# Patient Record
Sex: MALE | Race: BLACK OR AFRICAN AMERICAN | NOT HISPANIC OR LATINO | URBAN - METROPOLITAN AREA
[De-identification: names, ages, dates, MRNs, and addresses within clinical notes are randomized per-mention and may not be internally consistent; named-entity substitution may affect disease eponyms.]

---

## 2017-01-10 NOTE — HP
CIWA Score





- CIWA Score


Nausea/Vomiting: 3


Muscle Tremors: 3


Anxiety: 2


Agitation: 3


Paroxysmal Sweats: 3


Orientation: 0-Oriented


Tacttile Disturbances: 1-Very Mild Itch/Numbness


Auditory Disturbances: 0-None


Visual Disturbances: 0-None


Headache: 2-Mild


CIWA-Ar Total Score: 17





Admission ROS [facility]





- HPI


Chief Complaint: 


I need help to stop using alcohol and cocaine .


Allergies/Adverse Reactions: 


 Allergies











Allergy/AdvReac Type Severity Reaction Status Date / Time


 


tylemperatriz with codeine Allergy Intermediate Swelling Uncoded 01/10/17 17:52











History of Present Illness: 


89y/o m pt with h/o htn aox3 in  nad ambulating  and cooperative with exam. 


Exam Limitations: No Limitations





- Ebola screening


Have you traveled outside of the country in the last 21 days: No


Have you had contact with anyone from an Ebola affected area: No


Have you been sick,other than usual withdrawal symptoms: No


Do you have a fever: No





- Review of Systems


Constitutional: Malaise, Night Sweats, Changes in sleep


EENT: reports: Ear Pain


Respiratory: reports: Shortness of Breath


Cardiac: reports: No Symptoms Reported, Chest Tightness


GI: reports: Indigestion


: reports: Frequency


Musculoskeletal: reports: Muscle Weakness (left sided weakness occas. ambulates 

with a cane)


Integumentary: reports: No Symptoms Reported


Neuro: reports: Headache, Weakness


Endocrine: reports: No Symptoms Reported


Hematology: reports: No Symptoms Reported


Psychiatric: reports: No Sypmtoms Reported


Other Systems: Reviewed and Negative





Patient History





- Patient Medical History


Hx Anemia: No


Hx Asthma: No


Hx Cancer: No


Hx Cardiac Disorders: No


Hx Congestive Heart Failure: No


Hx Hypertension: Yes


Hx Hypercholesterolemia: Yes


Hx Pacemaker: No


HX Cerebrovascular Accident: Yes (x 3 )


Hx Seizures: No


Hx Dementia: No (h/o blackouts last 5 yrs ago )


Hx Diabetes: No


Hx Gastrointestinal Disorders: Yes (gerd )


Hx Liver Disease: No


Hx Genitourinary Disorders: Yes (bph )


Hx Sexually Transmitted Disorders: No


Hx Renal Disease (ESRD): No


Hx Thyroid Disease: No


Hx Human Immunodeficiency Virus (HIV): No (neg, )


Hx Hepatitis C: No


Hx Depression: Yes


Hx Suicide Attempt: No


Hx Bipolar Disorder: No


Hx Schizophrenia: No


Other Medical History: h/o dvt-pe on warfarin 7.0mg/d 





- Patient Surgical History


Hx Appendectomy: Yes





- PPD History


Documented Results: Negative w/o proof


PPD to be Administered?: Yes





- Reproductive History


Patient is a Female of Child Bearing Age (11 -55 yrs old): No





- Smoking Cessation


Smoking history: Current some day smoker


Have you smoked in the past 12 months: Yes


Aproximately how many cigarettes per day: 2


Cigars Per Day: 0


Hx Chewing Tobacco Use: No


Initiated information on smoking cessation: Yes


'Breaking Loose' booklet given: 01/10/17





- Substance & Tx. History


Hx Alcohol Use: Yes


Hx Substance Use: Yes


Substance Use Type: Alcohol, Cocaine


Hx Substance Use Treatment: Yes





- Substances Abused


  ** Alcohol


Route: Oral


Frequency: Daily


Amount used: vodka 2 pts/d 


Age of first use: 18


Date of Last Use: 01/10/17





  ** Crack


Route: Smoking


Frequency: 1-3 times last 30 days


Amount used: $100-300/ use 


Age of first use: 29


Date of Last Use: 18





Family Disease History





- Family Disease History


Family Disease History: Other: Father (cva  ), Mother (cva  ), 

Sister (cva )





Admission Physical Exam [facility]





- Vital Signs


Vital Signs: 


 Vital Signs - 24 hr











  01/10/17





  16:24


 


Temperature 96.7 F L


 


Pulse Rate 110 H


 


Respiratory 20





Rate 


 


Blood Pressure 159/100








55 y/o m pt 





- Physical


General Appearance: Yes: Disheveled, Obese, Sweating, Anxious


HEENTM: Yes: EOMI, Hearing grossly Normal, Normocephalic, Normal Voice, ANDI


Respiratory: Yes: Chest Non-Tender, Lungs Clear, Normal Breath Sounds, No 

Respiratory Distress


Neck: Yes: Supple, Trachea in good position


Breast: Yes: Within Normal Limits


Cardiology: Yes: Regular Rhythm, S1, S2, Tachycardia


Abdominal: Yes: Non Tender, Flat, Soft, Increased Bowel Sounds, Protuberent


Genitourinary: Yes: Frequency, Uregency, Dribblimg


Back: Yes: Decreased Range of Motion


Musculoskeletal: Yes: Back pain


Extremities: Yes: Tremors


Neurological: Yes: CNs II-XII NML intact, Fully Oriented, Alert, Normal Response


Integumentary: Yes: Moist


Lymphatic: Yes: Within Normal Limits





- Diagnostic


(1) Alcohol dependence with uncomplicated withdrawal


Current Visit: Yes   Status: Chronic





(2) Crack cocaine use


Current Visit: Yes   Status: Chronic





(3) HTN (hypertension)


Current Visit: Yes   Status: Chronic   Qualifiers: 


     Hypertension type: essential hypertension        Qualified Code(s): I10 - 

Essential (primary) hypertension  





(4) Hyperlipidemia


Current Visit: Yes   Status: Chronic   Qualifiers: 


     Hyperlipidemia type: unspecified        Qualified Code(s): E78.5 - 

Hyperlipidemia, unspecified  





(5) History of pulmonary embolism


Current Visit: Yes   Status: Chronic





(6) Nicotine abuse


Current Visit: Yes   Status: Chronic





(7) H/O: CVA (cerebrovascular accident)


Current Visit: Yes   Status: Chronic








Cleared for Admission S





- Detox or Rehab


[facility] Level of Care: Medically Managed


Detox Regimen/Protocol: Librium





BHS Breath Alcohol Content


Breath Alcohol Content: 0.083





Urine Drug Screen





- Results


Drug Screen Negative: No


Urine Drug Screen Results: SANDRINE-Cocaine

## 2017-01-11 NOTE — PN
S CIWA





- CIWA Score


Nausea/Vomitin-No Nausea/No Vomiting


Muscle Tremors: 4-Moderate,w/Arms Extend


Anxiety: 3


Agitation: 4-Moderately Restless


Paroxysmal Sweats: 3


Orientation: 0-Oriented


Tacttile Disturbances: 0-None


Auditory Disturbances: 0-None


Visual Disturbances: 0-None


Headache: 1-Very Mild


CIWA-Ar Total Score: 15





BHS Progress Note (SOAP)


Subjective: 


sweats


shakes


headache


interrupted sleep


Objective: 





17 11:22


 Vital Signs











Temperature  98.2 F   17 10:03


 


Pulse Rate  81   17 10:03


 


Respiratory Rate  16   17 10:03


 


Blood Pressure  135/98   17 10:03


 


O2 Sat by Pulse Oximetry (%)      








 Laboratory Tests











  01/10/17 01/11/17 01/11/17





  23:00 07:30 07:30


 


WBC   6.3 


 


RBC   4.09 


 


Hgb   11.7 


 


Hct   36.7 


 


MCV   89.6 


 


MCHC   32.0 


 


RDW   19.3 H 


 


Plt Count   412 


 


MPV   8.8 


 


Sodium    142


 


Potassium    4.0


 


Chloride    110 H


 


Carbon Dioxide    24


 


Anion Gap    8


 


BUN    9


 


Creatinine    0.9


 


Creat Clearance w eGFR    > 60


 


Random Glucose    86


 


Calcium    8.7


 


Total Bilirubin    0.7


 


AST    13 L


 


ALT    20


 


Alkaline Phosphatase    85


 


Total Protein    7.2


 


Albumin    3.7


 


Urine Color  Lt. yellow  


 


Urine Appearance  Clear  


 


Urine pH  7.0  


 


Ur Specific Gravity  1.010  


 


Urine Protein  Negative  


 


Urine Glucose (UA)  Negative  


 


Urine Ketones  Negative  


 


Urine Blood  Negative  


 


Urine Nitrite  Negative  


 


Urine Bilirubin  Negative  


 


Urine Urobilinogen  1.0 e.u/dl  


 


Ur Leukocyte Esterase  Negative  


 


RPR Titer   


 


HIV 1&2 Antibody Screen   


 


HIV P24 Antigen   














  17





  07:30 07:30


 


WBC  


 


RBC  


 


Hgb  


 


Hct  


 


MCV  


 


MCHC  


 


RDW  


 


Plt Count  


 


MPV  


 


Sodium  


 


Potassium  


 


Chloride  


 


Carbon Dioxide  


 


Anion Gap  


 


BUN  


 


Creatinine  


 


Creat Clearance w eGFR  


 


Random Glucose  


 


Calcium  


 


Total Bilirubin  


 


AST  


 


ALT  


 


Alkaline Phosphatase  


 


Total Protein  


 


Albumin  


 


Urine Color  


 


Urine Appearance  


 


Urine pH  


 


Ur Specific Gravity  


 


Urine Protein  


 


Urine Glucose (UA)  


 


Urine Ketones  


 


Urine Blood  


 


Urine Nitrite  


 


Urine Bilirubin  


 


Urine Urobilinogen  


 


Ur Leukocyte Esterase  


 


RPR Titer  Nonreactive 


 


HIV 1&2 Antibody Screen   Negative


 


HIV P24 Antigen   Negative








awake/alert


ambulating


no acute distress


Assessment: 





17 11:23


withdrawal sx


Plan: 


continue detox


increase fluids

## 2017-01-11 NOTE — CONSULT
[facility] Psychiatric Consult





- Data


Date of interview: 01/11/17


Admission source: [facility]


Identifying data: This is 56 years olod male with  psychiatric hospitalization 

history intoxicated with Alcohol, Crack and Nicotine


Substance Abuse History: - Smoking Cessation.  Smoking history: Current some 

day smoker.  Have you smoked in the past 12 months: Yes.  Aproximately how many 

cigarettes per day: 2.  Cigars Per Day: 0.  Hx Chewing Tobacco Use: No.  

Initiated information on smoking cessation: Yes.  'Breaking Loose' booklet given

: 01/10/17.  - Substance & Tx. History.  Hx Alcohol Use: Yes.  Hx Substance Use

: Yes.  Substance Use Type: Alcohol, Cocaine.  Hx Substance Use Treatment: Yes.

  - Substances Abused.  ** Alcohol.  Route: Oral.  Frequency: Daily.  Amount 

used: vodka 2 pts/d.  Age of first use: 18.  Date of Last Use: 01/10/17.  ** 

Crack.  Route: Smoking.  Frequency: 1-3 times last 30 days.  Amount used: $100-

300/ use.  Age of first use: 29.  Date of Last Use: 01/03/17


Medical History: HTN, Hyperlipidemia


Psychiatric History: Patient reportsm history of depression, repoprts most 

recent psychiatric admission on 2106 at [facility], reports no 

suicidal history, reports no medications taking prior to admission, willing to 

sart andideprssants on rehabilitation program


Physical/Sexual Abuse/Trauma History: Denies


Additional Comment: Detox Unit Care Protocol





Mental Status Exam





- Mental Status Exam


Alert and Oriented to: Person


Cognitive Function: Fair


Patient Appearance: Well Groomed


Mood: Apprehensive


Patient Behavior: Cooperative


Speech Pattern: Appropriate


Voice Loudness: Normal


Thought Process: Goal Oriented


Hallucinations: Denies


Suicidal Ideation: Denies


Homicidal Ideation: Denies


Insight/Judgement: Fair


Sleep: Difficulty falling asleep


Appetite: Fair


Muscle strength/Tone: Normal


Gait/Station: Normal


Additional Comments: Detox Unit Care Protocol





Psychiatric Findings





- Problem List (Axis 1, 2,3)


(1) Alcohol dependence with uncomplicated withdrawal


Current Visit: Yes   Status: Chronic





(2) Crack cocaine use


Current Visit: Yes   Status: Chronic





(3) Nicotine abuse


Current Visit: Yes   Status: Chronic





(4) Drug-induced mood disorder


Current Visit: Yes   Status: Acute








- Initial Treatment Plan


Initial Treatment Plan: Detox Unit Care Protocol

## 2017-01-11 NOTE — EKG
Test Reason : 

Blood Pressure : ***/*** mmHG

Vent. Rate : 099 BPM     Atrial Rate : 099 BPM

   P-R Int : 178 ms          QRS Dur : 080 ms

    QT Int : 352 ms       P-R-T Axes : 078 025 061 degrees

   QTc Int : 451 ms

 

NORMAL SINUS RHYTHM

NORMAL ECG

NO PREVIOUS ECGS AVAILABLE

Confirmed by EVER GONSALVES, CORONA ([id number]) on 1/11/2017 10:25:45 AM

 

Referred By:             Confirmed By:CORONA CURTIS MD

## 2017-01-12 NOTE — PN
S CIWA





- CIWA Score


Nausea/Vomitin


Muscle Tremors: 2


Anxiety: 2


Agitation: 2


Paroxysmal Sweats: 2


Orientation: 0-Oriented


Tacttile Disturbances: 1-Very Mild Itch/Numbness


Auditory Disturbances: 0-None


Visual Disturbances: 0-None


Headache: 0-None Present


CIWA-Ar Total Score: 11





BHS Progress Note (SOAP)


Subjective: 


feeling better , some swewats , headache and nasal congestion 


Objective: 





17 09:50


 Vital Signs











Temperature  97.1 F L  17 05:52


 


Pulse Rate  71   17 05:52


 


Respiratory Rate  18   17 05:52


 


Blood Pressure  107/61   17 05:52


 


O2 Sat by Pulse Oximetry (%)      








 Laboratory Tests











  01/10/17 01/11/17 01/11/17





  23:00 07:30 07:30


 


WBC   6.3 


 


RBC   4.09 


 


Hgb   11.7 


 


Hct   36.7 


 


MCV   89.6 


 


MCHC   32.0 


 


RDW   19.3 H 


 


Plt Count   412 


 


MPV   8.8 


 


Sodium    142


 


Potassium    4.0


 


Chloride    110 H


 


Carbon Dioxide    24


 


Anion Gap    8


 


BUN    9


 


Creatinine    0.9


 


Creat Clearance w eGFR    > 60


 


Random Glucose    86


 


Calcium    8.7


 


Total Bilirubin    0.7


 


AST    13 L


 


ALT    20


 


Alkaline Phosphatase    85


 


Total Protein    7.2


 


Albumin    3.7


 


Urine Color  Lt. yellow  


 


Urine Appearance  Clear  


 


Urine pH  7.0  


 


Ur Specific Gravity  1.010  


 


Urine Protein  Negative  


 


Urine Glucose (UA)  Negative  


 


Urine Ketones  Negative  


 


Urine Blood  Negative  


 


Urine Nitrite  Negative  


 


Urine Bilirubin  Negative  


 


Urine Urobilinogen  1.0 e.u/dl  


 


Ur Leukocyte Esterase  Negative  


 


RPR Titer   


 


HIV 1&2 Antibody Screen   


 


HIV P24 Antigen   














  17





  07:30 07:30


 


WBC  


 


RBC  


 


Hgb  


 


Hct  


 


MCV  


 


MCHC  


 


RDW  


 


Plt Count  


 


MPV  


 


Sodium  


 


Potassium  


 


Chloride  


 


Carbon Dioxide  


 


Anion Gap  


 


BUN  


 


Creatinine  


 


Creat Clearance w eGFR  


 


Random Glucose  


 


Calcium  


 


Total Bilirubin  


 


AST  


 


ALT  


 


Alkaline Phosphatase  


 


Total Protein  


 


Albumin  


 


Urine Color  


 


Urine Appearance  


 


Urine pH  


 


Ur Specific Gravity  


 


Urine Protein  


 


Urine Glucose (UA)  


 


Urine Ketones  


 


Urine Blood  


 


Urine Nitrite  


 


Urine Bilirubin  


 


Urine Urobilinogen  


 


Ur Leukocyte Esterase  


 


RPR Titer  Nonreactive 


 


HIV 1&2 Antibody Screen   Negative


 


HIV P24 Antigen   Negative











Assessment: 





17 09:50


withdrawl sx's 


Plan: 


cont.detox 


increase fluids 


inr pending 


flonase 


tylenolprn

## 2017-01-13 NOTE — DS
BHS Detox Discharge Summary


Admission Date: 


01/10/17





Discharge Date: 01/13/17





- History


Present History: Alcohol Dependence, Cocaine Dependence


Additional Comments: 


patient is stable to be discharged to [facility] today


Pertinent Past History: 


hypertension


hyperlipidemia


old cva


history of pulmonary embolism





- Physical Exam Results


Vital Signs: 


 Vital Signs











Temperature  96.4 F L  01/13/17 10:05


 


Pulse Rate  106 H  01/13/17 10:05


 


Respiratory Rate  20   01/13/17 10:05


 


Blood Pressure  140/89   01/13/17 10:05


 


O2 Sat by Pulse Oximetry (%)      











Pertinent Admission Physical Exam Findings: 


withdrawal symptom





- Treatment


Hospital Course: Detox Protocol Followed, Detoxed Safely, Responded well, 

Discharged Condition Good, Rehab Referral Accepted


[facility]





- Medication


Discharge Medications: 


Ambulatory Orders





Clopidogrel Bisulfate [Plavix -] 75 mg PO DAILY 01/10/17 


Hydrochlorothiazide [Hctz -] 12.5 mg PO DAILY 01/10/17 


Lisinopril [Prinivil -] 40 mg PO DAILY 01/10/17 


Omeprazole 40 mg PO DAILY 01/10/17 


Tamsulosin HCl [Flomax] 0.4 mg PO DAILY 01/10/17 


Warfarin Sodium [Coumadin] 7.5 mg PO DAILY 01/10/17 











- Diagnosis


(1) Alcohol dependence with uncomplicated withdrawal


Current Visit: Yes   Status: Chronic





(2) H/O: CVA (cerebrovascular accident)


Current Visit: Yes   Status: Chronic





(3) HTN (hypertension)


Current Visit: Yes   Status: Chronic   Qualifiers: 


     Hypertension type: essential hypertension        Qualified Code(s): I10 - 

Essential (primary) hypertension  





(4) History of pulmonary embolism


Current Visit: Yes   Status: Chronic





(5) Hyperlipidemia


Current Visit: Yes   Status: Chronic   Qualifiers: 


     Hyperlipidemia type: unspecified        Qualified Code(s): E78.5 - 

Hyperlipidemia, unspecified  





(6) Nicotine abuse


Current Visit: Yes   Status: Chronic





(7) Cocaine dependence


Current Visit: Yes   Status: Acute   








- AMA


Did Patient Leave Against Medical Advice: No

## 2017-01-13 NOTE — PN
BHS Progress Note (SOAP)


Subjective: 


ALERT,INTERRUPTED SLEEP,ANXIOUS


Objective: 





01/13/17 10:59


 Vital Signs











Temperature  96.4 F L  01/13/17 10:05


 


Pulse Rate  106 H  01/13/17 10:05


 


Respiratory Rate  20   01/13/17 10:05


 


Blood Pressure  140/89   01/13/17 10:05


 


O2 Sat by Pulse Oximetry (%)      











Assessment: 





01/13/17 10:59


WITHDRAWAL SYMPTOM


Plan: 


CONTINUE DETOX,DISCHARGE IN AM

## 2017-01-14 NOTE — PN
BHS Progress Note


Note: 


INR IS 2.15 THERAPEUTIC RANGE,TO CONTINUE COUMADIN 7.5 MGS PO DAILY,REPEAT INR 

ON MON 01/16/17

## 2017-01-23 NOTE — PN
BHS Progress Note (SOAP)


Subjective: 


nose bleed after blowing nose , no other bleeding sites 


Objective: 





01/23/17 11:55


 Vital Signs











Temperature  98.1 F   01/23/17 06:43


 


Pulse Rate  95 H  01/23/17 10:17


 


Respiratory Rate  18   01/23/17 06:43


 


Blood Pressure  135/84   01/23/17 10:17


 


O2 Sat by Pulse Oximetry (%)      








 Laboratory Last Values











INR  3.67  (0.82-1.09)  H D 01/23/17  06:30    








pt aox3 in nad ambulating ,


no acute bleeding 


Assessment: 





01/23/17 11:56


epistaxis 


elevated inr 


Plan: 


plan - decrease warfarin to 5mg po daily


         repeat inr 


         report any bleeding episodes to staff

## 2017-02-02 NOTE — PN
Psychiatric Progress Note


Vital Signs: 


 Vital Signs











 Period  Temp  Pulse  Resp  BP Sys/Pickering  Pulse Ox


 


 Last 24 Hr  97.5 F  76-93  18-20  136-144/87-93  











Date of Session: 02/02/17


Chief Complaint:: discharge visit


HPI: Patient has addressed alcohol, cocaine,nicotine dependence.


ROS: HTN, CVA, h/o PE, hypercholesterolemia medically managed.


Current Medications: 


Active Medications











Generic Name Dose Route Start Last Admin





  Trade Name Freq  PRN Reason Stop Dose Admin


 


Acetaminophen  650 mg 01/13/17 15:48 02/01/17 21:54





  Tylenol -  PO   650 mg





  Q4H PRN   Administration





  FEVER OR PAIN   


 


Al Hydroxide/Mg Hydroxide  30 ml 01/13/17 15:48 01/26/17 12:28





  Mylanta Oral Suspension -  PO   30 ml





  Q6H PRN   Administration





  DYSPEPSIA   


 


Albuterol Sulfate  2 puff 01/16/17 12:54 02/01/17 21:18





  Ventolin Hfa Inhaler -  IH   2 puff





  Q4H PRN   Administration





  SHORT OF BREATH/WHEEZING   


 


Clopidogrel Bisulfate  75 mg 01/14/17 10:00 02/02/17 09:54





  Plavix -  PO   75 mg





  DAILY ASHLEY   Administration


 


Diphenhydramine HCl  50 mg 01/13/17 15:48 02/01/17 21:17





  Benadryl -  PO   50 mg





  HSMR1 PRN   Administration





  FOR ITCHING   


 


Eucalyptus/Menthol/Phenol/Sorbitol  1 each 01/13/17 15:48  





  Cepastat Lozenge -  MM   





  Q4H PRN   





  SORE THROAT   


 


Fluticasone Propionate  1 spray 01/13/17 22:00 02/02/17 09:55





  Flonase -  NS   1 spray





  BID ASHLEY   Administration


 


Guaifenesin  10 ml 01/13/17 15:48 01/24/17 21:20





  Robitussin Dm -  PO   10 ml





  Q6H PRN   Administration





  COUGH   


 


Hydrochlorothiazide  12.5 mg 01/14/17 10:00 02/02/17 09:54





  Hctz -  PO   12.5 mg





  DAILY ASHLEY   Administration


 


Hydroxyzine Pamoate  25 mg 01/13/17 15:48  





  Vistaril -  PO   





  Q4H PRN   





  AGITATION   


 


Lisinopril  40 mg 01/14/17 10:00 02/02/17 09:54





  Prinivil  PO   40 mg





  DAILY ASHLEY   Administration


 


Loperamide HCl  4 mg 01/13/17 15:48  





  Imodium -  PO   





  Q6H PRN   





  DIARRHEA   


 


Loratadine  10 mg 01/26/17 10:00 02/02/17 09:54





  Claritin -  PO   10 mg





  DAILY ASHLEY   Administration


 


Magnesium Hydroxide  30 ml 01/13/17 15:48  





  Milk Of Magnesia -  PO   





  DAILY PRN   





  CONSTIPATION   


 


Metoprolol Tartrate  25 mg 01/20/17 13:45 02/02/17 09:54





  Lopressor -  PO   25 mg





  BID ASHLEY   Administration


 


Pantoprazole Sodium  40 mg 01/20/17 10:00 02/02/17 09:54





  Protonix -  PO   40 mg





  DAILY ASHLEY   Administration


 


Prenatal Multivit/Folic Acid/Iron  1 tab 01/14/17 10:00 02/02/17 09:54





  Prenatal Vitamins (Sjr) -  PO   1 tab





  DAILY ASHLEY   Administration


 


Pseudoephedrine/Triprolidine  1 combo 01/13/17 15:48 01/14/17 21:20





  Actifed -  PO   1 combo





  TID PRN   Administration





  NASAL CONGESTION   


 


Tamsulosin HCl  0.4 mg 01/14/17 10:00 02/02/17 09:54





  Flomax -  PO   0.4 mg





  DAILY ASHLEY   Administration


 


Thiamine HCl  100 mg 01/13/17 22:00 02/01/17 21:17





  Vitamin B1 -  PO   100 mg





  HS ASHLEY   Administration


 


Warfarin Sodium  4 mg 01/24/17 18:00 02/01/17 17:00





  Coumadin -  PO   4 mg





  DAILY@1800 ASHLEY   Administration











Current Side Effect: No


Lab tests ordered: No


Lab tests reviewed: Yes


Provider note:: Patient has acompleted today this treatment and met his goals, 

will continue to address his issues at [facility] opd. He verbalized 

understanding of the negative consequenses of alcohol cocaine on his major life 

mert and motivated to continue maintain abstinence and adherent to every 

aspects of his aftercare plans, patient is stable for discharge sven.


Total face to face time:: 20





Mental Status Exam





- Mental Status Exam


Alert and Oriented to: Time, Place, Person


Cognitive Function: Good


Patient Appearance: Well Groomed


Mood: Hopeful


Affect: Appropriate, Mood Congruent


Patient Behavior: Appropriate, Cooperative


Speech Pattern: Clear, Appropriate


Voice Loudness: Normal


Thought Process: Intact, Goal Oriented


Thought Disorder: Not Present


Hallucinations: Denies


Suicidal Ideation: Denies


Homicidal Ideation: Denies


Insight/Judgement: Fair


Sleep: Fair


Appetite: Fair


Muscle strength/Tone: Normal


Gait/Station: Normal





Psychiatric Treatment Plan





- Problem List


(1) Crack cocaine use


Current Visit: No





(2) H/O: CVA (cerebrovascular accident)


Current Visit: No





(3) HTN (hypertension)


Current Visit: No   Qualifiers: 


     Hypertension type: essential hypertension        Qualified Code(s): I10 - 

Essential (primary) hypertension  





(4) History of pulmonary embolism


Current Visit: No





(5) Hyperlipidemia


Current Visit: No   Qualifiers: 


     Hyperlipidemia type: unspecified        Qualified Code(s): E78.5 - 

Hyperlipidemia, unspecified  





(6) Alcohol dependence


Current Visit: Yes   





(7) Nicotine dependence


Current Visit: Yes

## 2017-04-02 ENCOUNTER — EMERGENCY (EMERGENCY)
Facility: HOSPITAL | Age: 57
LOS: 1 days | Discharge: HOME | End: 2017-04-02
Attending: EMERGENCY MEDICINE | Admitting: EMERGENCY MEDICINE
Payer: MEDICARE

## 2017-04-02 VITALS
TEMPERATURE: 98 F | RESPIRATION RATE: 16 BRPM | OXYGEN SATURATION: 98 % | HEART RATE: 106 BPM | SYSTOLIC BLOOD PRESSURE: 140 MMHG | DIASTOLIC BLOOD PRESSURE: 90 MMHG

## 2017-04-02 DIAGNOSIS — Z88.8 ALLERGY STATUS TO OTHER DRUGS, MEDICAMENTS AND BIOLOGICAL SUBSTANCES STATUS: ICD-10-CM

## 2017-04-02 DIAGNOSIS — R06.02 SHORTNESS OF BREATH: ICD-10-CM

## 2017-04-02 DIAGNOSIS — I10 ESSENTIAL (PRIMARY) HYPERTENSION: ICD-10-CM

## 2017-04-02 DIAGNOSIS — Z79.899 OTHER LONG TERM (CURRENT) DRUG THERAPY: ICD-10-CM

## 2017-04-02 DIAGNOSIS — R07.9 CHEST PAIN, UNSPECIFIED: ICD-10-CM

## 2017-04-02 DIAGNOSIS — R42 DIZZINESS AND GIDDINESS: ICD-10-CM

## 2017-04-02 LAB
ALBUMIN SERPL ELPH-MCNC: 3.5 G/DL — SIGNIFICANT CHANGE UP (ref 3.4–5)
ALP SERPL-CCNC: 81 U/L — SIGNIFICANT CHANGE UP (ref 40–120)
ALT FLD-CCNC: 19 U/L — SIGNIFICANT CHANGE UP (ref 12–42)
ANION GAP SERPL CALC-SCNC: 12 MMOL/L — SIGNIFICANT CHANGE UP (ref 9–16)
AST SERPL-CCNC: 21 U/L — SIGNIFICANT CHANGE UP (ref 15–37)
BASOPHILS NFR BLD AUTO: 0.4 % — SIGNIFICANT CHANGE UP (ref 0–2)
BILIRUB SERPL-MCNC: 1 MG/DL — SIGNIFICANT CHANGE UP (ref 0.2–1.2)
BUN SERPL-MCNC: 13 MG/DL — SIGNIFICANT CHANGE UP (ref 7–23)
CALCIUM SERPL-MCNC: 8.5 MG/DL — SIGNIFICANT CHANGE UP (ref 8.5–10.5)
CHLORIDE SERPL-SCNC: 107 MMOL/L — SIGNIFICANT CHANGE UP (ref 96–108)
CO2 SERPL-SCNC: 25 MMOL/L — SIGNIFICANT CHANGE UP (ref 22–31)
CREAT SERPL-MCNC: 0.92 MG/DL — SIGNIFICANT CHANGE UP (ref 0.5–1.3)
EOSINOPHIL NFR BLD AUTO: 0.7 % — SIGNIFICANT CHANGE UP (ref 0–6)
GLUCOSE SERPL-MCNC: 84 MG/DL — SIGNIFICANT CHANGE UP (ref 70–99)
HCT VFR BLD CALC: 36.4 % — LOW (ref 39–50)
HGB BLD-MCNC: 11.8 G/DL — LOW (ref 13–17)
IMM GRANULOCYTES NFR BLD AUTO: 0.4 % — SIGNIFICANT CHANGE UP (ref 0–1.5)
INR BLD: 1.6 — HIGH (ref 0.88–1.16)
LYMPHOCYTES # BLD AUTO: 20.1 % — SIGNIFICANT CHANGE UP (ref 13–44)
MCHC RBC-ENTMCNC: 30.7 PG — SIGNIFICANT CHANGE UP (ref 27–34)
MCHC RBC-ENTMCNC: 32.4 G/DL — SIGNIFICANT CHANGE UP (ref 32–36)
MCV RBC AUTO: 94.8 FL — SIGNIFICANT CHANGE UP (ref 80–100)
MONOCYTES NFR BLD AUTO: 11 % — SIGNIFICANT CHANGE UP (ref 2–14)
NEUTROPHILS NFR BLD AUTO: 67.4 % — SIGNIFICANT CHANGE UP (ref 43–77)
PLATELET # BLD AUTO: 390 K/UL — SIGNIFICANT CHANGE UP (ref 150–400)
POTASSIUM SERPL-MCNC: 3.3 MMOL/L — LOW (ref 3.5–5.3)
POTASSIUM SERPL-SCNC: 3.3 MMOL/L — LOW (ref 3.5–5.3)
PROT SERPL-MCNC: 7.8 G/DL — SIGNIFICANT CHANGE UP (ref 6.4–8.2)
PROTHROM AB SERPL-ACNC: 17.8 SEC — HIGH (ref 9.8–12.7)
RBC # BLD: 3.84 M/UL — LOW (ref 4.2–5.8)
RBC # FLD: 16.8 % — SIGNIFICANT CHANGE UP (ref 10.3–16.9)
SODIUM SERPL-SCNC: 144 MMOL/L — SIGNIFICANT CHANGE UP (ref 132–145)
TROPONIN I SERPL-MCNC: <0.017 NG/ML — LOW (ref 0.02–0.06)
WBC # BLD: 8.3 K/UL — SIGNIFICANT CHANGE UP (ref 3.8–10.5)
WBC # FLD AUTO: 8.3 K/UL — SIGNIFICANT CHANGE UP (ref 3.8–10.5)

## 2017-04-02 PROCEDURE — 99220: CPT | Mod: 25

## 2017-04-02 PROCEDURE — 93010 ELECTROCARDIOGRAM REPORT: CPT | Mod: 77

## 2017-04-02 PROCEDURE — 71010: CPT | Mod: 26

## 2017-04-02 PROCEDURE — 99284 EMERGENCY DEPT VISIT MOD MDM: CPT

## 2017-04-02 RX ORDER — ENOXAPARIN SODIUM 100 MG/ML
100 INJECTION SUBCUTANEOUS ONCE
Qty: 0 | Refills: 0 | Status: COMPLETED | OUTPATIENT
Start: 2017-04-02 | End: 2017-04-02

## 2017-04-02 RX ORDER — WARFARIN SODIUM 2.5 MG/1
8 TABLET ORAL ONCE
Qty: 0 | Refills: 0 | Status: DISCONTINUED | OUTPATIENT
Start: 2017-04-02 | End: 2017-04-02

## 2017-04-02 RX ORDER — WARFARIN SODIUM 2.5 MG/1
5 TABLET ORAL ONCE
Qty: 0 | Refills: 0 | Status: COMPLETED | OUTPATIENT
Start: 2017-04-02 | End: 2017-04-02

## 2017-04-02 RX ORDER — ENOXAPARIN SODIUM 100 MG/ML
80 INJECTION SUBCUTANEOUS ONCE
Qty: 0 | Refills: 0 | Status: DISCONTINUED | OUTPATIENT
Start: 2017-04-02 | End: 2017-04-02

## 2017-04-02 RX ORDER — AMLODIPINE BESYLATE 2.5 MG/1
5 TABLET ORAL ONCE
Qty: 0 | Refills: 0 | Status: COMPLETED | OUTPATIENT
Start: 2017-04-02 | End: 2017-04-02

## 2017-04-02 RX ORDER — SODIUM CHLORIDE 9 MG/ML
1000 INJECTION INTRAMUSCULAR; INTRAVENOUS; SUBCUTANEOUS
Qty: 0 | Refills: 0 | Status: DISCONTINUED | OUTPATIENT
Start: 2017-04-02 | End: 2017-04-06

## 2017-04-02 RX ADMIN — WARFARIN SODIUM 5 MILLIGRAM(S): 2.5 TABLET ORAL at 21:20

## 2017-04-02 RX ADMIN — AMLODIPINE BESYLATE 5 MILLIGRAM(S): 2.5 TABLET ORAL at 16:34

## 2017-04-02 RX ADMIN — SODIUM CHLORIDE 60 MILLILITER(S): 9 INJECTION INTRAMUSCULAR; INTRAVENOUS; SUBCUTANEOUS at 21:16

## 2017-04-02 RX ADMIN — ENOXAPARIN SODIUM 100 MILLIGRAM(S): 100 INJECTION SUBCUTANEOUS at 21:15

## 2017-04-02 NOTE — ED CDU PROVIDER NOTE - OBJECTIVE STATEMENT
57 y/o M with pmhx pancreatitis, CVA, HTN, MI, PE (on Coumadin), c/o feeling dizzy and SOB after getting off a train at [place] today. Pt admits to using cocaine and alcohol last night. Denies chest pain, n/v, calf pain or swelling. Received 162mg ASA and ntg with EMS prior to arrival which made him feel better.  Takes coumadin for PE.  states when INR is low, takes lovenox as bridging at home.

## 2017-04-02 NOTE — ED PROVIDER NOTE - NS ED MD SCRIBE ATTENDING SCRIBE SECTIONS
HIV/PHYSICAL EXAM/REVIEW OF SYSTEMS/VITAL SIGNS( Pullset)/RESULTS/PAST MEDICAL/SURGICAL/SOCIAL HISTORY/HISTORY OF PRESENT ILLNESS

## 2017-04-02 NOTE — ED PROVIDER NOTE - PMH
Acute pancreatitis  Pancreatitis  Cerebral artery occlusion with cerebral infarction  Stroke  Diverticulosis of large intestine  Diverticulosis  History of pulmonary embolism    HTN (hypertension)    MI (myocardial infarction)

## 2017-04-02 NOTE — ED PROVIDER NOTE - INTERPRETATION
normal sinus rhythm, Normal axis, Normal [place] interval and QRS complex. There are no acute ischemic ST or T-wave changes.

## 2017-04-02 NOTE — ED ADULT TRIAGE NOTE - CHIEF COMPLAINT QUOTE
Patient complaining of chest pain, dizziness and shortness of breath x 1 hr Patient complaining of chest pain, dizziness and shortness of breath x 1 hr. Given , and 0.8 spray of Nitro Patient complaining of chest pain, dizziness and shortness of breath x 1 hr.  Admits cocaine and ETOH yesterday Given , and 0.8 spray of Nitro

## 2017-04-02 NOTE — ED ADULT NURSE NOTE - CHIEF COMPLAINT QUOTE
Patient complaining of chest pain, dizziness and shortness of breath x 1 hr.  Admits cocaine and ETOH yesterday Given , and 0.8 spray of Nitro

## 2017-04-02 NOTE — ED PROVIDER NOTE - MEDICAL DECISION MAKING DETAILS
sob, LH, baseline has cp, hx of MI, had cocaine last night w/ EtOH, on coumadin for PE, will check labs, screening EKG, will likely keep on obs for cardiology eval in the AM, continue to trend enzyme

## 2017-04-02 NOTE — ED PROVIDER NOTE - OBJECTIVE STATEMENT
55 y/o M with pmhx pancreatitis, CVA, HTN, MI, PE (on Coumadin), c/o feeling dizzy and SOB after getting off a train at [place] today. Pt admits to using cocaine and alcohol last night. Denies chest pain, n/v, calf pain or swelling. Received 162mg ASA and ntg with EMS prior to arrival. 55 y/o M with pmhx pancreatitis, CVA, HTN, MI, PE (on Coumadin), c/o feeling dizzy and SOB after getting off a train at [place] today. Pt admits to using cocaine and alcohol last night. Denies chest pain, n/v, calf pain or swelling. Received 162mg ASA and ntg with EMS prior to arrival which made him feel better.  Takes coumadin for PE.  states when INR is low, takes lovenox as bridging at home.

## 2017-04-03 VITALS
HEART RATE: 83 BPM | TEMPERATURE: 98 F | RESPIRATION RATE: 16 BRPM | DIASTOLIC BLOOD PRESSURE: 88 MMHG | SYSTOLIC BLOOD PRESSURE: 146 MMHG | OXYGEN SATURATION: 97 %

## 2017-04-03 LAB
TROPONIN I SERPL-MCNC: <0.017 NG/ML — LOW (ref 0.02–0.06)
TROPONIN I SERPL-MCNC: <0.017 NG/ML — LOW (ref 0.02–0.06)

## 2017-04-03 PROCEDURE — 99217: CPT

## 2017-04-03 RX ORDER — ENOXAPARIN SODIUM 100 MG/ML
100 INJECTION SUBCUTANEOUS ONCE
Qty: 0 | Refills: 0 | Status: COMPLETED | OUTPATIENT
Start: 2017-04-03 | End: 2017-04-03

## 2017-04-03 RX ADMIN — ENOXAPARIN SODIUM 100 MILLIGRAM(S): 100 INJECTION SUBCUTANEOUS at 09:48

## 2017-04-03 NOTE — SBIRT NOTE. - NSSBIRTSERVICES_GEN_A_ED_FT
Provided SBIRT services. Full Screen Positive. Brief Intervention Performed. Screening results were reviewed with the patient and patient was provided information about healthy guidelines and potential negative consequences associated with level of risk.   Motivation and readiness to reduce or stop use was discussed and goals and activities to make changes were suggested/offered.    Audit Score :8    Dast Score :2    Duration : 15 minutes

## 2017-04-03 NOTE — CONSULT NOTE ADULT - SUBJECTIVE AND OBJECTIVE BOX
[facility] Cardiology Consultation    CHIEF COMPLAINT: dizziness    HISTORY OF PRESENT ILLNESS: The patient is a poor historian. Patient is seen this morning after completing 3 sets of cardiac markers and observational monitoring. He states that, as he was getting off the train, he noted a sensation of feeling dizzy and lightheaded. He did not pass out at the times. He notes possible retrosternal chest discomfort of unclear quality or duration as an associated complaint. He follows with a doctor in [place] and sees a cardiology on occasion. He admits to alcohol and cocaine use on occasion.     PAST MEDICAL & SURGICAL HISTORY:  HTN (hypertension)  MI (myocardial infarction)  History of pulmonary embolism  Cerebral artery occlusion with cerebral infarction: Stroke  Acute pancreatitis: Pancreatitis  Diverticulosis of large intestine: Diverticulosis    Allergies: codeine (Unknown)    MEDICATIONS:  patient was somewhat uncertain  ASA and coumadin  Atenolol possible  Lisinopril  Lipitor      FAMILY HISTORY:  No pertinent family history in first degree relatives    SOCIAL HISTORY:  occasional EtOH, occasional tobacco or occasional cocaine     REVIEW OF SYSTEMS:  CONSTITUTIONAL: No fever, weight loss, or fatigue  EYES: No eye pain, visual disturbances, or discharge  ENMT:  No difficulty hearing, tinnitus, vertigo; No sinus or throat pain  NECK: No pain or stiffness  BREASTS: No pain, masses, or nipple discharge  RESPIRATORY: No cough, wheezing, chills or hemoptysis; No Shortness of Breath  CARDIOVASCULAR: No chest pain, palpitations, dizziness, or leg swelling  GASTROINTESTINAL: No abdominal or epigastric pain. No nausea, vomiting, or hematemesis; No diarrhea or constipation. No melena or hematochezia.  GENITOURINARY: No dysuria, frequency, hematuria, or incontinence  NEUROLOGICAL: No headaches, memory loss, loss of strength, numbness, or tremors  SKIN: No itching, burning, rashes, or lesions   LYMPH Nodes: No enlarged glands  ENDOCRINE: No heat or cold intolerance; No hair loss  MUSCULOSKELETAL: No joint pain or swelling; No muscle, back, or extremity pain  PSYCHIATRIC: No depression, anxiety, mood swings, or difficulty sleeping  HEME/LYMPH: No easy bruising, or bleeding gums  ALLERY AND IMMUNOLOGIC: No hives or eczema	      PHYSICAL EXAM:  T(C): 36.9, Max: 37 (04-03 @ 01:23)  HR: 83 (57 - 106)  BP: 146/88 (126/83 - 172/99)  RR: 16 (16 - 20)  SpO2: 97% (94% - 98%)      Appearance: middle aged male NAD	  HEENT:   Normal oral mucosa, PERRL, EOMI	  Lymphatic: No lymphadenopathy  Cardiovascular: Normal S1 S2, No JVD, No murmurs, No edema  Respiratory: Lungs clear to auscultation	  Psychiatry: A & O x 3, Mood & affect appropriate  Gastrointestinal:  Soft, Non-tender, + BS	  Skin: No rashes, No ecchymoses, No cyanosis	  Neurologic: Non-focal  Extremities: Normal range of motion, No clubbing, cyanosis or edema  Vascular: Peripheral pulses palpable 2+ bilaterally  	    ECG:  	SR no ST-T changes    LABS:	 	  CARDIAC MARKERS:  Troponin I, Serum: <0.017 ng/mL (04-03 @ 06:32)  Troponin I, Serum: <0.017 ng/mL (04-03 @ 00:02)  Troponin I, Serum: <0.017 ng/mL (04-02 @ 17:52)                          11.8   8.3   )-----------( 390      ( 02 Apr 2017 16:27 )             36.4     02 Apr 2017 17:52    144    |  107    |  13     ----------------------------<  84     3.3     |  25     |  0.92     Ca    8.5        02 Apr 2017 17:52    TPro  7.8    /  Alb  3.5    /  TBili  1.0    /  DBili  x      /  AST  21     /  ALT  19     /  AlkPhos  81     02 Apr 2017 17:52        ASSESSMENT/PLAN: 	57 y/o male, poor historian, cocaine use who presents with dizziness  1. dizziness seems to be improving, unlikely cardiac, given the data obtained so far, encourage out-patient follow up and lifestyle modification  2. CAD on ASA, discussed beta blockers and recreational drug interactions  3. HTN responded to norvasc, but would cont home meds  4. HLD cont statin  5. Dispo stable for discharge, has follow up    I spent 45 min in care of this patient

## 2017-04-03 NOTE — ED ADULT NURSE REASSESSMENT NOTE - NS ED NURSE REASSESS COMMENT FT1
patient sleeping thru the night with no subjective complaints. VSS.
Rec'd pt in bed 7. Pt A+Ox3 and in NAD. Denies c/p, denies sob.

## 2017-07-30 ENCOUNTER — EMERGENCY (EMERGENCY)
Facility: HOSPITAL | Age: 57
LOS: 1 days | Discharge: HOME | End: 2017-07-30
Attending: EMERGENCY MEDICINE | Admitting: EMERGENCY MEDICINE
Payer: MEDICARE

## 2017-07-30 VITALS
HEART RATE: 79 BPM | DIASTOLIC BLOOD PRESSURE: 100 MMHG | OXYGEN SATURATION: 98 % | TEMPERATURE: 98 F | SYSTOLIC BLOOD PRESSURE: 142 MMHG | WEIGHT: 216.93 LBS

## 2017-07-30 VITALS
TEMPERATURE: 98 F | HEART RATE: 62 BPM | SYSTOLIC BLOOD PRESSURE: 135 MMHG | DIASTOLIC BLOOD PRESSURE: 87 MMHG | OXYGEN SATURATION: 98 % | RESPIRATION RATE: 18 BRPM

## 2017-07-30 DIAGNOSIS — R07.89 OTHER CHEST PAIN: ICD-10-CM

## 2017-07-30 DIAGNOSIS — R11.0 NAUSEA: ICD-10-CM
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
## 2017-07-30 DIAGNOSIS — I10 ESSENTIAL (PRIMARY) HYPERTENSION: ICD-10-CM

## 2017-07-30 DIAGNOSIS — Z79.899 OTHER LONG TERM (CURRENT) DRUG THERAPY: ICD-10-CM

## 2017-07-30 LAB
ALBUMIN SERPL ELPH-MCNC: 3.4 G/DL — SIGNIFICANT CHANGE UP (ref 3.4–5)
ALP SERPL-CCNC: 65 U/L — SIGNIFICANT CHANGE UP (ref 40–120)
ALT FLD-CCNC: 18 U/L — SIGNIFICANT CHANGE UP (ref 12–42)
ANION GAP SERPL CALC-SCNC: 9 MMOL/L — SIGNIFICANT CHANGE UP (ref 9–16)
APPEARANCE UR: CLEAR — SIGNIFICANT CHANGE UP
APTT BLD: 31.3 SEC — SIGNIFICANT CHANGE UP (ref 27.5–36.5)
AST SERPL-CCNC: 47 U/L — HIGH (ref 15–37)
BASOPHILS NFR BLD AUTO: 0.9 % — SIGNIFICANT CHANGE UP (ref 0–2)
BILIRUB SERPL-MCNC: 0.9 MG/DL — SIGNIFICANT CHANGE UP (ref 0.2–1.2)
BILIRUB UR-MCNC: NEGATIVE — SIGNIFICANT CHANGE UP
BUN SERPL-MCNC: 8 MG/DL — SIGNIFICANT CHANGE UP (ref 7–23)
CALCIUM SERPL-MCNC: 9.3 MG/DL — SIGNIFICANT CHANGE UP (ref 8.5–10.5)
CHLORIDE SERPL-SCNC: 107 MMOL/L — SIGNIFICANT CHANGE UP (ref 96–108)
CK MB BLD-MCNC: 0.65 % — SIGNIFICANT CHANGE UP
CK MB BLD-MCNC: 0.66 % — SIGNIFICANT CHANGE UP
CK MB CFR SERPL CALC: 1.3 NG/ML — SIGNIFICANT CHANGE UP (ref 0.5–3.6)
CK MB CFR SERPL CALC: 2.1 NG/ML — SIGNIFICANT CHANGE UP (ref 0.5–3.6)
CO2 SERPL-SCNC: 22 MMOL/L — SIGNIFICANT CHANGE UP (ref 22–31)
COLOR SPEC: YELLOW — SIGNIFICANT CHANGE UP
CREAT SERPL-MCNC: 0.81 MG/DL — SIGNIFICANT CHANGE UP (ref 0.5–1.3)
DIFF PNL FLD: NEGATIVE — SIGNIFICANT CHANGE UP
EOSINOPHIL NFR BLD AUTO: 1.3 % — SIGNIFICANT CHANGE UP (ref 0–6)
ETHANOL SERPL-MCNC: 7 MG/DL — HIGH
GLUCOSE SERPL-MCNC: 94 MG/DL — SIGNIFICANT CHANGE UP (ref 70–99)
GLUCOSE UR QL: NEGATIVE — SIGNIFICANT CHANGE UP
HCT VFR BLD CALC: 38.4 % — LOW (ref 39–50)
HGB BLD-MCNC: 12.5 G/DL — LOW (ref 13–17)
IMM GRANULOCYTES NFR BLD AUTO: 0.3 % — SIGNIFICANT CHANGE UP (ref 0–1.5)
INR BLD: 1.03 — SIGNIFICANT CHANGE UP (ref 0.88–1.16)
KETONES UR-MCNC: NEGATIVE — SIGNIFICANT CHANGE UP
LEUKOCYTE ESTERASE UR-ACNC: NEGATIVE — SIGNIFICANT CHANGE UP
LYMPHOCYTES # BLD AUTO: 20.5 % — SIGNIFICANT CHANGE UP (ref 13–44)
MCHC RBC-ENTMCNC: 32.1 PG — SIGNIFICANT CHANGE UP (ref 27–34)
MCHC RBC-ENTMCNC: 32.6 G/DL — SIGNIFICANT CHANGE UP (ref 32–36)
MCV RBC AUTO: 98.5 FL — SIGNIFICANT CHANGE UP (ref 80–100)
MONOCYTES NFR BLD AUTO: 13.7 % — SIGNIFICANT CHANGE UP (ref 2–14)
NEUTROPHILS NFR BLD AUTO: 63.3 % — SIGNIFICANT CHANGE UP (ref 43–77)
NITRITE UR-MCNC: NEGATIVE — SIGNIFICANT CHANGE UP
PCP SPEC-MCNC: SIGNIFICANT CHANGE UP
PH UR: 5.5 — SIGNIFICANT CHANGE UP (ref 5–8)
PLATELET # BLD AUTO: 389 K/UL — SIGNIFICANT CHANGE UP (ref 150–400)
POTASSIUM SERPL-MCNC: 5.1 MMOL/L — SIGNIFICANT CHANGE UP (ref 3.5–5.3)
POTASSIUM SERPL-SCNC: 5.1 MMOL/L — SIGNIFICANT CHANGE UP (ref 3.5–5.3)
PROT SERPL-MCNC: 7.6 G/DL — SIGNIFICANT CHANGE UP (ref 6.4–8.2)
PROT UR-MCNC: NEGATIVE MG/DL — SIGNIFICANT CHANGE UP
PROTHROM AB SERPL-ACNC: 11.3 SEC — SIGNIFICANT CHANGE UP (ref 9.8–12.7)
RBC # BLD: 3.9 M/UL — LOW (ref 4.2–5.8)
RBC # FLD: 15.1 % — SIGNIFICANT CHANGE UP (ref 10.3–16.9)
SODIUM SERPL-SCNC: 138 MMOL/L — SIGNIFICANT CHANGE UP (ref 132–145)
SP GR SPEC: >=1.03 — SIGNIFICANT CHANGE UP (ref 1–1.03)
TROPONIN I SERPL-MCNC: <0.017 NG/ML — LOW (ref 0.02–0.06)
TROPONIN I SERPL-MCNC: <0.017 NG/ML — LOW (ref 0.02–0.06)
UROBILINOGEN FLD QL: 0.2 E.U./DL — SIGNIFICANT CHANGE UP
WBC # BLD: 6.9 K/UL — SIGNIFICANT CHANGE UP (ref 3.8–10.5)
WBC # FLD AUTO: 6.9 K/UL — SIGNIFICANT CHANGE UP (ref 3.8–10.5)

## 2017-07-30 PROCEDURE — 99284 EMERGENCY DEPT VISIT MOD MDM: CPT

## 2017-07-30 PROCEDURE — 71010: CPT | Mod: 26

## 2017-07-30 PROCEDURE — 99220: CPT | Mod: 25

## 2017-07-30 PROCEDURE — 93010 ELECTROCARDIOGRAM REPORT: CPT

## 2017-07-30 RX ORDER — NITROGLYCERIN 6.5 MG
0.4 CAPSULE, EXTENDED RELEASE ORAL ONCE
Qty: 0 | Refills: 0 | Status: COMPLETED | OUTPATIENT
Start: 2017-07-30 | End: 2017-07-30

## 2017-07-30 RX ORDER — ONDANSETRON 8 MG/1
4 TABLET, FILM COATED ORAL ONCE
Qty: 0 | Refills: 0 | Status: COMPLETED | OUTPATIENT
Start: 2017-07-30 | End: 2017-07-30

## 2017-07-30 RX ORDER — ASPIRIN/CALCIUM CARB/MAGNESIUM 324 MG
162 TABLET ORAL ONCE
Qty: 0 | Refills: 0 | Status: COMPLETED | OUTPATIENT
Start: 2017-07-30 | End: 2017-07-30

## 2017-07-30 RX ORDER — AMLODIPINE BESYLATE 2.5 MG/1
5 TABLET ORAL ONCE
Qty: 0 | Refills: 0 | Status: COMPLETED | OUTPATIENT
Start: 2017-07-30 | End: 2017-07-30

## 2017-07-30 RX ADMIN — Medication 162 MILLIGRAM(S): at 08:04

## 2017-07-30 RX ADMIN — Medication 0.4 MILLIGRAM(S): at 08:05

## 2017-07-30 RX ADMIN — Medication 162 MILLIGRAM(S): at 01:55

## 2017-07-30 RX ADMIN — ONDANSETRON 4 MILLIGRAM(S): 8 TABLET, FILM COATED ORAL at 08:04

## 2017-07-30 RX ADMIN — AMLODIPINE BESYLATE 5 MILLIGRAM(S): 2.5 TABLET ORAL at 08:04

## 2017-07-30 NOTE — ED PROVIDER NOTE - NEURO NEGATIVE STATEMENT, MLM
+ lightheadedness. no loss of consciousness, no gait abnormality, no headache, no sensory deficits, and no weakness.

## 2017-07-30 NOTE — CONSULT NOTE ADULT - SUBJECTIVE AND OBJECTIVE BOX
[facility] Cardiology Consultation    CHIEF COMPLAINT: chest pain    HISTORY OF PRESENT ILLNESS: 56 y/o male with history of cocaine and alcohol use who presents secondary to chest discomfort. The pain was midsternal and pressure like in nature. Symptoms noted at rest and not different from prior complains. Patient comfortable and now complains of feeling nauseous.     PAST MEDICAL & SURGICAL HISTORY:  HTN (hypertension)  History of pulmonary embolism  Cerebral artery occlusion with cerebral infarction: Stroke  Acute pancreatitis: Pancreatitis  Diverticulosis of large intestine: Diverticulosis    Allergies codeine (Unknown)      MEDICATIONS:  xarelto  flomax  lisinopril  metoprolol  lipitor      FAMILY HISTORY:  No pertinent family history in first degree relatives    SOCIAL HISTORY:  no EtOH, tobacco or drug use    REVIEW OF SYSTEMS:  CONSTITUTIONAL: No fever, weight loss, or fatigue  EYES: No eye pain, visual disturbances, or discharge  ENMT:  No difficulty hearing, tinnitus, vertigo; No sinus or throat pain  NECK: No pain or stiffness  BREASTS: No pain, masses, or nipple discharge  RESPIRATORY: No cough, wheezing, chills or hemoptysis; No Shortness of Breath  CARDIOVASCULAR: No chest pain, palpitations, dizziness, or leg swelling  GASTROINTESTINAL: No abdominal or epigastric pain. No nausea, vomiting, or hematemesis; No diarrhea or constipation. No melena or hematochezia.  GENITOURINARY: No dysuria, frequency, hematuria, or incontinence  NEUROLOGICAL: No headaches, memory loss, loss of strength, numbness, or tremors  SKIN: No itching, burning, rashes, or lesions   LYMPH Nodes: No enlarged glands  ENDOCRINE: No heat or cold intolerance; No hair loss  MUSCULOSKELETAL: No joint pain or swelling; No muscle, back, or extremity pain  PSYCHIATRIC: No depression, anxiety, mood swings, or difficulty sleeping  HEME/LYMPH: No easy bruising, or bleeding gums  ALLERY AND IMMUNOLOGIC: No hives or eczema	      PHYSICAL EXAM:  T(C): 37.1 (07-30-17 @ 06:35), Max: 37.1 (07-30-17 @ 06:35)  HR: 59 (07-30-17 @ 06:35) (59 - 79)  BP: 142/97 (07-30-17 @ 06:35) (142/97 - 142/100)  RR: 18 (07-30-17 @ 06:35) (18 - 18)  SpO2: 96% (07-30-17 @ 06:35) (96% - 98%)  Appearance: elderly man NAD  HEENT:   Normal oral mucosa, PERRL, EOMI	  Lymphatic: No lymphadenopathy  Cardiovascular: Normal S1 S2, No JVD, No murmurs, No edema  Respiratory: Lungs clear to auscultation	  Psychiatry: A & O x 3, Mood & affect appropriate  Gastrointestinal:  Soft, Non-tender, + BS	  Skin: No rashes, No ecchymoses, No cyanosis	  Neurologic: Non-focal  Extremities: Normal range of motion, No clubbing, cyanosis or edema  Vascular: Peripheral pulses palpable 2+ bilaterally  	    ECG:  	SR no ST-T changes    LABS:	 	  CARDIAC MARKERS:  Troponin I, Serum: <0.017 ng/mL (07-30 @ 06:18)  Troponin I, Serum: <0.017 ng/mL (07-30 @ 01:45)                          12.5   6.9   )-----------( 389      ( 30 Jul 2017 01:45 )             38.4     07-30    138  |  107  |  8   ----------------------------<  94  5.1   |  22  |  0.81    Ca    9.3      30 Jul 2017 01:45    TPro  7.6  /  Alb  3.4  /  TBili  0.9  /  DBili  x   /  AST  47<H>  /  ALT  18  /  AlkPhos  65  07-30    ASSESSMENT/PLAN: 	56 y/o male with cocaine induced chest discomfort, HTN, HLD  1. patient seen and examined chart reviewed, his presentation appears to be similar to his prior one; ekg unchanged  2. norvasc 5 mg qdaily  3. stable for discharge, encourage out-patient follow up and rehab  4. no changes in out patient meds advised    I spent 45 min in care of this patient

## 2017-07-30 NOTE — ED PROVIDER NOTE - MEDICAL DECISION MAKING DETAILS
Two troponins negative however pt still endorses chest discomfort. Nitro 0.4mg SL ordered. Will place on Obs and have Dr. Alvarado (Cardiologist) evaluate pt in ED.

## 2017-07-30 NOTE — ED CDU PROVIDER NOTE - OBJECTIVE STATEMENT
56 y/o M with PMH of HTN and PE, on xarelto, presents c/o waxing/waning, localized, left-sided, pounding CP x 1 hour that began while pt was walking down the street. Pain is moderate in nature with no aggravating/alleviating factors. He reports drinking 2 pints of vodka and using cocaine this evening prior to onset of CP. He also reports having SOB, lightheadedness and nausea. No meds taken prior to arrival.    Denies fever, chills, headache, confusion, LOC, vision changes, neck or back pain, palpitations, cough, hemoptysis, abdo pain, vomiting, diarrhea

## 2017-07-30 NOTE — ED PROVIDER NOTE - OBJECTIVE STATEMENT
58 y/o M with PMH of HTN and PE, on xarelto, presents c/o waxing/waning, localized, left-sided, pounding CP x 1 hour that began while pt was walking down the street. Pain is moderate in nature with no aggravating/alleviating factors. He reports drinking 2 pints of vodka and using cocaine this evening prior to onset of CP. He also reports having SOB, lightheadedness and nausea. No meds taken prior to arrival.    Denies fever, chills, headache, confusion, LOC, vision changes, neck or back pain, palpitations, cough, hemoptysis, abdo pain, vomiting, diarrhea

## 2017-07-30 NOTE — ED CDU PROVIDER NOTE - PROGRESS NOTE DETAILS
Seen by Dr. Winter and cleared for discharge.  Patient is known to him from prior visits.  Patient is to continue Xeralto.  Patient is now feeling better.  No pain, dyspnea, vomiting, or diarrhea.  Tolerating po without difficulty.  Well appearing.  Importance of close followup and precautions for immediate return discussed.

## 2017-07-30 NOTE — ED PROVIDER NOTE - ATTENDING CONTRIBUTION TO CARE
56 y/o M with PMH of HTN and PE, on xarelto, presents c/o waxing/waning, localized, left-sided, pounding CP x 1 hour that began while pt was walking down the street. Pain is moderate in nature with no aggravating/alleviating factors. He reports drinking 2 pints of vodka and using cocaine this evening prior to onset of CP.    /100    Exam: awake, alert  lungs: cta bilat  cor: rrr without m/c/r.    EKG: no elevation    Imp: cocaine cp, will place on observation for serial ekg's, continuous cardiac monitoring, serial ce"s, cardiology consult.

## 2017-07-30 NOTE — ED CDU PROVIDER NOTE - ATTENDING CONTRIBUTION TO CARE
58 y/o M with PMH of HTN and PE, on xarelto, presents c/o waxing/waning, localized, left-sided, pounding CP x 1 hour that began while pt was walking down the street. Pain is moderate in nature with no aggravating/alleviating factors. He reports drinking 2 pints of vodka and using cocaine this evening prior to onset of CP.    /100    Exam: awake, alert  lungs: cta bilat  cor: rrr without m/c/r.    EKG: no elevation    troponins x 2 wnl  on reassessment this am, pt c/o sscp, no ekg change. Will place on observation, await cardiology consult (Dr. Alvarado).    Imp: cocaine cp, will place on observation for serial ekg's, continuous cardiac monitoring, serial ce"s, cardiology consult.

## 2017-09-10 ENCOUNTER — EMERGENCY (EMERGENCY)
Facility: HOSPITAL | Age: 57
LOS: 1 days | Discharge: HOME | End: 2017-09-10
Attending: EMERGENCY MEDICINE | Admitting: EMERGENCY MEDICINE
Payer: MEDICARE

## 2017-09-10 VITALS
SYSTOLIC BLOOD PRESSURE: 135 MMHG | HEART RATE: 119 BPM | RESPIRATION RATE: 19 BRPM | TEMPERATURE: 98 F | DIASTOLIC BLOOD PRESSURE: 95 MMHG | OXYGEN SATURATION: 99 % | WEIGHT: 229.94 LBS

## 2017-09-10 VITALS
RESPIRATION RATE: 17 BRPM | OXYGEN SATURATION: 98 % | DIASTOLIC BLOOD PRESSURE: 85 MMHG | SYSTOLIC BLOOD PRESSURE: 148 MMHG | HEART RATE: 85 BPM

## 2017-09-10 DIAGNOSIS — J45.909 UNSPECIFIED ASTHMA, UNCOMPLICATED: ICD-10-CM

## 2017-09-10 DIAGNOSIS — Z88.5 ALLERGY STATUS TO NARCOTIC AGENT: ICD-10-CM

## 2017-09-10 DIAGNOSIS — J40 BRONCHITIS, NOT SPECIFIED AS ACUTE OR CHRONIC: ICD-10-CM

## 2017-09-10 DIAGNOSIS — F17.210 NICOTINE DEPENDENCE, CIGARETTES, UNCOMPLICATED: ICD-10-CM

## 2017-09-10 DIAGNOSIS — Z79.01 LONG TERM (CURRENT) USE OF ANTICOAGULANTS: ICD-10-CM

## 2017-09-10 DIAGNOSIS — Z79.52 LONG TERM (CURRENT) USE OF SYSTEMIC STEROIDS: ICD-10-CM

## 2017-09-10 DIAGNOSIS — I10 ESSENTIAL (PRIMARY) HYPERTENSION: ICD-10-CM

## 2017-09-10 DIAGNOSIS — Z79.899 OTHER LONG TERM (CURRENT) DRUG THERAPY: ICD-10-CM

## 2017-09-10 DIAGNOSIS — R05 COUGH: ICD-10-CM

## 2017-09-10 LAB
ALBUMIN SERPL ELPH-MCNC: 3.6 G/DL — SIGNIFICANT CHANGE UP (ref 3.4–5)
ALP SERPL-CCNC: 78 U/L — SIGNIFICANT CHANGE UP (ref 40–120)
ALT FLD-CCNC: 23 U/L — SIGNIFICANT CHANGE UP (ref 12–42)
ANION GAP SERPL CALC-SCNC: 15 MMOL/L — SIGNIFICANT CHANGE UP (ref 9–16)
AST SERPL-CCNC: 51 U/L — HIGH (ref 15–37)
BILIRUB SERPL-MCNC: 0.7 MG/DL — SIGNIFICANT CHANGE UP (ref 0.2–1.2)
BUN SERPL-MCNC: 23 MG/DL — SIGNIFICANT CHANGE UP (ref 7–23)
CALCIUM SERPL-MCNC: 9.4 MG/DL — SIGNIFICANT CHANGE UP (ref 8.5–10.5)
CHLORIDE SERPL-SCNC: 111 MMOL/L — HIGH (ref 96–108)
CO2 SERPL-SCNC: 22 MMOL/L — SIGNIFICANT CHANGE UP (ref 22–31)
CREAT SERPL-MCNC: 0.95 MG/DL — SIGNIFICANT CHANGE UP (ref 0.5–1.3)
ETHANOL SERPL-MCNC: 48 MG/DL — HIGH
GLUCOSE SERPL-MCNC: 114 MG/DL — HIGH (ref 70–99)
HCT VFR BLD CALC: 41.1 % — SIGNIFICANT CHANGE UP (ref 39–50)
HGB BLD-MCNC: 13.4 G/DL — SIGNIFICANT CHANGE UP (ref 13–17)
MAGNESIUM SERPL-MCNC: 1.5 MG/DL — LOW (ref 1.6–2.6)
MCHC RBC-ENTMCNC: 32 PG — SIGNIFICANT CHANGE UP (ref 27–34)
MCHC RBC-ENTMCNC: 32.6 G/DL — SIGNIFICANT CHANGE UP (ref 32–36)
MCV RBC AUTO: 98.1 FL — SIGNIFICANT CHANGE UP (ref 80–100)
NT-PROBNP SERPL-SCNC: 20 PG/ML — SIGNIFICANT CHANGE UP
PLATELET # BLD AUTO: 336 K/UL — SIGNIFICANT CHANGE UP (ref 150–400)
POTASSIUM SERPL-MCNC: 4.3 MMOL/L — SIGNIFICANT CHANGE UP (ref 3.5–5.3)
POTASSIUM SERPL-SCNC: 4.3 MMOL/L — SIGNIFICANT CHANGE UP (ref 3.5–5.3)
PROT SERPL-MCNC: 7.7 G/DL — SIGNIFICANT CHANGE UP (ref 6.4–8.2)
RBC # BLD: 4.19 M/UL — LOW (ref 4.2–5.8)
RBC # FLD: 14.4 % — SIGNIFICANT CHANGE UP (ref 10.3–16.9)
SODIUM SERPL-SCNC: 148 MMOL/L — HIGH (ref 132–145)
TROPONIN I SERPL-MCNC: <0.017 NG/ML — LOW (ref 0.02–0.06)
WBC # BLD: 7.5 K/UL — SIGNIFICANT CHANGE UP (ref 3.8–10.5)
WBC # FLD AUTO: 7.5 K/UL — SIGNIFICANT CHANGE UP (ref 3.8–10.5)

## 2017-09-10 PROCEDURE — 99285 EMERGENCY DEPT VISIT HI MDM: CPT | Mod: 25

## 2017-09-10 PROCEDURE — 71020: CPT | Mod: 26

## 2017-09-10 PROCEDURE — 93010 ELECTROCARDIOGRAM REPORT: CPT

## 2017-09-10 RX ORDER — ALBUTEROL 90 UG/1
2.5 AEROSOL, METERED ORAL ONCE
Qty: 0 | Refills: 0 | Status: COMPLETED | OUTPATIENT
Start: 2017-09-10 | End: 2017-09-10

## 2017-09-10 RX ORDER — ALBUTEROL 90 UG/1
2 AEROSOL, METERED ORAL
Qty: 1 | Refills: 0
Start: 2017-09-10 | End: 2017-09-15

## 2017-09-10 RX ORDER — IPRATROPIUM/ALBUTEROL SULFATE 18-103MCG
3 AEROSOL WITH ADAPTER (GRAM) INHALATION ONCE
Qty: 0 | Refills: 0 | Status: COMPLETED | OUTPATIENT
Start: 2017-09-10 | End: 2017-09-10

## 2017-09-10 RX ORDER — SODIUM CHLORIDE 9 MG/ML
1000 INJECTION INTRAMUSCULAR; INTRAVENOUS; SUBCUTANEOUS ONCE
Qty: 0 | Refills: 0 | Status: COMPLETED | OUTPATIENT
Start: 2017-09-10 | End: 2017-09-10

## 2017-09-10 RX ADMIN — Medication 100 MILLIGRAM(S): at 09:06

## 2017-09-10 RX ADMIN — ALBUTEROL 2.5 MILLIGRAM(S): 90 AEROSOL, METERED ORAL at 12:07

## 2017-09-10 RX ADMIN — SODIUM CHLORIDE 1000 MILLILITER(S): 9 INJECTION INTRAMUSCULAR; INTRAVENOUS; SUBCUTANEOUS at 10:32

## 2017-09-10 RX ADMIN — Medication 3 MILLILITER(S): at 09:06

## 2017-09-10 RX ADMIN — Medication 60 MILLIGRAM(S): at 09:06

## 2017-09-10 NOTE — ED PROVIDER NOTE - DIAGNOSTIC INTERPRETATION
Interpreted by MD  _chest  x-ray, 2_ views  Lungs clear, heart shadow normal, bony structures normal, no free air under diaphragm, no PTX

## 2017-09-10 NOTE — ED ADULT TRIAGE NOTE - CHIEF COMPLAINT QUOTE
here for productive cough, left sided cp radiating to left arm- Pt is a+Ox3 denies sick contacts and fever, pt with alcohol on breath

## 2017-11-13 ENCOUNTER — EMERGENCY (EMERGENCY)
Facility: HOSPITAL | Age: 57
LOS: 1 days | Discharge: HOME | End: 2017-11-13
Attending: EMERGENCY MEDICINE | Admitting: EMERGENCY MEDICINE
Payer: MEDICARE

## 2017-11-13 VITALS
RESPIRATION RATE: 20 BRPM | OXYGEN SATURATION: 96 % | SYSTOLIC BLOOD PRESSURE: 155 MMHG | DIASTOLIC BLOOD PRESSURE: 113 MMHG | HEART RATE: 95 BPM | TEMPERATURE: 98 F

## 2017-11-13 VITALS
DIASTOLIC BLOOD PRESSURE: 87 MMHG | TEMPERATURE: 99 F | SYSTOLIC BLOOD PRESSURE: 134 MMHG | OXYGEN SATURATION: 97 % | RESPIRATION RATE: 18 BRPM | HEART RATE: 82 BPM

## 2017-11-13 DIAGNOSIS — R11.2 NAUSEA WITH VOMITING, UNSPECIFIED: ICD-10-CM

## 2017-11-13 DIAGNOSIS — B34.9 VIRAL INFECTION, UNSPECIFIED: ICD-10-CM

## 2017-11-13 DIAGNOSIS — R42 DIZZINESS AND GIDDINESS: ICD-10-CM

## 2017-11-13 DIAGNOSIS — I10 ESSENTIAL (PRIMARY) HYPERTENSION: ICD-10-CM

## 2017-11-13 DIAGNOSIS — Z79.01 LONG TERM (CURRENT) USE OF ANTICOAGULANTS: ICD-10-CM

## 2017-11-13 DIAGNOSIS — Z79.52 LONG TERM (CURRENT) USE OF SYSTEMIC STEROIDS: ICD-10-CM

## 2017-11-13 DIAGNOSIS — R07.1 CHEST PAIN ON BREATHING: ICD-10-CM

## 2017-11-13 DIAGNOSIS — F17.200 NICOTINE DEPENDENCE, UNSPECIFIED, UNCOMPLICATED: ICD-10-CM

## 2017-11-13 DIAGNOSIS — E78.5 HYPERLIPIDEMIA, UNSPECIFIED: ICD-10-CM

## 2017-11-13 DIAGNOSIS — Z79.2 LONG TERM (CURRENT) USE OF ANTIBIOTICS: ICD-10-CM

## 2017-11-13 DIAGNOSIS — Z88.5 ALLERGY STATUS TO NARCOTIC AGENT: ICD-10-CM

## 2017-11-13 DIAGNOSIS — Z79.899 OTHER LONG TERM (CURRENT) DRUG THERAPY: ICD-10-CM

## 2017-11-13 LAB
ALBUMIN SERPL ELPH-MCNC: 3.2 G/DL — LOW (ref 3.4–5)
ALP SERPL-CCNC: 96 U/L — SIGNIFICANT CHANGE UP (ref 40–120)
ALT FLD-CCNC: 26 U/L — SIGNIFICANT CHANGE UP (ref 12–42)
ANION GAP SERPL CALC-SCNC: 10 MMOL/L — SIGNIFICANT CHANGE UP (ref 9–16)
APPEARANCE UR: CLEAR — SIGNIFICANT CHANGE UP
AST SERPL-CCNC: 33 U/L — SIGNIFICANT CHANGE UP (ref 15–37)
BASOPHILS NFR BLD AUTO: 0.6 % — SIGNIFICANT CHANGE UP (ref 0–2)
BILIRUB SERPL-MCNC: 0.6 MG/DL — SIGNIFICANT CHANGE UP (ref 0.2–1.2)
BILIRUB UR-MCNC: (no result)
BUN SERPL-MCNC: 10 MG/DL — SIGNIFICANT CHANGE UP (ref 7–23)
CALCIUM SERPL-MCNC: 9.4 MG/DL — SIGNIFICANT CHANGE UP (ref 8.5–10.5)
CHLORIDE SERPL-SCNC: 104 MMOL/L — SIGNIFICANT CHANGE UP (ref 96–108)
CO2 SERPL-SCNC: 25 MMOL/L — SIGNIFICANT CHANGE UP (ref 22–31)
COLOR SPEC: YELLOW — SIGNIFICANT CHANGE UP
CREAT SERPL-MCNC: 0.84 MG/DL — SIGNIFICANT CHANGE UP (ref 0.5–1.3)
DIFF PNL FLD: NEGATIVE — SIGNIFICANT CHANGE UP
EOSINOPHIL NFR BLD AUTO: 1.2 % — SIGNIFICANT CHANGE UP (ref 0–6)
FLUAV SPEC QL CULT: NEGATIVE — SIGNIFICANT CHANGE UP
FLUBV AG SPEC QL IA: NEGATIVE — SIGNIFICANT CHANGE UP
GLUCOSE SERPL-MCNC: 111 MG/DL — HIGH (ref 70–99)
GLUCOSE UR QL: NEGATIVE — SIGNIFICANT CHANGE UP
HCT VFR BLD CALC: 37.4 % — LOW (ref 39–50)
HGB BLD-MCNC: 12.8 G/DL — LOW (ref 13–17)
IMM GRANULOCYTES NFR BLD AUTO: 0.5 % — SIGNIFICANT CHANGE UP (ref 0–1.5)
KETONES UR-MCNC: NEGATIVE — SIGNIFICANT CHANGE UP
LACTATE SERPL-SCNC: 1.5 MMOL/L — SIGNIFICANT CHANGE UP (ref 0.4–2)
LEUKOCYTE ESTERASE UR-ACNC: NEGATIVE — SIGNIFICANT CHANGE UP
LIDOCAIN IGE QN: 424 U/L — HIGH (ref 73–393)
LYMPHOCYTES # BLD AUTO: 13.3 % — SIGNIFICANT CHANGE UP (ref 13–44)
MCHC RBC-ENTMCNC: 34.2 G/DL — SIGNIFICANT CHANGE UP (ref 32–36)
MCHC RBC-ENTMCNC: 34.8 PG — HIGH (ref 27–34)
MCV RBC AUTO: 101.6 FL — HIGH (ref 80–100)
MONOCYTES NFR BLD AUTO: 11.5 % — SIGNIFICANT CHANGE UP (ref 2–14)
NEUTROPHILS NFR BLD AUTO: 72.9 % — SIGNIFICANT CHANGE UP (ref 43–77)
NITRITE UR-MCNC: NEGATIVE — SIGNIFICANT CHANGE UP
PH UR: 6.5 — SIGNIFICANT CHANGE UP (ref 5–8)
PLATELET # BLD AUTO: 228 K/UL — SIGNIFICANT CHANGE UP (ref 150–400)
POTASSIUM SERPL-MCNC: 3.3 MMOL/L — LOW (ref 3.5–5.3)
POTASSIUM SERPL-SCNC: 3.3 MMOL/L — LOW (ref 3.5–5.3)
PROT SERPL-MCNC: 7.1 G/DL — SIGNIFICANT CHANGE UP (ref 6.4–8.2)
PROT UR-MCNC: (no result) MG/DL
RBC # BLD: 3.68 M/UL — LOW (ref 4.2–5.8)
RBC # FLD: 13.6 % — SIGNIFICANT CHANGE UP (ref 10.3–16.9)
SODIUM SERPL-SCNC: 139 MMOL/L — SIGNIFICANT CHANGE UP (ref 132–145)
SP GR SPEC: 1.02 — SIGNIFICANT CHANGE UP (ref 1–1.03)
UROBILINOGEN FLD QL: 1 E.U./DL — SIGNIFICANT CHANGE UP
WBC # BLD: 8.4 K/UL — SIGNIFICANT CHANGE UP (ref 3.8–10.5)
WBC # FLD AUTO: 8.4 K/UL — SIGNIFICANT CHANGE UP (ref 3.8–10.5)

## 2017-11-13 PROCEDURE — 99285 EMERGENCY DEPT VISIT HI MDM: CPT | Mod: 25

## 2017-11-13 PROCEDURE — 74177 CT ABD & PELVIS W/CONTRAST: CPT | Mod: 26

## 2017-11-13 PROCEDURE — 93010 ELECTROCARDIOGRAM REPORT: CPT

## 2017-11-13 PROCEDURE — 71020: CPT | Mod: 26

## 2017-11-13 RX ORDER — SODIUM CHLORIDE 9 MG/ML
2000 INJECTION INTRAMUSCULAR; INTRAVENOUS; SUBCUTANEOUS ONCE
Qty: 0 | Refills: 0 | Status: COMPLETED | OUTPATIENT
Start: 2017-11-13 | End: 2017-11-13

## 2017-11-13 RX ORDER — SODIUM CHLORIDE 9 MG/ML
3 INJECTION INTRAMUSCULAR; INTRAVENOUS; SUBCUTANEOUS ONCE
Qty: 0 | Refills: 0 | Status: COMPLETED | OUTPATIENT
Start: 2017-11-13 | End: 2017-11-13

## 2017-11-13 RX ORDER — ONDANSETRON 8 MG/1
4 TABLET, FILM COATED ORAL ONCE
Qty: 0 | Refills: 0 | Status: COMPLETED | OUTPATIENT
Start: 2017-11-13 | End: 2017-11-13

## 2017-11-13 RX ORDER — ACETAMINOPHEN 500 MG
650 TABLET ORAL ONCE
Qty: 0 | Refills: 0 | Status: COMPLETED | OUTPATIENT
Start: 2017-11-13 | End: 2017-11-13

## 2017-11-13 RX ORDER — IOHEXOL 300 MG/ML
50 INJECTION, SOLUTION INTRAVENOUS ONCE
Qty: 0 | Refills: 0 | Status: COMPLETED | OUTPATIENT
Start: 2017-11-13 | End: 2017-11-13

## 2017-11-13 RX ADMIN — SODIUM CHLORIDE 2000 MILLILITER(S): 9 INJECTION INTRAMUSCULAR; INTRAVENOUS; SUBCUTANEOUS at 09:00

## 2017-11-13 RX ADMIN — Medication 650 MILLIGRAM(S): at 14:07

## 2017-11-13 RX ADMIN — ONDANSETRON 4 MILLIGRAM(S): 8 TABLET, FILM COATED ORAL at 12:15

## 2017-11-13 RX ADMIN — Medication 50 MILLIGRAM(S): at 13:23

## 2017-11-13 RX ADMIN — SODIUM CHLORIDE 3 MILLILITER(S): 9 INJECTION INTRAMUSCULAR; INTRAVENOUS; SUBCUTANEOUS at 09:10

## 2017-11-13 RX ADMIN — IOHEXOL 50 MILLILITER(S): 300 INJECTION, SOLUTION INTRAVENOUS at 11:00

## 2017-11-13 NOTE — ED PROVIDER NOTE - MEDICAL DECISION MAKING DETAILS
56 y/o M w/ fever, cough, runny nose, nausea, vomiting, and diarrhea. Possible viral syndrome. Will send labs, get chest XR to rule out pneumonia. CTAP to rule out intraabdominal infection, UA to rule out UTI. Not currently concern for ACS or PE. Librium given to avoid withdrawal symptoms.

## 2017-11-13 NOTE — ED ADULT NURSE REASSESSMENT NOTE - NS ED NURSE REASSESS COMMENT FT1
Received patient from night shift. Patient is A,A&Ox3, resting comfortably, comfort care provided and will continue to monitor.

## 2017-11-13 NOTE — ED PROVIDER NOTE - OBJECTIVE STATEMENT
HLD, PE was on coumadin x 2 years stopped this summer after docs recs, + alcohol abuse, n/v/d x 3 days, +chills, +coughing x 2 days, runny nose, able to tolerate some food, +dizzy, no abdominal pain, +pleuritic pain both sides with deep breath, +h/o withdrawal, no sz/DT HLD, chronic pancreatis, PE was on coumadin x 2 years stopped this summer after docs recs, + alcohol abuse, n/v/d x 3 days, +chills, +coughing x 2 days, runny nose, able to tolerate some food, +dizzy, no abdominal pain, +pleuritic pain both sides with deep breath, +h/o withdrawal, no sz/DT

## 2017-11-13 NOTE — SBIRT NOTE. - NSSBIRTSERVICES_GEN_A_ED_FT
Provided SBIRT services: Full screen positive. Referral to Treatment attempted. Screening results were reviewed with the patient and patient was provided information about healthy guidelines and potential negative consequences associated with level of risk. Motivation and readiness to reduce or stop use was discussed and goals and activities to make changes were suggested/offered.  Options discussed for further evaluation and treatment, but referral to treatment was not completed because Patient currently has a treatment plan setup or currently in treatment  Audit Score: 28  DAST Score:4  Duration = 22 Minutes

## 2017-11-13 NOTE — ED ADULT TRIAGE NOTE - CHIEF COMPLAINT QUOTE
walkin patient with complaints of abdominal pain, nausea, vomiting and diarrhea since last night. Patient reports he drinks alcohol daily. last drink yesterday.

## 2017-11-13 NOTE — ED ADULT NURSE NOTE - OBJECTIVE STATEMENT
Pt walked in c/o N/V/D, subjective fever and chills, productive cough and rib pain on inspiration x3days. Pt also reports nasal congestion. Pt denies any CP/SOB, no recent travel or recent sick contacts. Pt airway is patent and breathing is spontaneous and unlabored.

## 2017-11-13 NOTE — ED PROVIDER NOTE - PROGRESS NOTE DETAILS
Mildly elevated lipase consistent with chronic pancreatitis. Not concern for acute panceratitis. Chest XR without signs of pneumonia. CTAP without intraabdominal infection. No evidence of UTI on UA. Recommend Tylenol PRN for likely viral syndrome, PO hydration, rest, and follow up with PMD. Strict return precautions were given.

## 2017-11-26 NOTE — ED CDU PROVIDER NOTE - CROS ED EYES ALL NEG
- General


Chief complaint: Skin/Abscess/Foreign Body


Stated complaint: CYST BEHIND LEFT EAR


Time Seen by Provider: 17 20:31


Source: patient


Mode of arrival: Ambulatory


Limitations: No Limitations





- History of Present Illness


Initial comments: 


38M PMh Schizophrenia p/w c/o lump behind left ear and left-sided earache.  

Also complaining of stye on his left lower eyelid.  States he has had ongoing 

symptoms for approximately 3 days.  Denies any drainage from left ear.  Denies 

any fever or chills.  Denies nausea or vomiting.  Denies tinnitus.  Is awake 

alert and oriented 3 does not appear to be in acute distress.


MD complaint: other (post auricular lymphdenopathy)


Onset/Timin


-: days(s)


Severity: moderate


Quality: aching


Consistency: constant


Improves with: none


Worsens with: none


Context: none


Associated symptoms: denies other symptoms


Treatments Prior to Arrival: none





- Related Data


 Previous Rx's











 Medication  Instructions  Recorded  Last Taken  Type


 


Famotidine [Pepcid] 20 mg PO BID #60 tablet 10/03/17 Unknown Rx


 


Ibuprofen [Motrin 400 MG tab] 400 mg PO Q8H PRN #20 tablet 10/03/17 Unknown Rx


 


Butalb/Acetamin/Caff -40 1 tab PO Q6HR PRN #15 tab 10/08/17 Unknown Rx





[Fioricet]    


 


Amoxicillin/K Clav Tab [Augmentin 1 tab PO Q12HR #20 tab 17 Unknown Rx





875 mg]    


 


Erythromycin [Erythromycin Ophth 1 applic OP BID #1 tube 17 Unknown Rx





Oint]    


 


Ibuprofen [Motrin] 800 mg PO Q8HR PRN #30 tablet 17 Unknown Rx











 Allergies











Allergy/AdvReac Type Severity Reaction Status Date / Time


 


No Known Allergies Allergy   Verified 10/01/17 22:30














Abscess Boil HPI





- HPI


Chief Complaint: Skin/Abscess/Foreign Body


Stated Complaint: CYST BEHIND LEFT EAR


Time Seen by Provider: 17 20:31


Home Medications: 


 Previous Rx's











 Medication  Instructions  Recorded  Last Taken  Type


 


Famotidine [Pepcid] 20 mg PO BID #60 tablet 10/03/17 Unknown Rx


 


Ibuprofen [Motrin 400 MG tab] 400 mg PO Q8H PRN #20 tablet 10/03/17 Unknown Rx


 


Butalb/Acetamin/Caff -40 1 tab PO Q6HR PRN #15 tab 10/08/17 Unknown Rx





[Fioricet]    


 


Amoxicillin/K Clav Tab [Augmentin 1 tab PO Q12HR #20 tab 17 Unknown Rx





875 mg]    


 


Erythromycin [Erythromycin Ophth 1 applic OP BID #1 tube 17 Unknown Rx





Oint]    


 


Ibuprofen [Motrin] 800 mg PO Q8HR PRN #30 tablet 17 Unknown Rx











Allergies/Adverse Reactions: 


 Allergies











Allergy/AdvReac Type Severity Reaction Status Date / Time


 


No Known Allergies Allergy   Verified 10/01/17 22:30














ED Review of Systems


ROS: 


Stated complaint: CYST BEHIND LEFT EAR


Other details as noted in HPI





Constitutional: denies: chills, fever


Eyes: denies: eye pain, eye discharge, vision change


ENT: ear pain (left sided earache)


Respiratory: denies: cough, shortness of breath, wheezing


Cardiovascular: denies: chest pain, palpitations


Endocrine: no symptoms reported


Gastrointestinal: as per HPI.  denies: abdominal pain, nausea, diarrhea


Genitourinary: denies: urgency, dysuria


Musculoskeletal: denies: back pain, joint swelling, arthralgia


Skin: denies: rash, lesions


Neurological: denies: headache, weakness, paresthesias


Psychiatric: denies: anxiety, depression


Hematological/Lymphatic: denies: easy bleeding, easy bruising





ED Past Medical Hx





- Past Medical History


Previous Medical History?: No


Hx Psychiatric Treatment:  (bipolar/schizo not taking meds)





- Surgical History


Past Surgical History?: No





- Social History


Smoking Status: Never Smoker


Substance Use Type: None





- Medications


Home Medications: 


 Home Medications











 Medication  Instructions  Recorded  Confirmed  Last Taken  Type


 


Famotidine [Pepcid] 20 mg PO BID #60 tablet 10/03/17  Unknown Rx


 


Ibuprofen [Motrin 400 MG tab] 400 mg PO Q8H PRN #20 tablet 10/03/17  Unknown Rx


 


Butalb/Acetamin/Caff -40 1 tab PO Q6HR PRN #15 tab 10/08/17  Unknown Rx





[Fioricet]     


 


Amoxicillin/K Clav Tab [Augmentin 1 tab PO Q12HR #20 tab 17  Unknown Rx





875 mg]     


 


Erythromycin [Erythromycin Ophth 1 applic OP BID #1 tube 17  Unknown Rx





Oint]     


 


Ibuprofen [Motrin] 800 mg PO Q8HR PRN #30 tablet 17  Unknown Rx














ED Physical Exam





- General


Limitations: No Limitations


General appearance: alert, in no apparent distress





- Head


Head exam: Present: atraumatic, normocephalic





- Eye


Eye exam: Present: normal appearance, PERRL, EOMI





- Expanded Eye Exam


  ** Expanded


Eyelids: Stye: Left (stye left lower eyelid)





- ENT


ENT exam: Present: mucous membranes moist





- Expanded ENT Exam


  ** Expanded


TM/Canal exam: Erythema: Left TM (left tympanic membrane injected and inflamed, 

no clinical mastoiditis on exam), Bulging: Left TM


Throat exam: Positive: tonsillar erythema





- Neck


Neck exam: Present: normal inspection





- Respiratory


Respiratory exam: Present: normal lung sounds bilaterally.  Absent: respiratory 

distress





- Cardiovascular


Cardiovascular Exam: Present: regular rate, normal rhythm.  Absent: systolic 

murmur, diastolic murmur, rubs, gallop





- GI/Abdominal


GI/Abdominal exam: Present: soft, normal bowel sounds





- Rectal


Rectal exam: Present: deferred





- Extremities Exam


Extremities exam: Present: normal inspection





- Back Exam


Back exam: Present: normal inspection





- Neurological Exam


Neurological exam: Present: alert, oriented X3





- Psychiatric


Psychiatric exam: Present: normal affect, normal mood





- Skin


Skin exam: Present: warm, dry, intact, normal color.  Absent: rash





ED Course


 Vital Signs











  17





  18:19


 


Temperature 99.7 F H


 


Pulse Rate 85


 


Respiratory 16





Rate 


 


Blood Pressure 111/80


 


O2 Sat by Pulse 98





Oximetry 














ED Medical Decision Making





- Medical Decision Making


A/P: Left Sided Otitis media, stye left eye


1-Motrin when necessary


2-course of of Augmentin.  No clinical signs or symptoms of mastoiditis.  Some 

postauricular lymphadenopathy left ear region.


3-erythromycin ointment and warm compresses left eye


4-follow-up with primary care and ophthalmology 


Critical care attestation.: 


If time is entered above; I have spent that time in minutes in the direct care 

of this critically ill patient, excluding procedure time.








ED Disposition


Clinical Impression: 


Hordeolum external


Qualifiers:


 Laterality: left Eyelid: lower Qualified Code(s): H00.015 - Hordeolum externum 

left lower eyelid





Otitis media


Qualifiers:


 Otitis media type: suppurative Chronicity: acute Laterality: left Recurrence: 

not specified as recurrent Spontaneous tympanic membrane rupture: without 

spontaneous rupture Qualified Code(s): H66.002 - Acute suppurative otitis media 

without spontaneous rupture of ear drum, left ear





Disposition: DC- TO HOME OR SELFCARE


Is pt being admited?: No


Does the pt Need Aspirin: No


Condition: Stable


Instructions:  Stye (ED), Otitis Media (ED)


Prescriptions: 


Amoxicillin/K Clav Tab [Augmentin 875 mg] 1 tab PO Q12HR #20 tab


Erythromycin [Erythromycin Ophth Oint] 1 applic OP BID #1 tube


Ibuprofen [Motrin] 800 mg PO Q8HR PRN #30 tablet


 PRN Reason: Pain


Referrals: 


[facility] [Outside] - 3-5 Days


MELITA GALLEGOS MD [Staff Physician] - 3-5 Days


DUDLEY RITCHIE MD [Staff Physician] - 3-5 Days


[facility] [Outside] - 3-5 Days


Time of Disposition: 20:58 negative...

## 2018-01-11 ENCOUNTER — EMERGENCY (EMERGENCY)
Facility: HOSPITAL | Age: 58
LOS: 1 days | Discharge: HOME | End: 2018-01-11
Attending: EMERGENCY MEDICINE | Admitting: EMERGENCY MEDICINE
Payer: MEDICARE

## 2018-01-11 VITALS
DIASTOLIC BLOOD PRESSURE: 82 MMHG | RESPIRATION RATE: 18 BRPM | HEART RATE: 67 BPM | TEMPERATURE: 99 F | SYSTOLIC BLOOD PRESSURE: 138 MMHG | OXYGEN SATURATION: 98 %

## 2018-01-11 VITALS
RESPIRATION RATE: 20 BRPM | OXYGEN SATURATION: 96 % | HEART RATE: 82 BPM | TEMPERATURE: 98 F | DIASTOLIC BLOOD PRESSURE: 96 MMHG | SYSTOLIC BLOOD PRESSURE: 141 MMHG

## 2018-01-11 DIAGNOSIS — Z79.51 LONG TERM (CURRENT) USE OF INHALED STEROIDS: ICD-10-CM

## 2018-01-11 DIAGNOSIS — I10 ESSENTIAL (PRIMARY) HYPERTENSION: ICD-10-CM

## 2018-01-11 DIAGNOSIS — Z88.5 ALLERGY STATUS TO NARCOTIC AGENT: ICD-10-CM

## 2018-01-11 DIAGNOSIS — T62.91XA TOXIC EFFECT OF UNSPECIFIED NOXIOUS SUBSTANCE EATEN AS FOOD, ACCIDENTAL (UNINTENTIONAL), INITIAL ENCOUNTER: ICD-10-CM
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
## 2018-01-11 DIAGNOSIS — Z79.899 OTHER LONG TERM (CURRENT) DRUG THERAPY: ICD-10-CM

## 2018-01-11 DIAGNOSIS — Z79.01 LONG TERM (CURRENT) USE OF ANTICOAGULANTS: ICD-10-CM

## 2018-01-11 DIAGNOSIS — R11.2 NAUSEA WITH VOMITING, UNSPECIFIED: ICD-10-CM

## 2018-01-11 LAB
ALBUMIN SERPL ELPH-MCNC: 3.5 G/DL — SIGNIFICANT CHANGE UP (ref 3.4–5)
ALP SERPL-CCNC: 77 U/L — SIGNIFICANT CHANGE UP (ref 40–120)
ALT FLD-CCNC: 43 U/L — HIGH (ref 12–42)
AMPHET UR-MCNC: NEGATIVE — SIGNIFICANT CHANGE UP
ANION GAP SERPL CALC-SCNC: 13 MMOL/L — SIGNIFICANT CHANGE UP (ref 9–16)
APPEARANCE UR: CLEAR — SIGNIFICANT CHANGE UP
AST SERPL-CCNC: 53 U/L — HIGH (ref 15–37)
BARBITURATES UR SCN-MCNC: NEGATIVE — SIGNIFICANT CHANGE UP
BENZODIAZ UR-MCNC: POSITIVE
BILIRUB SERPL-MCNC: 0.9 MG/DL — SIGNIFICANT CHANGE UP (ref 0.2–1.2)
BILIRUB UR-MCNC: NEGATIVE — SIGNIFICANT CHANGE UP
BUN SERPL-MCNC: 9 MG/DL — SIGNIFICANT CHANGE UP (ref 7–23)
CALCIUM SERPL-MCNC: 8.2 MG/DL — LOW (ref 8.5–10.5)
CHLORIDE SERPL-SCNC: 104 MMOL/L — SIGNIFICANT CHANGE UP (ref 96–108)
CO2 SERPL-SCNC: 24 MMOL/L — SIGNIFICANT CHANGE UP (ref 22–31)
COCAINE METAB.OTHER UR-MCNC: NEGATIVE — SIGNIFICANT CHANGE UP
COLOR SPEC: YELLOW — SIGNIFICANT CHANGE UP
CREAT SERPL-MCNC: 0.81 MG/DL — SIGNIFICANT CHANGE UP (ref 0.5–1.3)
DIFF PNL FLD: NEGATIVE — SIGNIFICANT CHANGE UP
ETHANOL SERPL-MCNC: 69 MG/DL — HIGH
GLUCOSE SERPL-MCNC: 125 MG/DL — HIGH (ref 70–99)
GLUCOSE UR QL: NEGATIVE — SIGNIFICANT CHANGE UP
HCT VFR BLD CALC: 39.3 % — SIGNIFICANT CHANGE UP (ref 39–50)
HGB BLD-MCNC: 13.5 G/DL — SIGNIFICANT CHANGE UP (ref 13–17)
KETONES UR-MCNC: NEGATIVE — SIGNIFICANT CHANGE UP
LEUKOCYTE ESTERASE UR-ACNC: NEGATIVE — SIGNIFICANT CHANGE UP
LIDOCAIN IGE QN: 151 U/L — SIGNIFICANT CHANGE UP (ref 73–393)
MAGNESIUM SERPL-MCNC: 1.7 MG/DL — SIGNIFICANT CHANGE UP (ref 1.6–2.6)
MCHC RBC-ENTMCNC: 34.4 G/DL — SIGNIFICANT CHANGE UP (ref 32–36)
MCHC RBC-ENTMCNC: 34.4 PG — HIGH (ref 27–34)
MCV RBC AUTO: 100.3 FL — HIGH (ref 80–100)
METHADONE UR-MCNC: NEGATIVE — SIGNIFICANT CHANGE UP
NITRITE UR-MCNC: NEGATIVE — SIGNIFICANT CHANGE UP
NT-PROBNP SERPL-SCNC: 15 PG/ML — SIGNIFICANT CHANGE UP
OPIATES UR-MCNC: NEGATIVE — SIGNIFICANT CHANGE UP
PCP SPEC-MCNC: SIGNIFICANT CHANGE UP
PCP UR-MCNC: NEGATIVE — SIGNIFICANT CHANGE UP
PH UR: 7 — SIGNIFICANT CHANGE UP (ref 5–8)
PLATELET # BLD AUTO: 296 K/UL — SIGNIFICANT CHANGE UP (ref 150–400)
POTASSIUM SERPL-MCNC: 3.5 MMOL/L — SIGNIFICANT CHANGE UP (ref 3.5–5.3)
POTASSIUM SERPL-SCNC: 3.5 MMOL/L — SIGNIFICANT CHANGE UP (ref 3.5–5.3)
PROT SERPL-MCNC: 7.3 G/DL — SIGNIFICANT CHANGE UP (ref 6.4–8.2)
PROT UR-MCNC: NEGATIVE MG/DL — SIGNIFICANT CHANGE UP
RBC # BLD: 3.92 M/UL — LOW (ref 4.2–5.8)
RBC # FLD: 12.6 % — SIGNIFICANT CHANGE UP (ref 10.3–16.9)
SODIUM SERPL-SCNC: 141 MMOL/L — SIGNIFICANT CHANGE UP (ref 132–145)
SP GR SPEC: 1.02 — SIGNIFICANT CHANGE UP (ref 1–1.03)
THC UR QL: NEGATIVE — SIGNIFICANT CHANGE UP
TROPONIN I SERPL-MCNC: <0.017 NG/ML — LOW (ref 0.02–0.06)
TROPONIN I SERPL-MCNC: <0.017 NG/ML — LOW (ref 0.02–0.06)
UROBILINOGEN FLD QL: 0.2 E.U./DL — SIGNIFICANT CHANGE UP
WBC # BLD: 4.9 K/UL — SIGNIFICANT CHANGE UP (ref 3.8–10.5)
WBC # FLD AUTO: 4.9 K/UL — SIGNIFICANT CHANGE UP (ref 3.8–10.5)

## 2018-01-11 PROCEDURE — 99285 EMERGENCY DEPT VISIT HI MDM: CPT | Mod: 25

## 2018-01-11 PROCEDURE — 93010 ELECTROCARDIOGRAM REPORT: CPT

## 2018-01-11 PROCEDURE — 71045 X-RAY EXAM CHEST 1 VIEW: CPT | Mod: 26

## 2018-01-11 PROCEDURE — 74019 RADEX ABDOMEN 2 VIEWS: CPT | Mod: 26

## 2018-01-11 RX ORDER — DIPHENOXYLATE HCL/ATROPINE 2.5-.025MG
1 TABLET ORAL ONCE
Qty: 0 | Refills: 0 | Status: DISCONTINUED | OUTPATIENT
Start: 2018-01-11 | End: 2018-01-11

## 2018-01-11 RX ORDER — FAMOTIDINE 10 MG/ML
20 INJECTION INTRAVENOUS ONCE
Qty: 0 | Refills: 0 | Status: COMPLETED | OUTPATIENT
Start: 2018-01-11 | End: 2018-01-11

## 2018-01-11 RX ORDER — SODIUM CHLORIDE 9 MG/ML
3 INJECTION INTRAMUSCULAR; INTRAVENOUS; SUBCUTANEOUS ONCE
Qty: 0 | Refills: 0 | Status: COMPLETED | OUTPATIENT
Start: 2018-01-11 | End: 2018-01-11

## 2018-01-11 RX ORDER — ASPIRIN/CALCIUM CARB/MAGNESIUM 324 MG
325 TABLET ORAL ONCE
Qty: 0 | Refills: 0 | Status: DISCONTINUED | OUTPATIENT
Start: 2018-01-11 | End: 2018-01-11

## 2018-01-11 RX ORDER — ONDANSETRON 8 MG/1
4 TABLET, FILM COATED ORAL ONCE
Qty: 0 | Refills: 0 | Status: COMPLETED | OUTPATIENT
Start: 2018-01-11 | End: 2018-01-11

## 2018-01-11 RX ORDER — METOCLOPRAMIDE HCL 10 MG
10 TABLET ORAL ONCE
Qty: 0 | Refills: 0 | Status: COMPLETED | OUTPATIENT
Start: 2018-01-11 | End: 2018-01-11

## 2018-01-11 RX ADMIN — Medication 10 MILLIGRAM(S): at 19:30

## 2018-01-11 RX ADMIN — ONDANSETRON 4 MILLIGRAM(S): 8 TABLET, FILM COATED ORAL at 16:37

## 2018-01-11 RX ADMIN — FAMOTIDINE 20 MILLIGRAM(S): 10 INJECTION INTRAVENOUS at 16:37

## 2018-01-11 RX ADMIN — Medication 1 TABLET(S): at 16:37

## 2018-01-11 RX ADMIN — SODIUM CHLORIDE 3 MILLILITER(S): 9 INJECTION INTRAMUSCULAR; INTRAVENOUS; SUBCUTANEOUS at 15:50

## 2018-01-11 NOTE — SBIRT NOTE. - NSSBIRTSERVICES_GEN_A_ED_FT
Provided SBIRT services: Full screen positive. Referral to Treatment attempted. Screening results were reviewed with the patient and patient was provided information about healthy guidelines and potential negative consequences associated with level of risk. Motivation and readiness to reduce or stop use was discussed and goals and activities to make changes were suggested/offered.  Options discussed for further evaluation and treatment, but referral to treatment was not completed because Pt declined refused and is in need of medical treatment.  AUDIT: 22  DAST Score:0  Duration = 20 Minutes

## 2018-01-11 NOTE — ED ADULT NURSE NOTE - OBJECTIVE STATEMENT
Patient presents to the ED complaining of chills, nausea/vomiting/diarrhea with one episode of BRB in emesis; patient denies any chest pain, shortness of breath, abdominal pain; patient is complaining of lower back pain; patient states he usually drink 3 to 4 pints of vodka daily but has not been able to tolerate anything PO since this morning; abdomen soft, nontender, nondistended; patient with mild headache and tremors noted- SLOAN Quesada RN

## 2018-01-26 ENCOUNTER — EMERGENCY (EMERGENCY)
Facility: HOSPITAL | Age: 58
LOS: 1 days | Discharge: HOME | End: 2018-01-26
Admitting: EMERGENCY MEDICINE
Payer: MEDICARE

## 2018-01-26 VITALS
DIASTOLIC BLOOD PRESSURE: 131 MMHG | RESPIRATION RATE: 18 BRPM | OXYGEN SATURATION: 100 % | SYSTOLIC BLOOD PRESSURE: 182 MMHG | HEART RATE: 97 BPM | WEIGHT: 220.02 LBS | TEMPERATURE: 98 F

## 2018-01-26 VITALS
OXYGEN SATURATION: 98 % | HEART RATE: 90 BPM | SYSTOLIC BLOOD PRESSURE: 148 MMHG | DIASTOLIC BLOOD PRESSURE: 93 MMHG | RESPIRATION RATE: 18 BRPM

## 2018-01-26 DIAGNOSIS — Z79.52 LONG TERM (CURRENT) USE OF SYSTEMIC STEROIDS: ICD-10-CM

## 2018-01-26 DIAGNOSIS — Z88.5 ALLERGY STATUS TO NARCOTIC AGENT: ICD-10-CM

## 2018-01-26 DIAGNOSIS — R06.02 SHORTNESS OF BREATH: ICD-10-CM

## 2018-01-26 DIAGNOSIS — Z79.2 LONG TERM (CURRENT) USE OF ANTIBIOTICS: ICD-10-CM

## 2018-01-26 DIAGNOSIS — R07.89 OTHER CHEST PAIN: ICD-10-CM

## 2018-01-26 DIAGNOSIS — Z79.01 LONG TERM (CURRENT) USE OF ANTICOAGULANTS: ICD-10-CM

## 2018-01-26 DIAGNOSIS — I25.10 ATHEROSCLEROTIC HEART DISEASE OF NATIVE CORONARY ARTERY WITHOUT ANGINA PECTORIS: ICD-10-CM

## 2018-01-26 DIAGNOSIS — R05 COUGH: ICD-10-CM

## 2018-01-26 DIAGNOSIS — I10 ESSENTIAL (PRIMARY) HYPERTENSION: ICD-10-CM

## 2018-01-26 DIAGNOSIS — Z79.02 LONG TERM (CURRENT) USE OF ANTITHROMBOTICS/ANTIPLATELETS: ICD-10-CM

## 2018-01-26 DIAGNOSIS — Z79.899 OTHER LONG TERM (CURRENT) DRUG THERAPY: ICD-10-CM

## 2018-01-26 DIAGNOSIS — R07.9 CHEST PAIN, UNSPECIFIED: ICD-10-CM

## 2018-01-26 LAB
ANION GAP SERPL CALC-SCNC: 8 MMOL/L — LOW (ref 9–16)
BUN SERPL-MCNC: 7 MG/DL — SIGNIFICANT CHANGE UP (ref 7–23)
CALCIUM SERPL-MCNC: 8.5 MG/DL — SIGNIFICANT CHANGE UP (ref 8.5–10.5)
CHLORIDE SERPL-SCNC: 101 MMOL/L — SIGNIFICANT CHANGE UP (ref 96–108)
CK MB BLD-MCNC: 0.8 % — SIGNIFICANT CHANGE UP
CK MB CFR SERPL CALC: 3.6 NG/ML — SIGNIFICANT CHANGE UP (ref 0.5–3.6)
CO2 SERPL-SCNC: 27 MMOL/L — SIGNIFICANT CHANGE UP (ref 22–31)
CREAT SERPL-MCNC: 0.81 MG/DL — SIGNIFICANT CHANGE UP (ref 0.5–1.3)
D DIMER BLD IA.RAPID-MCNC: 196 NG/ML DDU — SIGNIFICANT CHANGE UP
ETHANOL SERPL-MCNC: <3 MG/DL — SIGNIFICANT CHANGE UP
GLUCOSE SERPL-MCNC: 104 MG/DL — HIGH (ref 70–99)
HCT VFR BLD CALC: 38 % — LOW (ref 39–50)
HGB BLD-MCNC: 13.1 G/DL — SIGNIFICANT CHANGE UP (ref 13–17)
MAGNESIUM SERPL-MCNC: 1.4 MG/DL — LOW (ref 1.6–2.6)
MCHC RBC-ENTMCNC: 34.5 G/DL — SIGNIFICANT CHANGE UP (ref 32–36)
MCHC RBC-ENTMCNC: 34.9 PG — HIGH (ref 27–34)
MCV RBC AUTO: 101.3 FL — HIGH (ref 80–100)
PHOSPHATE SERPL-MCNC: 2.6 MG/DL — SIGNIFICANT CHANGE UP (ref 2.5–4.5)
PLATELET # BLD AUTO: 282 K/UL — SIGNIFICANT CHANGE UP (ref 150–400)
POTASSIUM SERPL-MCNC: 3.5 MMOL/L — SIGNIFICANT CHANGE UP (ref 3.5–5.3)
POTASSIUM SERPL-SCNC: 3.5 MMOL/L — SIGNIFICANT CHANGE UP (ref 3.5–5.3)
RBC # BLD: 3.75 M/UL — LOW (ref 4.2–5.8)
RBC # FLD: 13.2 % — SIGNIFICANT CHANGE UP (ref 10.3–16.9)
SODIUM SERPL-SCNC: 136 MMOL/L — SIGNIFICANT CHANGE UP (ref 132–145)
TROPONIN I SERPL-MCNC: <0.017 NG/ML — LOW (ref 0.02–0.06)
TROPONIN I SERPL-MCNC: <0.017 NG/ML — LOW (ref 0.02–0.06)
TSH SERPL-MCNC: 0.32 UIU/ML — LOW (ref 0.36–3.74)
WBC # BLD: 4.6 K/UL — SIGNIFICANT CHANGE UP (ref 3.8–10.5)
WBC # FLD AUTO: 4.6 K/UL — SIGNIFICANT CHANGE UP (ref 3.8–10.5)

## 2018-01-26 PROCEDURE — 93010 ELECTROCARDIOGRAM REPORT: CPT

## 2018-01-26 PROCEDURE — 99284 EMERGENCY DEPT VISIT MOD MDM: CPT | Mod: 25

## 2018-01-26 PROCEDURE — 71046 X-RAY EXAM CHEST 2 VIEWS: CPT | Mod: 26

## 2018-01-26 RX ORDER — ONDANSETRON 8 MG/1
4 TABLET, FILM COATED ORAL ONCE
Qty: 0 | Refills: 0 | Status: COMPLETED | OUTPATIENT
Start: 2018-01-26 | End: 2018-01-26

## 2018-01-26 RX ORDER — ASPIRIN/CALCIUM CARB/MAGNESIUM 324 MG
325 TABLET ORAL DAILY
Qty: 0 | Refills: 0 | Status: DISCONTINUED | OUTPATIENT
Start: 2018-01-26 | End: 2018-01-30

## 2018-01-26 RX ADMIN — ONDANSETRON 4 MILLIGRAM(S): 8 TABLET, FILM COATED ORAL at 16:00

## 2018-01-26 RX ADMIN — Medication 325 MILLIGRAM(S): at 10:41

## 2018-01-26 NOTE — ED PROVIDER NOTE - MEDICAL DECISION MAKING DETAILS
Pt with CP and SOB with cardiac hx and hx of PE. Will get ACS workup, including CXR, and will also add d-dimer.

## 2018-01-26 NOTE — ED PROVIDER NOTE - OBJECTIVE STATEMENT
57y M with PMHx of HTN, stroke, diverticulosis, pancreatitis, PE, numerous ED visits in the past    Pt presents to ED for chest pain. Pt [place] the train [place] 8[place] when he was woken up by a dull pressure-like pain on the left side of his chest, radiating to his left arm and causing SOB. Pt states pain has been constant since then. Pt states he has been worked up for similar symptoms in the past and has had numerous visits to the ED for similar symptoms. Last visit 1 month ago. Pt also endorses cough. Admits to EtOH use, and last binge drank yesterday afternoon. Denies cocaine use or use of other illicit drugs. No LOC, no leg swelling, no calf tenderness.

## 2018-01-26 NOTE — ED ADULT NURSE NOTE - OBJECTIVE STATEMENT
speaking in full sentences, in NAD, and will continue to monitor. Patient is a 56 y/o male presenting to the ED with localized left sided pectoral CP starting 2hours PTA. Patient is speaking in full sentences, in NAD, and will continue to monitor. Patient is from [facility]. Patient drinks 2-3 pints a day, last drink last night.

## 2018-01-26 NOTE — SBIRT NOTE. - NSSBIRTSERVICES_GEN_A_ED_FT
Provided SBIRT services: Full screen positive. Referral to Treatment attempted. Screening results were reviewed with the patient and patient was provided information about healthy guidelines and potential negative consequences associated with level of risk. Motivation and readiness to reduce or stop use was discussed and goals and activities to make changes were suggested/offered.  Options discussed for further evaluation and treatment, but referral to treatment was not completed because Patient currently has a treatment plan setup or currently in treatment at [facility] addiction treatment center.  Audit Score: 22  DAST Score: 0  Duration = 20 Minutes

## 2018-01-26 NOTE — ED PROVIDER NOTE - PROGRESS NOTE DETAILS
trop x2 negative. EKG unchanged. CXR clear. D-dimer negative. Rest of workup unremarkable. Pt no longer having chest pain. Will DC to f/u with his cardiologist

## 2018-01-26 NOTE — ED PROVIDER NOTE - NS ED ROS FT
Denies fevers, chills, nausea, vomiting, diarrhea, constipation, abdominal pain, urinary symptoms, palpitations, dyspnea on exertion, syncope/near syncope, URI symptoms, headache, weakness, numbness, focal deficits, visual changes, gait or balance changes, dizziness

## 2018-02-23 ENCOUNTER — EMERGENCY (EMERGENCY)
Facility: HOSPITAL | Age: 58
LOS: 1 days | Discharge: HOME | End: 2018-02-23
Attending: EMERGENCY MEDICINE | Admitting: EMERGENCY MEDICINE
Payer: MEDICARE

## 2018-02-23 VITALS
SYSTOLIC BLOOD PRESSURE: 172 MMHG | OXYGEN SATURATION: 96 % | TEMPERATURE: 99 F | RESPIRATION RATE: 16 BRPM | HEART RATE: 82 BPM | DIASTOLIC BLOOD PRESSURE: 101 MMHG

## 2018-02-23 VITALS
RESPIRATION RATE: 18 BRPM | SYSTOLIC BLOOD PRESSURE: 165 MMHG | TEMPERATURE: 99 F | OXYGEN SATURATION: 96 % | DIASTOLIC BLOOD PRESSURE: 90 MMHG | HEART RATE: 91 BPM

## 2018-02-23 DIAGNOSIS — Z79.01 LONG TERM (CURRENT) USE OF ANTICOAGULANTS: ICD-10-CM

## 2018-02-23 DIAGNOSIS — J44.9 CHRONIC OBSTRUCTIVE PULMONARY DISEASE, UNSPECIFIED: ICD-10-CM

## 2018-02-23 DIAGNOSIS — Z79.899 OTHER LONG TERM (CURRENT) DRUG THERAPY: ICD-10-CM

## 2018-02-23 DIAGNOSIS — F17.200 NICOTINE DEPENDENCE, UNSPECIFIED, UNCOMPLICATED: ICD-10-CM

## 2018-02-23 DIAGNOSIS — Z79.2 LONG TERM (CURRENT) USE OF ANTIBIOTICS: ICD-10-CM

## 2018-02-23 DIAGNOSIS — R07.2 PRECORDIAL PAIN: ICD-10-CM

## 2018-02-23 DIAGNOSIS — Z88.5 ALLERGY STATUS TO NARCOTIC AGENT: ICD-10-CM

## 2018-02-23 DIAGNOSIS — I10 ESSENTIAL (PRIMARY) HYPERTENSION: ICD-10-CM

## 2018-02-23 LAB
ALBUMIN SERPL ELPH-MCNC: 3.5 G/DL — SIGNIFICANT CHANGE UP (ref 3.4–5)
ALP SERPL-CCNC: 90 U/L — SIGNIFICANT CHANGE UP (ref 40–120)
ALT FLD-CCNC: 29 U/L — SIGNIFICANT CHANGE UP (ref 12–42)
ANION GAP SERPL CALC-SCNC: 10 MMOL/L — SIGNIFICANT CHANGE UP (ref 9–16)
APPEARANCE UR: CLEAR — SIGNIFICANT CHANGE UP
AST SERPL-CCNC: 58 U/L — HIGH (ref 15–37)
BILIRUB SERPL-MCNC: 1 MG/DL — SIGNIFICANT CHANGE UP (ref 0.2–1.2)
BILIRUB UR-MCNC: NEGATIVE — SIGNIFICANT CHANGE UP
BUN SERPL-MCNC: 7 MG/DL — SIGNIFICANT CHANGE UP (ref 7–23)
CALCIUM SERPL-MCNC: 8.6 MG/DL — SIGNIFICANT CHANGE UP (ref 8.5–10.5)
CHLORIDE SERPL-SCNC: 104 MMOL/L — SIGNIFICANT CHANGE UP (ref 96–108)
CK MB BLD-MCNC: 1.03 % — SIGNIFICANT CHANGE UP
CK MB CFR SERPL CALC: 5.2 NG/ML — HIGH (ref 0.5–3.6)
CK SERPL-CCNC: 503 U/L — HIGH (ref 39–308)
CO2 SERPL-SCNC: 27 MMOL/L — SIGNIFICANT CHANGE UP (ref 22–31)
COLOR SPEC: YELLOW — SIGNIFICANT CHANGE UP
CREAT SERPL-MCNC: 0.7 MG/DL — SIGNIFICANT CHANGE UP (ref 0.5–1.3)
D DIMER BLD IA.RAPID-MCNC: 245 NG/ML DDU — HIGH
DIFF PNL FLD: NEGATIVE — SIGNIFICANT CHANGE UP
ETHANOL SERPL-MCNC: <3 MG/DL — SIGNIFICANT CHANGE UP
GLUCOSE SERPL-MCNC: 116 MG/DL — HIGH (ref 70–99)
GLUCOSE UR QL: NEGATIVE — SIGNIFICANT CHANGE UP
HCT VFR BLD CALC: 40 % — SIGNIFICANT CHANGE UP (ref 39–50)
HGB BLD-MCNC: 13.1 G/DL — SIGNIFICANT CHANGE UP (ref 13–17)
KETONES UR-MCNC: NEGATIVE — SIGNIFICANT CHANGE UP
LEUKOCYTE ESTERASE UR-ACNC: NEGATIVE — SIGNIFICANT CHANGE UP
MAGNESIUM SERPL-MCNC: 1.5 MG/DL — LOW (ref 1.6–2.6)
MCHC RBC-ENTMCNC: 32.8 G/DL — SIGNIFICANT CHANGE UP (ref 32–36)
MCHC RBC-ENTMCNC: 34.5 PG — HIGH (ref 27–34)
MCV RBC AUTO: 105.3 FL — HIGH (ref 80–100)
NITRITE UR-MCNC: NEGATIVE — SIGNIFICANT CHANGE UP
PCP SPEC-MCNC: SIGNIFICANT CHANGE UP
PH UR: 7 — SIGNIFICANT CHANGE UP (ref 5–8)
PHOSPHATE SERPL-MCNC: 2.9 MG/DL — SIGNIFICANT CHANGE UP (ref 2.5–4.5)
PLATELET # BLD AUTO: 274 K/UL — SIGNIFICANT CHANGE UP (ref 150–400)
POTASSIUM SERPL-MCNC: 4.4 MMOL/L — SIGNIFICANT CHANGE UP (ref 3.5–5.3)
POTASSIUM SERPL-SCNC: 4.4 MMOL/L — SIGNIFICANT CHANGE UP (ref 3.5–5.3)
PROT SERPL-MCNC: 8 G/DL — SIGNIFICANT CHANGE UP (ref 6.4–8.2)
PROT UR-MCNC: NEGATIVE MG/DL — SIGNIFICANT CHANGE UP
RBC # BLD: 3.8 M/UL — LOW (ref 4.2–5.8)
RBC # FLD: 13.1 % — SIGNIFICANT CHANGE UP (ref 10.3–16.9)
SODIUM SERPL-SCNC: 141 MMOL/L — SIGNIFICANT CHANGE UP (ref 132–145)
SP GR SPEC: 1.01 — SIGNIFICANT CHANGE UP (ref 1–1.03)
TROPONIN I SERPL-MCNC: <0.017 NG/ML — LOW (ref 0.02–0.06)
TROPONIN I SERPL-MCNC: <0.017 NG/ML — LOW (ref 0.02–0.06)
TSH SERPL-MCNC: 0.41 UIU/ML — SIGNIFICANT CHANGE UP (ref 0.36–3.74)
UROBILINOGEN FLD QL: 0.2 E.U./DL — SIGNIFICANT CHANGE UP
WBC # BLD: 6 K/UL — SIGNIFICANT CHANGE UP (ref 3.8–10.5)
WBC # FLD AUTO: 6 K/UL — SIGNIFICANT CHANGE UP (ref 3.8–10.5)

## 2018-02-23 PROCEDURE — 71275 CT ANGIOGRAPHY CHEST: CPT | Mod: 26

## 2018-02-23 PROCEDURE — 93010 ELECTROCARDIOGRAM REPORT: CPT

## 2018-02-23 PROCEDURE — 99284 EMERGENCY DEPT VISIT MOD MDM: CPT | Mod: 25

## 2018-02-23 PROCEDURE — 71046 X-RAY EXAM CHEST 2 VIEWS: CPT | Mod: 26

## 2018-02-23 RX ORDER — IPRATROPIUM/ALBUTEROL SULFATE 18-103MCG
3 AEROSOL WITH ADAPTER (GRAM) INHALATION EVERY 6 HOURS
Qty: 0 | Refills: 0 | Status: DISCONTINUED | OUTPATIENT
Start: 2018-02-23 | End: 2018-02-27

## 2018-02-23 RX ORDER — ASPIRIN/CALCIUM CARB/MAGNESIUM 324 MG
325 TABLET ORAL ONCE
Qty: 0 | Refills: 0 | Status: COMPLETED | OUTPATIENT
Start: 2018-02-23 | End: 2018-02-23

## 2018-02-23 RX ORDER — ALBUTEROL 90 UG/1
1 AEROSOL, METERED ORAL EVERY 4 HOURS
Qty: 0 | Refills: 0 | Status: DISCONTINUED | OUTPATIENT
Start: 2018-02-23 | End: 2018-02-27

## 2018-02-23 RX ADMIN — Medication 3 MILLILITER(S): at 13:05

## 2018-02-23 RX ADMIN — Medication 25 MILLIGRAM(S): at 14:48

## 2018-02-23 RX ADMIN — Medication 325 MILLIGRAM(S): at 13:05

## 2018-02-23 NOTE — ED PROVIDER NOTE - NS ED ROS FT
Denies fevers, chills, nausea, vomiting, diarrhea, constipation, abdominal pain, urinary symptoms, palpitations, shortness of breath, dyspnea on exertion, syncope/near syncope, cough/URI symptoms, headache, weakness, numbness, focal deficits, visual changes, gait or balance changes, dizziness

## 2018-02-23 NOTE — ED PROVIDER NOTE - PROGRESS NOTE DETAILS
Pt no longer endorsing chest pain. EKG, CXR, CT angio of chest, tropx2 all without acute findings- unremarkable. vitals normal. Will DC to f/u with his cardiologist

## 2018-02-23 NOTE — ED PROVIDER NOTE - MEDICAL DECISION MAKING DETAILS
chest pain in pt with cardiac hx. Will r/o ACS- EKG, CXR, tropx2, ck, tsh/mag/phos, basic labs. Pt on tele. Will do bedside US.  Will give aspirin and re-eval chest pain in pt with cardiac hx. Will r/o ACS- EKG, CXR, tropx2, ck, tsh/mag/phos, d-dimer, basic labs. Pt on tele. Will give aspirin and re-eval

## 2018-02-23 NOTE — ED ADULT NURSE NOTE - ADDITIONAL PRINTED INSTRUCTIONS GIVEN
D/c w /instructions for followup patient to f/u w/ cardiologist, return to ER for worsening symptoms, verbalized understanding of all instructions. denies chest pain/sob at discharge.

## 2018-02-23 NOTE — ED PROVIDER NOTE - OBJECTIVE STATEMENT
58 y/o M    PMHx: Alcohol abuse, HTN, COPD, stroke, MI, PE    Pt presents with c/o substernal chest pain while filling out housing paperwork 3 hours ago. States pain lasted for about 3 hours- no longer having. States he has never had chest pain before. states nothing alleviates or aggravates it. States he has not drank alcohol yet today- so is concerned he may go into withdrawal. Denies any drug use in the past few days. Denies shortness of breath, Dyspnea on exertion, palpitations, lightheadedness, near syncope/syncope, leg swelling, calf pain, wheezing, cough/uri sxs

## 2018-03-12 ENCOUNTER — EMERGENCY (EMERGENCY)
Facility: HOSPITAL | Age: 58
LOS: 1 days | Discharge: HOME | End: 2018-03-12
Attending: EMERGENCY MEDICINE | Admitting: EMERGENCY MEDICINE
Payer: MEDICARE

## 2018-03-12 VITALS
HEART RATE: 63 BPM | SYSTOLIC BLOOD PRESSURE: 148 MMHG | DIASTOLIC BLOOD PRESSURE: 93 MMHG | TEMPERATURE: 98 F | RESPIRATION RATE: 16 BRPM | OXYGEN SATURATION: 98 %

## 2018-03-12 VITALS
OXYGEN SATURATION: 96 % | TEMPERATURE: 98 F | DIASTOLIC BLOOD PRESSURE: 96 MMHG | HEART RATE: 96 BPM | RESPIRATION RATE: 18 BRPM | SYSTOLIC BLOOD PRESSURE: 152 MMHG

## 2018-03-12 DIAGNOSIS — Z79.51 LONG TERM (CURRENT) USE OF INHALED STEROIDS: ICD-10-CM

## 2018-03-12 DIAGNOSIS — I10 ESSENTIAL (PRIMARY) HYPERTENSION: ICD-10-CM

## 2018-03-12 DIAGNOSIS — Z79.899 OTHER LONG TERM (CURRENT) DRUG THERAPY: ICD-10-CM

## 2018-03-12 DIAGNOSIS — R07.89 OTHER CHEST PAIN: ICD-10-CM
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
## 2018-03-12 DIAGNOSIS — Z88.5 ALLERGY STATUS TO NARCOTIC AGENT: ICD-10-CM

## 2018-03-12 LAB
ALBUMIN SERPL ELPH-MCNC: 3.1 G/DL — LOW (ref 3.4–5)
ALP SERPL-CCNC: 90 U/L — SIGNIFICANT CHANGE UP (ref 40–120)
ALT FLD-CCNC: 27 U/L — SIGNIFICANT CHANGE UP (ref 12–42)
ANION GAP SERPL CALC-SCNC: 11 MMOL/L — SIGNIFICANT CHANGE UP (ref 9–16)
AST SERPL-CCNC: 40 U/L — HIGH (ref 15–37)
BASOPHILS NFR BLD AUTO: 0.6 % — SIGNIFICANT CHANGE UP (ref 0–2)
BILIRUB SERPL-MCNC: 1 MG/DL — SIGNIFICANT CHANGE UP (ref 0.2–1.2)
BUN SERPL-MCNC: 9 MG/DL — SIGNIFICANT CHANGE UP (ref 7–23)
CALCIUM SERPL-MCNC: 8.5 MG/DL — SIGNIFICANT CHANGE UP (ref 8.5–10.5)
CHLORIDE SERPL-SCNC: 105 MMOL/L — SIGNIFICANT CHANGE UP (ref 96–108)
CK MB BLD-MCNC: 1.04 % — SIGNIFICANT CHANGE UP
CK MB CFR SERPL CALC: 2.1 NG/ML — SIGNIFICANT CHANGE UP (ref 0.5–3.6)
CK SERPL-CCNC: 201 U/L — SIGNIFICANT CHANGE UP (ref 39–308)
CO2 SERPL-SCNC: 23 MMOL/L — SIGNIFICANT CHANGE UP (ref 22–31)
CREAT SERPL-MCNC: 0.85 MG/DL — SIGNIFICANT CHANGE UP (ref 0.5–1.3)
EOSINOPHIL NFR BLD AUTO: 2.8 % — SIGNIFICANT CHANGE UP (ref 0–6)
ETHANOL SERPL-MCNC: <3 MG/DL — SIGNIFICANT CHANGE UP
GLUCOSE SERPL-MCNC: 109 MG/DL — HIGH (ref 70–99)
HCT VFR BLD CALC: 37.7 % — LOW (ref 39–50)
HGB BLD-MCNC: 12.9 G/DL — LOW (ref 13–17)
IMM GRANULOCYTES NFR BLD AUTO: 0.2 % — SIGNIFICANT CHANGE UP (ref 0–1.5)
LYMPHOCYTES # BLD AUTO: 18.2 % — SIGNIFICANT CHANGE UP (ref 13–44)
MCHC RBC-ENTMCNC: 34.2 G/DL — SIGNIFICANT CHANGE UP (ref 32–36)
MCHC RBC-ENTMCNC: 35.2 PG — HIGH (ref 27–34)
MCV RBC AUTO: 103 FL — HIGH (ref 80–100)
MONOCYTES NFR BLD AUTO: 11.6 % — SIGNIFICANT CHANGE UP (ref 2–14)
NEUTROPHILS NFR BLD AUTO: 66.6 % — SIGNIFICANT CHANGE UP (ref 43–77)
PLATELET # BLD AUTO: 295 K/UL — SIGNIFICANT CHANGE UP (ref 150–400)
POTASSIUM SERPL-MCNC: 3 MMOL/L — LOW (ref 3.5–5.3)
POTASSIUM SERPL-SCNC: 3 MMOL/L — LOW (ref 3.5–5.3)
PROT SERPL-MCNC: 7 G/DL — SIGNIFICANT CHANGE UP (ref 6.4–8.2)
RBC # BLD: 3.66 M/UL — LOW (ref 4.2–5.8)
RBC # FLD: 12.6 % — SIGNIFICANT CHANGE UP (ref 10.3–16.9)
SODIUM SERPL-SCNC: 139 MMOL/L — SIGNIFICANT CHANGE UP (ref 132–145)
TROPONIN I SERPL-MCNC: <0.017 NG/ML — LOW (ref 0.02–0.06)
WBC # BLD: 6.4 K/UL — SIGNIFICANT CHANGE UP (ref 3.8–10.5)
WBC # FLD AUTO: 6.4 K/UL — SIGNIFICANT CHANGE UP (ref 3.8–10.5)

## 2018-03-12 PROCEDURE — 75574 CT ANGIO HRT W/3D IMAGE: CPT | Mod: 26

## 2018-03-12 PROCEDURE — 71046 X-RAY EXAM CHEST 2 VIEWS: CPT | Mod: 26

## 2018-03-12 PROCEDURE — 93010 ELECTROCARDIOGRAM REPORT: CPT

## 2018-03-12 PROCEDURE — 99285 EMERGENCY DEPT VISIT HI MDM: CPT | Mod: 25

## 2018-03-12 RX ORDER — NITROGLYCERIN 6.5 MG
0.4 CAPSULE, EXTENDED RELEASE ORAL ONCE
Qty: 0 | Refills: 0 | Status: COMPLETED | OUTPATIENT
Start: 2018-03-12 | End: 2018-03-12

## 2018-03-12 RX ORDER — NITROGLYCERIN 6.5 MG
0.3 CAPSULE, EXTENDED RELEASE ORAL ONCE
Qty: 0 | Refills: 0 | Status: DISCONTINUED | OUTPATIENT
Start: 2018-03-12 | End: 2018-03-12

## 2018-03-12 RX ORDER — ASPIRIN/CALCIUM CARB/MAGNESIUM 324 MG
162 TABLET ORAL ONCE
Qty: 0 | Refills: 0 | Status: COMPLETED | OUTPATIENT
Start: 2018-03-12 | End: 2018-03-12

## 2018-03-12 RX ORDER — METOPROLOL TARTRATE 50 MG
5 TABLET ORAL ONCE
Qty: 0 | Refills: 0 | Status: COMPLETED | OUTPATIENT
Start: 2018-03-12 | End: 2018-03-12

## 2018-03-12 RX ORDER — POTASSIUM CHLORIDE 20 MEQ
40 PACKET (EA) ORAL ONCE
Qty: 0 | Refills: 0 | Status: COMPLETED | OUTPATIENT
Start: 2018-03-12 | End: 2018-03-12

## 2018-03-12 RX ORDER — ASPIRIN/CALCIUM CARB/MAGNESIUM 324 MG
162 TABLET ORAL ONCE
Qty: 0 | Refills: 0 | Status: DISCONTINUED | OUTPATIENT
Start: 2018-03-12 | End: 2018-03-12

## 2018-03-12 RX ORDER — METOPROLOL TARTRATE 50 MG
50 TABLET ORAL ONCE
Qty: 0 | Refills: 0 | Status: COMPLETED | OUTPATIENT
Start: 2018-03-12 | End: 2018-03-12

## 2018-03-12 RX ADMIN — Medication 0.4 MILLIGRAM(S): at 15:54

## 2018-03-12 RX ADMIN — Medication 162 MILLIGRAM(S): at 10:04

## 2018-03-12 RX ADMIN — Medication 50 MILLIGRAM(S): at 14:19

## 2018-03-12 RX ADMIN — Medication 40 MILLIEQUIVALENT(S): at 11:53

## 2018-03-12 RX ADMIN — Medication 5 MILLIGRAM(S): at 15:48

## 2018-03-12 NOTE — SBIRT NOTE. - NSSBIRTSERVICES_GEN_A_ED_FT
Provided SBIRT services: Full screen positive. Referral to Treatment attempted. Screening results were reviewed with the patient and patient was provided information about healthy guidelines and potential negative consequences associated with level of risk. Motivation and readiness to reduce or stop use was discussed and goals and activities to make changes were suggested/offered.  Options discussed for further evaluation and treatment, but referral to treatment was not completed because Patient currently has a treatment plan setup or currently in treatment.    Audit Score: 20  DAST Score:0  Duration =18 Minutes

## 2018-03-12 NOTE — ED PROVIDER NOTE - OBJECTIVE STATEMENT
57 y.o. male with PMH Pancreatitis Stroke  Diverticulosis  History of pulmonary embolism    HTN (hypertension)    MI (myocardial infarction)     with c/o chest pain, today.  Seen 2/23 for chest pain and had extensive workup during that visits, labs, CE x 2 sets negative, slightly elevated ddimer with no PE on CTA.  CTA also showed aortic aneurysm, mild.

## 2018-03-12 NOTE — ED PROVIDER NOTE - PROGRESS NOTE DETAILS
58 y/o male with PMHx of HTN, COPD, MI, and PE (resolved, last AC use greater than one year ago) presents to the ED with c/o CP central area substernal described as sharp. Sx have been ongoing constantly for 2 weeks. Associated with mild LE swelling. Denies SOB, external sx, change in exercise tolerance, F/C , occasional dry cough, N/V. Has not have had outpatient cardiology evaluation. Seen in the ER. no chest pain, CTA negative, awaiting radiology reading of rest of CTA, spoke with resident and read was requested. RAD READING WITH NAD, STABLE FOR DC HOME, WILL HAVE OUTPATIENT FOLLOWUP, INFORMED OF LUNG NODULE AND NEED FOR FOLLOWUP.

## 2018-03-12 NOTE — ED ADULT NURSE NOTE - OBJECTIVE STATEMENT
Pt has chest pain on and off for the last 2 weeks with shortness of breath and productive green phlegm cough.

## 2018-03-12 NOTE — ED ADULT TRIAGE NOTE - CHIEF COMPLAINT QUOTE
productive cough and fever yesterday, chest pain that woke patient from sleep this am (1 spray ntg sl, and 162mg asa pta)

## 2018-04-07 ENCOUNTER — EMERGENCY (EMERGENCY)
Facility: HOSPITAL | Age: 58
LOS: 1 days | Discharge: HOME | End: 2018-04-07
Attending: EMERGENCY MEDICINE | Admitting: EMERGENCY MEDICINE
Payer: MEDICARE

## 2018-04-07 VITALS
DIASTOLIC BLOOD PRESSURE: 107 MMHG | HEART RATE: 105 BPM | RESPIRATION RATE: 16 BRPM | SYSTOLIC BLOOD PRESSURE: 152 MMHG | OXYGEN SATURATION: 97 % | TEMPERATURE: 98 F

## 2018-04-07 DIAGNOSIS — Z79.01 LONG TERM (CURRENT) USE OF ANTICOAGULANTS: ICD-10-CM

## 2018-04-07 DIAGNOSIS — Z88.5 ALLERGY STATUS TO NARCOTIC AGENT: ICD-10-CM

## 2018-04-07 DIAGNOSIS — R07.0 PAIN IN THROAT: ICD-10-CM

## 2018-04-07 DIAGNOSIS — I10 ESSENTIAL (PRIMARY) HYPERTENSION: ICD-10-CM

## 2018-04-07 DIAGNOSIS — Z79.899 OTHER LONG TERM (CURRENT) DRUG THERAPY: ICD-10-CM

## 2018-04-07 DIAGNOSIS — Z79.2 LONG TERM (CURRENT) USE OF ANTIBIOTICS: ICD-10-CM

## 2018-04-07 DIAGNOSIS — J02.9 ACUTE PHARYNGITIS, UNSPECIFIED: ICD-10-CM

## 2018-04-07 PROCEDURE — 99284 EMERGENCY DEPT VISIT MOD MDM: CPT

## 2018-04-07 RX ORDER — PENICILLIN V POTASIUM 500 MG/1
1 TABLET OROPHARYNGEAL
Qty: 40 | Refills: 0
Start: 2018-04-07 | End: 2018-04-16

## 2018-04-07 RX ORDER — ACETAMINOPHEN 500 MG
650 TABLET ORAL ONCE
Qty: 0 | Refills: 0 | Status: COMPLETED | OUTPATIENT
Start: 2018-04-07 | End: 2018-04-07

## 2018-04-07 RX ADMIN — Medication 50 MILLIGRAM(S): at 16:06

## 2018-04-07 RX ADMIN — Medication 650 MILLIGRAM(S): at 16:37

## 2018-04-07 NOTE — ED PROVIDER NOTE - OBJECTIVE STATEMENT
56 y/o M 58 y/o M with pmhx CVA, PE, HTN, MI, diverticulosis, pancreatitis, presents c/o throat pain 56 y/o M with pmhx CVA, PE, HTN, MI, diverticulosis, pancreatitis, presents c/o throat pain x 2 days. Associated hoarse voice and HA. Pt also c/o vomiting and diarrhea today. Denies f/c, chest pain, cough, SOB, abdominal pain.

## 2018-04-07 NOTE — ED PROVIDER NOTE - MEDICAL DECISION MAKING DETAILS
Strict prompt return precautions to ER discussed for any worsening or new sypmtoms, pt verbalized understanding. The patient tolerated PO fluids. The patient's symptoms progressively improved throughout the ED stay. At the time of discharge from the Emergency Department, the patient is alert with fluent appropriate speech and ambulatory without difficulty.  A medical screening examination was performed and no emergency medical condition was identified. Strict prompt return precautions to ER discussed for any worsening or new symptoms, pt verbalized understanding. The patient tolerated PO fluids. The patient's symptoms progressively improved throughout the ED stay. At the time of discharge from the Emergency Department, the patient is alert with fluent appropriate speech and ambulatory without difficulty.  A medical screening examination was performed and no emergency medical condition was identified.

## 2018-04-07 NOTE — ED ADULT NURSE NOTE - ADDITIONAL PRINTED INSTRUCTIONS GIVEN
DC w/ instructions for f/u, patient to f/u w/ pmd, return to ER for worsening symptoms, verbalized understanding.

## 2018-04-25 ENCOUNTER — EMERGENCY (EMERGENCY)
Facility: HOSPITAL | Age: 58
LOS: 1 days | Discharge: HOME | End: 2018-04-25
Attending: EMERGENCY MEDICINE | Admitting: EMERGENCY MEDICINE
Payer: MEDICARE

## 2018-04-25 VITALS
OXYGEN SATURATION: 98 % | HEART RATE: 82 BPM | RESPIRATION RATE: 16 BRPM | DIASTOLIC BLOOD PRESSURE: 86 MMHG | SYSTOLIC BLOOD PRESSURE: 149 MMHG

## 2018-04-25 VITALS
SYSTOLIC BLOOD PRESSURE: 152 MMHG | HEART RATE: 90 BPM | OXYGEN SATURATION: 99 % | RESPIRATION RATE: 18 BRPM | TEMPERATURE: 98 F | DIASTOLIC BLOOD PRESSURE: 98 MMHG

## 2018-04-25 DIAGNOSIS — R07.89 OTHER CHEST PAIN: ICD-10-CM

## 2018-04-25 DIAGNOSIS — Z79.899 OTHER LONG TERM (CURRENT) DRUG THERAPY: ICD-10-CM

## 2018-04-25 DIAGNOSIS — Z79.2 LONG TERM (CURRENT) USE OF ANTIBIOTICS: ICD-10-CM

## 2018-04-25 DIAGNOSIS — Z88.5 ALLERGY STATUS TO NARCOTIC AGENT: ICD-10-CM

## 2018-04-25 DIAGNOSIS — I10 ESSENTIAL (PRIMARY) HYPERTENSION: ICD-10-CM

## 2018-04-25 DIAGNOSIS — Z79.01 LONG TERM (CURRENT) USE OF ANTICOAGULANTS: ICD-10-CM

## 2018-04-25 LAB
ALBUMIN SERPL ELPH-MCNC: 3.3 G/DL — LOW (ref 3.4–5)
ALP SERPL-CCNC: 64 U/L — SIGNIFICANT CHANGE UP (ref 40–120)
ALT FLD-CCNC: 45 U/L — HIGH (ref 12–42)
AMYLASE P1 CFR SERPL: 64 U/L — SIGNIFICANT CHANGE UP (ref 25–115)
ANION GAP SERPL CALC-SCNC: 10 MMOL/L — SIGNIFICANT CHANGE UP (ref 9–16)
APTT BLD: 36.1 SEC — SIGNIFICANT CHANGE UP (ref 27.5–36.5)
AST SERPL-CCNC: 28 U/L — SIGNIFICANT CHANGE UP (ref 15–37)
BILIRUB SERPL-MCNC: 0.6 MG/DL — SIGNIFICANT CHANGE UP (ref 0.2–1.2)
BUN SERPL-MCNC: 13 MG/DL — SIGNIFICANT CHANGE UP (ref 7–23)
CALCIUM SERPL-MCNC: 8.8 MG/DL — SIGNIFICANT CHANGE UP (ref 8.5–10.5)
CHLORIDE SERPL-SCNC: 106 MMOL/L — SIGNIFICANT CHANGE UP (ref 96–108)
CK MB BLD-MCNC: 1.11 % — SIGNIFICANT CHANGE UP
CK MB CFR SERPL CALC: 1.4 NG/ML — SIGNIFICANT CHANGE UP (ref 0.5–3.6)
CK SERPL-CCNC: 126 U/L — SIGNIFICANT CHANGE UP (ref 39–308)
CO2 SERPL-SCNC: 24 MMOL/L — SIGNIFICANT CHANGE UP (ref 22–31)
CREAT SERPL-MCNC: 0.98 MG/DL — SIGNIFICANT CHANGE UP (ref 0.5–1.3)
ETHANOL SERPL-MCNC: <3 MG/DL — SIGNIFICANT CHANGE UP
GLUCOSE SERPL-MCNC: 100 MG/DL — HIGH (ref 70–99)
HCT VFR BLD CALC: 39.6 % — SIGNIFICANT CHANGE UP (ref 39–50)
HGB BLD-MCNC: 13.1 G/DL — SIGNIFICANT CHANGE UP (ref 13–17)
INR BLD: 1.06 — SIGNIFICANT CHANGE UP (ref 0.88–1.16)
LIDOCAIN IGE QN: 272 U/L — SIGNIFICANT CHANGE UP (ref 73–393)
MCHC RBC-ENTMCNC: 33.1 G/DL — SIGNIFICANT CHANGE UP (ref 32–36)
MCHC RBC-ENTMCNC: 34 PG — SIGNIFICANT CHANGE UP (ref 27–34)
MCV RBC AUTO: 102.9 FL — HIGH (ref 80–100)
PLATELET # BLD AUTO: 336 K/UL — SIGNIFICANT CHANGE UP (ref 150–400)
POTASSIUM SERPL-MCNC: 4.1 MMOL/L — SIGNIFICANT CHANGE UP (ref 3.5–5.3)
POTASSIUM SERPL-SCNC: 4.1 MMOL/L — SIGNIFICANT CHANGE UP (ref 3.5–5.3)
PROT SERPL-MCNC: 7.4 G/DL — SIGNIFICANT CHANGE UP (ref 6.4–8.2)
PROTHROM AB SERPL-ACNC: 11.7 SEC — SIGNIFICANT CHANGE UP (ref 9.8–12.7)
RBC # BLD: 3.85 M/UL — LOW (ref 4.2–5.8)
RBC # FLD: 11.9 % — SIGNIFICANT CHANGE UP (ref 10.3–16.9)
SODIUM SERPL-SCNC: 140 MMOL/L — SIGNIFICANT CHANGE UP (ref 132–145)
TROPONIN I SERPL-MCNC: <0.017 NG/ML — LOW (ref 0.02–0.06)
WBC # BLD: 6.1 K/UL — SIGNIFICANT CHANGE UP (ref 3.8–10.5)
WBC # FLD AUTO: 6.1 K/UL — SIGNIFICANT CHANGE UP (ref 3.8–10.5)

## 2018-04-25 PROCEDURE — 71046 X-RAY EXAM CHEST 2 VIEWS: CPT | Mod: 26

## 2018-04-25 PROCEDURE — 99285 EMERGENCY DEPT VISIT HI MDM: CPT | Mod: 25

## 2018-04-25 PROCEDURE — 93010 ELECTROCARDIOGRAM REPORT: CPT

## 2018-04-25 RX ORDER — ASPIRIN/CALCIUM CARB/MAGNESIUM 324 MG
325 TABLET ORAL DAILY
Qty: 0 | Refills: 0 | Status: DISCONTINUED | OUTPATIENT
Start: 2018-04-25 | End: 2018-04-25

## 2018-04-25 RX ORDER — ASPIRIN/CALCIUM CARB/MAGNESIUM 324 MG
325 TABLET ORAL ONCE
Qty: 0 | Refills: 0 | Status: COMPLETED | OUTPATIENT
Start: 2018-04-25 | End: 2018-04-25

## 2018-04-25 RX ORDER — NITROGLYCERIN 6.5 MG
0.4 CAPSULE, EXTENDED RELEASE ORAL ONCE
Qty: 0 | Refills: 0 | Status: COMPLETED | OUTPATIENT
Start: 2018-04-25 | End: 2018-04-25

## 2018-04-25 RX ADMIN — Medication 0.4 MILLIGRAM(S): at 20:06

## 2018-04-25 RX ADMIN — Medication 325 MILLIGRAM(S): at 20:06

## 2018-04-25 NOTE — ED ADULT NURSE NOTE - CHPI ED SYMPTOMS NEG
no nausea/no syncope/no vomiting/no chills/no back pain/no fever/no shortness of breath/no cough/no dizziness/no diaphoresis

## 2018-04-25 NOTE — ED PROVIDER NOTE - OBJECTIVE STATEMENT
59 y/o M with PMHx of CVA, PE, HTN, MI, diverticulosis, pancreatitis, on amlodipine 10mg qd, atorvastatin 40mg qd, folic acid 1mg qd, lisinopril 20mg qd, tamsulosin 0.4mg qd,  presents with L-sided chest pain onset today. Pt states he was fine last night. Reports decreased tobacco use. Denies FHx of cardiac issues.

## 2018-05-04 ENCOUNTER — EMERGENCY (EMERGENCY)
Facility: HOSPITAL | Age: 58
LOS: 1 days | Discharge: HOME | End: 2018-05-04
Attending: EMERGENCY MEDICINE | Admitting: EMERGENCY MEDICINE
Payer: MEDICARE

## 2018-05-04 VITALS
SYSTOLIC BLOOD PRESSURE: 134 MMHG | HEART RATE: 87 BPM | DIASTOLIC BLOOD PRESSURE: 84 MMHG | TEMPERATURE: 99 F | OXYGEN SATURATION: 97 % | RESPIRATION RATE: 18 BRPM

## 2018-05-04 VITALS
OXYGEN SATURATION: 100 % | SYSTOLIC BLOOD PRESSURE: 151 MMHG | DIASTOLIC BLOOD PRESSURE: 81 MMHG | RESPIRATION RATE: 18 BRPM | TEMPERATURE: 98 F | HEART RATE: 110 BPM

## 2018-05-04 DIAGNOSIS — Z79.01 LONG TERM (CURRENT) USE OF ANTICOAGULANTS: ICD-10-CM

## 2018-05-04 DIAGNOSIS — Z79.52 LONG TERM (CURRENT) USE OF SYSTEMIC STEROIDS: ICD-10-CM

## 2018-05-04 DIAGNOSIS — R00.2 PALPITATIONS: ICD-10-CM

## 2018-05-04 DIAGNOSIS — Z88.5 ALLERGY STATUS TO NARCOTIC AGENT: ICD-10-CM

## 2018-05-04 DIAGNOSIS — Z79.899 OTHER LONG TERM (CURRENT) DRUG THERAPY: ICD-10-CM

## 2018-05-04 DIAGNOSIS — R07.9 CHEST PAIN, UNSPECIFIED: ICD-10-CM

## 2018-05-04 DIAGNOSIS — F17.200 NICOTINE DEPENDENCE, UNSPECIFIED, UNCOMPLICATED: ICD-10-CM

## 2018-05-04 DIAGNOSIS — Z79.2 LONG TERM (CURRENT) USE OF ANTIBIOTICS: ICD-10-CM

## 2018-05-04 DIAGNOSIS — I10 ESSENTIAL (PRIMARY) HYPERTENSION: ICD-10-CM

## 2018-05-04 LAB
ALBUMIN SERPL ELPH-MCNC: 3.7 G/DL — SIGNIFICANT CHANGE UP (ref 3.4–5)
ALP SERPL-CCNC: 62 U/L — SIGNIFICANT CHANGE UP (ref 40–120)
ALT FLD-CCNC: 28 U/L — SIGNIFICANT CHANGE UP (ref 12–42)
ANION GAP SERPL CALC-SCNC: 14 MMOL/L — SIGNIFICANT CHANGE UP (ref 9–16)
APPEARANCE UR: CLEAR — SIGNIFICANT CHANGE UP
APTT BLD: 32.5 SEC — SIGNIFICANT CHANGE UP (ref 27.5–36.5)
AST SERPL-CCNC: 41 U/L — HIGH (ref 15–37)
BILIRUB SERPL-MCNC: 1.2 MG/DL — SIGNIFICANT CHANGE UP (ref 0.2–1.2)
BILIRUB UR-MCNC: NEGATIVE — SIGNIFICANT CHANGE UP
BUN SERPL-MCNC: 14 MG/DL — SIGNIFICANT CHANGE UP (ref 7–23)
CALCIUM SERPL-MCNC: 8.9 MG/DL — SIGNIFICANT CHANGE UP (ref 8.5–10.5)
CHLORIDE SERPL-SCNC: 107 MMOL/L — SIGNIFICANT CHANGE UP (ref 96–108)
CK MB BLD-MCNC: 0.59 % — SIGNIFICANT CHANGE UP
CK MB CFR SERPL CALC: 6.4 NG/ML — HIGH (ref 0.5–3.6)
CO2 SERPL-SCNC: 20 MMOL/L — LOW (ref 22–31)
COLOR SPEC: YELLOW — SIGNIFICANT CHANGE UP
CREAT SERPL-MCNC: 0.91 MG/DL — SIGNIFICANT CHANGE UP (ref 0.5–1.3)
DIFF PNL FLD: NEGATIVE — SIGNIFICANT CHANGE UP
ETHANOL SERPL-MCNC: 78 MG/DL — HIGH
GLUCOSE SERPL-MCNC: 64 MG/DL — LOW (ref 70–99)
GLUCOSE UR QL: NEGATIVE — SIGNIFICANT CHANGE UP
HCG UR QL: NEGATIVE — SIGNIFICANT CHANGE UP
HCT VFR BLD CALC: 39.6 % — SIGNIFICANT CHANGE UP (ref 39–50)
HGB BLD-MCNC: 13.2 G/DL — SIGNIFICANT CHANGE UP (ref 13–17)
INR BLD: 1.11 — SIGNIFICANT CHANGE UP (ref 0.88–1.16)
KETONES UR-MCNC: 15 MG/DL
LEUKOCYTE ESTERASE UR-ACNC: NEGATIVE — SIGNIFICANT CHANGE UP
LIDOCAIN IGE QN: 134 U/L — SIGNIFICANT CHANGE UP (ref 73–393)
MCHC RBC-ENTMCNC: 33.3 G/DL — SIGNIFICANT CHANGE UP (ref 32–36)
MCHC RBC-ENTMCNC: 34.4 PG — HIGH (ref 27–34)
MCV RBC AUTO: 103.1 FL — HIGH (ref 80–100)
NITRITE UR-MCNC: NEGATIVE — SIGNIFICANT CHANGE UP
PCP SPEC-MCNC: SIGNIFICANT CHANGE UP
PH UR: 5.5 — SIGNIFICANT CHANGE UP (ref 5–8)
PLATELET # BLD AUTO: 291 K/UL — SIGNIFICANT CHANGE UP (ref 150–400)
POTASSIUM SERPL-MCNC: 4.3 MMOL/L — SIGNIFICANT CHANGE UP (ref 3.5–5.3)
POTASSIUM SERPL-SCNC: 4.3 MMOL/L — SIGNIFICANT CHANGE UP (ref 3.5–5.3)
PROT SERPL-MCNC: 7.9 G/DL — SIGNIFICANT CHANGE UP (ref 6.4–8.2)
PROT UR-MCNC: (no result) MG/DL
PROTHROM AB SERPL-ACNC: 12.2 SEC — SIGNIFICANT CHANGE UP (ref 9.8–12.7)
RBC # BLD: 3.84 M/UL — LOW (ref 4.2–5.8)
RBC # FLD: 12.6 % — SIGNIFICANT CHANGE UP (ref 10.3–16.9)
SODIUM SERPL-SCNC: 141 MMOL/L — SIGNIFICANT CHANGE UP (ref 132–145)
SP GR SPEC: >=1.03 — SIGNIFICANT CHANGE UP (ref 1–1.03)
TROPONIN I SERPL-MCNC: <0.017 NG/ML — LOW (ref 0.02–0.06)
UROBILINOGEN FLD QL: 0.2 E.U./DL — SIGNIFICANT CHANGE UP
WBC # BLD: 12.2 K/UL — HIGH (ref 3.8–10.5)
WBC # FLD AUTO: 12.2 K/UL — HIGH (ref 3.8–10.5)

## 2018-05-04 PROCEDURE — 93010 ELECTROCARDIOGRAM REPORT: CPT

## 2018-05-04 PROCEDURE — 99285 EMERGENCY DEPT VISIT HI MDM: CPT | Mod: 25

## 2018-05-04 PROCEDURE — 71046 X-RAY EXAM CHEST 2 VIEWS: CPT | Mod: 26

## 2018-05-04 RX ORDER — FAMOTIDINE 10 MG/ML
20 INJECTION INTRAVENOUS ONCE
Qty: 0 | Refills: 0 | Status: COMPLETED | OUTPATIENT
Start: 2018-05-04 | End: 2018-05-04

## 2018-05-04 RX ORDER — SODIUM CHLORIDE 9 MG/ML
1000 INJECTION INTRAMUSCULAR; INTRAVENOUS; SUBCUTANEOUS ONCE
Qty: 0 | Refills: 0 | Status: COMPLETED | OUTPATIENT
Start: 2018-05-04 | End: 2018-05-04

## 2018-05-04 RX ORDER — ONDANSETRON 8 MG/1
8 TABLET, FILM COATED ORAL ONCE
Qty: 0 | Refills: 0 | Status: COMPLETED | OUTPATIENT
Start: 2018-05-04 | End: 2018-05-04

## 2018-05-04 RX ORDER — ASPIRIN/CALCIUM CARB/MAGNESIUM 324 MG
162 TABLET ORAL ONCE
Qty: 0 | Refills: 0 | Status: COMPLETED | OUTPATIENT
Start: 2018-05-04 | End: 2018-05-04

## 2018-05-04 RX ADMIN — FAMOTIDINE 20 MILLIGRAM(S): 10 INJECTION INTRAVENOUS at 09:57

## 2018-05-04 RX ADMIN — ONDANSETRON 8 MILLIGRAM(S): 8 TABLET, FILM COATED ORAL at 09:57

## 2018-05-04 RX ADMIN — Medication 162 MILLIGRAM(S): at 09:57

## 2018-05-04 RX ADMIN — SODIUM CHLORIDE 1000 MILLILITER(S): 9 INJECTION INTRAMUSCULAR; INTRAVENOUS; SUBCUTANEOUS at 10:43

## 2018-05-04 RX ADMIN — SODIUM CHLORIDE 1000 MILLILITER(S): 9 INJECTION INTRAMUSCULAR; INTRAVENOUS; SUBCUTANEOUS at 09:58

## 2018-05-04 NOTE — ED PROVIDER NOTE - MEDICAL DECISION MAKING DETAILS
PMHx polysubstance abuse present with palpitations, chest pain, nausea since 5am after drinking and cocaine last night. will check labs, cxr, give iv hydration and continue to monitor

## 2018-05-04 NOTE — ED ADULT NURSE REASSESSMENT NOTE - NS ED NURSE REASSESS COMMENT FT1
received pt. from JACLYN Morris. pt. resting on hospital stretcher, no signs of acute distress noted. will continue to monitor patient.

## 2018-05-04 NOTE — ED PROVIDER NOTE - OBJECTIVE STATEMENT
PMHx polysubstance abuse with ETOH, cocaine, PE no longer on baby aspirin, pancreatitis, tobacco use presents with chestpain and palpitations on waking at 5am today. admits to doing cocaine and drinking 3-5 drinks last night. denies h/a, nausea, abdominal pain.  follows up at [facility]

## 2018-05-04 NOTE — ED ADULT TRIAGE NOTE - CHIEF COMPLAINT QUOTE
started this morning, while laying down. started this morning, while laying down. admits to drinking last night.

## 2018-05-04 NOTE — ED PROVIDER NOTE - ATTENDING CONTRIBUTION TO CARE
Patient very well known to us, was using cocaine and drinking last night, developed anxiety and chest pain, has had prior negative w/u with cardiology.  Exam benign, treated with IVF, labs, cardiac enzymes negative, observed, no complaints, discharged.  Advised to stop using drugs.

## 2018-05-25 ENCOUNTER — EMERGENCY (EMERGENCY)
Facility: HOSPITAL | Age: 58
LOS: 1 days | Discharge: HOME | End: 2018-05-25
Attending: EMERGENCY MEDICINE | Admitting: EMERGENCY MEDICINE
Payer: MEDICARE

## 2018-05-25 VITALS
DIASTOLIC BLOOD PRESSURE: 82 MMHG | SYSTOLIC BLOOD PRESSURE: 121 MMHG | OXYGEN SATURATION: 98 % | HEART RATE: 81 BPM | TEMPERATURE: 98 F | RESPIRATION RATE: 20 BRPM

## 2018-05-25 DIAGNOSIS — I10 ESSENTIAL (PRIMARY) HYPERTENSION: ICD-10-CM

## 2018-05-25 DIAGNOSIS — Z79.899 OTHER LONG TERM (CURRENT) DRUG THERAPY: ICD-10-CM

## 2018-05-25 DIAGNOSIS — M62.82 RHABDOMYOLYSIS: ICD-10-CM
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
## 2018-05-25 DIAGNOSIS — F17.200 NICOTINE DEPENDENCE, UNSPECIFIED, UNCOMPLICATED: ICD-10-CM

## 2018-05-25 DIAGNOSIS — R07.9 CHEST PAIN, UNSPECIFIED: ICD-10-CM

## 2018-05-25 DIAGNOSIS — E78.5 HYPERLIPIDEMIA, UNSPECIFIED: ICD-10-CM

## 2018-05-25 LAB
ALBUMIN SERPL ELPH-MCNC: 2.9 G/DL — LOW (ref 3.4–5)
ALP SERPL-CCNC: 66 U/L — SIGNIFICANT CHANGE UP (ref 40–120)
ALT FLD-CCNC: 63 U/L — HIGH (ref 12–42)
ANION GAP SERPL CALC-SCNC: 13 MMOL/L — SIGNIFICANT CHANGE UP (ref 9–16)
ANION GAP SERPL CALC-SCNC: 17 MMOL/L — HIGH (ref 9–16)
APPEARANCE UR: CLEAR — SIGNIFICANT CHANGE UP
AST SERPL-CCNC: 140 U/L — HIGH (ref 15–37)
BILIRUB SERPL-MCNC: 0.9 MG/DL — SIGNIFICANT CHANGE UP (ref 0.2–1.2)
BILIRUB UR-MCNC: (no result)
BUN SERPL-MCNC: 29 MG/DL — HIGH (ref 7–23)
BUN SERPL-MCNC: 30 MG/DL — HIGH (ref 7–23)
CALCIUM SERPL-MCNC: 7.1 MG/DL — LOW (ref 8.5–10.5)
CALCIUM SERPL-MCNC: 7.5 MG/DL — LOW (ref 8.5–10.5)
CHLORIDE SERPL-SCNC: 108 MMOL/L — SIGNIFICANT CHANGE UP (ref 96–108)
CHLORIDE SERPL-SCNC: 109 MMOL/L — HIGH (ref 96–108)
CK MB BLD-MCNC: 1.22 % — SIGNIFICANT CHANGE UP
CK MB BLD-MCNC: 1.28 % — SIGNIFICANT CHANGE UP
CK MB CFR SERPL CALC: 47.4 NG/ML — HIGH (ref 0.5–3.6)
CK MB CFR SERPL CALC: 59 NG/ML — HIGH (ref 0.5–3.6)
CK SERPL-CCNC: 3875 U/L — HIGH (ref 39–308)
CK SERPL-CCNC: 4615 U/L — HIGH (ref 39–308)
CO2 SERPL-SCNC: 18 MMOL/L — LOW (ref 22–31)
CO2 SERPL-SCNC: 20 MMOL/L — LOW (ref 22–31)
COLOR SPEC: YELLOW — SIGNIFICANT CHANGE UP
CREAT SERPL-MCNC: 1.66 MG/DL — HIGH (ref 0.5–1.3)
CREAT SERPL-MCNC: 1.74 MG/DL — HIGH (ref 0.5–1.3)
DIFF PNL FLD: (no result)
ETHANOL SERPL-MCNC: 29 MG/DL — HIGH
GLUCOSE SERPL-MCNC: 107 MG/DL — HIGH (ref 70–99)
GLUCOSE SERPL-MCNC: 87 MG/DL — SIGNIFICANT CHANGE UP (ref 70–99)
GLUCOSE UR QL: NEGATIVE — SIGNIFICANT CHANGE UP
HCT VFR BLD CALC: 42.7 % — SIGNIFICANT CHANGE UP (ref 39–50)
HGB BLD-MCNC: 13.7 G/DL — SIGNIFICANT CHANGE UP (ref 13–17)
KETONES UR-MCNC: (no result) MG/DL
LACTATE SERPL-SCNC: 2.3 MMOL/L — HIGH (ref 0.4–2)
LACTATE SERPL-SCNC: 3 MMOL/L — HIGH (ref 0.4–2)
LEUKOCYTE ESTERASE UR-ACNC: NEGATIVE — SIGNIFICANT CHANGE UP
LIDOCAIN IGE QN: 112 U/L — SIGNIFICANT CHANGE UP (ref 73–393)
MCHC RBC-ENTMCNC: 32.1 G/DL — SIGNIFICANT CHANGE UP (ref 32–36)
MCHC RBC-ENTMCNC: 34.9 PG — HIGH (ref 27–34)
MCV RBC AUTO: 108.7 FL — HIGH (ref 80–100)
NITRITE UR-MCNC: NEGATIVE — SIGNIFICANT CHANGE UP
PCP SPEC-MCNC: SIGNIFICANT CHANGE UP
PH UR: 6 — SIGNIFICANT CHANGE UP (ref 5–8)
PLATELET # BLD AUTO: 281 K/UL — SIGNIFICANT CHANGE UP (ref 150–400)
POTASSIUM SERPL-MCNC: 4.4 MMOL/L — SIGNIFICANT CHANGE UP (ref 3.5–5.3)
POTASSIUM SERPL-MCNC: 4.4 MMOL/L — SIGNIFICANT CHANGE UP (ref 3.5–5.3)
POTASSIUM SERPL-SCNC: 4.4 MMOL/L — SIGNIFICANT CHANGE UP (ref 3.5–5.3)
POTASSIUM SERPL-SCNC: 4.4 MMOL/L — SIGNIFICANT CHANGE UP (ref 3.5–5.3)
PROT SERPL-MCNC: 6.4 G/DL — SIGNIFICANT CHANGE UP (ref 6.4–8.2)
PROT UR-MCNC: 100 MG/DL
RBC # BLD: 3.93 M/UL — LOW (ref 4.2–5.8)
RBC # FLD: 12.9 % — SIGNIFICANT CHANGE UP (ref 10.3–16.9)
SODIUM SERPL-SCNC: 142 MMOL/L — SIGNIFICANT CHANGE UP (ref 132–145)
SODIUM SERPL-SCNC: 143 MMOL/L — SIGNIFICANT CHANGE UP (ref 132–145)
SP GR SPEC: 1.02 — SIGNIFICANT CHANGE UP (ref 1–1.03)
TROPONIN I SERPL-MCNC: 0.29 NG/ML — HIGH (ref 0.02–0.06)
TROPONIN I SERPL-MCNC: 0.42 NG/ML — HIGH (ref 0.02–0.06)
UROBILINOGEN FLD QL: 0.2 E.U./DL — SIGNIFICANT CHANGE UP
WBC # BLD: 14.5 K/UL — HIGH (ref 3.8–10.5)
WBC # FLD AUTO: 14.5 K/UL — HIGH (ref 3.8–10.5)

## 2018-05-25 PROCEDURE — 99220: CPT | Mod: 25

## 2018-05-25 PROCEDURE — 99284 EMERGENCY DEPT VISIT MOD MDM: CPT

## 2018-05-25 PROCEDURE — 71045 X-RAY EXAM CHEST 1 VIEW: CPT | Mod: 26

## 2018-05-25 PROCEDURE — 93010 ELECTROCARDIOGRAM REPORT: CPT

## 2018-05-25 RX ORDER — FAMOTIDINE 10 MG/ML
20 INJECTION INTRAVENOUS ONCE
Qty: 0 | Refills: 0 | Status: COMPLETED | OUTPATIENT
Start: 2018-05-25 | End: 2018-05-25

## 2018-05-25 RX ORDER — SODIUM CHLORIDE 9 MG/ML
1000 INJECTION INTRAMUSCULAR; INTRAVENOUS; SUBCUTANEOUS ONCE
Qty: 0 | Refills: 0 | Status: COMPLETED | OUTPATIENT
Start: 2018-05-25 | End: 2018-05-25

## 2018-05-25 RX ORDER — SODIUM CHLORIDE 9 MG/ML
2000 INJECTION INTRAMUSCULAR; INTRAVENOUS; SUBCUTANEOUS ONCE
Qty: 0 | Refills: 0 | Status: COMPLETED | OUTPATIENT
Start: 2018-05-25 | End: 2018-05-25

## 2018-05-25 RX ORDER — HYDROMORPHONE HYDROCHLORIDE 2 MG/ML
0.5 INJECTION INTRAMUSCULAR; INTRAVENOUS; SUBCUTANEOUS ONCE
Qty: 0 | Refills: 0 | Status: DISCONTINUED | OUTPATIENT
Start: 2018-05-25 | End: 2018-05-25

## 2018-05-25 RX ADMIN — HYDROMORPHONE HYDROCHLORIDE 0.5 MILLIGRAM(S): 2 INJECTION INTRAMUSCULAR; INTRAVENOUS; SUBCUTANEOUS at 11:53

## 2018-05-25 RX ADMIN — SODIUM CHLORIDE 1000 MILLILITER(S): 9 INJECTION INTRAMUSCULAR; INTRAVENOUS; SUBCUTANEOUS at 19:19

## 2018-05-25 RX ADMIN — FAMOTIDINE 20 MILLIGRAM(S): 10 INJECTION INTRAVENOUS at 11:53

## 2018-05-25 RX ADMIN — SODIUM CHLORIDE 1000 MILLILITER(S): 9 INJECTION INTRAMUSCULAR; INTRAVENOUS; SUBCUTANEOUS at 12:20

## 2018-05-25 RX ADMIN — HYDROMORPHONE HYDROCHLORIDE 0.5 MILLIGRAM(S): 2 INJECTION INTRAMUSCULAR; INTRAVENOUS; SUBCUTANEOUS at 11:30

## 2018-05-25 RX ADMIN — SODIUM CHLORIDE 1000 MILLILITER(S): 9 INJECTION INTRAMUSCULAR; INTRAVENOUS; SUBCUTANEOUS at 12:30

## 2018-05-25 RX ADMIN — SODIUM CHLORIDE 2000 MILLILITER(S): 9 INJECTION INTRAMUSCULAR; INTRAVENOUS; SUBCUTANEOUS at 14:48

## 2018-05-25 RX ADMIN — Medication 100 MILLIGRAM(S): at 12:10

## 2018-05-25 RX ADMIN — SODIUM CHLORIDE 2000 MILLILITER(S): 9 INJECTION INTRAMUSCULAR; INTRAVENOUS; SUBCUTANEOUS at 11:52

## 2018-05-25 RX ADMIN — SODIUM CHLORIDE 2000 MILLILITER(S): 9 INJECTION INTRAMUSCULAR; INTRAVENOUS; SUBCUTANEOUS at 13:48

## 2018-05-25 NOTE — ED CDU PROVIDER INITIAL DAY NOTE - PROGRESS NOTE DETAILS
Hemodynamically stable, feels somewhat better.  Awake, nontoxic, conversant.  Fluids still infusing.  CK up at 5000 c/w rhabdo, Trop also up.  Given unknown underlying cardiac status, case d/w cardiology (Dr. Winter) who is in department and will see patient, do echo, and consult.  Plan: hydrate, alkalinize urine, trend CK/Trop, closely monitor.  No evidence at this time of toxicity or withdrawal. Lab interface issues.  CMP normal except CO 18, AG 17, Glc 107, BUN 30, Cr 1.74, Ca 7.1(L), Lip 112, TBili 0.9, , ALT 63, AP 66 Patient reports feeling somewhat better.  Making good urine output, still tea colored.  Lactate has improved after 3 units but still elevated.  Awaiting repeat chemistries/CK.  Continuing hydration.  As per cardiology EF is good, ok for aggressive hydration.  Patient ate dinner without difficulty.  Oriented x 3, nontoxic, no distress, no signs of toxicity or withdrawal.

## 2018-05-25 NOTE — ED PROVIDER NOTE - PMH
Crohn's disease    HLD (hyperlipidemia)    Hypertension Alcohol withdrawal    Crohn's disease    HLD (hyperlipidemia)    Hypertension

## 2018-05-25 NOTE — ED PROVIDER NOTE - NEUROLOGICAL, MLM
Alert and oriented, no focal deficits, no motor or sensory deficits. No tremors. No signs of alcohol withdrawal. Alert and oriented, no focal deficits, no motor or sensory deficits.

## 2018-05-25 NOTE — ED PROVIDER NOTE - PROGRESS NOTE DETAILS
Patient feeling a little better after medication.  Urine noticed to be dark and tea colored, UA with large blood no red cells, pain all over, cocaine/alcohol use, "passed out" (from drinking) for "a while" (unclear circumstance), suspect rhabdo.  3000ml fluid ordered and infusing via 2 IVs.  Difficulty with getting lab specimens x 2 due to difficult vasculature. Hemodynamically stable, feels somewhat better.  Awake, nontoxic, conversant.  Fluids still infusing.  CK up at 5000 c/w rhabdo, Trop also up.  Given unknown underlying cardiac status, case d/w cardiology (Dr. Winter) who is in department and will see patient, do echo, and consult.  Plan: hydrate, alkalinize urine, trend CK/Trop, closely monitor.  No evidence at this time of toxicity or withdrawal. Patient noted to have another MRN with 20+ prior visits: [id number]

## 2018-05-25 NOTE — CONSULT NOTE ADULT - SUBJECTIVE AND OBJECTIVE BOX
[facility] Consultation    CHIEF COMPLAINT: syncope    HISTORY OF PRESENT ILLNESS: 57 y/o male with history of poly-substance abuse who presents after passing out in the setting of drinking and cocaine use. The patient notes feeling tired and rundown. He admits to a positional chest discomfort. Otherwise, appears well.     PAST MEDICAL & SURGICAL HISTORY:  HTN  HLD    Allergies No Known Allergies    MEDICATIONS: list reviewed    FAMILY HISTORY: no CAD    SOCIAL HISTORY:   polysubstance abuse     REVIEW OF SYSTEMS:  CONSTITUTIONAL: No fever, weight loss, or fatigue  EYES: No eye pain, visual disturbances, or discharge  ENMT:  No difficulty hearing, tinnitus, vertigo; No sinus or throat pain  NECK: No pain or stiffness  BREASTS: No pain, masses, or nipple discharge  RESPIRATORY: No cough, wheezing, chills or hemoptysis; No Shortness of Breath  CARDIOVASCULAR: No chest pain, palpitations, dizziness, or leg swelling  GASTROINTESTINAL: No abdominal or epigastric pain. No nausea, vomiting, or hematemesis; No diarrhea or constipation. No melena or hematochezia.  GENITOURINARY: No dysuria, frequency, hematuria, or incontinence  NEUROLOGICAL: No headaches, memory loss, loss of strength, numbness, or tremors  SKIN: No itching, burning, rashes, or lesions   LYMPH Nodes: No enlarged glands  ENDOCRINE: No heat or cold intolerance; No hair loss  MUSCULOSKELETAL: No joint pain or swelling; No muscle, back, or extremity pain  PSYCHIATRIC: No depression, anxiety, mood swings, or difficulty sleeping  HEME/LYMPH: No easy bruising, or bleeding gums  ALLERY AND IMMUNOLOGIC: No hives or eczema	      PHYSICAL EXAM:  T(C): 36.8 (05-25-18 @ 14:15), Max: 36.8 (05-25-18 @ 10:40)  HR: 89 (05-25-18 @ 14:15) (81 - 89)  BP: 134/84 (05-25-18 @ 14:15) (121/82 - 134/84)  RR: 20 (05-25-18 @ 14:15) (20 - 20)  SpO2: 98% (05-25-18 @ 14:15) (98% - 98%)    Appearance: elderly man NAD	  HEENT:   Normal oral mucosa, PERRL, EOMI	  Lymphatic: No lymphadenopathy  Cardiovascular: Normal S1 S2, No JVD, No murmurs, No edema  Respiratory: Lungs clear to auscultation	  Psychiatry: A & O x 3, Mood & affect appropriate  Gastrointestinal:  Soft, Non-tender, + BS	  Skin: No rashes, No ecchymoses, No cyanosis	  Neurologic: Non-focal  Extremities: Normal range of motion, No clubbing, cyanosis or edema  Vascular: Peripheral pulses palpable 2+ bilaterally  	    ECG:  	SR no ST_t changes    LABS:	 	  CARDIAC MARKERS:  Troponin I, Serum: 0.291 ng/mL (05-25 @ 13:11)                          13.7   14.5  )-----------( 281      ( 25 May 2018 11:10 )             42.7     ASSESSMENT/PLAN: 	57 y/o male with chest pain and a small trop leak  1. patient seen and examined, chart reviewed  2. agree with hydration and obs stay  3. imdur for cocaine related chest discomfort  4. limited echo done, c/w preserved lvef, mild TR and MR  5. repeat trop pending    I spent 45 min in care of this patient

## 2018-05-25 NOTE — ED CDU PROVIDER INITIAL DAY NOTE - OBJECTIVE STATEMENT
59 y/o M w/ PMH HTN, HLD, crohn's disease, who presents w/ chest pain and abdominal pain. Patient is unclear about chronicity of symptoms. He states both chest pain and abdominal pain began 1 or 2 days ago. States chest pain is sharp in nature, non-radiating, and not worse w/ exertion, no shortness of breath. And abdominal pain is generalized, non-radiating, w/ no exacerbating/relieving factors, w/ associated nausea and vomiting. Report symptoms feel similar to previous history of alcohol withdrawal. Admits to drinking daily, with last drink today, and using "some drug." Patient denies fever, chills, palpitations, urinary complaints, back pain, tremors, seizure, or any other complaints.

## 2018-05-25 NOTE — ED PROVIDER NOTE - MEDICAL DECISION MAKING DETAILS
P/w chest pain and abdominal pain. Chest XR negative. EKG non-ischemic. Will obtain labs including trop, lipase, LFTs, CKMB to further evaluate, administer Librium, hydrate w/ 1L NS, and reassess.

## 2018-05-25 NOTE — ED CDU PROVIDER INITIAL DAY NOTE - DETAILS
Patient w/ rhabdomyolysis, likely anociated w/ cocaine use possibly also statins. Seen by cardiology, place on observation for hydration, serial labs, and close observation.

## 2018-05-25 NOTE — ED PROVIDER NOTE - CONSTITUTIONAL, MLM
normal... Well appearing, well nourished, awake, alert, oriented to person, place, time/situation and in no apparent distress. Well appearing, well nourished, awake, alert, oriented to person, place, time/situation and in no apparent distress. No tremors. No signs of alcohol withdrawal.

## 2018-05-25 NOTE — ED PROVIDER NOTE - GASTROINTESTINAL, MLM
soft diffusely mildly tender abdomen, no guarding, no rebound, no masses soft, mild tenderness diffusely, no guarding, no rebound, no pulsatile masses

## 2018-05-25 NOTE — ED PROVIDER NOTE - OBJECTIVE STATEMENT
59 y/o M w/ chest pain that began a couple hours PTA here. 57 y/o M w/ PMH HTN, HLD, crohn's disease, who presents w/ chest pain and abdominal pain. Patient is unclear about chronicity of symptoms. He states both chest pain and abdominal pain began 1 or 2 days ago. States chest pain is sharp in nature, non-radiating, and not worse w/ exertion, no shortness of breath. And abdominal pain is generalized, non-radiating, w/ no exacerbating/relieving factors, w/ associated nausea and vomiting. Report symptoms feel similar to previous history of alcohol withdrawal. Admits to drinking daily, with last drink today, and using "some drug." Patient denies fever, chills, palpitations, urinary complaints, back pain, tremors, seizure, or any other complaints.

## 2018-05-26 VITALS
DIASTOLIC BLOOD PRESSURE: 89 MMHG | TEMPERATURE: 99 F | SYSTOLIC BLOOD PRESSURE: 141 MMHG | HEART RATE: 82 BPM | RESPIRATION RATE: 18 BRPM

## 2018-05-26 LAB
ALBUMIN SERPL ELPH-MCNC: 2.6 G/DL — LOW (ref 3.4–5)
ALP SERPL-CCNC: 64 U/L — SIGNIFICANT CHANGE UP (ref 40–120)
ALT FLD-CCNC: 56 U/L — HIGH (ref 12–42)
ANION GAP SERPL CALC-SCNC: 7 MMOL/L — LOW (ref 9–16)
AST SERPL-CCNC: 93 U/L — HIGH (ref 15–37)
BILIRUB SERPL-MCNC: 0.9 MG/DL — SIGNIFICANT CHANGE UP (ref 0.2–1.2)
BUN SERPL-MCNC: 29 MG/DL — HIGH (ref 7–23)
CALCIUM SERPL-MCNC: 7.5 MG/DL — LOW (ref 8.5–10.5)
CHLORIDE SERPL-SCNC: 111 MMOL/L — HIGH (ref 96–108)
CK MB BLD-MCNC: 0.43 % — SIGNIFICANT CHANGE UP
CK MB BLD-MCNC: 0.52 % — SIGNIFICANT CHANGE UP
CK MB BLD-MCNC: 0.74 % — SIGNIFICANT CHANGE UP
CK MB CFR SERPL CALC: 13.5 NG/ML — HIGH (ref 0.5–3.6)
CK MB CFR SERPL CALC: 22.8 NG/ML — HIGH (ref 0.5–3.6)
CK MB CFR SERPL CALC: 9.8 NG/ML — HIGH (ref 0.5–3.6)
CK SERPL-CCNC: 2294 U/L — HIGH (ref 39–308)
CK SERPL-CCNC: 2580 U/L — HIGH (ref 39–308)
CK SERPL-CCNC: 3072 U/L — HIGH (ref 39–308)
CO2 SERPL-SCNC: 24 MMOL/L — SIGNIFICANT CHANGE UP (ref 22–31)
CREAT SERPL-MCNC: 1.3 MG/DL — SIGNIFICANT CHANGE UP (ref 0.5–1.3)
GLUCOSE SERPL-MCNC: 112 MG/DL — HIGH (ref 70–99)
HCT VFR BLD CALC: 32.1 % — LOW (ref 39–50)
HGB BLD-MCNC: 10.7 G/DL — LOW (ref 13–17)
LACTATE SERPL-SCNC: 1.3 MMOL/L — SIGNIFICANT CHANGE UP (ref 0.4–2)
MCHC RBC-ENTMCNC: 33.3 G/DL — SIGNIFICANT CHANGE UP (ref 32–36)
MCHC RBC-ENTMCNC: 34.5 PG — HIGH (ref 27–34)
MCV RBC AUTO: 103.5 FL — HIGH (ref 80–100)
NT-PROBNP SERPL-SCNC: 95 PG/ML — SIGNIFICANT CHANGE UP
PLATELET # BLD AUTO: 204 K/UL — SIGNIFICANT CHANGE UP (ref 150–400)
POTASSIUM SERPL-MCNC: 3.8 MMOL/L — SIGNIFICANT CHANGE UP (ref 3.5–5.3)
POTASSIUM SERPL-SCNC: 3.8 MMOL/L — SIGNIFICANT CHANGE UP (ref 3.5–5.3)
PROT SERPL-MCNC: 6.1 G/DL — LOW (ref 6.4–8.2)
RBC # BLD: 3.1 M/UL — LOW (ref 4.2–5.8)
RBC # FLD: 12.8 % — SIGNIFICANT CHANGE UP (ref 10.3–16.9)
SODIUM SERPL-SCNC: 142 MMOL/L — SIGNIFICANT CHANGE UP (ref 132–145)
TROPONIN I SERPL-MCNC: 0.3 NG/ML — HIGH (ref 0.02–0.06)
TROPONIN I SERPL-MCNC: 0.35 NG/ML — HIGH (ref 0.02–0.06)
TROPONIN I SERPL-MCNC: 0.46 NG/ML — HIGH (ref 0.02–0.06)
WBC # BLD: 7.9 K/UL — SIGNIFICANT CHANGE UP (ref 3.8–10.5)
WBC # FLD AUTO: 7.9 K/UL — SIGNIFICANT CHANGE UP (ref 3.8–10.5)

## 2018-05-26 PROCEDURE — 99217: CPT | Mod: 25

## 2018-05-26 RX ORDER — SODIUM CHLORIDE 9 MG/ML
1000 INJECTION INTRAMUSCULAR; INTRAVENOUS; SUBCUTANEOUS ONCE
Qty: 0 | Refills: 0 | Status: COMPLETED | OUTPATIENT
Start: 2018-05-26 | End: 2018-05-26

## 2018-05-26 RX ORDER — SODIUM CHLORIDE 9 MG/ML
1000 INJECTION INTRAMUSCULAR; INTRAVENOUS; SUBCUTANEOUS
Qty: 0 | Refills: 0 | Status: DISCONTINUED | OUTPATIENT
Start: 2018-05-26 | End: 2018-05-29

## 2018-05-26 RX ADMIN — SODIUM CHLORIDE 500 MILLILITER(S): 9 INJECTION INTRAMUSCULAR; INTRAVENOUS; SUBCUTANEOUS at 07:11

## 2018-05-26 NOTE — ED CDU PROVIDER SUBSEQUENT DAY NOTE - HISTORY
pt aggressively hydrated with IVF overnight and responding well with progressive decreased in CK/trop; +rhabdomyolysis.  pt tolerated dinner and breakfast.  will continue hydration at home.  good renal function.  advised to stop cocaine use.  pt reports feeling well

## 2018-05-26 NOTE — ED CDU PROVIDER DISPOSITION NOTE - CLINICAL COURSE
+rhabdomyolysis; cardiology consult and pt with good EF; agtgressively hydrated overnight.  with progressive improvement with Ck, trop and pt now well appearing and tolerating breakfast reports feels much better. will continue hydration at home

## 2018-05-26 NOTE — ED ADULT NURSE REASSESSMENT NOTE - NS ED NURSE REASSESS COMMENT FT1
Pt sleeping comfortable in bed. Vitals as per flow sheet. Pt states that he has back pain 7/10. Will continue to monitor.

## 2018-07-17 ENCOUNTER — EMERGENCY (EMERGENCY)
Facility: HOSPITAL | Age: 58
LOS: 1 days | Discharge: HOME | End: 2018-07-17
Attending: EMERGENCY MEDICINE | Admitting: EMERGENCY MEDICINE
Payer: MEDICARE

## 2018-07-17 VITALS
HEART RATE: 78 BPM | TEMPERATURE: 98 F | DIASTOLIC BLOOD PRESSURE: 11 MMHG | RESPIRATION RATE: 18 BRPM | SYSTOLIC BLOOD PRESSURE: 153 MMHG | OXYGEN SATURATION: 97 %

## 2018-07-17 VITALS
OXYGEN SATURATION: 98 % | RESPIRATION RATE: 16 BRPM | HEART RATE: 68 BPM | DIASTOLIC BLOOD PRESSURE: 103 MMHG | SYSTOLIC BLOOD PRESSURE: 158 MMHG | TEMPERATURE: 98 F

## 2018-07-17 DIAGNOSIS — Z88.5 ALLERGY STATUS TO NARCOTIC AGENT: ICD-10-CM

## 2018-07-17 DIAGNOSIS — Z79.2 LONG TERM (CURRENT) USE OF ANTIBIOTICS: ICD-10-CM

## 2018-07-17 DIAGNOSIS — Z79.01 LONG TERM (CURRENT) USE OF ANTICOAGULANTS: ICD-10-CM

## 2018-07-17 DIAGNOSIS — R07.89 OTHER CHEST PAIN: ICD-10-CM

## 2018-07-17 DIAGNOSIS — E78.5 HYPERLIPIDEMIA, UNSPECIFIED: ICD-10-CM

## 2018-07-17 DIAGNOSIS — Z79.52 LONG TERM (CURRENT) USE OF SYSTEMIC STEROIDS: ICD-10-CM

## 2018-07-17 DIAGNOSIS — R10.84 GENERALIZED ABDOMINAL PAIN: ICD-10-CM

## 2018-07-17 DIAGNOSIS — I10 ESSENTIAL (PRIMARY) HYPERTENSION: ICD-10-CM

## 2018-07-17 DIAGNOSIS — R42 DIZZINESS AND GIDDINESS: ICD-10-CM

## 2018-07-17 PROBLEM — K50.90 CROHN'S DISEASE, UNSPECIFIED, WITHOUT COMPLICATIONS: Chronic | Status: ACTIVE | Noted: 2018-05-25

## 2018-07-17 PROBLEM — F10.239 ALCOHOL DEPENDENCE WITH WITHDRAWAL, UNSPECIFIED: Chronic | Status: ACTIVE | Noted: 2018-05-25

## 2018-07-17 LAB
ALBUMIN SERPL ELPH-MCNC: 3.3 G/DL — LOW (ref 3.4–5)
ALP SERPL-CCNC: 64 U/L — SIGNIFICANT CHANGE UP (ref 40–120)
ALT FLD-CCNC: 40 U/L — SIGNIFICANT CHANGE UP (ref 12–42)
ANION GAP SERPL CALC-SCNC: 10 MMOL/L — SIGNIFICANT CHANGE UP (ref 9–16)
APTT BLD: 29.8 SEC — SIGNIFICANT CHANGE UP (ref 27.5–36.5)
AST SERPL-CCNC: 31 U/L — SIGNIFICANT CHANGE UP (ref 15–37)
BASOPHILS NFR BLD AUTO: 0.5 % — SIGNIFICANT CHANGE UP (ref 0–2)
BILIRUB SERPL-MCNC: 0.4 MG/DL — SIGNIFICANT CHANGE UP (ref 0.2–1.2)
BUN SERPL-MCNC: 19 MG/DL — SIGNIFICANT CHANGE UP (ref 7–23)
CALCIUM SERPL-MCNC: 8.4 MG/DL — LOW (ref 8.5–10.5)
CHLORIDE SERPL-SCNC: 107 MMOL/L — SIGNIFICANT CHANGE UP (ref 96–108)
CO2 SERPL-SCNC: 24 MMOL/L — SIGNIFICANT CHANGE UP (ref 22–31)
CREAT SERPL-MCNC: 0.95 MG/DL — SIGNIFICANT CHANGE UP (ref 0.5–1.3)
EOSINOPHIL NFR BLD AUTO: 1.4 % — SIGNIFICANT CHANGE UP (ref 0–6)
GLUCOSE SERPL-MCNC: 105 MG/DL — HIGH (ref 70–99)
HCT VFR BLD CALC: 38.3 % — LOW (ref 39–50)
HGB BLD-MCNC: 12.7 G/DL — LOW (ref 13–17)
IMM GRANULOCYTES NFR BLD AUTO: 0.5 % — SIGNIFICANT CHANGE UP (ref 0–1.5)
INR BLD: 0.93 — SIGNIFICANT CHANGE UP (ref 0.88–1.16)
LIDOCAIN IGE QN: 409 U/L — HIGH (ref 73–393)
LYMPHOCYTES # BLD AUTO: 22.7 % — SIGNIFICANT CHANGE UP (ref 13–44)
MCHC RBC-ENTMCNC: 33.2 G/DL — SIGNIFICANT CHANGE UP (ref 32–36)
MCHC RBC-ENTMCNC: 33.6 PG — SIGNIFICANT CHANGE UP (ref 27–34)
MCV RBC AUTO: 101.3 FL — HIGH (ref 80–100)
MONOCYTES NFR BLD AUTO: 11.9 % — SIGNIFICANT CHANGE UP (ref 2–14)
NEUTROPHILS NFR BLD AUTO: 63 % — SIGNIFICANT CHANGE UP (ref 43–77)
OB PNL STL: NEGATIVE — SIGNIFICANT CHANGE UP
PLATELET # BLD AUTO: 357 K/UL — SIGNIFICANT CHANGE UP (ref 150–400)
POTASSIUM SERPL-MCNC: 4.3 MMOL/L — SIGNIFICANT CHANGE UP (ref 3.5–5.3)
POTASSIUM SERPL-SCNC: 4.3 MMOL/L — SIGNIFICANT CHANGE UP (ref 3.5–5.3)
PROT SERPL-MCNC: 7.4 G/DL — SIGNIFICANT CHANGE UP (ref 6.4–8.2)
PROTHROM AB SERPL-ACNC: 10.2 SEC — SIGNIFICANT CHANGE UP (ref 9.8–12.7)
RBC # BLD: 3.78 M/UL — LOW (ref 4.2–5.8)
RBC # FLD: 12.9 % — SIGNIFICANT CHANGE UP (ref 10.3–16.9)
SODIUM SERPL-SCNC: 141 MMOL/L — SIGNIFICANT CHANGE UP (ref 132–145)
TROPONIN I SERPL-MCNC: <0.017 NG/ML — LOW (ref 0.02–0.06)
WBC # BLD: 6.6 K/UL — SIGNIFICANT CHANGE UP (ref 3.8–10.5)
WBC # FLD AUTO: 6.6 K/UL — SIGNIFICANT CHANGE UP (ref 3.8–10.5)

## 2018-07-17 PROCEDURE — 74177 CT ABD & PELVIS W/CONTRAST: CPT | Mod: 26

## 2018-07-17 PROCEDURE — 71046 X-RAY EXAM CHEST 2 VIEWS: CPT | Mod: 26

## 2018-07-17 PROCEDURE — 70450 CT HEAD/BRAIN W/O DYE: CPT | Mod: 26

## 2018-07-17 PROCEDURE — 93010 ELECTROCARDIOGRAM REPORT: CPT

## 2018-07-17 PROCEDURE — 99285 EMERGENCY DEPT VISIT HI MDM: CPT | Mod: 25

## 2018-07-17 RX ORDER — ASPIRIN/CALCIUM CARB/MAGNESIUM 324 MG
243 TABLET ORAL DAILY
Qty: 0 | Refills: 0 | Status: DISCONTINUED | OUTPATIENT
Start: 2018-07-17 | End: 2018-07-17

## 2018-07-17 RX ORDER — IOHEXOL 300 MG/ML
30 INJECTION, SOLUTION INTRAVENOUS ONCE
Qty: 0 | Refills: 0 | Status: COMPLETED | OUTPATIENT
Start: 2018-07-17 | End: 2018-07-17

## 2018-07-17 RX ORDER — FAMOTIDINE 10 MG/ML
20 INJECTION INTRAVENOUS ONCE
Qty: 0 | Refills: 0 | Status: COMPLETED | OUTPATIENT
Start: 2018-07-17 | End: 2018-07-17

## 2018-07-17 RX ADMIN — IOHEXOL 30 MILLILITER(S): 300 INJECTION, SOLUTION INTRAVENOUS at 16:30

## 2018-07-17 RX ADMIN — Medication 30 MILLILITER(S): at 16:13

## 2018-07-17 RX ADMIN — FAMOTIDINE 20 MILLIGRAM(S): 10 INJECTION INTRAVENOUS at 16:13

## 2018-07-17 NOTE — ED PROVIDER NOTE - PLAN OF CARE
1) You were here for chest pain and abdominal pain.    2) Your labs were grossly normal, see attached, and your fecal occult blood was negative.  Continue taking your current medications as prescribed.    3) Please find your attached results with findings of kidney masses or cysts, as discussed previously, which have remained unchanged from your previous visit.  You should follow up with your primary doctor about these findings and arrange for MRI for further workup.  You should also discuss the findings of colonic diverticuli on your CT scan with your primary doctor.  We have attached the name and number of a general surgeon to discuss the hernias seen on your CT scan.  Follow up with your primary doctor and your cardiologist in the next 2 days for further evaluation and to answer any questions you have.    4) Return to the emergency department if you experience worsening symptoms, pain, fever, chills, nausea, vomiting or other concerning symptoms.

## 2018-07-17 NOTE — ED PROVIDER NOTE - PHYSICAL EXAMINATION
***GEN - well appearing; NAD   ***HEAD - NC/AT  ***EYES/NOSE - PEERL, EOMI, mucous membranes moist, no discharge   ***THROAT: Oral cavity and pharynx normal. No inflammation, swelling, exudate, or lesions.    ***NECK: supple, non-tender no lymphadenopathy  ***PULMONARY - CTA b/l, symmetric breath sounds.   ***CARDIAC- s1s2, RRR, no murmur  ***ABDOMEN - +BS, ND, mild diffuse ttp, soft, no guarding, no rebound, no organomegaly  ***BACK - no CVA tenderness, Normal  spine, no spinal TTP  ***EXTREMITIES - symmetric pulses, 2+ dp, capillary refill < 2 seconds, no clubbing, no cyanosis, no edema   ***SKIN - warm, dry, intact, no rash or bruising   ***NEUROLOGIC - a&o x3, CN 3-12 intact, sensation decreased on L arm and leg.  4/5 motor on L arm and leg.  5/5 motor on R arm and leg.

## 2018-07-17 NOTE — ED ADULT NURSE NOTE - CHPI ED SYMPTOMS NEG
no syncope/no cough/no chills/no dizziness/no back pain/no fever/no vomiting/no shortness of breath/no diaphoresis

## 2018-07-17 NOTE — ED PROVIDER NOTE - PMH
Acute pancreatitis  Pancreatitis  Alcohol withdrawal    Cerebral artery occlusion with cerebral infarction  Stroke  Crohn's disease    Diverticulosis of large intestine  Diverticulosis  History of pulmonary embolism    HLD (hyperlipidemia)    HTN (hypertension)    Hypertension    MI (myocardial infarction)

## 2018-07-17 NOTE — ED PROVIDER NOTE - CARE PLAN
Principal Discharge DX:	Chest pain  Assessment and plan of treatment:	1) You were here for chest pain and abdominal pain.    2) Your labs were grossly normal, see attached, and your fecal occult blood was negative.  Continue taking your current medications as prescribed.    3) Please find your attached results with findings of kidney masses or cysts, as discussed previously, which have remained unchanged from your previous visit.  You should follow up with your primary doctor about these findings and arrange for MRI for further workup.  You should also discuss the findings of colonic diverticuli on your CT scan with your primary doctor.  We have attached the name and number of a general surgeon to discuss the hernias seen on your CT scan.  Follow up with your primary doctor and your cardiologist in the next 2 days for further evaluation and to answer any questions you have.    4) Return to the emergency department if you experience worsening symptoms, pain, fever, chills, nausea, vomiting or other concerning symptoms.  Secondary Diagnosis:	Abdominal pain

## 2018-07-17 NOTE — ED PROVIDER NOTE - PROGRESS NOTE DETAILS
Patient feeling well, no longer in pain.  CT results unchanged from previous, will recommend patient fu on findings of cysts/masses in kidneys (seen before on CT), as well as colonic diverticuli and hernias.  Labs grossly normal.  Will have patient fu with his primary medical doctor and cardiologist.  printed results for patient.

## 2018-07-17 NOTE — ED PROVIDER NOTE - OBJECTIVE STATEMENT
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 States chest pain started yesterday while outside walking in heat.  Unclear as to when abdominal pain started. No exacerbating/relieving factors, no n/v.  States his stool is darker than usual.  . Report symptoms feel similar to previous history when he's had workups.  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 States chest pain started yesterday while outside walking in heat.  Unclear as to when abdominal pain started. No exacerbating/relieving factors, no n/v.  States his stool is darker than usual.  Also states that he feels his residual L sided weakness is more pronounced than usual in last 2 days.  Report symptoms feel similar to previous history when he's had workups which were unrevealing.  Daily drinker, denies drinks today.   Denies n/v, confusion, diaphoresis, fever, chills, palpitations, diarrhea, dysuria, back pain, tremors, seizure, or any other complaint

## 2018-07-17 NOTE — ED ADULT NURSE NOTE - OBJECTIVE STATEMENT
c/o localized left sided CP and dull periumbilical pain since yesterday. as per pt, thinks the heat started his symptoms. states he has a had a heat stroke in the past and states he thought it was due to dehydration. denies fever/chills, urinary/bowel s/s, weakness. pt in NAD, resting comfortably, A,A&Ox3 and will continue to monitor.

## 2018-07-17 NOTE — ED PROVIDER NOTE - MEDICAL DECISION MAKING DETAILS
CT head, CT abdomen/pelvis with oral and IV, fobt, acs w/u, lipase, basic labs, gi cocktail, cxr, reassess.

## 2018-07-17 NOTE — ED PROVIDER NOTE - NS ED ROS FT
Constitutional: no fever  Eyes: no conjunctivitis  Ears: no ear pain   Nose: no nasal congestion, Mouth/Throat: no throat pain, Neck: no stiffness  Cardiovascular: As noted in hpi   Chest: As noted in hpi   Gastrointestinal: no abdominal pain, no vomiting and diarrhea  MSK: no joint pain  : no dysuria  Skin: no rash  Neuro: As noted in hpi   All other review of systems negative except as noted in HPI

## 2018-07-17 NOTE — ED PROVIDER NOTE - ATTENDING CONTRIBUTION TO CARE
pt with multiple med complains including chest pain, also headache (and sensation that his prior stroke residual symptoms are worse), also abd pain. on exam non focal neuro, mild tenderness in abd. Similar visit recently nad was worked up. plan: Work up including CT head and abd, check labs and cardiac enzymes.

## 2018-08-13 ENCOUNTER — EMERGENCY (EMERGENCY)
Facility: HOSPITAL | Age: 58
LOS: 1 days | Discharge: HOME | End: 2018-08-13
Attending: EMERGENCY MEDICINE | Admitting: EMERGENCY MEDICINE
Payer: MEDICARE

## 2018-08-13 VITALS
SYSTOLIC BLOOD PRESSURE: 177 MMHG | DIASTOLIC BLOOD PRESSURE: 115 MMHG | RESPIRATION RATE: 18 BRPM | OXYGEN SATURATION: 99 % | HEART RATE: 64 BPM | TEMPERATURE: 99 F

## 2018-08-13 VITALS
OXYGEN SATURATION: 98 % | TEMPERATURE: 99 F | DIASTOLIC BLOOD PRESSURE: 91 MMHG | SYSTOLIC BLOOD PRESSURE: 166 MMHG | RESPIRATION RATE: 16 BRPM | HEART RATE: 61 BPM

## 2018-08-13 LAB
ANION GAP SERPL CALC-SCNC: 9 MMOL/L — SIGNIFICANT CHANGE UP (ref 9–16)
BASOPHILS NFR BLD AUTO: 0.6 % — SIGNIFICANT CHANGE UP (ref 0–2)
BUN SERPL-MCNC: 13 MG/DL — SIGNIFICANT CHANGE UP (ref 7–23)
CALCIUM SERPL-MCNC: 8.9 MG/DL — SIGNIFICANT CHANGE UP (ref 8.5–10.5)
CHLORIDE SERPL-SCNC: 107 MMOL/L — SIGNIFICANT CHANGE UP (ref 96–108)
CO2 SERPL-SCNC: 26 MMOL/L — SIGNIFICANT CHANGE UP (ref 22–31)
CREAT SERPL-MCNC: 1.05 MG/DL — SIGNIFICANT CHANGE UP (ref 0.5–1.3)
EOSINOPHIL NFR BLD AUTO: 1.3 % — SIGNIFICANT CHANGE UP (ref 0–6)
GLUCOSE SERPL-MCNC: 103 MG/DL — HIGH (ref 70–99)
HCT VFR BLD CALC: 41 % — SIGNIFICANT CHANGE UP (ref 39–50)
HGB BLD-MCNC: 13.5 G/DL — SIGNIFICANT CHANGE UP (ref 13–17)
IMM GRANULOCYTES NFR BLD AUTO: 0.1 % — SIGNIFICANT CHANGE UP (ref 0–1.5)
LYMPHOCYTES # BLD AUTO: 15.8 % — SIGNIFICANT CHANGE UP (ref 13–44)
MCHC RBC-ENTMCNC: 32.9 G/DL — SIGNIFICANT CHANGE UP (ref 32–36)
MCHC RBC-ENTMCNC: 33.7 PG — SIGNIFICANT CHANGE UP (ref 27–34)
MCV RBC AUTO: 102.2 FL — HIGH (ref 80–100)
MONOCYTES NFR BLD AUTO: 12.7 % — SIGNIFICANT CHANGE UP (ref 2–14)
NEUTROPHILS NFR BLD AUTO: 69.5 % — SIGNIFICANT CHANGE UP (ref 43–77)
PLATELET # BLD AUTO: 332 K/UL — SIGNIFICANT CHANGE UP (ref 150–400)
POTASSIUM SERPL-MCNC: 4.1 MMOL/L — SIGNIFICANT CHANGE UP (ref 3.5–5.3)
POTASSIUM SERPL-SCNC: 4.1 MMOL/L — SIGNIFICANT CHANGE UP (ref 3.5–5.3)
RBC # BLD: 4.01 M/UL — LOW (ref 4.2–5.8)
RBC # FLD: 13 % — SIGNIFICANT CHANGE UP (ref 10.3–16.9)
SODIUM SERPL-SCNC: 142 MMOL/L — SIGNIFICANT CHANGE UP (ref 132–145)
TROPONIN I SERPL-MCNC: <0.017 NG/ML — LOW (ref 0.02–0.06)
WBC # BLD: 7.1 K/UL — SIGNIFICANT CHANGE UP (ref 3.8–10.5)
WBC # FLD AUTO: 7.1 K/UL — SIGNIFICANT CHANGE UP (ref 3.8–10.5)

## 2018-08-13 PROCEDURE — 93010 ELECTROCARDIOGRAM REPORT: CPT | Mod: 76

## 2018-08-13 PROCEDURE — 99284 EMERGENCY DEPT VISIT MOD MDM: CPT | Mod: 25

## 2018-08-13 PROCEDURE — 71275 CT ANGIOGRAPHY CHEST: CPT | Mod: 26

## 2018-08-13 RX ORDER — NITROGLYCERIN 6.5 MG
0.4 CAPSULE, EXTENDED RELEASE ORAL
Qty: 0 | Refills: 0 | Status: DISCONTINUED | OUTPATIENT
Start: 2018-08-13 | End: 2018-08-17

## 2018-08-13 RX ORDER — ONDANSETRON 8 MG/1
4 TABLET, FILM COATED ORAL ONCE
Qty: 0 | Refills: 0 | Status: COMPLETED | OUTPATIENT
Start: 2018-08-13 | End: 2018-08-13

## 2018-08-13 RX ORDER — ASPIRIN/CALCIUM CARB/MAGNESIUM 324 MG
162 TABLET ORAL ONCE
Qty: 0 | Refills: 0 | Status: COMPLETED | OUTPATIENT
Start: 2018-08-13 | End: 2018-08-13

## 2018-08-13 RX ORDER — MORPHINE SULFATE 50 MG/1
2 CAPSULE, EXTENDED RELEASE ORAL ONCE
Qty: 0 | Refills: 0 | Status: DISCONTINUED | OUTPATIENT
Start: 2018-08-13 | End: 2018-08-13

## 2018-08-13 RX ADMIN — MORPHINE SULFATE 2 MILLIGRAM(S): 50 CAPSULE, EXTENDED RELEASE ORAL at 17:18

## 2018-08-13 RX ADMIN — MORPHINE SULFATE 2 MILLIGRAM(S): 50 CAPSULE, EXTENDED RELEASE ORAL at 16:33

## 2018-08-13 RX ADMIN — Medication 162 MILLIGRAM(S): at 15:59

## 2018-08-13 RX ADMIN — ONDANSETRON 4 MILLIGRAM(S): 8 TABLET, FILM COATED ORAL at 15:59

## 2018-08-13 RX ADMIN — Medication 0.4 MILLIGRAM(S): at 15:59

## 2018-08-13 NOTE — ED PROVIDER NOTE - PROGRESS NOTE DETAILS
ekg stable x2, pe neg, feels ewll no chest pain. dc to f/u w cards tomorrow pt states he can set up f/u feels well dc

## 2018-08-13 NOTE — ED PROVIDER NOTE - MEDICAL DECISION MAKING DETAILS
Pt with a h/o CVA and PE off anti-coagulants for 1 year c/o CP constant, with associated nausea and SOB. Lateral T-wave inversions seen compared to 2016 EKG. Pt is hypertensive and has already took his anti-hypertensive medications. Will give another 162mg of Aspirin, sublingual nitro, troponin, repeat EKG, and CT PE study. neurovascularly intact, low concern for dissection. No new neuro symptoms to suggest stroke.

## 2018-08-13 NOTE — ED ADULT NURSE NOTE - OBJECTIVE STATEMENT
c/o left sided non radiating chest pain since 8am. pt did experience episode of left arm numbness pt was sitting down after breakfast when cp began. hx htn hld. denies sob or dizziness at this time. iv placed. will cont to monitor. safety maintained

## 2018-08-13 NOTE — ED ADULT NURSE NOTE - NSIMPLEMENTINTERV_GEN_ALL_ED
Implemented All Universal Safety Interventions:  [place] to call system. Call bell, personal items and telephone within reach. Instruct patient to call for assistance. Room bathroom lighting operational. Non-slip footwear when patient is off stretcher. Physically safe environment: no spills, clutter or unnecessary equipment. Stretcher in lowest position, wheels locked, appropriate side rails in place.

## 2018-08-13 NOTE — ED PROVIDER NOTE - OBJECTIVE STATEMENT
57 y/o Male with a PMHx of PE/DVT and CVA presents to the ED c/o left sided chest pain, left arm pain, and nausea that started today around 8am. Pt states he woke up this morning feeling well, and laid back down but started to have chest pains and nausea with associated sweating, 2 bouts of non-bloody diarrhea in the last 24 hours, and SOB. Pain and nausea has been consistent since this morning. No leg swelling, abdominal pain, fever, cough, recent flights, and recent surgeries. Pt states PE and DVT were spontaneous and was placed on anti-coagulants which he has stopped 1 year ago. Pt takes Amlodipine, statin, and lisinopril all of which he has taken this morning.

## 2018-08-17 DIAGNOSIS — R61 GENERALIZED HYPERHIDROSIS: ICD-10-CM

## 2018-08-17 DIAGNOSIS — I25.10 ATHEROSCLEROTIC HEART DISEASE OF NATIVE CORONARY ARTERY WITHOUT ANGINA PECTORIS: ICD-10-CM

## 2018-08-17 DIAGNOSIS — Z79.02 LONG TERM (CURRENT) USE OF ANTITHROMBOTICS/ANTIPLATELETS: ICD-10-CM

## 2018-08-17 DIAGNOSIS — R07.9 CHEST PAIN, UNSPECIFIED: ICD-10-CM

## 2018-08-17 DIAGNOSIS — E78.5 HYPERLIPIDEMIA, UNSPECIFIED: ICD-10-CM

## 2018-08-17 DIAGNOSIS — R06.02 SHORTNESS OF BREATH: ICD-10-CM

## 2018-08-17 DIAGNOSIS — I10 ESSENTIAL (PRIMARY) HYPERTENSION: ICD-10-CM

## 2018-08-17 DIAGNOSIS — R11.0 NAUSEA: ICD-10-CM

## 2018-08-17 DIAGNOSIS — Z88.5 ALLERGY STATUS TO NARCOTIC AGENT: ICD-10-CM

## 2018-08-17 DIAGNOSIS — R07.89 OTHER CHEST PAIN: ICD-10-CM

## 2018-08-17 DIAGNOSIS — Z79.52 LONG TERM (CURRENT) USE OF SYSTEMIC STEROIDS: ICD-10-CM

## 2018-08-17 DIAGNOSIS — R19.7 DIARRHEA, UNSPECIFIED: ICD-10-CM

## 2018-08-17 DIAGNOSIS — M79.602 PAIN IN LEFT ARM: ICD-10-CM

## 2018-08-25 ENCOUNTER — EMERGENCY (EMERGENCY)
Facility: HOSPITAL | Age: 58
LOS: 1 days | Discharge: HOME | End: 2018-08-25
Attending: EMERGENCY MEDICINE | Admitting: EMERGENCY MEDICINE
Payer: MEDICARE

## 2018-08-25 VITALS
RESPIRATION RATE: 17 BRPM | HEART RATE: 66 BPM | OXYGEN SATURATION: 99 % | SYSTOLIC BLOOD PRESSURE: 163 MMHG | DIASTOLIC BLOOD PRESSURE: 101 MMHG

## 2018-08-25 VITALS
TEMPERATURE: 98 F | RESPIRATION RATE: 16 BRPM | HEART RATE: 88 BPM | OXYGEN SATURATION: 99 % | SYSTOLIC BLOOD PRESSURE: 151 MMHG | DIASTOLIC BLOOD PRESSURE: 110 MMHG

## 2018-08-25 DIAGNOSIS — E78.5 HYPERLIPIDEMIA, UNSPECIFIED: ICD-10-CM

## 2018-08-25 DIAGNOSIS — Z79.899 OTHER LONG TERM (CURRENT) DRUG THERAPY: ICD-10-CM

## 2018-08-25 DIAGNOSIS — Z79.2 LONG TERM (CURRENT) USE OF ANTIBIOTICS: ICD-10-CM

## 2018-08-25 DIAGNOSIS — Z88.5 ALLERGY STATUS TO NARCOTIC AGENT: ICD-10-CM

## 2018-08-25 DIAGNOSIS — F17.210 NICOTINE DEPENDENCE, CIGARETTES, UNCOMPLICATED: ICD-10-CM

## 2018-08-25 DIAGNOSIS — R07.89 OTHER CHEST PAIN: ICD-10-CM

## 2018-08-25 DIAGNOSIS — Z79.01 LONG TERM (CURRENT) USE OF ANTICOAGULANTS: ICD-10-CM

## 2018-08-25 DIAGNOSIS — Z79.52 LONG TERM (CURRENT) USE OF SYSTEMIC STEROIDS: ICD-10-CM

## 2018-08-25 DIAGNOSIS — I10 ESSENTIAL (PRIMARY) HYPERTENSION: ICD-10-CM

## 2018-08-25 LAB
ALBUMIN SERPL ELPH-MCNC: 4 G/DL — SIGNIFICANT CHANGE UP (ref 3.4–5)
ALP SERPL-CCNC: 71 U/L — SIGNIFICANT CHANGE UP (ref 40–120)
ALT FLD-CCNC: 33 U/L — SIGNIFICANT CHANGE UP (ref 12–42)
ANION GAP SERPL CALC-SCNC: 11 MMOL/L — SIGNIFICANT CHANGE UP (ref 9–16)
AST SERPL-CCNC: 27 U/L — SIGNIFICANT CHANGE UP (ref 15–37)
BILIRUB SERPL-MCNC: 1 MG/DL — SIGNIFICANT CHANGE UP (ref 0.2–1.2)
BUN SERPL-MCNC: 13 MG/DL — SIGNIFICANT CHANGE UP (ref 7–23)
CALCIUM SERPL-MCNC: 9.5 MG/DL — SIGNIFICANT CHANGE UP (ref 8.5–10.5)
CHLORIDE SERPL-SCNC: 104 MMOL/L — SIGNIFICANT CHANGE UP (ref 96–108)
CK MB BLD-MCNC: 1.01 % — SIGNIFICANT CHANGE UP
CK MB CFR SERPL CALC: 1.7 NG/ML — SIGNIFICANT CHANGE UP (ref 0.5–3.6)
CK SERPL-CCNC: 169 U/L — SIGNIFICANT CHANGE UP (ref 39–308)
CO2 SERPL-SCNC: 25 MMOL/L — SIGNIFICANT CHANGE UP (ref 22–31)
CREAT SERPL-MCNC: 0.93 MG/DL — SIGNIFICANT CHANGE UP (ref 0.5–1.3)
D DIMER BLD IA.RAPID-MCNC: <187 NG/ML DDU — SIGNIFICANT CHANGE UP
ETHANOL SERPL-MCNC: <3 MG/DL — SIGNIFICANT CHANGE UP
GLUCOSE SERPL-MCNC: 127 MG/DL — HIGH (ref 70–99)
HCT VFR BLD CALC: 42.7 % — SIGNIFICANT CHANGE UP (ref 39–50)
HGB BLD-MCNC: 14.8 G/DL — SIGNIFICANT CHANGE UP (ref 13–17)
INR BLD: 1.01 — SIGNIFICANT CHANGE UP (ref 0.88–1.16)
LIDOCAIN IGE QN: 309 U/L — SIGNIFICANT CHANGE UP (ref 73–393)
MAGNESIUM SERPL-MCNC: 1.7 MG/DL — SIGNIFICANT CHANGE UP (ref 1.6–2.6)
MCHC RBC-ENTMCNC: 34.2 PG — HIGH (ref 27–34)
MCHC RBC-ENTMCNC: 34.7 G/DL — SIGNIFICANT CHANGE UP (ref 32–36)
MCV RBC AUTO: 98.6 FL — SIGNIFICANT CHANGE UP (ref 80–100)
PLATELET # BLD AUTO: 335 K/UL — SIGNIFICANT CHANGE UP (ref 150–400)
POTASSIUM SERPL-MCNC: 4 MMOL/L — SIGNIFICANT CHANGE UP (ref 3.5–5.3)
POTASSIUM SERPL-SCNC: 4 MMOL/L — SIGNIFICANT CHANGE UP (ref 3.5–5.3)
PROT SERPL-MCNC: 8.7 G/DL — HIGH (ref 6.4–8.2)
PROTHROM AB SERPL-ACNC: 11.1 SEC — SIGNIFICANT CHANGE UP (ref 9.8–12.7)
RBC # BLD: 4.33 M/UL — SIGNIFICANT CHANGE UP (ref 4.2–5.8)
RBC # FLD: 12.3 % — SIGNIFICANT CHANGE UP (ref 10.3–16.9)
SODIUM SERPL-SCNC: 140 MMOL/L — SIGNIFICANT CHANGE UP (ref 132–145)
TROPONIN I SERPL-MCNC: <0.017 NG/ML — LOW (ref 0.02–0.06)
TSH SERPL-MCNC: 0.24 UIU/ML — LOW (ref 0.36–3.74)
WBC # BLD: 6.5 K/UL — SIGNIFICANT CHANGE UP (ref 3.8–10.5)
WBC # FLD AUTO: 6.5 K/UL — SIGNIFICANT CHANGE UP (ref 3.8–10.5)

## 2018-08-25 PROCEDURE — 71045 X-RAY EXAM CHEST 1 VIEW: CPT | Mod: 26

## 2018-08-25 PROCEDURE — 93010 ELECTROCARDIOGRAM REPORT: CPT

## 2018-08-25 PROCEDURE — 99285 EMERGENCY DEPT VISIT HI MDM: CPT | Mod: 25

## 2018-08-25 RX ORDER — ASPIRIN/CALCIUM CARB/MAGNESIUM 324 MG
325 TABLET ORAL ONCE
Qty: 0 | Refills: 0 | Status: COMPLETED | OUTPATIENT
Start: 2018-08-25 | End: 2018-08-25

## 2018-08-25 RX ORDER — HYDRALAZINE HCL 50 MG
10 TABLET ORAL ONCE
Qty: 0 | Refills: 0 | Status: COMPLETED | OUTPATIENT
Start: 2018-08-25 | End: 2018-08-25

## 2018-08-25 RX ORDER — SODIUM CHLORIDE 9 MG/ML
1000 INJECTION INTRAMUSCULAR; INTRAVENOUS; SUBCUTANEOUS ONCE
Qty: 0 | Refills: 0 | Status: COMPLETED | OUTPATIENT
Start: 2018-08-25 | End: 2018-08-25

## 2018-08-25 RX ORDER — LABETALOL HCL 100 MG
10 TABLET ORAL ONCE
Qty: 0 | Refills: 0 | Status: COMPLETED | OUTPATIENT
Start: 2018-08-25 | End: 2018-08-25

## 2018-08-25 RX ADMIN — Medication 10 MILLIGRAM(S): at 11:53

## 2018-08-25 RX ADMIN — Medication 10 MILLIGRAM(S): at 14:07

## 2018-08-25 RX ADMIN — SODIUM CHLORIDE 1000 MILLILITER(S): 9 INJECTION INTRAMUSCULAR; INTRAVENOUS; SUBCUTANEOUS at 11:24

## 2018-08-25 RX ADMIN — Medication 325 MILLIGRAM(S): at 11:24

## 2018-08-25 NOTE — ED ADULT TRIAGE NOTE - CHIEF COMPLAINT QUOTE
Patient complaining of chest pain and dizziness , near syncope since this morning. H/o HTN, and high cholesterol

## 2018-08-25 NOTE — ED PROVIDER NOTE - DIAGNOSTIC INTERPRETATION
Interpreted by MD  chest_ x-ray, 1_ view  Lungs clear, heart shadow normal, bony structures normal, no free air under diaphragm, no PTX

## 2018-08-25 NOTE — ED PROVIDER NOTE - OBJECTIVE STATEMENT
59 yo male pmhx htn, hld to ED c/o dizziness, internittent sharp left sided cp since 0500. Denies soa/fever/chills, endorses one episode vomiting and 2 episodes watery stool (no blood). Denies abdominal pain. Pt states he took his BP meds this am but vomited up. Denies syncope. No international travel within past 30 days.

## 2019-01-23 ENCOUNTER — EMERGENCY (EMERGENCY)
Facility: HOSPITAL | Age: 59
LOS: 1 days | Discharge: HOME | End: 2019-01-23
Attending: EMERGENCY MEDICINE | Admitting: EMERGENCY MEDICINE
Payer: MEDICARE

## 2019-01-23 VITALS
OXYGEN SATURATION: 97 % | DIASTOLIC BLOOD PRESSURE: 70 MMHG | SYSTOLIC BLOOD PRESSURE: 122 MMHG | TEMPERATURE: 98 F | RESPIRATION RATE: 20 BRPM | HEART RATE: 87 BPM

## 2019-01-23 VITALS
HEART RATE: 87 BPM | TEMPERATURE: 98 F | OXYGEN SATURATION: 100 % | RESPIRATION RATE: 16 BRPM | SYSTOLIC BLOOD PRESSURE: 144 MMHG | DIASTOLIC BLOOD PRESSURE: 94 MMHG

## 2019-01-23 LAB
ALBUMIN SERPL ELPH-MCNC: 3.6 G/DL — SIGNIFICANT CHANGE UP (ref 3.4–5)
ALP SERPL-CCNC: 70 U/L — SIGNIFICANT CHANGE UP (ref 40–120)
ALT FLD-CCNC: 39 U/L — SIGNIFICANT CHANGE UP (ref 12–42)
ANION GAP SERPL CALC-SCNC: 12 MMOL/L — SIGNIFICANT CHANGE UP (ref 9–16)
AST SERPL-CCNC: 34 U/L — SIGNIFICANT CHANGE UP (ref 15–37)
BASOPHILS NFR BLD AUTO: 0.5 % — SIGNIFICANT CHANGE UP (ref 0–2)
BILIRUB SERPL-MCNC: 0.5 MG/DL — SIGNIFICANT CHANGE UP (ref 0.2–1.2)
BUN SERPL-MCNC: 17 MG/DL — SIGNIFICANT CHANGE UP (ref 7–23)
CALCIUM SERPL-MCNC: 9.1 MG/DL — SIGNIFICANT CHANGE UP (ref 8.5–10.5)
CHLORIDE SERPL-SCNC: 107 MMOL/L — SIGNIFICANT CHANGE UP (ref 96–108)
CK MB BLD-MCNC: 0.53 % — SIGNIFICANT CHANGE UP
CK MB CFR SERPL CALC: 0.9 NG/ML — SIGNIFICANT CHANGE UP (ref 0.5–3.6)
CK SERPL-CCNC: 170 U/L — SIGNIFICANT CHANGE UP (ref 39–308)
CO2 SERPL-SCNC: 24 MMOL/L — SIGNIFICANT CHANGE UP (ref 22–31)
CREAT SERPL-MCNC: 1.04 MG/DL — SIGNIFICANT CHANGE UP (ref 0.5–1.3)
EOSINOPHIL NFR BLD AUTO: 0.6 % — SIGNIFICANT CHANGE UP (ref 0–6)
GLUCOSE SERPL-MCNC: 86 MG/DL — SIGNIFICANT CHANGE UP (ref 70–99)
HCT VFR BLD CALC: 37.6 % — LOW (ref 39–50)
HGB BLD-MCNC: 12.2 G/DL — LOW (ref 13–17)
IMM GRANULOCYTES NFR BLD AUTO: 0.3 % — SIGNIFICANT CHANGE UP (ref 0–1.5)
LYMPHOCYTES # BLD AUTO: 16.7 % — SIGNIFICANT CHANGE UP (ref 13–44)
MCHC RBC-ENTMCNC: 32.4 G/DL — SIGNIFICANT CHANGE UP (ref 32–36)
MCHC RBC-ENTMCNC: 33.3 PG — SIGNIFICANT CHANGE UP (ref 27–34)
MCV RBC AUTO: 102.7 FL — HIGH (ref 80–100)
MONOCYTES NFR BLD AUTO: 10.5 % — SIGNIFICANT CHANGE UP (ref 2–14)
NEUTROPHILS NFR BLD AUTO: 71.4 % — SIGNIFICANT CHANGE UP (ref 43–77)
NT-PROBNP SERPL-SCNC: 12 PG/ML — SIGNIFICANT CHANGE UP
PLATELET # BLD AUTO: 324 K/UL — SIGNIFICANT CHANGE UP (ref 150–400)
POTASSIUM SERPL-MCNC: 3.9 MMOL/L — SIGNIFICANT CHANGE UP (ref 3.5–5.3)
POTASSIUM SERPL-SCNC: 3.9 MMOL/L — SIGNIFICANT CHANGE UP (ref 3.5–5.3)
PROT SERPL-MCNC: 7.7 G/DL — SIGNIFICANT CHANGE UP (ref 6.4–8.2)
RBC # BLD: 3.66 M/UL — LOW (ref 4.2–5.8)
RBC # FLD: 12.3 % — SIGNIFICANT CHANGE UP (ref 10.3–14.5)
SODIUM SERPL-SCNC: 143 MMOL/L — SIGNIFICANT CHANGE UP (ref 132–145)
TROPONIN I SERPL-MCNC: <0.017 NG/ML — LOW (ref 0.02–0.06)
WBC # BLD: 6.6 K/UL — SIGNIFICANT CHANGE UP (ref 3.8–10.5)
WBC # FLD AUTO: 6.6 K/UL — SIGNIFICANT CHANGE UP (ref 3.8–10.5)

## 2019-01-23 PROCEDURE — 71275 CT ANGIOGRAPHY CHEST: CPT | Mod: 26

## 2019-01-23 PROCEDURE — 93010 ELECTROCARDIOGRAM REPORT: CPT

## 2019-01-23 PROCEDURE — 99284 EMERGENCY DEPT VISIT MOD MDM: CPT | Mod: 25

## 2019-01-23 RX ORDER — ASPIRIN/CALCIUM CARB/MAGNESIUM 324 MG
325 TABLET ORAL ONCE
Qty: 0 | Refills: 0 | Status: COMPLETED | OUTPATIENT
Start: 2019-01-23 | End: 2019-01-23

## 2019-01-23 RX ADMIN — Medication 325 MILLIGRAM(S): at 18:02

## 2019-01-23 NOTE — ED ADULT TRIAGE NOTE - CHIEF COMPLAINT QUOTE
Patient complaining of SOB on excretion  and chest pain after taking a walk, patient with h/o GI bleed

## 2019-01-23 NOTE — ED ADULT NURSE NOTE - CHPI ED NUR SYMPTOMS NEG
no chest pain/no vomiting/no congestion/no syncope/no chills/no diaphoresis/no fever/no nausea/no back pain

## 2019-01-23 NOTE — ED PROVIDER NOTE - NSFOLLOWUPINSTRUCTIONS_ED_ALL_ED_FT
Shortness of breath    Shortness of breath (dyspnea) means you have trouble breathing and could indicate a medical problem. Causes include lung disease, heart disease, low amount of red blood cells (anemia), poor physical fitness, being overweight, smoking, etc. Your health care provider today may not be able to find a cause for your shortness of breath after your exam. In this case, it is important to have a follow-up exam with your primary care physician as instructed. If medicines were prescribed, take them as directed for the full length of time directed. Refrain from tobacco products.    SEEK IMMEDIATE MEDICAL CARE IF YOU HAVE ANY OF THE FOLLOWING SYMPTOMS: worsening shortness of breath, chest pain, back pain, abdominal pain, fever, coughing up blood, lightheadedness/dizziness.     Chest Pain    Chest pain can be caused by many different conditions which may or may not be dangerous. Causes include heartburn, lung infections, heart attack, blood clot in lungs, skin infections, strain or damage to muscle, cartilage, or bones, etc. In addition to a history and physical examination, an electrocardiogram (ECG) or other lab tests may have been performed to determine the cause of your chest pain. Follow up with your primary care provider or with a cardiologist as instructed.     SEEK IMMEDIATE MEDICAL CARE IF YOU HAVE ANY OF THE FOLLOWING SYMPTOMS: worsening chest pain, coughing up blood, unexplained back/neck/jaw pain, severe abdominal pain, dizziness or lightheadedness, fainting, shortness of breath, sweaty or clammy skin, vomiting, or racing heart beat. These symptoms may represent a serious problem that is an emergency. Do not wait to see if the symptoms will go away. Get medical help right away. Call 911 and do not drive yourself to the hospital.

## 2019-01-23 NOTE — ED PROVIDER NOTE - CARE PROVIDER_API CALL
Blu Barajas (DO), Medicine  Dept Director  [address] [address]  Phone: [phone]  Fax: [phone]

## 2019-01-23 NOTE — ED PROVIDER NOTE - OBJECTIVE STATEMENT
59 y/o male with PMHx of PE (was on Warfarin for 1 year, taken off Warfarin in 2017), BPH (on Flomax), HTN (on Lisinopril 40, Amlodipine 10, and aspirin 81), HLD (on Atorvastatin 40), and COPD (was hospitalized this month at  for lower GI bleed, had red stools, discharged on 1/5/19) presents to ED c/o SOB. Patient reports he was walking in [employer] at 4 PM when he experienced sudden CP with associated SOB, dizziness, fatigue, and generalized weakness. He reports intermittent chills for the past week but denies any fever or cough/URI symptoms. Also denies any hx of heart surgery or heart failure, no exposure to any gas, and no black stool or blood in stool. Patient follows with Dr. Wilson, reports allergy to codeine.

## 2019-01-23 NOTE — ED ADULT NURSE REASSESSMENT NOTE - NS ED NURSE REASSESS COMMENT FT1
pt aox4 c/o dizziness but states he feels better. pt placed on O2 and reports that it helps his dizziness. no sob or difficulty breathing noted at this time.

## 2019-01-23 NOTE — ED PROVIDER NOTE - PMH
Acute pancreatitis  Pancreatitis  Alcohol withdrawal    BPH (benign prostatic hyperplasia)    Cerebral artery occlusion with cerebral infarction  Stroke  COPD (chronic obstructive pulmonary disease)    Crohn's disease    Diverticulosis of large intestine  Diverticulosis  History of pulmonary embolism    HLD (hyperlipidemia)    HTN (hypertension)    Hypertension    MI (myocardial infarction)    Pulmonary embolism

## 2019-01-23 NOTE — ED ADULT NURSE NOTE - OBJECTIVE STATEMENT
Pt presents to ED with c/o weakness, dizziness and SOB while walking through [place] today. Patient with hx of DVT, HTN and HLD. No leg swelling, tachypnea, wheeze or respiratory distress, lungs CTAB, S1S2. No focal neuro deficits, A + O x 3.

## 2019-01-26 DIAGNOSIS — R53.1 WEAKNESS: ICD-10-CM

## 2019-01-26 DIAGNOSIS — Z79.52 LONG TERM (CURRENT) USE OF SYSTEMIC STEROIDS: ICD-10-CM

## 2019-01-26 DIAGNOSIS — Z88.5 ALLERGY STATUS TO NARCOTIC AGENT: ICD-10-CM

## 2019-01-26 DIAGNOSIS — Z79.2 LONG TERM (CURRENT) USE OF ANTIBIOTICS: ICD-10-CM

## 2019-01-26 DIAGNOSIS — R06.02 SHORTNESS OF BREATH: ICD-10-CM

## 2019-01-26 DIAGNOSIS — E78.5 HYPERLIPIDEMIA, UNSPECIFIED: ICD-10-CM

## 2019-01-26 DIAGNOSIS — R07.89 OTHER CHEST PAIN: ICD-10-CM

## 2019-01-26 DIAGNOSIS — I10 ESSENTIAL (PRIMARY) HYPERTENSION: ICD-10-CM

## 2019-01-26 DIAGNOSIS — R42 DIZZINESS AND GIDDINESS: ICD-10-CM

## 2019-01-26 DIAGNOSIS — Z79.899 OTHER LONG TERM (CURRENT) DRUG THERAPY: ICD-10-CM

## 2019-05-06 ENCOUNTER — EMERGENCY (EMERGENCY)
Facility: HOSPITAL | Age: 59
LOS: 1 days | Discharge: LEFT BEFORE DISCHARGE | End: 2019-05-06
Admitting: EMERGENCY MEDICINE

## 2019-05-06 VITALS
DIASTOLIC BLOOD PRESSURE: 92 MMHG | HEART RATE: 77 BPM | SYSTOLIC BLOOD PRESSURE: 133 MMHG | OXYGEN SATURATION: 97 % | TEMPERATURE: 98 F | RESPIRATION RATE: 16 BRPM | WEIGHT: 195.11 LBS

## 2019-05-06 PROBLEM — N40.0 BENIGN PROSTATIC HYPERPLASIA WITHOUT LOWER URINARY TRACT SYMPTOMS: Chronic | Status: ACTIVE | Noted: 2019-01-23

## 2019-05-06 PROBLEM — I26.99 OTHER PULMONARY EMBOLISM WITHOUT ACUTE COR PULMONALE: Chronic | Status: ACTIVE | Noted: 2019-01-23

## 2019-05-06 PROBLEM — J44.9 CHRONIC OBSTRUCTIVE PULMONARY DISEASE, UNSPECIFIED: Chronic | Status: ACTIVE | Noted: 2019-01-23

## 2019-05-10 DIAGNOSIS — M54.5 LOW BACK PAIN: ICD-10-CM

## 2019-05-13 ENCOUNTER — EMERGENCY (EMERGENCY)
Facility: HOSPITAL | Age: 59
LOS: 1 days | Discharge: HOME | End: 2019-05-13
Admitting: EMERGENCY MEDICINE
Payer: MEDICARE

## 2019-05-13 VITALS
RESPIRATION RATE: 16 BRPM | OXYGEN SATURATION: 98 % | DIASTOLIC BLOOD PRESSURE: 86 MMHG | HEART RATE: 81 BPM | TEMPERATURE: 99 F | SYSTOLIC BLOOD PRESSURE: 138 MMHG

## 2019-05-13 VITALS
HEART RATE: 73 BPM | OXYGEN SATURATION: 99 % | SYSTOLIC BLOOD PRESSURE: 153 MMHG | DIASTOLIC BLOOD PRESSURE: 96 MMHG | TEMPERATURE: 98 F | RESPIRATION RATE: 17 BRPM | WEIGHT: 190.04 LBS

## 2019-05-13 DIAGNOSIS — Z79.899 OTHER LONG TERM (CURRENT) DRUG THERAPY: ICD-10-CM

## 2019-05-13 DIAGNOSIS — Z79.01 LONG TERM (CURRENT) USE OF ANTICOAGULANTS: ICD-10-CM

## 2019-05-13 DIAGNOSIS — R05 COUGH: ICD-10-CM

## 2019-05-13 DIAGNOSIS — Z79.2 LONG TERM (CURRENT) USE OF ANTIBIOTICS: ICD-10-CM

## 2019-05-13 DIAGNOSIS — R06.02 SHORTNESS OF BREATH: ICD-10-CM

## 2019-05-13 DIAGNOSIS — R07.89 OTHER CHEST PAIN: ICD-10-CM

## 2019-05-13 DIAGNOSIS — I10 ESSENTIAL (PRIMARY) HYPERTENSION: ICD-10-CM

## 2019-05-13 DIAGNOSIS — J44.9 CHRONIC OBSTRUCTIVE PULMONARY DISEASE, UNSPECIFIED: ICD-10-CM

## 2019-05-13 DIAGNOSIS — E78.5 HYPERLIPIDEMIA, UNSPECIFIED: ICD-10-CM

## 2019-05-13 DIAGNOSIS — Z88.5 ALLERGY STATUS TO NARCOTIC AGENT: ICD-10-CM

## 2019-05-13 LAB
ALBUMIN SERPL ELPH-MCNC: 3.5 G/DL — SIGNIFICANT CHANGE UP (ref 3.4–5)
ALP SERPL-CCNC: 72 U/L — SIGNIFICANT CHANGE UP (ref 40–120)
ALT FLD-CCNC: 22 U/L — SIGNIFICANT CHANGE UP (ref 12–42)
ANION GAP SERPL CALC-SCNC: 11 MMOL/L — SIGNIFICANT CHANGE UP (ref 9–16)
APTT BLD: 32.7 SEC — SIGNIFICANT CHANGE UP (ref 27.5–36.3)
AST SERPL-CCNC: 27 U/L — SIGNIFICANT CHANGE UP (ref 15–37)
BASOPHILS NFR BLD AUTO: 0.4 % — SIGNIFICANT CHANGE UP (ref 0–2)
BILIRUB SERPL-MCNC: 0.8 MG/DL — SIGNIFICANT CHANGE UP (ref 0.2–1.2)
BUN SERPL-MCNC: 9 MG/DL — SIGNIFICANT CHANGE UP (ref 7–23)
CALCIUM SERPL-MCNC: 8.7 MG/DL — SIGNIFICANT CHANGE UP (ref 8.5–10.5)
CHLORIDE SERPL-SCNC: 109 MMOL/L — HIGH (ref 96–108)
CO2 SERPL-SCNC: 26 MMOL/L — SIGNIFICANT CHANGE UP (ref 22–31)
CREAT SERPL-MCNC: 0.77 MG/DL — SIGNIFICANT CHANGE UP (ref 0.5–1.3)
D DIMER BLD IA.RAPID-MCNC: 228 NG/ML DDU — SIGNIFICANT CHANGE UP
EOSINOPHIL NFR BLD AUTO: 0.8 % — SIGNIFICANT CHANGE UP (ref 0–6)
ETHANOL SERPL-MCNC: <3 MG/DL — SIGNIFICANT CHANGE UP
GLUCOSE SERPL-MCNC: 91 MG/DL — SIGNIFICANT CHANGE UP (ref 70–99)
HCT VFR BLD CALC: 36.7 % — LOW (ref 39–50)
HGB BLD-MCNC: 12.1 G/DL — LOW (ref 13–17)
IMM GRANULOCYTES NFR BLD AUTO: 0.2 % — SIGNIFICANT CHANGE UP (ref 0–1.5)
INR BLD: 1.04 — SIGNIFICANT CHANGE UP (ref 0.88–1.16)
LIDOCAIN IGE QN: 225 U/L — SIGNIFICANT CHANGE UP (ref 73–393)
LYMPHOCYTES # BLD AUTO: 11.9 % — LOW (ref 13–44)
MCHC RBC-ENTMCNC: 33 G/DL — SIGNIFICANT CHANGE UP (ref 32–36)
MCHC RBC-ENTMCNC: 33.8 PG — SIGNIFICANT CHANGE UP (ref 27–34)
MCV RBC AUTO: 102.5 FL — HIGH (ref 80–100)
MONOCYTES NFR BLD AUTO: 8 % — SIGNIFICANT CHANGE UP (ref 2–14)
NEUTROPHILS NFR BLD AUTO: 78.7 % — HIGH (ref 43–77)
PLATELET # BLD AUTO: 264 K/UL — SIGNIFICANT CHANGE UP (ref 150–400)
POTASSIUM SERPL-MCNC: 3.9 MMOL/L — SIGNIFICANT CHANGE UP (ref 3.5–5.3)
POTASSIUM SERPL-SCNC: 3.9 MMOL/L — SIGNIFICANT CHANGE UP (ref 3.5–5.3)
PROT SERPL-MCNC: 7.4 G/DL — SIGNIFICANT CHANGE UP (ref 6.4–8.2)
PROTHROM AB SERPL-ACNC: 11.4 SEC — SIGNIFICANT CHANGE UP (ref 10–12.9)
RBC # BLD: 3.58 M/UL — LOW (ref 4.2–5.8)
RBC # FLD: 12.8 % — SIGNIFICANT CHANGE UP (ref 10.3–14.5)
SODIUM SERPL-SCNC: 146 MMOL/L — HIGH (ref 132–145)
TROPONIN I SERPL-MCNC: <0.017 NG/ML — LOW (ref 0.02–0.06)
TROPONIN I SERPL-MCNC: <0.017 NG/ML — LOW (ref 0.02–0.06)
WBC # BLD: 5.1 K/UL — SIGNIFICANT CHANGE UP (ref 3.8–10.5)
WBC # FLD AUTO: 5.1 K/UL — SIGNIFICANT CHANGE UP (ref 3.8–10.5)

## 2019-05-13 PROCEDURE — 71046 X-RAY EXAM CHEST 2 VIEWS: CPT | Mod: 26

## 2019-05-13 PROCEDURE — 93010 ELECTROCARDIOGRAM REPORT: CPT

## 2019-05-13 PROCEDURE — 99285 EMERGENCY DEPT VISIT HI MDM: CPT | Mod: 25

## 2019-05-13 RX ORDER — KETOROLAC TROMETHAMINE 30 MG/ML
30 SYRINGE (ML) INJECTION ONCE
Refills: 0 | Status: DISCONTINUED | OUTPATIENT
Start: 2019-05-13 | End: 2019-05-13

## 2019-05-13 RX ADMIN — Medication 30 MILLIGRAM(S): at 15:32

## 2019-05-13 RX ADMIN — Medication 50 MILLIGRAM(S): at 14:25

## 2019-05-13 NOTE — ED PROVIDER NOTE - CARE PROVIDER_API CALL
Charles Alvarado)  Cardiovascular Disease  [address] [facility], 3rd [place] [address]  Phone: [phone]  Fax: [phone]  Follow Up Time: 1-3 Days

## 2019-05-13 NOTE — ED PROVIDER NOTE - CLINICAL SUMMARY MEDICAL DECISION MAKING FREE TEXT BOX
EKG NSR, nonischemic.  CXR shows no acute cardiopulmonary pathology. Two troponins negative. Pt improved with Toradol and now reports feeling much better with no other complaints at this time. Will d/c with instructions to F/U with Cardio in 24-48 hours. Strict return precautions reviewed with pt in which pt verbalizes understanding and agrees to.

## 2019-05-13 NOTE — SBIRT NOTE. - NSSBIRTSERVICES_GEN_A_ED_FT
Provided SBIRT services: Full screen positive. Referral to Treatment attempted. Screening results were reviewed with the patient and patient was provided information about healthy guidelines and potential negative consequences associated with level of risk. Motivation and readiness to reduce or stop use was discussed and goals and activities to make changes were suggested/offered.  Options discussed for further evaluation and treatment, but referral to treatment was not completed because current health insurance will not cover for services. Patient was provided a list of substance abuse treatment facilities in the community.   Audit Score: 22  DAST Score:0  Duration = 15 Minutes

## 2019-05-13 NOTE — ED PROVIDER NOTE - OBJECTIVE STATEMENT
60 y/o M with PMH of HTN, HLD, remote PE (no longer on anticoagulation), COPD, BPH, ETOH abuse (withdrawals but no seizures) and Pancreatitis, presents to the ED c/o constant, sharp, left-sided CP since waking up at 8:00am this morning. The pain sometimes radiates toward the right side of his chest and he rates the pain a 9/10 with no aggravating or alleviating factors. He reports having slight SOB and lightheadedness at times. He has had chills but denies having a fever. He recently developed a dry cough this morning but denies any sputum production. He has not taken any medication for the pain. His last alcohol drink was yesterday.    Denies fever, headache, syncope, hemoptysis, palpitations, wheezing, abdo pain, N/V/D

## 2019-05-13 NOTE — ED PROVIDER NOTE - NSFOLLOWUPINSTRUCTIONS_ED_ALL_ED_FT
PLEASE FOLLOW-UP WITH YOUR PRIMARY CARE DOCTOR IN 1-2 DAY FOR FURTHER EVALUATION.  PLEASE TAKE ALL PAPERWORK FROM TODAY'S VISIT TO YOUR PRIMARY DOCTOR.  IF YOU DO NOT HAVE A PRIMARY CARE DOCTOR PLEASE REFER TO THE OFFICE/CLINIC INFORMATION GIVEN ABOVE.  YOU MAY ALSO CALL [phone] AND ASK FOR MS. RICHARD STEVENS.  SHE CAN HELP YOU MAKE A FOLLOW-UP APPOINTMENT.  HER HOURS ARE 11AM-7PM MONDAY - FRIDAY.    PLEASE FOLLOW UP WITH DR. BHAGAT (CARDIOLOGIST) LISTED HERE WITHIN 1-2 DAYS AS DISCUSSED.    PLEASE RETURN TO THE ER IMMEDIATELY OR CALL 911 FOR ANY HIGH FEVER, CHEST PAIN, TROUBLE BREATHING, VOMITING, SEVERE PAIN, OR ANY OTHER CONCERNS

## 2019-05-13 NOTE — ED ADULT NURSE NOTE - CHPI ED NUR SYMPTOMS NEG
no congestion/no syncope/no fever/no nausea/no vomiting/no diaphoresis/no dizziness/no back pain/no shortness of breath/no chest pain/no chills

## 2019-06-08 ENCOUNTER — EMERGENCY (EMERGENCY)
Facility: HOSPITAL | Age: 59
LOS: 1 days | Discharge: HOME | End: 2019-06-08
Attending: EMERGENCY MEDICINE | Admitting: EMERGENCY MEDICINE
Payer: MEDICARE

## 2019-06-08 VITALS
TEMPERATURE: 99 F | DIASTOLIC BLOOD PRESSURE: 96 MMHG | HEART RATE: 92 BPM | OXYGEN SATURATION: 98 % | SYSTOLIC BLOOD PRESSURE: 156 MMHG | WEIGHT: 195.11 LBS | RESPIRATION RATE: 18 BRPM

## 2019-06-08 VITALS
SYSTOLIC BLOOD PRESSURE: 148 MMHG | DIASTOLIC BLOOD PRESSURE: 93 MMHG | RESPIRATION RATE: 16 BRPM | HEART RATE: 62 BPM | TEMPERATURE: 99 F | OXYGEN SATURATION: 97 %

## 2019-06-08 PROBLEM — F10.10 ALCOHOL ABUSE, UNCOMPLICATED: Chronic | Status: ACTIVE | Noted: 2019-05-13

## 2019-06-08 LAB
ALBUMIN SERPL ELPH-MCNC: 3.3 G/DL — LOW (ref 3.4–5)
ALP SERPL-CCNC: 73 U/L — SIGNIFICANT CHANGE UP (ref 40–120)
ALT FLD-CCNC: 24 U/L — SIGNIFICANT CHANGE UP (ref 12–42)
ANION GAP SERPL CALC-SCNC: 9 MMOL/L — SIGNIFICANT CHANGE UP (ref 9–16)
AST SERPL-CCNC: 33 U/L — SIGNIFICANT CHANGE UP (ref 15–37)
BILIRUB SERPL-MCNC: 1 MG/DL — SIGNIFICANT CHANGE UP (ref 0.2–1.2)
BUN SERPL-MCNC: 9 MG/DL — SIGNIFICANT CHANGE UP (ref 7–23)
CALCIUM SERPL-MCNC: 8.7 MG/DL — SIGNIFICANT CHANGE UP (ref 8.5–10.5)
CHLORIDE SERPL-SCNC: 107 MMOL/L — SIGNIFICANT CHANGE UP (ref 96–108)
CO2 SERPL-SCNC: 28 MMOL/L — SIGNIFICANT CHANGE UP (ref 22–31)
CREAT SERPL-MCNC: 0.82 MG/DL — SIGNIFICANT CHANGE UP (ref 0.5–1.3)
D DIMER BLD IA.RAPID-MCNC: 464 NG/ML DDU — HIGH
GLUCOSE SERPL-MCNC: 96 MG/DL — SIGNIFICANT CHANGE UP (ref 70–99)
HCT VFR BLD CALC: 36.8 % — LOW (ref 39–50)
HGB BLD-MCNC: 12.6 G/DL — LOW (ref 13–17)
MCHC RBC-ENTMCNC: 34.2 G/DL — SIGNIFICANT CHANGE UP (ref 32–36)
MCHC RBC-ENTMCNC: 35 PG — HIGH (ref 27–34)
MCV RBC AUTO: 102.2 FL — HIGH (ref 80–100)
PLATELET # BLD AUTO: 305 K/UL — SIGNIFICANT CHANGE UP (ref 150–400)
POTASSIUM SERPL-MCNC: 3.5 MMOL/L — SIGNIFICANT CHANGE UP (ref 3.5–5.3)
POTASSIUM SERPL-SCNC: 3.5 MMOL/L — SIGNIFICANT CHANGE UP (ref 3.5–5.3)
PROT SERPL-MCNC: 7 G/DL — SIGNIFICANT CHANGE UP (ref 6.4–8.2)
RBC # BLD: 3.6 M/UL — LOW (ref 4.2–5.8)
RBC # FLD: 12.7 % — SIGNIFICANT CHANGE UP (ref 10.3–14.5)
SODIUM SERPL-SCNC: 144 MMOL/L — SIGNIFICANT CHANGE UP (ref 132–145)
TROPONIN I SERPL-MCNC: <0.017 NG/ML — LOW (ref 0.02–0.06)
TROPONIN I SERPL-MCNC: <0.017 NG/ML — LOW (ref 0.02–0.06)
WBC # BLD: 5.6 K/UL — SIGNIFICANT CHANGE UP (ref 3.8–10.5)
WBC # FLD AUTO: 5.6 K/UL — SIGNIFICANT CHANGE UP (ref 3.8–10.5)

## 2019-06-08 PROCEDURE — 99285 EMERGENCY DEPT VISIT HI MDM: CPT

## 2019-06-08 PROCEDURE — 71045 X-RAY EXAM CHEST 1 VIEW: CPT | Mod: 26

## 2019-06-08 PROCEDURE — 71275 CT ANGIOGRAPHY CHEST: CPT | Mod: 26

## 2019-06-08 RX ORDER — ASPIRIN/CALCIUM CARB/MAGNESIUM 324 MG
81 TABLET ORAL ONCE
Refills: 0 | Status: COMPLETED | OUTPATIENT
Start: 2019-06-08 | End: 2019-06-08

## 2019-06-08 NOTE — ED PROVIDER NOTE - NSFOLLOWUPINSTRUCTIONS_ED_ALL_ED_FT
follow up with your doctor as scheduled    tale all of your medications as previously directed.     return to ER for chest pain difficulty breathing or any other concern

## 2019-06-08 NOTE — ED PROVIDER NOTE - CARE PROVIDER_API CALL
Charles Alvarado)  Cardiovascular Disease  [address] [facility], [address]rd [place] [address]  Phone: [phone]  Fax: [phone]  Follow Up Time: 1-3 Days

## 2019-06-08 NOTE — ED PROVIDER NOTE - CLINICAL SUMMARY MEDICAL DECISION MAKING FREE TEXT BOX
progressive improvement throughout ed stay  alert cooperative w fluent and appropriate speech gait normal and tolerating po fluids at time of dc  no complaints @ 130pm   ED evaluation and management discussed with the patient and family (if available) in detail.  Close PMD follow up encouraged.  Strict ED return instructions discussed in detail and patient given the opportunity to ask any questions about their discharge diagnosis and instructions. Patient verbalized understanding. progressive improvement throughout ed stay  alert cooperative w fluent and appropriate speech gait normal and tolerating po fluids at time of dc  no complaints of cp or sob  @ 130pm   ED evaluation and management discussed with the patient and family (if available) in detail.  Close PMD follow up encouraged.  Strict ED return instructions discussed in detail and patient given the opportunity to ask any questions about their discharge diagnosis and instructions. Patient verbalized understanding.

## 2019-06-08 NOTE — ED PROVIDER NOTE - PROGRESS NOTE DETAILS
Pt ambulated to bathroom and back to his room pt is comfortable in bed - no compliants of CP or sob HR 80's Pt states "I'm feeling a whole lot better".

## 2019-06-08 NOTE — ED ADULT NURSE NOTE - NSFALLRSKINDICATORS_ED_ALL_ED
Received a call from Dr. Rema Steel office in [place]. Patient was in office and needed to have tooth extracted but had not stopped meds and they wanted to know if ok to take out today. I consulted Dr. Link Blanco and he states that the patient needs to hold the plavix and aspirin X 7 days prior to procedure and resume the plavix and aspirin the day following the procedure. I informed the office and they requested it also in writing on letterhead. This has been faxed to their office at this time.
no

## 2019-06-08 NOTE — ED PROVIDER NOTE - OBJECTIVE STATEMENT
60 yo woman sitting on park bench approximately two hours ago developed central non radiating chest pain - no dizziness no lightheadedness no diaphoresis no sob no pleuritic chest pain no jaw/arm/back pain     history of PE two years ago. previously on coumadin. no longer takes coumadin. 60 yo wmale sitting on park bench approximately two hours ago developed central non radiating chest pain - no dizziness no lightheadedness no diaphoresis no sob no pleuritic chest pain no jaw/arm/back pain     history of PE two years ago. previously on coumadin. no longer takes coumadin.

## 2019-06-08 NOTE — ED ADULT NURSE NOTE - CCCP TRG CHIEF CMPLNT
Sacroiliac Joint Injection under Fluoroscopy    Date of procedure 05/31/2018    Time-out taken to identify patient and procedure side prior to starting the procedure.                                                                 PROCEDURE:  Bilateral sacroiliac joint injection under fluoroscopy.    1) Right  sacroiliac joint injection under fluoroscopy.    2) Left  sacroiliac joint injection under fluoroscopy.    REASON FOR PROCEDURE: Sacroiliitis [M46.1]    PHYSICIAN: Lynette Rowley MD    Assistants:  Allen Lee MD PGY-4  I was present and supervising all critical portions of the procedure      MEDICATIONS INJECTED:  Depo-Medrol 40mg and 4 mL Bupivacaine 0.25% into each joint    LOCAL ANESTHETIC USED: Xylocaine 1% 5mL    SEDATION MEDICATIONS: None    ESTIMATED BLOOD LOSS:  None.    COMPLICATIONS:  None.    TECHNIQUE:   Laying in the prone position, the patient was prepped and draped in the usual sterile fashion using ChloraPrep and fenestrated drape.  The area was determined under fluoroscopy.  Local Xylocaine was injected by raising a wheel and going down to the periosteum using a 27-gauge hypodermic needle.  The 3.5 inch 22-gauge spinal needle was introduce into bilateral sacroiliac joints.  Negative pressure applied to confirm no intravascular placement.  Omnipaque was injected to confirm placement and to confirm that there was no vascular runoff.  The medication was then injected slowly.  The patient tolerated the procedure well.    The patient was monitored for approximately 30 minutes after the procedure.  Patient was given post procedure and discharge instructions to follow at home.  We will see the patient back in two weeks or the patient may call to inform of status. The patient was discharged in a stable condition         chest pain

## 2019-06-12 DIAGNOSIS — R07.89 OTHER CHEST PAIN: ICD-10-CM

## 2019-07-11 NOTE — ED ADULT TRIAGE NOTE - ARRIVAL FROM
Called and left a message to inform patient that I will be sending her (2) referral in the mail. Cc of referrals will be faxed over to [facility] on patient behalf.
Home

## 2019-07-12 ENCOUNTER — EMERGENCY (EMERGENCY)
Facility: HOSPITAL | Age: 59
LOS: 1 days | Discharge: HOME | End: 2019-07-12
Attending: EMERGENCY MEDICINE | Admitting: EMERGENCY MEDICINE
Payer: MEDICARE

## 2019-07-12 VITALS
HEART RATE: 84 BPM | RESPIRATION RATE: 18 BRPM | OXYGEN SATURATION: 94 % | TEMPERATURE: 98 F | SYSTOLIC BLOOD PRESSURE: 148 MMHG | DIASTOLIC BLOOD PRESSURE: 102 MMHG

## 2019-07-12 VITALS
DIASTOLIC BLOOD PRESSURE: 96 MMHG | OXYGEN SATURATION: 97 % | TEMPERATURE: 98 F | HEART RATE: 93 BPM | SYSTOLIC BLOOD PRESSURE: 147 MMHG | RESPIRATION RATE: 18 BRPM

## 2019-07-12 DIAGNOSIS — R07.89 OTHER CHEST PAIN: ICD-10-CM

## 2019-07-12 DIAGNOSIS — E78.5 HYPERLIPIDEMIA, UNSPECIFIED: ICD-10-CM

## 2019-07-12 DIAGNOSIS — R10.13 EPIGASTRIC PAIN: ICD-10-CM

## 2019-07-12 DIAGNOSIS — I10 ESSENTIAL (PRIMARY) HYPERTENSION: ICD-10-CM

## 2019-07-12 DIAGNOSIS — Z79.899 OTHER LONG TERM (CURRENT) DRUG THERAPY: ICD-10-CM

## 2019-07-12 LAB
ALBUMIN SERPL ELPH-MCNC: 3.2 G/DL — LOW (ref 3.4–5)
ALP SERPL-CCNC: 87 U/L — SIGNIFICANT CHANGE UP (ref 40–120)
ALT FLD-CCNC: 60 U/L — HIGH (ref 12–42)
ANION GAP SERPL CALC-SCNC: 11 MMOL/L — SIGNIFICANT CHANGE UP (ref 9–16)
APPEARANCE UR: CLEAR — SIGNIFICANT CHANGE UP
APTT BLD: 29.7 SEC — SIGNIFICANT CHANGE UP (ref 27.5–36.3)
AST SERPL-CCNC: 55 U/L — HIGH (ref 15–37)
BASOPHILS NFR BLD AUTO: 0.8 % — SIGNIFICANT CHANGE UP (ref 0–2)
BILIRUB SERPL-MCNC: 0.7 MG/DL — SIGNIFICANT CHANGE UP (ref 0.2–1.2)
BILIRUB UR-MCNC: NEGATIVE — SIGNIFICANT CHANGE UP
BUN SERPL-MCNC: 11 MG/DL — SIGNIFICANT CHANGE UP (ref 7–23)
CALCIUM SERPL-MCNC: 8.8 MG/DL — SIGNIFICANT CHANGE UP (ref 8.5–10.5)
CHLORIDE SERPL-SCNC: 105 MMOL/L — SIGNIFICANT CHANGE UP (ref 96–108)
CO2 SERPL-SCNC: 27 MMOL/L — SIGNIFICANT CHANGE UP (ref 22–31)
COLOR SPEC: YELLOW — SIGNIFICANT CHANGE UP
CREAT SERPL-MCNC: 0.83 MG/DL — SIGNIFICANT CHANGE UP (ref 0.5–1.3)
DIFF PNL FLD: NEGATIVE — SIGNIFICANT CHANGE UP
EOSINOPHIL NFR BLD AUTO: 2.1 % — SIGNIFICANT CHANGE UP (ref 0–6)
ETHANOL SERPL-MCNC: 50 MG/DL — HIGH
GLUCOSE SERPL-MCNC: 98 MG/DL — SIGNIFICANT CHANGE UP (ref 70–99)
GLUCOSE UR QL: NEGATIVE — SIGNIFICANT CHANGE UP
HCT VFR BLD CALC: 37.7 % — LOW (ref 39–50)
HGB BLD-MCNC: 12.7 G/DL — LOW (ref 13–17)
IMM GRANULOCYTES NFR BLD AUTO: 0.3 % — SIGNIFICANT CHANGE UP (ref 0–1.5)
INR BLD: 0.88 — SIGNIFICANT CHANGE UP (ref 0.88–1.16)
KETONES UR-MCNC: NEGATIVE — SIGNIFICANT CHANGE UP
LEUKOCYTE ESTERASE UR-ACNC: NEGATIVE — SIGNIFICANT CHANGE UP
LIDOCAIN IGE QN: 449 U/L — HIGH (ref 73–393)
LYMPHOCYTES # BLD AUTO: 22.5 % — SIGNIFICANT CHANGE UP (ref 13–44)
MCHC RBC-ENTMCNC: 33.7 G/DL — SIGNIFICANT CHANGE UP (ref 32–36)
MCHC RBC-ENTMCNC: 35.2 PG — HIGH (ref 27–34)
MCV RBC AUTO: 104.4 FL — HIGH (ref 80–100)
MONOCYTES NFR BLD AUTO: 16.4 % — HIGH (ref 2–14)
NEUTROPHILS NFR BLD AUTO: 57.9 % — SIGNIFICANT CHANGE UP (ref 43–77)
NITRITE UR-MCNC: NEGATIVE — SIGNIFICANT CHANGE UP
NT-PROBNP SERPL-SCNC: 9 PG/ML — SIGNIFICANT CHANGE UP
PCP SPEC-MCNC: SIGNIFICANT CHANGE UP
PH UR: 6.5 — SIGNIFICANT CHANGE UP (ref 5–8)
PLATELET # BLD AUTO: 194 K/UL — SIGNIFICANT CHANGE UP (ref 150–400)
POTASSIUM SERPL-MCNC: 3.3 MMOL/L — LOW (ref 3.5–5.3)
POTASSIUM SERPL-SCNC: 3.3 MMOL/L — LOW (ref 3.5–5.3)
PROT SERPL-MCNC: 7.3 G/DL — SIGNIFICANT CHANGE UP (ref 6.4–8.2)
PROT UR-MCNC: ABNORMAL MG/DL
PROTHROM AB SERPL-ACNC: 9.7 SEC — LOW (ref 10–12.9)
RBC # BLD: 3.61 M/UL — LOW (ref 4.2–5.8)
RBC # FLD: 13.3 % — SIGNIFICANT CHANGE UP (ref 10.3–14.5)
SODIUM SERPL-SCNC: 143 MMOL/L — SIGNIFICANT CHANGE UP (ref 132–145)
SP GR SPEC: 1.02 — SIGNIFICANT CHANGE UP (ref 1–1.03)
UROBILINOGEN FLD QL: 4 E.U./DL
WBC # BLD: 3.8 K/UL — SIGNIFICANT CHANGE UP (ref 3.8–10.5)
WBC # FLD AUTO: 3.8 K/UL — SIGNIFICANT CHANGE UP (ref 3.8–10.5)

## 2019-07-12 PROCEDURE — 74177 CT ABD & PELVIS W/CONTRAST: CPT | Mod: 26

## 2019-07-12 PROCEDURE — 93010 ELECTROCARDIOGRAM REPORT: CPT

## 2019-07-12 PROCEDURE — 99284 EMERGENCY DEPT VISIT MOD MDM: CPT | Mod: 25

## 2019-07-12 PROCEDURE — 71045 X-RAY EXAM CHEST 1 VIEW: CPT | Mod: 26

## 2019-07-12 RX ORDER — KETOROLAC TROMETHAMINE 30 MG/ML
30 SYRINGE (ML) INJECTION ONCE
Refills: 0 | Status: DISCONTINUED | OUTPATIENT
Start: 2019-07-12 | End: 2019-07-12

## 2019-07-12 RX ORDER — POTASSIUM CHLORIDE 20 MEQ
40 PACKET (EA) ORAL ONCE
Refills: 0 | Status: COMPLETED | OUTPATIENT
Start: 2019-07-12 | End: 2019-07-12

## 2019-07-12 RX ORDER — FAMOTIDINE 10 MG/ML
1 INJECTION INTRAVENOUS
Qty: 14 | Refills: 0
Start: 2019-07-12 | End: 2019-07-18

## 2019-07-12 RX ORDER — METOCLOPRAMIDE HCL 10 MG
10 TABLET ORAL ONCE
Refills: 0 | Status: COMPLETED | OUTPATIENT
Start: 2019-07-12 | End: 2019-07-12

## 2019-07-12 RX ORDER — ONDANSETRON 8 MG/1
4 TABLET, FILM COATED ORAL ONCE
Refills: 0 | Status: COMPLETED | OUTPATIENT
Start: 2019-07-12 | End: 2019-07-12

## 2019-07-12 RX ORDER — ONDANSETRON 8 MG/1
1 TABLET, FILM COATED ORAL
Qty: 9 | Refills: 0
Start: 2019-07-12 | End: 2019-07-14

## 2019-07-12 RX ORDER — FAMOTIDINE 10 MG/ML
20 INJECTION INTRAVENOUS ONCE
Refills: 0 | Status: COMPLETED | OUTPATIENT
Start: 2019-07-12 | End: 2019-07-12

## 2019-07-12 RX ADMIN — ONDANSETRON 4 MILLIGRAM(S): 8 TABLET, FILM COATED ORAL at 08:35

## 2019-07-12 RX ADMIN — Medication 30 MILLILITER(S): at 08:35

## 2019-07-12 RX ADMIN — Medication 30 MILLIGRAM(S): at 08:35

## 2019-07-12 RX ADMIN — Medication 10 MILLIGRAM(S): at 10:30

## 2019-07-12 RX ADMIN — FAMOTIDINE 20 MILLIGRAM(S): 10 INJECTION INTRAVENOUS at 08:35

## 2019-07-12 RX ADMIN — Medication 40 MILLIEQUIVALENT(S): at 08:09

## 2019-07-12 NOTE — ED PROVIDER NOTE - RAPID ASSESSMENT
60 yo M with PMHx of HTN, HLD, remote PE, off AC, COPD, BPH, chronic alcoholism, pancreatitis, recently undomiciled x 2 months, off meds x 2 months, presenting c/o constant mid sternal CP x 2-3 days with associated progressive worsening ZEPEDA, and lower abdominal pain since last night. EKG with NSR and no acute ST-T wave changes. Pt also asking for refill of his home meds

## 2019-07-12 NOTE — ED ADULT NURSE NOTE - NSIMPLEMENTINTERV_GEN_ALL_ED
Implemented All Universal Safety Interventions:  [place] to call system. Call bell, personal items and telephone within reach. Instruct patient to call for assistance. Room bathroom lighting operational. Non-slip footwear when patient is off stretcher. Physically safe environment: no spills, clutter or unnecessary equipment. Stretcher in lowest position, wheels locked, appropriate side rails in place.

## 2019-07-12 NOTE — ED PROVIDER NOTE - DIAGNOSTIC INTERPRETATION
Interpreted by ED physician  Chest x-ray, 1 view  Lungs clear, heart shadow normal, bony structures normal, no free air under diaphragm, no PTX

## 2019-07-12 NOTE — ED PROVIDER NOTE - OBJECTIVE STATEMENT
58 yo M 60 yo M with PMHx of HTN, HLD, remote PE, off AC, COPD, BPH, chronic alcoholism, pancreatitis, recently undomiciled x 2 months, off meds x 2 months, presenting c/o constant mid sternal CP x 2-3 days with associated progressive worsening ZEPEDA, and lower abdominal pain since last night. Initially pt states pain since last night, then states on my evaluation pain since 2-3 days ago. Pt drinks daily. no fever, no chills, no cough, no vomiting, no diarrhea, no HA., no neck pain.

## 2019-07-12 NOTE — ED PROVIDER NOTE - CLINICAL SUMMARY MEDICAL DECISION MAKING FREE TEXT BOX
Pt w several complains including chest pain, abd pain, no vomiting, no diarrhea, no fever, no chills. Old chart review shows prior visits w chest pain work up, recent normal CTA chest.

## 2019-07-12 NOTE — ED PROVIDER NOTE - PROGRESS NOTE DETAILS
pt w normal labs, EKG, will treat symptomatically with GI cocktail, NSAID dose, and if improved will dc. Low suspicion for ACS at this time or surgical intraabdominal pathology. Will assess po tolerance prior to dc. improved, no distress. Will DC was unable to eat, vomited once, held dc, will get CT abd normal CT, MARTI w GI meds

## 2019-08-20 ENCOUNTER — EMERGENCY (EMERGENCY)
Facility: HOSPITAL | Age: 59
LOS: 1 days | Discharge: HOME | End: 2019-08-20
Admitting: EMERGENCY MEDICINE
Payer: MEDICARE

## 2019-08-20 VITALS
TEMPERATURE: 98 F | HEART RATE: 100 BPM | DIASTOLIC BLOOD PRESSURE: 97 MMHG | OXYGEN SATURATION: 97 % | SYSTOLIC BLOOD PRESSURE: 148 MMHG | WEIGHT: 190.92 LBS | RESPIRATION RATE: 18 BRPM

## 2019-08-20 VITALS
HEART RATE: 86 BPM | SYSTOLIC BLOOD PRESSURE: 153 MMHG | DIASTOLIC BLOOD PRESSURE: 91 MMHG | OXYGEN SATURATION: 96 % | RESPIRATION RATE: 18 BRPM

## 2019-08-20 LAB
ALBUMIN SERPL ELPH-MCNC: 3.4 G/DL — SIGNIFICANT CHANGE UP (ref 3.4–5)
ALP SERPL-CCNC: 83 U/L — SIGNIFICANT CHANGE UP (ref 40–120)
ALT FLD-CCNC: 26 U/L — SIGNIFICANT CHANGE UP (ref 12–42)
ANION GAP SERPL CALC-SCNC: 12 MMOL/L — SIGNIFICANT CHANGE UP (ref 9–16)
AST SERPL-CCNC: 28 U/L — SIGNIFICANT CHANGE UP (ref 15–37)
BASOPHILS NFR BLD AUTO: 0.8 % — SIGNIFICANT CHANGE UP (ref 0–2)
BILIRUB SERPL-MCNC: 0.6 MG/DL — SIGNIFICANT CHANGE UP (ref 0.2–1.2)
BUN SERPL-MCNC: 15 MG/DL — SIGNIFICANT CHANGE UP (ref 7–23)
CALCIUM SERPL-MCNC: 8.4 MG/DL — LOW (ref 8.5–10.5)
CHLORIDE SERPL-SCNC: 110 MMOL/L — HIGH (ref 96–108)
CO2 SERPL-SCNC: 24 MMOL/L — SIGNIFICANT CHANGE UP (ref 22–31)
CREAT SERPL-MCNC: 0.72 MG/DL — SIGNIFICANT CHANGE UP (ref 0.5–1.3)
EOSINOPHIL NFR BLD AUTO: 1.5 % — SIGNIFICANT CHANGE UP (ref 0–6)
ETHANOL SERPL-MCNC: 156 MG/DL — HIGH
GLUCOSE SERPL-MCNC: 92 MG/DL — SIGNIFICANT CHANGE UP (ref 70–99)
HCT VFR BLD CALC: 38.7 % — LOW (ref 39–50)
HGB BLD-MCNC: 12.7 G/DL — LOW (ref 13–17)
IMM GRANULOCYTES NFR BLD AUTO: 0.2 % — SIGNIFICANT CHANGE UP (ref 0–1.5)
LIDOCAIN IGE QN: 350 U/L — SIGNIFICANT CHANGE UP (ref 73–393)
LYMPHOCYTES # BLD AUTO: 16.3 % — SIGNIFICANT CHANGE UP (ref 13–44)
MCHC RBC-ENTMCNC: 32.8 G/DL — SIGNIFICANT CHANGE UP (ref 32–36)
MCHC RBC-ENTMCNC: 34.8 PG — HIGH (ref 27–34)
MCV RBC AUTO: 106 FL — HIGH (ref 80–100)
MONOCYTES NFR BLD AUTO: 11.9 % — SIGNIFICANT CHANGE UP (ref 2–14)
NEUTROPHILS NFR BLD AUTO: 69.3 % — SIGNIFICANT CHANGE UP (ref 43–77)
NT-PROBNP SERPL-SCNC: <5 PG/ML — SIGNIFICANT CHANGE UP
PCO2 BLDV: 50 MMHG — SIGNIFICANT CHANGE UP (ref 41–51)
PCP SPEC-MCNC: SIGNIFICANT CHANGE UP
PH BLDV: 7.35 — SIGNIFICANT CHANGE UP (ref 7.32–7.43)
PLATELET # BLD AUTO: 300 K/UL — SIGNIFICANT CHANGE UP (ref 150–400)
PO2 BLDV: 30 MMHG — LOW (ref 35–40)
POTASSIUM SERPL-MCNC: 3.8 MMOL/L — SIGNIFICANT CHANGE UP (ref 3.5–5.3)
POTASSIUM SERPL-SCNC: 3.8 MMOL/L — SIGNIFICANT CHANGE UP (ref 3.5–5.3)
PROT SERPL-MCNC: 7.7 G/DL — SIGNIFICANT CHANGE UP (ref 6.4–8.2)
RBC # BLD: 3.65 M/UL — LOW (ref 4.2–5.8)
RBC # FLD: 13 % — SIGNIFICANT CHANGE UP (ref 10.3–14.5)
SAO2 % BLDV: 44 % — SIGNIFICANT CHANGE UP
SODIUM SERPL-SCNC: 146 MMOL/L — HIGH (ref 132–145)
TROPONIN I SERPL-MCNC: <0.017 NG/ML — LOW (ref 0.02–0.06)
TROPONIN I SERPL-MCNC: <0.017 NG/ML — LOW (ref 0.02–0.06)
WBC # BLD: 4.8 K/UL — SIGNIFICANT CHANGE UP (ref 3.8–10.5)
WBC # FLD AUTO: 4.8 K/UL — SIGNIFICANT CHANGE UP (ref 3.8–10.5)

## 2019-08-20 PROCEDURE — 71045 X-RAY EXAM CHEST 1 VIEW: CPT | Mod: 26

## 2019-08-20 PROCEDURE — 99285 EMERGENCY DEPT VISIT HI MDM: CPT

## 2019-08-20 PROCEDURE — 76705 ECHO EXAM OF ABDOMEN: CPT | Mod: 26

## 2019-08-20 PROCEDURE — 93010 ELECTROCARDIOGRAM REPORT: CPT

## 2019-08-20 RX ORDER — ONDANSETRON 8 MG/1
4 TABLET, FILM COATED ORAL ONCE
Refills: 0 | Status: COMPLETED | OUTPATIENT
Start: 2019-08-20 | End: 2019-08-20

## 2019-08-20 RX ORDER — IBUPROFEN 200 MG
600 TABLET ORAL ONCE
Refills: 0 | Status: COMPLETED | OUTPATIENT
Start: 2019-08-20 | End: 2019-08-20

## 2019-08-20 RX ADMIN — ONDANSETRON 4 MILLIGRAM(S): 8 TABLET, FILM COATED ORAL at 10:51

## 2019-08-20 RX ADMIN — Medication 600 MILLIGRAM(S): at 10:51

## 2019-08-20 RX ADMIN — ONDANSETRON 4 MILLIGRAM(S): 8 TABLET, FILM COATED ORAL at 14:00

## 2019-08-20 NOTE — ED ADULT NURSE NOTE - OBJECTIVE STATEMENT
58 yo M c.o chest pain. Pt states "[place] the train and started having pain in my left chest that radiated to my right lower back. I felt SOB and lightheaded ans nauseous so I came in". Pt denies n/t to bl upper or lower extremities, vomiting, cough fever at this time.

## 2019-08-20 NOTE — ED PROVIDER NOTE - OBJECTIVE STATEMENT
60 yo M with PMHx of HTN, CVA, with no residual deficits, pancreatitis, alcoholism, PE off AC x 2 yrs, COPD, +current smoker, presenting c/o CP since 2 hrs ago.  Pt reports being on the train and had sudden onset of sharp pain in the chest radiating to the RUQ and epigastric region.  Pain is rated 6/10 with associated nausea and lightheadedness.  Worse with taking a deep breath and movements.  Denies fever, chills, palpitations, diaphoresis, ZEPEDA, SOB, orthopnea, cough, hemoptysis, wheezing, peripheral edema, focal weakness, numbness, tingling, paresthesia, HA, dizziness, neck pain, V/D/C, abdominal pain, change in urinary/bowel function, trauma, fall, rash, and malaise. Last TTE and stress test few weeks ago at [facility] with no acute ischemic findings.  No recent travel or sick contact noted

## 2019-08-20 NOTE — ED PROVIDER NOTE - PMH
Acute pancreatitis  Pancreatitis  Alcohol withdrawal    BPH (benign prostatic hyperplasia)    Cerebral artery occlusion with cerebral infarction  Stroke  COPD (chronic obstructive pulmonary disease)    Crohn's disease    Diverticulosis of large intestine  Diverticulosis  ETOH abuse    History of pulmonary embolism    HLD (hyperlipidemia)    HTN (hypertension)    Hypertension    MI (myocardial infarction)    Pulmonary embolism

## 2019-08-20 NOTE — ED PROVIDER NOTE - CARE PROVIDER_API CALL
Femi Young; MBBS)  Internal Medicine  [address] [place] [address]  Phone: [phone]  Fax: [phone]  Follow Up Time:     Charles Alvarado)  Cardiovascular Disease  [address] [facility], [address] [place] [address]  Phone: [phone]  Fax: [phone]  Follow Up Time:

## 2019-08-20 NOTE — ED ADULT NURSE NOTE - CAS TRG GEN SKIN COLOR
Left Lower Extremity Venous Duplex Study:



Reason for Exam: Pain and swelling of the left lower extremity.



Comments on the Right:

A limited duplex study was done of the proximal veins of the right 

lower extremity. All veins visualized are freely compressible without 

evidence of internal echogenicity.  Flow is spontaneous and phasic 

throughout. No evidence of acute or chronic thrombus is seen in any of 

the vessels visualized.



Comments on the Left:

All veins visualized are freely compressible without evidence of 

internal echogenicity.  Flow is spontaneous and phasic throughout. No 

evidence of acute or chronic thrombus is seen in any of the vessels 

visualized. Left inguinal adenopathy is noted.



Impression:

No evidence of acute or chronic deep venous thrombosis in the left 

lower extremity.

Left inguinal adenopathy is noted. Normal for race

## 2019-08-20 NOTE — ED PROVIDER NOTE - PHYSICAL EXAMINATION
Vital Signs - nursing notes reviewed and confirmed  Gen - Disheveled M, NAD, comfortable and non-toxic appearing, speaking in full sentences   Skin - warm, dry, intact  HEENT - AT/NC, PERRL, EOMI, no conjunctival injection, moist oral mucosa, TM intact b/l with good cone of lights, o/p clear with no erythema, edema, or exudate, uvula midline, airway patent, neck supple and NT, FROM, no JVD or carotid bruits b/l, no palpable nodes  CV - S1S2, R/R/R  Resp - respiration non-labored, CTAB, symmetric bs b/l, no r/r/w  GI - NABS, soft, ND, epigastric and RUQ TTP with no rebound or guarding, negative ignacio's. no CVAT b/l   MS - w/w/p, no c/c/e, calves supple and NT, distal pulses symmetric b/l, brisk cap refills, +SILT  Neuro - AxOx3, no focal neuro deficits, CN II-XII grossly intact, cerebellar function intact, negative pronator drift, negative nystagmus, ambulatory without gait disturbance

## 2019-08-20 NOTE — ED PROVIDER NOTE - CLINICAL SUMMARY MEDICAL DECISION MAKING FREE TEXT BOX
pt p/w CP and RUQ pain this morning, EKG with no acute ischemic changes, afebrile and nontoxic appearing, labs and CXR wnl, trop neg x 2, pain improved s/p NSAID and zofran, tolerating po now without N/V, pain improved s/p motrin and zofran, US with chronic findings, no acute intraabdominal pathology, AFVSS at time of d/c, pt non-toxic appearing, results, ddx, and f/u plans discussed with pt at bedside, d/c'd home to f/u with PMD and GI, strict return precautions discussed, prompt return to ER for any worsening or new sx, pt verbalized understanding.

## 2019-08-20 NOTE — ED ADULT NURSE NOTE - NSIMPLEMENTINTERV_GEN_ALL_ED
Implemented All Universal Safety Interventions:  [place] to call system. Call bell, personal items and telephone within reach. Instruct patient to call for assistance. Room bathroom lighting operational. Non-slip footwear when patient is off stretcher. Physically safe environment: no spills, clutter or unnecessary equipment. Stretcher in lowest position, wheels locked, appropriate side rails in place.

## 2019-08-23 DIAGNOSIS — R11.0 NAUSEA: ICD-10-CM

## 2019-08-23 DIAGNOSIS — R07.89 OTHER CHEST PAIN: ICD-10-CM

## 2019-08-23 DIAGNOSIS — R10.13 EPIGASTRIC PAIN: ICD-10-CM

## 2019-08-23 DIAGNOSIS — R42 DIZZINESS AND GIDDINESS: ICD-10-CM

## 2019-08-23 DIAGNOSIS — R06.02 SHORTNESS OF BREATH: ICD-10-CM

## 2019-08-23 DIAGNOSIS — R10.11 RIGHT UPPER QUADRANT PAIN: ICD-10-CM

## 2019-09-06 ENCOUNTER — EMERGENCY (EMERGENCY)
Facility: HOSPITAL | Age: 59
LOS: 1 days | Discharge: HOME | End: 2019-09-06
Attending: EMERGENCY MEDICINE | Admitting: EMERGENCY MEDICINE
Payer: MEDICARE

## 2019-09-06 VITALS
SYSTOLIC BLOOD PRESSURE: 146 MMHG | DIASTOLIC BLOOD PRESSURE: 98 MMHG | RESPIRATION RATE: 16 BRPM | TEMPERATURE: 98 F | HEART RATE: 86 BPM | OXYGEN SATURATION: 97 %

## 2019-09-06 VITALS
TEMPERATURE: 98 F | HEART RATE: 87 BPM | RESPIRATION RATE: 20 BRPM | OXYGEN SATURATION: 97 % | DIASTOLIC BLOOD PRESSURE: 106 MMHG | SYSTOLIC BLOOD PRESSURE: 154 MMHG

## 2019-09-06 DIAGNOSIS — M54.5 LOW BACK PAIN: ICD-10-CM

## 2019-09-06 DIAGNOSIS — F10.230 ALCOHOL DEPENDENCE WITH WITHDRAWAL, UNCOMPLICATED: ICD-10-CM

## 2019-09-06 LAB
APPEARANCE UR: CLEAR — SIGNIFICANT CHANGE UP
BILIRUB UR-MCNC: NEGATIVE — SIGNIFICANT CHANGE UP
COLOR SPEC: YELLOW — SIGNIFICANT CHANGE UP
DIFF PNL FLD: NEGATIVE — SIGNIFICANT CHANGE UP
GLUCOSE UR QL: NEGATIVE — SIGNIFICANT CHANGE UP
KETONES UR-MCNC: NEGATIVE — SIGNIFICANT CHANGE UP
LEUKOCYTE ESTERASE UR-ACNC: NEGATIVE — SIGNIFICANT CHANGE UP
NITRITE UR-MCNC: NEGATIVE — SIGNIFICANT CHANGE UP
PH UR: 7.5 — SIGNIFICANT CHANGE UP (ref 5–8)
PROT UR-MCNC: ABNORMAL MG/DL
SP GR SPEC: 1.01 — SIGNIFICANT CHANGE UP (ref 1–1.03)
UROBILINOGEN FLD QL: 1 E.U./DL — SIGNIFICANT CHANGE UP

## 2019-09-06 PROCEDURE — 99283 EMERGENCY DEPT VISIT LOW MDM: CPT

## 2019-09-06 RX ORDER — IBUPROFEN 200 MG
800 TABLET ORAL ONCE
Refills: 0 | Status: COMPLETED | OUTPATIENT
Start: 2019-09-06 | End: 2019-09-06

## 2019-09-06 RX ORDER — ACETAMINOPHEN 500 MG
975 TABLET ORAL ONCE
Refills: 0 | Status: COMPLETED | OUTPATIENT
Start: 2019-09-06 | End: 2019-09-06

## 2019-09-06 RX ADMIN — Medication 50 MILLIGRAM(S): at 09:10

## 2019-09-06 RX ADMIN — Medication 800 MILLIGRAM(S): at 09:09

## 2019-09-06 RX ADMIN — Medication 975 MILLIGRAM(S): at 09:10

## 2019-09-06 NOTE — ED PROVIDER NOTE - PATIENT PORTAL LINK FT
You can access the FollowMyHealth Patient Portal offered by [facility] by registering at the following website: [email or web address]. By joining [employer]’s FollowMyHealth portal, you will also be able to view your health information using other applications (apps) compatible with our system.

## 2019-09-06 NOTE — SBIRT NOTE ADULT - NSSBIRTALCPASSREFTXDET_GEN_A_CORE
Provided SBIRT services: Full screen positive. Referral to Treatment attempted. Screening results were reviewed with the patient and patient was provided information about healthy guidelines and potential negative consequences associated with level of risk. Motivation and readiness to reduce or stop use was discussed and goals and activities to make changes were suggested/offered.  Options discussed for further evaluation and treatment, but referral to treatment was not completed because Patient refused.

## 2019-09-06 NOTE — ED ADULT NURSE NOTE - OBJECTIVE STATEMENT
Patient is a daily alcohol drinker, present to ED after not having a drink in 20 hrs, pt states he feels like he is going through alcohol withdrawal.

## 2019-09-06 NOTE — ED ADULT NURSE NOTE - NSFALLRSKASSESSTYPE_ED_ALL_ED
Verbal Order with read back per Dr. Carmen George MD  For PFT smart panel. AMB POC PFT complete w/ bronchodilator  AMB POC PFT complete w/o bronchodilator    Dr. Carmen George MD will co-sign the orders. Initial (On Arrival)

## 2019-09-06 NOTE — ED ADULT TRIAGE NOTE - CHIEF COMPLAINT QUOTE
Patient complaining of alcohol withdrawal with tremors and states his last drink was yesterday afternoon; also complaining of lower back pain

## 2019-09-06 NOTE — ED PROVIDER NOTE - PHYSICAL EXAMINATION
VITAL SIGNS: I have reviewed nursing notes and confirm.  CONSTITUTIONAL: Well-developed; well-nourished male disheveled smelling of urine and alcohol lying calmly in stretcher speaking clearly in complete sentences; in no acute distress.  SKIN: Skin exam is warm and dry, no acute rash.  HEAD: Normocephalic; atraumatic.  EYES: PERRL, EOM intact; conjunctiva and sclera clear.  ENT: No nasal discharge; airway clear.  NECK: Supple; non tender.  CARD: S1, S2 normal; no murmurs, gallops, or rubs. Regular rate and rhythm.  RESP: No wheezes, rales or rhonchi.  ABD: Normal bowel sounds; soft; non-distended; non-tender  BACK: + b/l lumbar paraspinal TTP w/out midline TTP or overlying skin changes  EXT: Normal ROM. No clubbing, cyanosis or edema.  NEURO: Alert, oriented. Grossly unremarkable. No tongue fasciculations or resting tremor. speech intact, no facial droop, 5/5 strength all ext, no sensory deficits  PSYCH: Cooperative, appropriate.

## 2019-09-11 ENCOUNTER — EMERGENCY (EMERGENCY)
Facility: HOSPITAL | Age: 59
LOS: 1 days | Discharge: HOME | End: 2019-09-11
Attending: EMERGENCY MEDICINE | Admitting: EMERGENCY MEDICINE
Payer: MEDICARE

## 2019-09-11 VITALS
HEART RATE: 98 BPM | OXYGEN SATURATION: 97 % | DIASTOLIC BLOOD PRESSURE: 88 MMHG | TEMPERATURE: 98 F | SYSTOLIC BLOOD PRESSURE: 139 MMHG | RESPIRATION RATE: 16 BRPM

## 2019-09-11 VITALS
RESPIRATION RATE: 16 BRPM | DIASTOLIC BLOOD PRESSURE: 99 MMHG | TEMPERATURE: 98 F | SYSTOLIC BLOOD PRESSURE: 147 MMHG | OXYGEN SATURATION: 97 % | WEIGHT: 187.39 LBS | HEART RATE: 110 BPM

## 2019-09-11 DIAGNOSIS — R10.11 RIGHT UPPER QUADRANT PAIN: ICD-10-CM

## 2019-09-11 LAB
ANION GAP SERPL CALC-SCNC: 11 MMOL/L — SIGNIFICANT CHANGE UP (ref 9–16)
BASOPHILS NFR BLD AUTO: 0.7 % — SIGNIFICANT CHANGE UP (ref 0–2)
BUN SERPL-MCNC: 7 MG/DL — SIGNIFICANT CHANGE UP (ref 7–23)
CALCIUM SERPL-MCNC: 8.2 MG/DL — LOW (ref 8.5–10.5)
CHLORIDE SERPL-SCNC: 108 MMOL/L — SIGNIFICANT CHANGE UP (ref 96–108)
CO2 SERPL-SCNC: 26 MMOL/L — SIGNIFICANT CHANGE UP (ref 22–31)
CREAT SERPL-MCNC: 0.66 MG/DL — SIGNIFICANT CHANGE UP (ref 0.5–1.3)
EOSINOPHIL NFR BLD AUTO: 4 % — SIGNIFICANT CHANGE UP (ref 0–6)
GLUCOSE SERPL-MCNC: 112 MG/DL — HIGH (ref 70–99)
HCT VFR BLD CALC: 34 % — LOW (ref 39–50)
HGB BLD-MCNC: 11.1 G/DL — LOW (ref 13–17)
LYMPHOCYTES # BLD AUTO: 32 % — SIGNIFICANT CHANGE UP (ref 13–44)
MCHC RBC-ENTMCNC: 32.6 G/DL — SIGNIFICANT CHANGE UP (ref 32–36)
MCHC RBC-ENTMCNC: 33.7 PG — SIGNIFICANT CHANGE UP (ref 27–34)
MCV RBC AUTO: 103.3 FL — HIGH (ref 80–100)
MONOCYTES NFR BLD AUTO: 16.2 % — HIGH (ref 2–14)
NEUTROPHILS NFR BLD AUTO: 47.1 % — SIGNIFICANT CHANGE UP (ref 43–77)
PLATELET # BLD AUTO: 196 K/UL — SIGNIFICANT CHANGE UP (ref 150–400)
POTASSIUM SERPL-MCNC: 3.4 MMOL/L — LOW (ref 3.5–5.3)
POTASSIUM SERPL-SCNC: 3.4 MMOL/L — LOW (ref 3.5–5.3)
RBC # BLD: 3.29 M/UL — LOW (ref 4.2–5.8)
RBC # FLD: 13.8 % — SIGNIFICANT CHANGE UP (ref 10.3–14.5)
SODIUM SERPL-SCNC: 145 MMOL/L — SIGNIFICANT CHANGE UP (ref 132–145)
WBC # BLD: 3 K/UL — LOW (ref 3.8–10.5)
WBC # FLD AUTO: 3 K/UL — LOW (ref 3.8–10.5)

## 2019-09-11 PROCEDURE — 71046 X-RAY EXAM CHEST 2 VIEWS: CPT | Mod: 26

## 2019-09-11 PROCEDURE — 99284 EMERGENCY DEPT VISIT MOD MDM: CPT

## 2019-09-11 PROCEDURE — 76705 ECHO EXAM OF ABDOMEN: CPT | Mod: 26

## 2019-09-11 RX ORDER — IBUPROFEN 200 MG
800 TABLET ORAL ONCE
Refills: 0 | Status: COMPLETED | OUTPATIENT
Start: 2019-09-11 | End: 2019-09-11

## 2019-09-11 RX ORDER — ACETAMINOPHEN 500 MG
975 TABLET ORAL ONCE
Refills: 0 | Status: COMPLETED | OUTPATIENT
Start: 2019-09-11 | End: 2019-09-11

## 2019-09-11 RX ADMIN — Medication 800 MILLIGRAM(S): at 10:31

## 2019-09-11 RX ADMIN — Medication 975 MILLIGRAM(S): at 10:31

## 2019-09-11 NOTE — ED PROVIDER NOTE - CLINICAL SUMMARY MEDICAL DECISION MAKING FREE TEXT BOX
RUQ abd pain, non-tender. No CVA TTP. No biliary disease on u/s. No hydronephrosis. No fever, leukocytosis, or n/v. Tolerating PO. No SOB, saturating well on RA w/normal lung sounds and unremarkable CXR. Possible chronic pancreatitis from alcoholism vs cirrhotic liver. Will d/c home with supportive care to f/u with GI. Given return precautions and anticipatory guidance.

## 2019-09-11 NOTE — ED PROVIDER NOTE - OBJECTIVE STATEMENT
60 y/o homeless M, daily etoh drinker, p/w RUQ vs R flank pain on going for several weeks, worse with movement or deep breathing. No SOB or cough. No association with food. No urinary sx. Denies dysuria/hematuria. No n/v/c/d. Normal stooling. No fever/chills. Denies trauma.

## 2019-09-11 NOTE — ED PROVIDER NOTE - PATIENT PORTAL LINK FT
You can access the FollowMyHealth Patient Portal offered by [facility] by registering at the following website: [email or web address]. By joining [employer]’s FollowMyHealth portal, you will also be able to view your health information using other applications (apps) compatible with our system.

## 2019-09-11 NOTE — ED PROVIDER NOTE - NSFOLLOWUPINSTRUCTIONS_ED_ALL_ED_FT
Log Out.    [employer] CareNotes®     :  [facility]             CIRRHOSIS - AfterCare(R) Instructions(ER/ED)     Cirrhosis    WHAT YOU NEED TO KNOW:    Cirrhosis is long-term scarring of the liver. The liver makes enzymes and bile that help digest food and gives your body energy. It also removes harmful material from your body, such as alcohol and other chemicals. Cirrhosis is caused by repeated damage to your liver over time. Scar tissue starts to replace healthy liver tissue. The scar tissue prevents the liver from working properly.    DISCHARGE INSTRUCTIONS:    Return to the emergency department if:     You have pain during a bowel movement and it is black or contains blood.      You have a fast heart rate and fast breathing.      You are dizzy or confused.       You have severe pain in your abdomen.      You have trouble breathing.      Your vomit looks like it has coffee grinds or blood in it.    Contact your healthcare provider if:     You have a fever.      You have red or itchy skin.      You are in pain and feel weak.      You have questions or concerns about your condition or care.    Medicines:You may need medicine to treat the cause of your cirrhosis. You may also need medicine to treat any health problems caused by cirrhosis.    Antiviral medicine may be needed if your cirrhosis is caused by hepatitis. Antiviral medicine may prevent or decrease swelling and damage to your liver.      Blood pressure medicine is used to treat high blood pressure in the portal vein (the vein that goes to your liver).       Diuretics decrease extra fluid that collects in a part of your body, such as your legs and abdomen. Diuretics can also decrease your blood pressure. You will urinate more often when you take this medicine.      Antibiotics help prevent or treat a bacterial infection.      Take your medicine as directed. Contact your healthcare provider if you think your medicine is not helping or if you have side effects. Tell him or her if you are allergic to any medicine. Keep a list of the medicines, vitamins, and herbs you take. Include the amounts, and when and why you take them. Bring the list or the pill bottles to follow-up visits. Carry your medicine list with you in case of an emergency.    Do not drink alcohol: Alcohol will cause more damage to your liver.     Manage cirrhosis:     Do not smoke. Nicotine and other chemicals in cigarettes and cigars can cause blood vessel and lung damage. Ask your healthcare provider for information if you currently smoke and need help to quit. E-cigarettes or smokeless tobacco still contain nicotine. Talk to your healthcare provider before you use these products.       Eat a variety of healthy foods. Healthy foods include fruits, vegetables, whole-grain breads, low-fat dairy products, beans, lean meat, and fish. Ask if you need to be on a special diet.       Reach or maintain a healthy weight. You may develop fatty liver disease if you are overweight. Ask your healthcare provider for a healthy weight for you. He can help you create a safe weight loss plan if you are overweight.      Limit sodium (salt). You may need to decrease the amount of sodium you eat if you have swelling caused by fluid buildup. Sodium is found in table salt and salty foods such as canned foods, frozen foods, and potato chips.       Drink liquids as directed. Ask how much liquid to drink each day and which liquids are best for you. For most people, good liquids to drink are water, juice, and milk. Liquids can help your liver work better.      Ask about vaccines. You may have a hard time fighting infection because of cirrhosis. Vaccines help protect you against viruses that can cause diseases such as the flu or hepatitis. Viral hepatitis is caused by a virus that leads to inflammation of the liver. You may need a hepatitis A or B vaccine. You may also need a pneumonia vaccine. Always get a flu vaccine each year as soon as it becomes available.       Ask about medicines. Some medicines can harm your liver. Acetaminophen is an example. Talk to your healthcare provider about all your medicines. Do not take any over-the-counter medicine or herbal supplements until your healthcare provider says it is okay.    Follow up with your healthcare provider as directed: Write down your questions so you remember to ask them during your visits.

## 2019-09-11 NOTE — ED ADULT NURSE NOTE - NSIMPLEMENTINTERV_GEN_ALL_ED
Implemented All Universal Safety Interventions:  [place] to call system. Call bell, personal items and telephone within reach. Instruct patient to call for assistance. Room bathroom lighting operational. Non-slip footwear when patient is off stretcher. Physically safe environment: no spills, clutter or unnecessary equipment. Stretcher in lowest position, wheels locked, appropriate side rails in place.

## 2019-09-11 NOTE — ED PROVIDER NOTE - CARE PROVIDER_API CALL
Femi Young; MBBS)  Internal Medicine  [address] [place] [address]  Phone: [phone]  Fax: [phone]  Follow Up Time:

## 2019-09-11 NOTE — ED ADULT TRIAGE NOTE - CHIEF COMPLAINT QUOTE
"I have right side lung and kidney pain" pt denies trauma sob, cough fever or chills admits to drinking daily and appears intoxicated in triage no signs of etoh withdrawl

## 2019-09-11 NOTE — ED ADULT NURSE REASSESSMENT NOTE - NS ED NURSE REASSESS COMMENT FT1
received pt from night shift RN, pt awaiting results, in NAD, appears comfortable, and will continue to monitor.

## 2019-09-11 NOTE — ED PROVIDER NOTE - PHYSICAL EXAMINATION
VITAL SIGNS: I have reviewed nursing notes and confirm.  CONSTITUTIONAL: Well-developed; well-nourished disheveled man found comfortably sleeping in chair but wakes easily when spoken to; in no acute distress.  SKIN: Skin exam is warm and dry, no acute rash.  HEAD: Normocephalic; atraumatic.  EYES: PERRL, EOM intact; conjunctiva and sclera clear.  ENT: No nasal discharge; airway clear.  NECK: Supple; non tender.  CARD: S1, S2 normal; no murmurs, gallops, or rubs. Regular rate and rhythm.  RESP: Unlabored. No wheezes, rales or rhonchi.  ABD: soft; non-distended; non-tender, no fluid wave, negative ignacio's, no mcburney's pt TTP  : No CVA TTP b/l  EXT: Normal ROM. No cyanosis or edema. Non-ttp all ext, distal pulses intact  NEURO: Alert, oriented. Grossly unremarkable.  PSYCH: Cooperative, appropriate.

## 2019-10-17 ENCOUNTER — EMERGENCY (EMERGENCY)
Facility: HOSPITAL | Age: 59
LOS: 1 days | Discharge: HOME | End: 2019-10-17
Admitting: EMERGENCY MEDICINE
Payer: MEDICARE

## 2019-10-17 VITALS
RESPIRATION RATE: 17 BRPM | OXYGEN SATURATION: 95 % | TEMPERATURE: 98 F | HEART RATE: 92 BPM | DIASTOLIC BLOOD PRESSURE: 98 MMHG | SYSTOLIC BLOOD PRESSURE: 138 MMHG | HEIGHT: 68 IN | WEIGHT: 179.9 LBS
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
## 2019-10-17 DIAGNOSIS — Z79.899 OTHER LONG TERM (CURRENT) DRUG THERAPY: ICD-10-CM

## 2019-10-17 DIAGNOSIS — M10.9 GOUT, UNSPECIFIED: ICD-10-CM

## 2019-10-17 DIAGNOSIS — F10.129 ALCOHOL ABUSE WITH INTOXICATION, UNSPECIFIED: ICD-10-CM

## 2019-10-17 DIAGNOSIS — M25.531 PAIN IN RIGHT WRIST: ICD-10-CM

## 2019-10-17 DIAGNOSIS — I10 ESSENTIAL (PRIMARY) HYPERTENSION: ICD-10-CM

## 2019-10-17 DIAGNOSIS — S62.101S FRACTURE OF UNSPECIFIED CARPAL BONE, RIGHT WRIST, SEQUELA: ICD-10-CM

## 2019-10-17 PROCEDURE — 99284 EMERGENCY DEPT VISIT MOD MDM: CPT | Mod: 25

## 2019-10-17 PROCEDURE — 72192 CT PELVIS W/O DYE: CPT | Mod: 26

## 2019-10-17 PROCEDURE — 71250 CT THORAX DX C-: CPT | Mod: 26

## 2019-10-17 PROCEDURE — 73110 X-RAY EXAM OF WRIST: CPT | Mod: 26,RT

## 2019-10-17 PROCEDURE — 29125 APPL SHORT ARM SPLINT STATIC: CPT

## 2019-10-17 RX ORDER — COLCHICINE 0.6 MG
1.2 TABLET ORAL ONCE
Refills: 0 | Status: COMPLETED | OUTPATIENT
Start: 2019-10-17 | End: 2019-10-17

## 2019-10-17 RX ORDER — COLCHICINE 0.6 MG
1 TABLET ORAL
Qty: 7 | Refills: 0
Start: 2019-10-17 | End: 2019-10-23

## 2019-10-17 RX ORDER — ACETAMINOPHEN 500 MG
2 TABLET ORAL
Qty: 20 | Refills: 0
Start: 2019-10-17

## 2019-10-17 RX ADMIN — Medication 1.2 MILLIGRAM(S): at 03:55

## 2019-10-17 RX ADMIN — Medication 500 MILLIGRAM(S): at 02:35

## 2019-10-17 NOTE — ED PROVIDER NOTE - PHYSICAL EXAMINATION
Gen - Disheveled, +AOB, no respiratory distress   Skin - warm, dry, intact  HEENT - AT/NC, PERRL, mild conjunctival injection, pupils 3mm b/l, TM intact with no hemotympanium b/l, no facial contusion or periorbital ecchymosis, o/p clear, uvula midline, airway patent, neck supple with no step off or midline tenderness, FROM   CV - S1S2, R/R/R, mid sternal and L anterior chest wall region TTP with no focal crepitus, ecchymosis, or deformity   Resp - respiration non-labored, CTAB, symmetric bs b/l, no r/r/w  GI - NABS, soft, ND, NT, no rebound or guarding, no CVAT b/l  MS - moving all extremities, R wrist in splint, soiled, L hip with TTP with no ecchymosis, erythema, or laxity, ambulatory with cane, no shortening or rotation, pelvis stable, no midline tenderness   Neuro - Lethargic but arousable by verbal stimuli, slurred speech and ambulatory with cane

## 2019-10-17 NOTE — ED PROVIDER NOTE - CARE PLAN
Principal Discharge DX:	Wrist fracture, closed, right, sequela  Secondary Diagnosis:	Fall Principal Discharge DX:	Wrist fracture, closed, right, sequela  Secondary Diagnosis:	Fall  Secondary Diagnosis:	Gouty arthritis  Secondary Diagnosis:	Alcohol intoxication

## 2019-10-17 NOTE — ED PROVIDER NOTE - CARE PROVIDER_API CALL
Bird Oliver)  Orthopaedic Surgery  [address] [place] [address]  Phone: [phone]  Fax: [phone]  Follow Up Time:     [place] [address]  Phone: [phone]  Fax: [phone]  Follow Up Time:

## 2019-10-17 NOTE — ED ADULT NURSE NOTE - NSIMPLEMENTINTERV_GEN_ALL_ED
Implemented All Universal Safety Interventions:  [place] to call system. Call bell, personal items and telephone within reach. Instruct patient to call for assistance. Room bathroom lighting operational. Non-slip footwear when patient is off stretcher. Physically safe environment: no spills, clutter or unnecessary equipment. Stretcher in lowest position, wheels locked, appropriate side rails in place.

## 2019-10-17 NOTE — ED ADULT NURSE NOTE - NS ED NURSE LEVEL OF CONSCIOUSNESS ORIENTATION
Outpatient Behavioral Health Progress Note    Date: 3/04/19   Time Session Began: 10:45 am Ended: 11:40 am    Session Type: 45 Minute Therapy (22909)    Others Present: NA     Intervention: Behavioral, Cognitive, Insight, Supportive and Education    Patient Level of Functioning: No Change    Suicide/Homicide/Violence Ideation: Pt verbalized no thoughts or feelings of SI/HI, self-harming behaviors and/or wish to die. Change in Medication(s) Reported: No    Patient/Family Education Provided: Yes   Patient/Family Displays Understanding: Yes     Need for Community Resources Assessed: Yes  Resources Needed: No      Diagnosis: Opioid, Cocaine & Cannabis Use Disorder Code: F11.2,F14.2,F12.2     Treatment Plan: Initial    Discharge Plan: Abstinence, Relapse Prevention Plan, Identified support network, Recommended weekly support groups  Next Appointment: 3/14/19    Chief Complaint: AODA (Alcohol and Other Drug Abuse)    Progress Note:   Met individually with patient this morning for her initial session in the Outpatient Opiate Recovery Program. Patient was referred by her IOP therapist, Tessa Finney upon successful completion for AODA continuing care to focus on relapse prevention. Patient was alert and oriented x3, presented with a balanced mood and bright affect. Patient and provider went over, then signed outpatient admission paperwork, too include Releases of Information necessary for patient's care. Patient was oriented to the OP ORP program, policies, procedures, treatment expectations, as well as attendance and cancellation policies, session format and follow-up. The remainder of the session was spent establishing rapport with patient, identifying and addressing patient's presenting issues, concerns, plans, and goals for recovery. Personal treatment objectives were established that patient will focus on during her outpatient care.  Additional encouragement and support was offered to make positive changes supportive to a clean/sober recovery and lifestyle. Patient will continue to follow up individually with provider on a weekly basis in the OP ORP. Oriented - self; Oriented - place; Oriented - time

## 2019-10-17 NOTE — ED PROVIDER NOTE - PATIENT PORTAL LINK FT
You can access the FollowMyHealth Patient Portal offered by [facility] by registering at the following website: [email or web address]. By joining [employer]’s FollowMyHealth portal, you will also be able to view your health information using other applications (apps) compatible with our system.

## 2019-10-17 NOTE — ED PROVIDER NOTE - OBJECTIVE STATEMENT
60 yo M with PMHx of HTN, HLD, chronic alcoholism, COPD, pancreatitis, crohn's diverticulosis, undomiciled, not on any AC, RHD, recently dx with R wrist fx at [facility] 2 wks ago, presenting c/o chest wall pain, L hip pain, and R wrist pain s/p repeat fall today.  Pt slipped in the rain, unable to break his fall and landed on his L side and chest region.  Noted pain with difficulty ambulating.  Also reports R wrist splint slipped and got wet after the fall today.  Denies prodromes, LOC, bleeding, HA, dizziness, SOB, palpitations, N/V, change in sensation, abdominal/back/neck pain, focal weakness, change in vision/mental status/speech, paresthesia, and malaise. Pt is ambulatory s/p fall. Admits to alcohol consumption tonight. 60 yo M with PMHx of HTN, HLD, chronic alcoholism, COPD, pancreatitis, crohn's diverticulosis, gout, undomiciled, not on any AC, RHD, recently dx with R wrist fx at [facility] 2 wks ago, presenting c/o chest wall pain, L hip pain, and R wrist pain s/p repeat fall today.  Pt slipped in the rain, unable to break his fall and landed on his L side and chest region.  Noted pain with difficulty ambulating.  Also reports R wrist splint slipped and got wet after the fall today.  Denies prodromes, LOC, bleeding, HA, dizziness, SOB, palpitations, N/V, change in sensation, abdominal/back/neck pain, focal weakness, change in vision/mental status/speech, paresthesia, and malaise. Pt is ambulatory s/p fall. Admits to alcohol consumption tonight. Also reports having gouty flareup of the R great toe for the past wk, seen and treated at [facility] as well but pain not optimally controlled

## 2019-10-17 NOTE — ED ADULT TRIAGE NOTE - CHIEF COMPLAINT QUOTE
Patient walk into ER with c/o right wrist pain s/p fall 2 weeks ago. PT reports injuring wrist again tonight. Pt has multiple hospital bands noted to wrist and states he was seen at another ER for same issue last night. Ambulatory with steady gait without difficulty.

## 2019-11-11 NOTE — ED PROVIDER NOTE - CLINICAL SUMMARY MEDICAL DECISION MAKING FREE TEXT BOX
Uncomplicated etoh withdrawal based on reported sx, no physical exam findings, feeling improved s/p librium. back pain improved. No indication of infectious process or neurologic deficit. No hematuria, do not suspect renal colic. d/c home to report to [facility] for detox. given return precautions. no

## 2019-12-17 NOTE — ED PROVIDER NOTE - CROS ED ROS STATEMENT
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 Could consider if issue with rehab placements.  - Duonebs QID  - Antibiotics per ID (off)  - PT/OT  - Nutrition per PEG  - Poor dentition with multiple caries, high risk for dental abscess , consider dental  eval.     SUBJECTIVE:  Ms. Junior is a 63 year old female with past history of hypertension, tobacco use, and recent CVAs. She was admitted on 11/18/19 after transfer from [facility]. She was managed there for multifocal brainstem strokes. She was intubated there for failure to manage her own secretions and eventually trached. She has a 6 shiley cuffed trach in that is current uncuffed. She is tolerated TM 24/7. She received antibiotics for MSSA and strep pneumonia.    Interval History:  11/20: On TM. No significant secretions. Unable to follow commands. Tracks.  11/21: On TM 28% 24/7. Mild rhonchi today. Duo and mucomyst. No secretions.  11/22: No acute events; remains stable on TM 24/7; will consider trial of capping today; secretions minimal and thin so will d/c mucomyst  11/23: pt on PMV, minimal interaction , non verbal   11/25:  on 28% trach mask PMV.  11/26:  trach mask 24/7, no changes in mental status  11/27: TM 28%. No secretions.  11/29: TM 28% 24/7. PMV. No secretions.  11/30: TM 28% 24/7. PMV. Sleeping.  12/2   capped during day 1 L at night  12/3  overnight capped on room air  12/4 capped 24/7   12/5 capped 24/7 7.48/40 VBG  12/6 capped 24/7 mentation remains labile,   12/7 capped 24/7 tolerating  12/8 capped 24/7 tolerating    12/10:  Remains capped minimal secretions  12/11:  Remains capped minimal secretions  12/12: remains capped and encephalopathic  12/13: Pt remains capped and confused.  12/14: Remains capped and confused  12/15: Remains capped. Room air.  12/16: Continues capping. On room air. Neurologically more alert and interactive. Good cough.    HISTORIES:  ALLERGIES:  No Known Allergies   Social History     Tobacco Use   • Smoking status: Not on file   Substance Use Topics   • Alcohol use: Not on file     No family history on file.    PAST MEDICAL HISTORY:  Hypertension  Hyperlipidemia  Tobacco use    PAST SURGICAL HISTORY:  Reviewed    SOCIAL HISTORY:  Tobacco use  Lives with significant other. Previously independent in ADLs    FAMILY HISTORY:  Reviewed    MEDICATIONS:  Naficillin, Cefazolin, Mucomyst, Duonebs, ASA, Statin, Plavix, Erythromycin, Metoprolol, Sertraline    REVIEW OF SYSTEMS:  All systems reviewed and are negative with the exception of the findings noted in the HPI. Limited due to patient's locked in neurological status.      OBJECTIVE:  PHYSICAL EXAMINATION:  VITALS: Reviewed in paper chart and stable.  General:  No acute distress.  Skin:  Skin color, texture, and turgor normal.  No rashes or lesions. No cyanosis or clubbing.  Head:  Normocephalic, without obvious abnormality, atraumatic.  Eyes:  Patch over one eye.  Ears:  Normal external both ears.  Nose:  Nares normal. Septum midline. Mucosa normal. No drainage or sinus tenderness.  Poor dentition with multiple  Caries.  Throat:  Lips, mucosa, and tongue normal; teeth and gums normal. Normal posterior oropharynx.  Neck: Supple, symmetrical.  Trachea midline. No adenopathy;.  No secretions from trach. Trach in place   Lungs: Clear bilateral. Distant.  Chest wall:  No tenderness or deformity.  Heart::  Regular rate and rhythm. S1 and S2 normal. No murmur, rub or gallop. No peripheral edema.  Abdomen:  Soft, nontender, bowel sounds active all four quadrants.   No masses or hepatomegaly or splenomegaly.  Extremities:   Normal, atraumatic, no cyanosis or edema.  Pulses:  2+ and symmetric all extremities.  Lymph Nodes:  Cervical, supraclavicular and axillary nodes normal.  Musculoskeletal: No cyanosis clubbing edema or petechiae.   Neurologic:  Not following commands.    LABORATORY DATA:    Chest xray 11/24/19 personally reviewed   Tracheostomy tube and tracheostomy mask in place.   Platelike atelectasis in the right lower lung with minimal probable  atelectasis in the left infrahilar region. No effusion.   The cardiac and mediastinal contours appear unremarkable.    No pneumothorax.         Recent Labs   Lab 12/16/19  0425   WBC 10.5   HCT 36.0   HGB 11.7*   *     Recent Labs   Lab 12/16/19  0425   SODIUM 144   POTASSIUM 3.4   CHLORIDE 110*   CO2 30   ANIONGAP 7*   GLUCOSE 112*   BUN 23*   CREATININE 0.39*   GFRA >90   GFRNA >90   BCRAT 59*   CALCIUM 9.6       Blade Villalobos MD MPH  Pulmonary Medicine    Please page provider during normal business hours.   Use answering service [phone] after 5 pm, before 8 am, and weekends for on-call provider       all other ROS negative except as per HPI

## 2020-03-02 NOTE — ED PROVIDER NOTE - AGGRAVATING FACTORS
· POA with fever and leukocytosis with infectious source likely being R hip wound with underlying iliopsoas myositis  No signs of PNA or intra-abdominal infection  Review of repeat CT scan shows known osteomyelitis of the right hip with dislocation  Pockets of gas have decreased since the initial consult  No evidence for abscesses or collection  · Blood cultures 1/2 positive for coag-neg staph (likely contaminant) other negative  Repeat cultures negative for 5 days   · Urine culture + for proteus and e coli; likely asymptomatic bacteriuria per ID; isabel was exchanged   · ID had been following, patient completed 7 days total of intravenous Zosyn last dose being 2/11  ID with preference for surgical intervention for definitive source control however no focus for intervention as per IR and orthopedic surgery  · PT/OT recommending rehab  Case management following  Plan was to go back to [facility] however they will not take her back  CM working on alternative plan and she will require auth  · Note, patient was initially deemed to not have capacity however re-evaluation done and patient DOES have capacity for medical decision making  none

## 2020-08-26 NOTE — ED ADULT NURSE NOTE - QUALITY
Please change your insulin to the following regimen:     Novolog 6 units with breakfast  Novolog 6 units with lunch  Novolog 8 units with dinner  Levemir 15 units at night    You will have a visit with Dr. Lopez (pharmacist) within a week to review your blood sugars.    We are ordering a referral to endocrinology to also help with your insulin and blood sugar control. They will call you to set up the appointment.    We are ordering a referral to a nurse educator to talk about the differences in diet and managing blood sugar in pregnancy compared to when you aren't pregnant. They will call you to set up the appointment.    We would like to see you back with the OB doctor in 2 weeks.    We would like to have a growth US within the next 2 weeks as well.    non-productive cough/tightness/wheezing

## 2020-11-10 ENCOUNTER — EMERGENCY (EMERGENCY)
Facility: HOSPITAL | Age: 60
LOS: 1 days | Discharge: HOME | End: 2020-11-10
Attending: EMERGENCY MEDICINE | Admitting: EMERGENCY MEDICINE
Payer: COMMERCIAL

## 2020-11-10 VITALS
RESPIRATION RATE: 16 BRPM | DIASTOLIC BLOOD PRESSURE: 94 MMHG | OXYGEN SATURATION: 97 % | SYSTOLIC BLOOD PRESSURE: 137 MMHG | HEART RATE: 92 BPM | TEMPERATURE: 99 F

## 2020-11-10 VITALS
DIASTOLIC BLOOD PRESSURE: 81 MMHG | HEART RATE: 115 BPM | OXYGEN SATURATION: 96 % | TEMPERATURE: 98 F | WEIGHT: 220.46 LBS | SYSTOLIC BLOOD PRESSURE: 120 MMHG | RESPIRATION RATE: 16 BRPM | HEIGHT: 68 IN

## 2020-11-10 LAB
ALBUMIN SERPL ELPH-MCNC: 3.7 G/DL — SIGNIFICANT CHANGE UP (ref 3.4–5)
ALP SERPL-CCNC: 73 U/L — SIGNIFICANT CHANGE UP (ref 40–120)
ALT FLD-CCNC: 30 U/L — SIGNIFICANT CHANGE UP (ref 12–42)
AMPHET UR-MCNC: NEGATIVE — SIGNIFICANT CHANGE UP
ANION GAP SERPL CALC-SCNC: 15 MMOL/L — SIGNIFICANT CHANGE UP (ref 9–16)
ANISOCYTOSIS BLD QL: SLIGHT — SIGNIFICANT CHANGE UP
APTT BLD: 29.1 SEC — SIGNIFICANT CHANGE UP (ref 27.5–35.5)
AST SERPL-CCNC: 25 U/L — SIGNIFICANT CHANGE UP (ref 15–37)
BARBITURATES UR SCN-MCNC: NEGATIVE — SIGNIFICANT CHANGE UP
BASOPHILS # BLD AUTO: 0.04 K/UL — SIGNIFICANT CHANGE UP (ref 0–0.2)
BASOPHILS NFR BLD AUTO: 0.7 % — SIGNIFICANT CHANGE UP (ref 0–2)
BENZODIAZ UR-MCNC: NEGATIVE — SIGNIFICANT CHANGE UP
BILIRUB SERPL-MCNC: 0.8 MG/DL — SIGNIFICANT CHANGE UP (ref 0.2–1.2)
BUN SERPL-MCNC: 17 MG/DL — SIGNIFICANT CHANGE UP (ref 7–23)
CALCIUM SERPL-MCNC: 8.9 MG/DL — SIGNIFICANT CHANGE UP (ref 8.5–10.5)
CHLORIDE SERPL-SCNC: 108 MMOL/L — SIGNIFICANT CHANGE UP (ref 96–108)
CO2 SERPL-SCNC: 20 MMOL/L — LOW (ref 22–31)
COCAINE METAB.OTHER UR-MCNC: POSITIVE
CREAT SERPL-MCNC: 1.2 MG/DL — SIGNIFICANT CHANGE UP (ref 0.5–1.3)
EOSINOPHIL # BLD AUTO: 0.13 K/UL — SIGNIFICANT CHANGE UP (ref 0–0.5)
EOSINOPHIL NFR BLD AUTO: 2.2 % — SIGNIFICANT CHANGE UP (ref 0–6)
ETHANOL SERPL-MCNC: <3 MG/DL — SIGNIFICANT CHANGE UP
GLUCOSE SERPL-MCNC: 126 MG/DL — HIGH (ref 70–99)
HCT VFR BLD CALC: 40.3 % — SIGNIFICANT CHANGE UP (ref 39–50)
HGB BLD-MCNC: 13.1 G/DL — SIGNIFICANT CHANGE UP (ref 13–17)
IMM GRANULOCYTES NFR BLD AUTO: 0.5 % — SIGNIFICANT CHANGE UP (ref 0–1.5)
INR BLD: 1.07 — SIGNIFICANT CHANGE UP (ref 0.88–1.16)
LIDOCAIN IGE QN: 157 U/L — SIGNIFICANT CHANGE UP (ref 73–393)
LYMPHOCYTES # BLD AUTO: 0.77 K/UL — LOW (ref 1–3.3)
LYMPHOCYTES # BLD AUTO: 12.9 % — LOW (ref 13–44)
MACROCYTES BLD QL: SLIGHT — SIGNIFICANT CHANGE UP
MAGNESIUM SERPL-MCNC: 1.9 MG/DL — SIGNIFICANT CHANGE UP (ref 1.6–2.6)
MANUAL SMEAR VERIFICATION: SIGNIFICANT CHANGE UP
MCHC RBC-ENTMCNC: 32.5 GM/DL — SIGNIFICANT CHANGE UP (ref 32–36)
MCHC RBC-ENTMCNC: 34.7 PG — HIGH (ref 27–34)
MCV RBC AUTO: 106.6 FL — HIGH (ref 80–100)
METHADONE UR-MCNC: NEGATIVE — SIGNIFICANT CHANGE UP
MONOCYTES # BLD AUTO: 0.67 K/UL — SIGNIFICANT CHANGE UP (ref 0–0.9)
MONOCYTES NFR BLD AUTO: 11.2 % — SIGNIFICANT CHANGE UP (ref 2–14)
NEUTROPHILS # BLD AUTO: 4.33 K/UL — SIGNIFICANT CHANGE UP (ref 1.8–7.4)
NEUTROPHILS NFR BLD AUTO: 72.5 % — SIGNIFICANT CHANGE UP (ref 43–77)
NRBC # BLD: 0 /100 WBCS — SIGNIFICANT CHANGE UP (ref 0–0)
NT-PROBNP SERPL-SCNC: 11 PG/ML — SIGNIFICANT CHANGE UP
OPIATES UR-MCNC: NEGATIVE — SIGNIFICANT CHANGE UP
PCP SPEC-MCNC: SIGNIFICANT CHANGE UP
PCP UR-MCNC: NEGATIVE — SIGNIFICANT CHANGE UP
PLAT MORPH BLD: NORMAL — SIGNIFICANT CHANGE UP
PLATELET # BLD AUTO: 301 K/UL — SIGNIFICANT CHANGE UP (ref 150–400)
PLATELET COUNT - ESTIMATE: NORMAL — SIGNIFICANT CHANGE UP
POLYCHROMASIA BLD QL SMEAR: SLIGHT — SIGNIFICANT CHANGE UP
POTASSIUM SERPL-MCNC: 3.6 MMOL/L — SIGNIFICANT CHANGE UP (ref 3.5–5.3)
POTASSIUM SERPL-SCNC: 3.6 MMOL/L — SIGNIFICANT CHANGE UP (ref 3.5–5.3)
PROT SERPL-MCNC: 8.1 G/DL — SIGNIFICANT CHANGE UP (ref 6.4–8.2)
PROTHROM AB SERPL-ACNC: 12.6 SEC — SIGNIFICANT CHANGE UP (ref 10.6–13.6)
RBC # BLD: 3.78 M/UL — LOW (ref 4.2–5.8)
RBC # FLD: 11.9 % — SIGNIFICANT CHANGE UP (ref 10.3–14.5)
RBC BLD AUTO: ABNORMAL
SODIUM SERPL-SCNC: 143 MMOL/L — SIGNIFICANT CHANGE UP (ref 132–145)
THC UR QL: NEGATIVE — SIGNIFICANT CHANGE UP
TROPONIN I SERPL-MCNC: 0.08 NG/ML — HIGH (ref 0.02–0.06)
TROPONIN I SERPL-MCNC: 0.1 NG/ML — HIGH (ref 0.02–0.06)
WBC # BLD: 5.97 K/UL — SIGNIFICANT CHANGE UP (ref 3.8–10.5)
WBC # FLD AUTO: 5.97 K/UL — SIGNIFICANT CHANGE UP (ref 3.8–10.5)

## 2020-11-10 PROCEDURE — 71275 CT ANGIOGRAPHY CHEST: CPT | Mod: 26

## 2020-11-10 PROCEDURE — 71045 X-RAY EXAM CHEST 1 VIEW: CPT | Mod: 26

## 2020-11-10 PROCEDURE — 93010 ELECTROCARDIOGRAM REPORT: CPT

## 2020-11-10 PROCEDURE — 99285 EMERGENCY DEPT VISIT HI MDM: CPT | Mod: 25

## 2020-11-10 RX ORDER — ASPIRIN/CALCIUM CARB/MAGNESIUM 324 MG
325 TABLET ORAL ONCE
Refills: 0 | Status: COMPLETED | OUTPATIENT
Start: 2020-11-10 | End: 2020-11-10

## 2020-11-10 RX ORDER — SODIUM CHLORIDE 9 MG/ML
1000 INJECTION INTRAMUSCULAR; INTRAVENOUS; SUBCUTANEOUS ONCE
Refills: 0 | Status: COMPLETED | OUTPATIENT
Start: 2020-11-10 | End: 2020-11-10

## 2020-11-10 RX ADMIN — SODIUM CHLORIDE 2000 MILLILITER(S): 9 INJECTION INTRAMUSCULAR; INTRAVENOUS; SUBCUTANEOUS at 16:03

## 2020-11-10 RX ADMIN — Medication 325 MILLIGRAM(S): at 16:47

## 2020-11-10 RX ADMIN — SODIUM CHLORIDE 2000 MILLILITER(S): 9 INJECTION INTRAMUSCULAR; INTRAVENOUS; SUBCUTANEOUS at 17:20

## 2020-11-10 NOTE — ED ADULT NURSE NOTE - OBJECTIVE STATEMENT
Pt presents to ED c/o nonraditing right sided chest pain that began x2 hours ago accompanied with diaphoresis, nausea and tremors. Pt is a daily drinker, last drink was 0400. Pt denies any SOB, no episodes of emesis, no diarrhea, no fever/chills. Denies any drug use.

## 2020-11-10 NOTE — ED PROVIDER NOTE - CLINICAL SUMMARY MEDICAL DECISION MAKING FREE TEXT BOX
Will do workup for AMS/chest pain. Patient on arrival in [facility]. No tremor. No active sign of alcohol withdrawal. Patient is somnolent, which is consistent with recent intake of K2. Will monitor.

## 2020-11-10 NOTE — ED PROVIDER NOTE - DIAGNOSTIC INTERPRETATION
Interpreted by ED physician Charity   Chest x-ray, 1 view  Lungs clear, heart shadow normal, bony structures normal, no free air under diaphragm, no PTX

## 2020-11-10 NOTE — ED ADULT NURSE NOTE - NSHOSCREENINGQ1_ED_ALL_ED
Please abstract the following data from this visit with this patient into the appropriate field in Epic:    Tests that can be patient reported without a hard copy:    Pap smear done on this date: 10/13/17 (approximately), by this group: [employer], results were negative. Due in October 2020.    Note to [employer]: found via Care Everywhere.    
[id number]
No

## 2020-11-10 NOTE — ED ADULT NURSE REASSESSMENT NOTE - NS ED NURSE REASSESS COMMENT FT1
Pt just returned from CT scan, states he is comfortable at this time. Pt placed on back on continuous cardiac monitoring, HR 87 and SpO2 98% on RA. Radiology results pending, will continue to monitor. Side rails up, safety is maintained.

## 2020-11-10 NOTE — ED PROVIDER NOTE - PATIENT PORTAL LINK FT
You can access the FollowMyHealth Patient Portal offered by [facility] by registering at the following website: [email or web address]. By joining [employer]’s FollowMyHealth portal, you will also be able to view your health information using other applications (apps) compatible with our system.

## 2020-11-10 NOTE — ED PROVIDER NOTE - PROGRESS NOTE DETAILS
slight trop elevation, EKG non ischemic, pt is well appearing and has no complains at this time, will get repeat trop, also added CTA since pt has some hx of "distant PE" according to some prior notes.

## 2020-11-10 NOTE — ED PROVIDER NOTE - NSFOLLOWUPINSTRUCTIONS_ED_ALL_ED_FT
Chest Pain    WHAT YOU NEED TO KNOW:    Chest pain can be caused by a range of conditions, from not serious to life-threatening. Chest pain can be a symptom of a digestive problem, such as acid reflux or a stomach ulcer. An anxiety attack or a strong emotion, such as anger, can also cause chest pain. Infection, inflammation, or a fracture in the bones or cartilage in your chest can cause pain or discomfort. Sometimes chest pain or pressure is caused by poor blood flow to your heart (angina). Chest pain may also be caused by life-threatening conditions such as a heart attack or blood clot in your lungs.     DISCHARGE INSTRUCTIONS:    Call 911 if:   •You have any of the following signs of a heart attack: ?Squeezing, pressure, or pain in your chest      ?You may also have any of the following: ?Discomfort or pain in your back, neck, jaw, stomach, or arm      ?Shortness of breath      ?Nausea or vomiting      ?Lightheadedness or a sudden cold sweat            Return to the emergency department if:   •You have chest discomfort that gets worse, even with medicine.      •You cough or vomit blood.       •Your bowel movements are black or bloody.       •You cannot stop vomiting, or it hurts to swallow.       Contact your healthcare provider if:   •You have questions or concerns about your condition or care.          Medicines:   •Medicines may be given to treat the cause of your chest pain. Examples include pain medicine, anxiety medicine, or medicines to increase blood flow to your heart.       •Do not take certain medicines without asking your healthcare provider first. These include NSAIDs, herbal or vitamin supplements, or hormones (estrogen or progestin).       •Take your medicine as directed. Contact your healthcare provider if you think your medicine is not helping or if you have side effects. Tell him or her if you are allergic to any medicine. Keep a list of the medicines, vitamins, and herbs you take. Include the amounts, and when and why you take them. Bring the list or the pill bottles to follow-up visits. Carry your medicine list with you in case of an emergency.      Follow up with your healthcare provider within 72 hours, or as directed: You may need to return for more tests to find the cause of your chest pain. You may be referred to a specialist, such as a cardiologist or gastroenterologist. Write down your questions so you remember to ask them during your visits.     Healthy living tips: The following are general healthy guidelines. If your chest pain is caused by a heart problem, your healthcare provider will give you specific guidelines to follow.  •Do not smoke. Nicotine and other chemicals in cigarettes and cigars can cause lung and heart damage. Ask your healthcare provider for information if you currently smoke and need help to quit. E-cigarettes or smokeless tobacco still contain nicotine. Talk to your healthcare provider before you use these products.       •Eat a variety of healthy, low-fat, low-salt foods. Healthy foods include fruits, vegetables, whole-grain breads, low-fat dairy products, beans, lean meats, and fish. Ask for more information about a heart healthy diet.      •Drink plenty of water every day. Your body is made of mostly water. Water helps your body to control your temperature and blood pressure. Ask your healthcare provider how much water you should drink every day.      •Ask about activity. Your healthcare provider will tell you which activities to limit or avoid. Ask when you can drive, return to work, and have sex. Ask about the best exercise plan for you.      •Maintain a healthy weight. Ask your healthcare provider how much you should weigh. Ask him or her to help you create a weight loss plan if you are overweight.       •Get the flu and pneumonia vaccines. All adults should get the influenza (flu) vaccine. Get it every year as soon as it becomes available. The pneumococcal vaccine is given to adults aged 65 years or older. The vaccine is given every 5 years to prevent pneumococcal disease, such as pneumonia.      If you have a stent:   •Carry your stent card with you at all times.       •Let all healthcare providers know that you have a stent.

## 2020-11-10 NOTE — ED PROVIDER NOTE - OBJECTIVE STATEMENT
61 y/o male with PMHx of HTN, HLD, alcohol abuse, COPD, pancreatitis, Crohn's Disease, diverticulosis, and GOUT. Complete history obtained through prior chart review as Patient only stated HTN in PMHx. Also with history of CVA with no known residual weakness. Patient comes in after being home with friends drinking some alcohol and smoking K2. His friend was concerned that he was "wobbly" and earlier today, Patient was c/o chest discomfort on the right side of his chest that is worse with movement. Denies SOB, fever, or chills. As per Patient, last alcohol drink was last night. As per triage note, mentioned concern for withdrawal symptoms, but during initial evaluation, no signs of acute withdrawal symptoms.

## 2020-11-10 NOTE — ED PROVIDER NOTE - CARE PROVIDER_API CALL
Charles Alvarado  CARDIOVASCULAR DISEASE  [address], [address]rd [place] [address]  Phone: [phone]  Fax: [phone]  Follow Up Time:

## 2020-11-14 DIAGNOSIS — Z88.5 ALLERGY STATUS TO NARCOTIC AGENT: ICD-10-CM

## 2020-11-14 DIAGNOSIS — Z79.51 LONG TERM (CURRENT) USE OF INHALED STEROIDS: ICD-10-CM

## 2020-11-14 DIAGNOSIS — R07.89 OTHER CHEST PAIN: ICD-10-CM

## 2020-11-14 DIAGNOSIS — I10 ESSENTIAL (PRIMARY) HYPERTENSION: ICD-10-CM

## 2020-11-14 DIAGNOSIS — F14.10 COCAINE ABUSE, UNCOMPLICATED: ICD-10-CM

## 2020-11-14 DIAGNOSIS — Z79.899 OTHER LONG TERM (CURRENT) DRUG THERAPY: ICD-10-CM

## 2020-11-14 DIAGNOSIS — J44.9 CHRONIC OBSTRUCTIVE PULMONARY DISEASE, UNSPECIFIED: ICD-10-CM

## 2021-01-08 NOTE — ED PROVIDER NOTE - OBJECTIVE STATEMENT
Pt  Was  informed 56 yo male pmhx asthma//tobacco abuse to ED c/o 1 day hx of np cough, wheezing. Pt states he drank etoh last night and was smoking " a lot" when developed above. Denies f/c/n/v/hemoptysis. No international travel within past 30 days.

## 2021-07-07 ENCOUNTER — EMERGENCY (EMERGENCY)
Facility: HOSPITAL | Age: 61
LOS: 1 days | Discharge: HOME | End: 2021-07-07
Attending: EMERGENCY MEDICINE | Admitting: EMERGENCY MEDICINE
Payer: MEDICAID

## 2021-07-07 VITALS
DIASTOLIC BLOOD PRESSURE: 78 MMHG | SYSTOLIC BLOOD PRESSURE: 117 MMHG | HEART RATE: 88 BPM | RESPIRATION RATE: 18 BRPM | TEMPERATURE: 98 F | OXYGEN SATURATION: 94 %

## 2021-07-07 VITALS — HEIGHT: 68 IN

## 2021-07-07 DIAGNOSIS — I63.50 CEREBRAL INFARCTION DUE TO UNSPECIFIED OCCLUSION OR STENOSIS OF UNSPECIFIED CEREBRAL ARTERY: ICD-10-CM

## 2021-07-07 DIAGNOSIS — F19.20 OTHER PSYCHOACTIVE SUBSTANCE DEPENDENCE, UNCOMPLICATED: ICD-10-CM

## 2021-07-07 DIAGNOSIS — R41.82 ALTERED MENTAL STATUS, UNSPECIFIED: ICD-10-CM

## 2021-07-07 DIAGNOSIS — N40.1 BENIGN PROSTATIC HYPERPLASIA WITH LOWER URINARY TRACT SYMPTOMS: ICD-10-CM

## 2021-07-07 DIAGNOSIS — Z87.19 PERSONAL HISTORY OF OTHER DISEASES OF THE DIGESTIVE SYSTEM: ICD-10-CM

## 2021-07-07 DIAGNOSIS — F10.229 ALCOHOL DEPENDENCE WITH INTOXICATION, UNSPECIFIED: ICD-10-CM

## 2021-07-07 DIAGNOSIS — I10 ESSENTIAL (PRIMARY) HYPERTENSION: ICD-10-CM

## 2021-07-07 DIAGNOSIS — F19.229 OTHER PSYCHOACTIVE SUBSTANCE DEPENDENCE WITH INTOXICATION, UNSPECIFIED: ICD-10-CM

## 2021-07-07 DIAGNOSIS — J44.9 CHRONIC OBSTRUCTIVE PULMONARY DISEASE, UNSPECIFIED: ICD-10-CM

## 2021-07-07 DIAGNOSIS — E78.5 HYPERLIPIDEMIA, UNSPECIFIED: ICD-10-CM

## 2021-07-07 DIAGNOSIS — Z86.711 PERSONAL HISTORY OF PULMONARY EMBOLISM: ICD-10-CM
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
## 2021-07-07 DIAGNOSIS — Z88.5 ALLERGY STATUS TO NARCOTIC AGENT: ICD-10-CM

## 2021-07-07 LAB
ALBUMIN SERPL ELPH-MCNC: 3.6 G/DL — SIGNIFICANT CHANGE UP (ref 3.4–5)
ALP SERPL-CCNC: 72 U/L — SIGNIFICANT CHANGE UP (ref 40–120)
ALT FLD-CCNC: 41 U/L — SIGNIFICANT CHANGE UP (ref 12–42)
ANION GAP SERPL CALC-SCNC: 18 MMOL/L — HIGH (ref 9–16)
AST SERPL-CCNC: 36 U/L — SIGNIFICANT CHANGE UP (ref 15–37)
BASOPHILS # BLD AUTO: 0.04 K/UL — SIGNIFICANT CHANGE UP (ref 0–0.2)
BASOPHILS NFR BLD AUTO: 0.5 % — SIGNIFICANT CHANGE UP (ref 0–2)
BILIRUB SERPL-MCNC: 0.7 MG/DL — SIGNIFICANT CHANGE UP (ref 0.2–1.2)
BUN SERPL-MCNC: 38 MG/DL — HIGH (ref 7–23)
CALCIUM SERPL-MCNC: 9.2 MG/DL — SIGNIFICANT CHANGE UP (ref 8.5–10.5)
CHLORIDE SERPL-SCNC: 108 MMOL/L — SIGNIFICANT CHANGE UP (ref 96–108)
CO2 SERPL-SCNC: 18 MMOL/L — LOW (ref 22–31)
CREAT SERPL-MCNC: 1.61 MG/DL — HIGH (ref 0.5–1.3)
EOSINOPHIL # BLD AUTO: 0.09 K/UL — SIGNIFICANT CHANGE UP (ref 0–0.5)
EOSINOPHIL NFR BLD AUTO: 1.2 % — SIGNIFICANT CHANGE UP (ref 0–6)
GLUCOSE SERPL-MCNC: 94 MG/DL — SIGNIFICANT CHANGE UP (ref 70–99)
HCT VFR BLD CALC: 41.3 % — SIGNIFICANT CHANGE UP (ref 39–50)
HGB BLD-MCNC: 13.9 G/DL — SIGNIFICANT CHANGE UP (ref 13–17)
IMM GRANULOCYTES NFR BLD AUTO: 0.5 % — SIGNIFICANT CHANGE UP (ref 0–1.5)
LG PLATELETS BLD QL AUTO: SIGNIFICANT CHANGE UP
LYMPHOCYTES # BLD AUTO: 0.8 K/UL — LOW (ref 1–3.3)
LYMPHOCYTES # BLD AUTO: 10.7 % — LOW (ref 13–44)
MACROCYTES BLD QL: SIGNIFICANT CHANGE UP
MANUAL SMEAR VERIFICATION: SIGNIFICANT CHANGE UP
MCHC RBC-ENTMCNC: 33.7 GM/DL — SIGNIFICANT CHANGE UP (ref 32–36)
MCHC RBC-ENTMCNC: 35.9 PG — HIGH (ref 27–34)
MCV RBC AUTO: 106.7 FL — HIGH (ref 80–100)
MONOCYTES # BLD AUTO: 0.84 K/UL — SIGNIFICANT CHANGE UP (ref 0–0.9)
MONOCYTES NFR BLD AUTO: 11.2 % — SIGNIFICANT CHANGE UP (ref 2–14)
NEUTROPHILS # BLD AUTO: 5.69 K/UL — SIGNIFICANT CHANGE UP (ref 1.8–7.4)
NEUTROPHILS NFR BLD AUTO: 75.9 % — SIGNIFICANT CHANGE UP (ref 43–77)
NRBC # BLD: 0 /100 WBCS — SIGNIFICANT CHANGE UP (ref 0–0)
PLAT MORPH BLD: ABNORMAL
PLATELET # BLD AUTO: 211 K/UL — SIGNIFICANT CHANGE UP (ref 150–400)
PLATELET CLUMP BLD QL SMEAR: SLIGHT
POTASSIUM SERPL-MCNC: 3.8 MMOL/L — SIGNIFICANT CHANGE UP (ref 3.5–5.3)
POTASSIUM SERPL-SCNC: 3.8 MMOL/L — SIGNIFICANT CHANGE UP (ref 3.5–5.3)
PROT SERPL-MCNC: 8.1 G/DL — SIGNIFICANT CHANGE UP (ref 6.4–8.2)
RBC # BLD: 3.87 M/UL — LOW (ref 4.2–5.8)
RBC # FLD: 11.6 % — SIGNIFICANT CHANGE UP (ref 10.3–14.5)
RBC BLD AUTO: ABNORMAL
SODIUM SERPL-SCNC: 144 MMOL/L — SIGNIFICANT CHANGE UP (ref 132–145)
WBC # BLD: 7.5 K/UL — SIGNIFICANT CHANGE UP (ref 3.8–10.5)
WBC # FLD AUTO: 7.5 K/UL — SIGNIFICANT CHANGE UP (ref 3.8–10.5)

## 2021-07-07 PROCEDURE — 99284 EMERGENCY DEPT VISIT MOD MDM: CPT

## 2021-07-07 PROCEDURE — 70450 CT HEAD/BRAIN W/O DYE: CPT | Mod: 26

## 2021-07-07 RX ORDER — SODIUM CHLORIDE 9 MG/ML
1000 INJECTION INTRAMUSCULAR; INTRAVENOUS; SUBCUTANEOUS ONCE
Refills: 0 | Status: COMPLETED | OUTPATIENT
Start: 2021-07-07 | End: 2021-07-07

## 2021-07-07 RX ADMIN — SODIUM CHLORIDE 1000 MILLILITER(S): 9 INJECTION INTRAMUSCULAR; INTRAVENOUS; SUBCUTANEOUS at 16:20

## 2021-07-07 NOTE — ED PROVIDER NOTE - CLINICAL SUMMARY MEDICAL DECISION MAKING FREE TEXT BOX
60 y/o male with significant dependence on drug and alcohol presents with "seizure" symptoms prior to arrival. No history of seizure or evidence of trauma. Patient has no complaints. Given drug/alcohol dependence, and seizure complaint, out of abumeance of precaution will perform head CT and basic labs. If normal, we will wait until patient is clinically sober and discharge.

## 2021-07-07 NOTE — ED ADULT NURSE NOTE - CHIEF COMPLAINT QUOTE
pt. picked up from the [facility] where he was visiting a friend. While there he admits to drinking alcohol and smoking crack cocaine. EMS was called when friends states he had "seizure like" activity. No visible injuries noted.

## 2021-07-07 NOTE — ED PROVIDER NOTE - PATIENT PORTAL LINK FT
You can access the FollowMyHealth Patient Portal offered by [facility] by registering at the following website: [email or web address]. By joining [employer]’s FollowMyHealth portal, you will also be able to view your health information using other applications (apps) compatible with our system.

## 2021-07-07 NOTE — ED PROVIDER NOTE - PROGRESS NOTE DETAILS
Patient now awake, alert and oriented. Well appearing. Clinically sober. ambulating with ease and without assistance. Labs and CT negative. D/C.

## 2021-07-07 NOTE — ED ADULT NURSE NOTE - OBJECTIVE STATEMENT
Pt SRI from [facility] c/o AMS 2dnary to use of crack cocaine and ETOH.  Pt denies fall or injury.  Pt placed in full view of nursing station, bed alarm in place.  Will continue to reassess and reevaluate.

## 2021-07-07 NOTE — ED PROVIDER NOTE - NSFOLLOWUPINSTRUCTIONS_ED_ALL_ED_FT
POLYSUBSTANCE ABUSE - AfterCare(R) Instructions(ER/ED)           Polysubstance Abuse    WHAT YOU NEED TO KNOW:    Polysubstance abuse is the abuse of 2 or more drugs that cause impairment or distress. Examples include alcohol, nicotine, marijuana, cocaine, heroin, methamphetamine, hallucinogens such as mushrooms, or inhalants such as paint thinner. Prescribed medicines, such as opioids for pain or benzodiazepines for anxiety, are also commonly abused.    DISCHARGE INSTRUCTIONS:    Call your local emergency number (911 in the [place]) if:   •You feel you might harm yourself or others.          Return to the emergency department if:   •You have a seizure.      •You have chest pain and your heart is beating faster than usual.      •You have new shortness of breath.      •You are dizzy and lightheaded.      Call your doctor or therapist if:   •You are using drugs and think you are pregnant.      •You have withdrawal symptoms and want to start using drugs again.      •You have questions or concerns about your condition or care.      Risks of polysubstance abuse:   •Drug dependence is when you continue to use drugs, even when you know the risks. Polysubstance abuse can damage your heart, brain, lungs, liver, and gastrointestinal tract. You continue even when it causes problems with work, school, or relationships. You may have difficulty finding or keeping a job because of your drug dependence.      •Drug tolerance is when you need to use more drugs, or use them more often, to get the effects you want. You may not be able to stop using the drugs. When you try to stop, you may have withdrawal symptoms and strong cravings for the drugs.      •Drug overdose can occur when you take more drugs than your body can handle. This may be a small amount or a large amount. You can lose consciousness or have a seizure or stroke. Your heart can stop beating, or you can stop breathing. You may die from a drug overdose.      Medicines:   •Withdrawal medicines may be given according to the types of drugs you are abusing. Withdrawal from drugs can cause serious, life-threatening side effects. Certain medicines can help decrease your withdrawal symptoms and your desire for the drug. Ask for more information about the withdrawal medicines you may need.      •Mood stabilizers may be given to help prevent or treat depression or anxiety caused by drug abuse and withdrawal.      •Take your medicine as directed. Contact your healthcare provider if you think your medicine is not helping or if you have side effects. Tell him or her if you are allergic to any medicine. Keep a list of the medicines, vitamins, and herbs you take. Include the amounts, and when and why you take them. Bring the list or the pill bottles to follow-up visits. Carry your medicine list with you in case of an emergency.      Follow up with your doctor or therapist as directed: You may be referred to a specialist to treat health conditions caused by your drug use. This includes mental health, heart, or lung specialists. Write down your questions so you remember to ask them during your visits.    Therapy: You may need therapy and support to stop using drugs:   •Cognitive and behavioral therapy helps you change your thinking and behavior. It can help you develop plans to avoid the situations that make you want to use drugs. It also helps you cope with the feelings of wanting to use drugs. You may have individual or group therapy.      •Contingency management helps you set drug-free goals with a therapist. You will decide ways to celebrate your success when you reach a goal.      •Family therapy and support groups allow you and your family members to talk to and be encouraged by other people affected by drug abuse. You and your family members may attend together or separately. Ask your healthcare provider for information about programs in your area.      How polysubstance abuse affects unborn or  babies:   •If you are pregnant or get pregnant while using drugs, you may have a miscarriage or give birth early. Your baby may be born addicted to the drugs.      •Do not breastfeed your baby if you use drugs. Drugs pass from your bloodstream into your breast milk and affect your baby's health. Talk with your healthcare provider if you are using drugs and breastfeeding.      For support and more information:   •Alcoholics Anonymous  Web Address: http://www.aa.org      •Substance Abuse and Mental Health Services Administration  [address]  [place] [address]  Web Address: http://www.samhsa.gov           © Copyright [employer]            back to top                          © Copyright [employer]

## 2021-07-07 NOTE — ED ADULT TRIAGE NOTE - CHIEF COMPLAINT QUOTE
pt. picked up from the [facility] where he was visiting a friend. While there he admits to drinking alcohol and smoking crack cocaine. EMS was called when friends states he had "seizure like" activity. No visible injuries noted.

## 2021-08-12 NOTE — HP
[facility] MD Rehab Assess/Revision





- Admission History


Admitted to Rehab from: [facility]


Date of Admission to Rehab: 01/13/17





- Vital signs


Vital Signs: 


 Vital Signs











 Period  Temp  Pulse  Resp  BP Sys/Pickering  Pulse Ox


 


 Last 24 Hr  98 F  110  18  147/83  














- Findings


Detox History & Physical reviewed: Yes


Concur with findings: Yes


Comments/Additional Findings: TRASNFERRED FROM DETOX TO REHAB ADMISSION AS PER 

PROTOCOL
Psychiatrist Admission





- Data


Date of interview: 01/13/17


Admission source: [facility]


Identifying data: This is the first [facility] inpatient rehabilittion admission for 

thsi 56 year old afther of 3, who is  and unemployed on SSD, residing 

alone in [facility].


Medical History: HTN, CVA, h/o PE, hypercholesterolemia. Smokes 3-5 cigarettes 

a day.


Psychiatric History: Patient reports treated for depression a few years ago 

with Seroquel, saw the psychiatrist in [place],reports no history of psychiatric 

hospitalizations,not on any medications now, does not feel he needs it.


Physical/Sexual Abuse/Trauma History: Denies history of sexual, physical and 

verbal abuse.


Allergies/Adverse Reactions: 


 Allergies











Allergy/AdvReac Type Severity Reaction Status Date / Time


 


No Known Drug Allergies Allergy   Verified 01/13/17 14:56


 


tyleno with codeine Allergy Intermediate Swelling Uncoded 01/13/17 14:56











Date of last physical exam: 01/10/17


Concur with the findings of this exam: Yes





- Substance Abuse/Tx History


Hx Alcohol Use: Yes (2-3 pints of vodka)


Hx Substance Use: Yes


Substance Use Type: Cocaine (2-3 times a month)


Hx Substance Use Treatment: Yes





- Admission Criteria


Previous failed treatment: Yes


Poor recovery environment: Yes


Comorbidities: No


Lacks judgement: Yes





Mental Status Exam





- Mental Status Exam


Alert and Oriented to: Time, Place


Cognitive Function: Good


Patient Appearance: Well Groomed


Mood: Sad


Affect: Appropriate, Mood Congruent


Patient Behavior: Appropriate, Cooperative


Speech Pattern: Clear, Appropriate


Voice Loudness: Normal


Thought Process: Goal Oriented


Thought Disorder: Not Present


Hallucinations: Denies


Suicidal Ideation: Denies


Homicidal Ideation: Denies


Insight/Judgement: Fair


Sleep: Fair


Appetite: Fair


Muscle strength/Tone: Normal


Gait/Station: Normal





Psychiatric Findings





- Problem List (Axis 1, 2,3)


(1) Crack cocaine use


Current Visit: No   Status: Chronic





(2) H/O: CVA (cerebrovascular accident)


Current Visit: No   Status: Chronic





(3) HTN (hypertension)


Current Visit: No   Status: Chronic   Qualifiers: 


     Hypertension type: essential hypertension        Qualified Code(s): I10 - 

Essential (primary) hypertension  





(4) History of pulmonary embolism


Current Visit: No   Status: Chronic





(5) Hyperlipidemia


Current Visit: No   Status: Chronic   Qualifiers: 


     Hyperlipidemia type: unspecified        Qualified Code(s): E78.5 - 

Hyperlipidemia, unspecified  





(6) Alcohol dependence


Current Visit: Yes   Status: Acute   








- Initial Treatment Plan


Initial Treatment Plan: will monitor progress as needed.
[Symptom and Test Evaluation] : symptom and test evaluation

## 2021-08-25 NOTE — ED CDU PROVIDER NOTE - PROGRESS NOTE DETAILS
If you receive a survey via e-mail or mail, please take the time to complete and return as your feedback is very helpful to our practice.   In order to make sure we are meeting our patients' needs, we require a 36 hour notice from you prior to picking up your medications. Attached is a table that will help you determine when your prescriptions will be ready for pick-up.                 If the refill is requested between when the clinic opens and 12 pm on  You can  your prescription at the pharmacy after 6 PM on   Monday Tuesday Tuesday Wednesday Wednesday Thursday Thursday Friday Friday Monday     If the refill is requested between 12 pm and after the clinic closes on You can  your prescription at the pharmacy after 12 PM on   Monday Wednesday Tuesday Thursday Wednesday Friday Thursday Monday Friday Tuesday     If your neurological testing is not going to be scheduled today our Pre-Service Department will contact you to schedule within one to two days. If you would like to call and schedule when it is convenient for you or if you need to reschedule your appointment please call the Pre-Service Department at [phone].    Your tests will be reviewed by the Benefits Department and if there are any concerns regarding prior authorization or financial obligation they will contact you. We recommend you still contact your insurance company to see if the test, provider, and location of service are covered, and if so, will there be any out of pocket expenses.     If you should have any concerns regarding the financial obligation of the tests please contact our Financial Advocates at [phone] and they will be happy to assist you.                 Thank you for letting us care for you today.     Your doctor works with Nurse Practitioners.   Our Neurology providers work closely together to ensure timly access to high-quality care. As a result, your appointment might  to be transitioned to  the nurse practitoner working directly with your physician to avoid delaying your treatment.     Nurse Practitioner provide a broad range of health services and are an importatnt part of your multidisciplinary care team.     They can:    -Obtain medical histories and perform physical exams   -Diagnose, treat and monitor illnesses and injuries   -Order and interpret tests, such as la work and imaging studies   -Prescribe medications  - and educate patients on preventive health and lifestyle practices, self-care skills and treatment options      Nurse Practitioner (NP)   ~Specialized medical training program   ~Clinical training and experience   ~Licensed by the state   ~Supervised by the Physician   When scheduling your 12 week botox appointment, and/or rescheduling, please contact our staff to do so at [phone]    Please don't do it online, this is a procedure that needs to be scheduled in person or on the phone.        pt states improvement after treatment.  seen ambulatory in ED w/o dyspnea. INR 1.6.  pt states he usually takes lovenox when INR is low.  lovenox weight based 1mg/kg given in ED 100mg SQ, followed by 5mg of coumadin (pt states he did not take his home dose tonight).  will continue to trend cardiac panel and AM cardiology evaluation. received pt in s/o from Dr. Melgar. No complaints, pain free. await 12 mn troponin/repeat ekg. cardiology to see in am. vss, sleeping, no complaints, await cardiology. Trops neg x3, redosed with Lovenox as INR is low. Cleared for d/c by cards and will fu as outpatient.

## 2021-08-29 NOTE — ED ADULT NURSE NOTE - NS PRO PASSIVE SMOKE EXP
Pt is in distress and crying  Pt is saying "I just want to be dead"  Will continue to try to deescalate and monitor pt        Audrey Poole  08/29/21 4413 Yes...

## 2021-09-10 NOTE — ED ADULT NURSE NOTE - CADM POA URETHRAL CATHETER
SWEETIE Hernandez  P Gastro Ma/Lpn Message Pool [facility] [place]  Please let patient know that his lab work was negative for celiac disease      No

## 2021-10-13 NOTE — ED PROVIDER NOTE - GASTROINTESTINAL, MLM
Detail Level: Zone
Moderate LLQ tenderness to palpation. No rebound. No guarding. Very mild epigastric tenderness to palpation.
Quality 226: Preventive Care And Screening: Tobacco Use: Screening And Cessation Intervention: Patient screened for tobacco use, is a smoker AND did not received Cessation Counseling for Unknown Reasons

## 2021-10-30 NOTE — ED ADULT NURSE NOTE - PAIN RATING/NUMBER SCALE (0-10): ACTIVITY
Potassium   Date Value Ref Range Status   10/29/2021 4.8 3.4 - 5.3 mmol/L Final   07/11/2021 4.7 3.4 - 5.3 mmol/L Final     Hemoglobin   Date Value Ref Range Status   10/29/2021 10.8 (L) 13.3 - 17.7 g/dL Final   07/11/2021 8.6 (L) 13.3 - 17.7 g/dL Final     Creatinine   Date Value Ref Range Status   10/29/2021 4.38 (H) 0.66 - 1.25 mg/dL Final   07/11/2021 1.82 (H) 0.66 - 1.25 mg/dL Final     Urea Nitrogen   Date Value Ref Range Status   10/29/2021 52 (H) 7 - 30 mg/dL Final   07/11/2021 45 (H) 7 - 30 mg/dL Final     Sodium   Date Value Ref Range Status   10/29/2021 136 133 - 144 mmol/L Final   07/11/2021 138 133 - 144 mmol/L Final     INR   Date Value Ref Range Status   10/21/2021 1.26 (H) 0.85 - 1.15 Final   07/06/2021 1.32 (H) 0.86 - 1.14 Final       DIALYSIS PROCEDURE NOTE  Hepatitis status of previous patient on machine log was checked and verified ok to use with this patients hepatitis status.  Patient dialyzed for 3 hrs. on a K2 bath with a net fluid removal of  2.5L.  A BFR of 300 ml/min was obtained via a R CVC       The treatment plan was discussed with Dr. Bradley during the treatment.    Total heparin received during the treatment: 0 units.   Line flushed, clamped and capped with heparin 1:1000 2 mL (2000 units) per lumen    Meds  given: none   Complications: none      Person educated: pt. Knowledge base minimal. Barriers to learning: sedated. Educated on procedure via verbal mode.   ICEBOAT? Timeout performed pre-treatment  I: Patient was identified using 2 identifiers  C:  Consent Signed Yes  E: Equipment preventative maintenance is current and dialysis delivery system OK to use  B: Hepatitis B Surface Antigen: unk; Draw Date: 10/29/21      Hepatitis B Surface Antibody: unk; Draw Date: 10/29/21  O: Dialysis orders present and complete prior to treatment  A: Vascular access verified and assessed prior to treatment  T: Treatment was performed at a clinically appropriate time  ?: Patient was allowed to ask  questions and address concerns prior to treatment  See flowsheet in EPIC for further details and post assessment.  Machine water alarm in place and functioning. Transducer pods intact and checked every 15min.   Pt returned via bed.  Chlorine/Chloramine water system checked every 4 hours.    Greyson Hylton RN   7

## 2021-11-22 NOTE — ED ADULT TRIAGE NOTE - CADM TRG TX PRIOR TO ARRIVAL
"    11/22/2021         RE: Zack Demarco  [address] [facility] [address]        Dear Colleague,    Thank you for referring your patient, Zack Demarco, to the [facility]. Please see a copy of my visit note below.     Current Eye Medications:  AREDS2 twice a day, Timolol both eyes each morning, Latanoprost both eyes every evening.  Last drops:  6:30am     Subjective:  Comprehensive Eye Exam.  [facility] is requesting a summary of today's visit faxed to:  Chelita Hansenkaybrittani,OD@ [phone].  [facility]  [facility].  Present glasses are one month old, so he is declining an additional refraction. Patient finds he more dependent on his glasses for distance vision.  Vision with correction is good in each eye, distance and near.       Objective:  See Ophthalmology Exam.       Assessment:  Stable intraocular pressure and discs both eyes in patient who is a treated glaucoma suspect.  Otherwise stable eye exam.      ICD-10-CM    1. Pseudophakia, ou; Yag Caps, os  Z96.1    2. Borderline glaucoma with ocular hypertension, bilateral - treated  H40.053 EYE EXAM (SIMPLE-NONBILLABLE)   3. Macular drusen, bilateral  H35.363    4. Posterior vitreous detachment of both eyes  H43.813    5. Encounter for examination of eyes and vision with abnormal findings  Z01.01    6. Presbyopia  H52.4         Plan:  Continue same medication.  Take a multiple vitamin or \"eye vitamin\" daily (AREDS2).  Protect your eyes outdoors from ultraviolet rays with sunglasses and/or brimmed hat.  Have spinach (cooked or raw), colorful fruits, walnuts, hazelnuts, almonds in your diet.  Monitor the vision in each eye weekly - call if any sudden persistent changes.  Possible clouding of posterior capsule right eye discussed.   Possible posterior vitreous detachment (sudden onset large floater and/or flashing lights) right eye discussed.  Return visit 6 months for intraocular pressure check, glaucoma OCT, retinal OCT " and Galvan Visual Field.    Avtar Mccullough M.D.  [phone]           Again, thank you for allowing me to participate in the care of your patient.        Sincerely,        Avtar Mccullough MD     none

## 2021-12-20 NOTE — ED PROVIDER NOTE - ENMT, MLM
External Port Reading/External FHR Tonsils mildly erythematous bilaterally. Exudates on R tonsil. No signs of PTA. Uvula midline, not swollen. No trismus. No drooling. No muffled voice.

## 2022-01-13 NOTE — ED ADULT TRIAGE NOTE - SPO2 (%)
Patient ID: Brianna is a 24 year old female.    Chief Complaint   Patient presents with   • Office Visit   • Other     Hyperprolactinemia       In brief known Hyperprolactinemia/ microprolactinoma on MRi from 2016 by Jarrell Seay but and subsequent MRI showing  asymmetric pit w ? microadenoma on and off       Interval hx:   currently on:  - Cabergoline 0.25 mg twice a week   on cabergoline since Oct 2016  Also on OCP     No side effects     Working at [employer], works nightshift     - on OCP since fall 2017 but her menses were regular even before OCP    Headaches: no  Diplopia: no    Feels well         Data:   -U/S thyroid Dec 2021:  Stable 0.8 cm cystic right lobe nodule is benign, corresponding with  TR1.  No FNA or imaging follow-up required  -MRI Pituitary 7/ 2021:  The right aspect of the posterior pituitary gland is relatively hypoenhancing which may be due to congenitally asymmetric location of the neurohypophysis to the left versus underlying pituitary microadenoma. The  pituitary stalk is midline. These findings are unchanged since 10/17/2016.  -U/S thyroid March 2018:  Mildly heterogeneous thyroid, can be seen with thyroiditis. No significant enlargement.0.8 cm partially solid and cystic right thyroid nodule without suspicious features    Lab Results   Component Value Date    CREATININE 0.88 11/30/2021     Lab Results   Component Value Date    AST 30 11/30/2021     Lab Results   Component Value Date    GPT 36 11/30/2021     Lab Results   Component Value Date    ALKPT 60 11/30/2021     Lab Results   Component Value Date    BILIRUBIN 0.7 11/30/2021     Lab Results   Component Value Date    TSH 1.994 11/30/2021    TSH 2.130 04/02/2021    TSH 1.072 12/07/2020     Lab Results   Component Value Date    PROLACTIN 22.8 11/30/2021    PROLACTIN 35.5 (H) 06/30/2021    PROLACTIN 10.9 04/02/2021       Lab Results   Component Value Date    AMICR 33 12/07/2020     Lab Results   Component Value Date     ATHYRTAC <0.9 12/07/2020       Lab Results   Component Value Date    SODIUM 141 11/30/2021    SODIUM 137 12/07/2020    SODIUM 139 06/11/2020     Lab Results   Component Value Date    TSH 1.994 11/30/2021    TSH 2.130 04/02/2021    TSH 1.072 12/07/2020     Lab Results   Component Value Date    T4FREE 0.8 11/30/2021     No results found for: CORAM  No components found for: COR  No results found for: ACTR  Lab Results   Component Value Date    PROLACTIN 22.8 11/30/2021    PROLACTIN 35.5 (H) 06/30/2021    PROLACTIN 10.9 04/02/2021     No results found for: FSH  No results found for: LH  No results found for: TEST  No results found for: ESTD  No results found for: ESTRAD  No results found for: ESTROG  No results found for: ILGFR  No results found for: ASPG  No results found for: CORTDX    ROS  All other 12 ROS reviewed and are negative otherwise except for what is reported in HPI     Review  Past medical history, problem list, family medical history, surgical history and social history reviewed.     No past medical history on file.  No past surgical history on file.  No family history on file.  Social History     Socioeconomic History   • Marital status: Single     Spouse name: Not on file   • Number of children: Not on file   • Years of education: Not on file   • Highest education level: Not on file   Occupational History   • Not on file   Tobacco Use   • Smoking status: Never Smoker   • Smokeless tobacco: Never Used   Vaping Use   • Vaping Use: never used   Substance and Sexual Activity   • Alcohol use: Not on file   • Drug use: Not on file   • Sexual activity: Not on file   Other Topics Concern   • Not on file   Social History Narrative   • Not on file     Social Determinants of Health     Financial Resource Strain:    • Social Determinants: Financial Resource Strain: Not on file   Food Insecurity:    • Social Determinants: Food Insecurity: Not on file   Transportation Needs:    • Lack of Transportation (Medical): Not  on file   • Lack of Transportation (Non-Medical): Not on file   Physical Activity:    • Days of Exercise per Week: Not on file   • Minutes of Exercise per Session: Not on file   Stress:    • Social Determinants: Stress: Not on file   Social Connections:    • Social Determinants: Social Connections: Not on file   Intimate Partner Violence:    • Social Determinants: Intimate Partner Violence Past Fear: Not on file   • Social Determinants: Intimate Partner Violence Current Fear: Not on file     ALLERGIES:  No Known Allergies  Current Outpatient Medications   Medication Sig Dispense Refill   • cabergoline (DOSTINEX) 0.5 MG tablet Take 0.5 tab 2 days a week 24 tablet 1   • norethindrone-ethinyl estradiol-FE (JUNEL FE 1/20) 1-20 MG-MCG per tablet      • citalopram (CELEXA) 20 MG tablet Take 20 mg by mouth daily.        No current facility-administered medications for this visit.         Physical exam:  Visit Vitals  /72 (BP Location: RUE - Right upper extremity)   Pulse 68   Temp 96.6 °F (35.9 °C)   Ht 5' 5\" (1.651 m)   Wt 70.9 kg (156 lb 6.7 oz)   BMI 26.03 kg/m²     GA: NAD   Normocephalic atraumatic, swallows ok, good ROM of neck   Moist mucous membranes  ENT: EOM intact, no scleral icterus, no hoarseness  Thyroid not enlarged  No cervical LN  Heart: normal S1 S2 no murmurs  Lungs: clear, Non labored breathing, No resp distress   Abdomen: soft non tender  Musculoskeletal: normal   Skin: normal texture and turgor  Psych: cooperative, pleasant, appropriate mood and affect      Assessment:    - Hyperprolactinemia/ microprolactinoma  Last MRI was in July 2021  On cabergoline 0.25 mg twice a week, every time I try decreasing the dose the Prolactin level starts increasing  On OCP: which can lead to elevations in PRL level, had d/w pt previously   Noting pt started on OCP after Hyperprolactinemia diagnosis     - Palpable goiter: U/S thyroid showed thyroiditis and a tiny nodule of 0.8 cm in 2018    had d/w pt in the past  that Cabergoline may be stopped w pituitary microadenoma and OCP may be the only treatment necessary if microadenoma or No adenoma. Pt's decision at the time was to keep both and still is.      Chart reviewed along with available documents in EPIC  Labs reviewed, analyzed, interpreted and d/w pt        RTC in 6 months     Vicki Sanchez MD, FACE   of Clinical Medicine  Endocrinology Division, Internal Medicine Department   [facility]     99

## 2022-06-08 NOTE — ED ADULT NURSE NOTE - HOW OFTEN DO YOU HAVE A DRINK CONTAINING ALCOHOL?
[Pneumonia] : pneumonia [Follow-Up] : a follow-up visit [Asthma] : asthma [Shortness of Breath] : shortness of breath [Pulmonary Nodules] : pulmonary nodules [TextBox_44] : Hx thyroid cancer Never

## 2022-07-09 NOTE — ED ADULT NURSE NOTE - PAIN RATING/NUMBER SCALE (0-10): REST
[Never] : never [TextBox_4] : 44 yo female presents for preop pulmonary evaluation prior to bariatric surgery. The patient was seen by me over one year ago for same evaluation but did not have procedure. Presently she is "OK", without SOB or chest pain. She denies sleep related complaints. [TextBox_29] : Denies snoring, daytime somnolence, apneic episodes, AM headaches 7

## 2022-07-27 NOTE — ED PROVIDER NOTE - ST/T WAVE
----- Message from Ritika Macedo sent at 7/27/2022  2:20 PM CDT -----  Type:  Patient Returning Call    Who Called:     Who Left Message for Patient:     Does the patient know what this is regarding?: missed call     Best Call Back Number:  [phone]    Additional Information:       no acute ischemic changes

## 2022-11-09 NOTE — ED ADULT NURSE NOTE - CAS TRG GENERAL NORM CIRC DET
Level of Care: Appropriately managed with future care plans also in place.     Medications: Reviewed with Dr. Rosario.     POA: Met with Jessica on day of clinic to upload to medical record.     Follow-up: RTC in 6 months.   
Strong peripheral pulses/Capillary refill less/equal to 2 seconds

## 2022-11-23 ENCOUNTER — EMERGENCY (EMERGENCY)
Facility: HOSPITAL | Age: 62
LOS: 1 days | Discharge: HOME | End: 2022-11-23
Attending: EMERGENCY MEDICINE | Admitting: EMERGENCY MEDICINE

## 2022-11-23 VITALS
SYSTOLIC BLOOD PRESSURE: 160 MMHG | HEART RATE: 72 BPM | RESPIRATION RATE: 18 BRPM | WEIGHT: 229.94 LBS | OXYGEN SATURATION: 93 % | DIASTOLIC BLOOD PRESSURE: 109 MMHG | TEMPERATURE: 97 F

## 2022-11-23 VITALS
OXYGEN SATURATION: 100 % | SYSTOLIC BLOOD PRESSURE: 121 MMHG | TEMPERATURE: 98 F | RESPIRATION RATE: 16 BRPM | DIASTOLIC BLOOD PRESSURE: 77 MMHG | HEART RATE: 68 BPM

## 2022-11-23 DIAGNOSIS — I25.2 OLD MYOCARDIAL INFARCTION: ICD-10-CM

## 2022-11-23 DIAGNOSIS — I10 ESSENTIAL (PRIMARY) HYPERTENSION: ICD-10-CM

## 2022-11-23 DIAGNOSIS — Z20.822 CONTACT WITH AND (SUSPECTED) EXPOSURE TO COVID-19: ICD-10-CM

## 2022-11-23 DIAGNOSIS — N40.0 BENIGN PROSTATIC HYPERPLASIA WITHOUT LOWER URINARY TRACT SYMPTOMS: ICD-10-CM

## 2022-11-23 DIAGNOSIS — Z86.711 PERSONAL HISTORY OF PULMONARY EMBOLISM: ICD-10-CM

## 2022-11-23 DIAGNOSIS — Z88.5 ALLERGY STATUS TO NARCOTIC AGENT: ICD-10-CM

## 2022-11-23 DIAGNOSIS — R07.89 OTHER CHEST PAIN: ICD-10-CM

## 2022-11-23 DIAGNOSIS — E78.00 PURE HYPERCHOLESTEROLEMIA, UNSPECIFIED: ICD-10-CM

## 2022-11-23 DIAGNOSIS — J44.9 CHRONIC OBSTRUCTIVE PULMONARY DISEASE, UNSPECIFIED: ICD-10-CM

## 2022-11-23 DIAGNOSIS — F10.10 ALCOHOL ABUSE, UNCOMPLICATED: ICD-10-CM

## 2022-11-23 DIAGNOSIS — R07.9 CHEST PAIN, UNSPECIFIED: ICD-10-CM

## 2022-11-23 DIAGNOSIS — Z59.00 HOMELESSNESS UNSPECIFIED: ICD-10-CM

## 2022-11-23 LAB
ALBUMIN SERPL ELPH-MCNC: 3.1 G/DL — LOW (ref 3.4–5)
ALBUMIN SERPL ELPH-MCNC: 3.2 G/DL — LOW (ref 3.4–5)
ALP SERPL-CCNC: 68 U/L — SIGNIFICANT CHANGE UP (ref 40–120)
ALP SERPL-CCNC: 73 U/L — SIGNIFICANT CHANGE UP (ref 40–120)
ALT FLD-CCNC: 19 U/L — SIGNIFICANT CHANGE UP (ref 12–42)
ALT FLD-CCNC: 22 U/L — SIGNIFICANT CHANGE UP (ref 12–42)
ANION GAP SERPL CALC-SCNC: 7 MMOL/L — LOW (ref 9–16)
ANION GAP SERPL CALC-SCNC: 9 MMOL/L — SIGNIFICANT CHANGE UP (ref 9–16)
ANISOCYTOSIS BLD QL: SIGNIFICANT CHANGE UP
AST SERPL-CCNC: 36 U/L — SIGNIFICANT CHANGE UP (ref 15–37)
AST SERPL-CCNC: SIGNIFICANT CHANGE UP U/L (ref 15–37)
BASOPHILS # BLD AUTO: 0.04 K/UL — SIGNIFICANT CHANGE UP (ref 0–0.2)
BASOPHILS NFR BLD AUTO: 0.6 % — SIGNIFICANT CHANGE UP (ref 0–2)
BILIRUB SERPL-MCNC: 0.5 MG/DL — SIGNIFICANT CHANGE UP (ref 0.2–1.2)
BILIRUB SERPL-MCNC: 0.6 MG/DL — SIGNIFICANT CHANGE UP (ref 0.2–1.2)
BUN SERPL-MCNC: 15 MG/DL — SIGNIFICANT CHANGE UP (ref 7–23)
BUN SERPL-MCNC: 15 MG/DL — SIGNIFICANT CHANGE UP (ref 7–23)
CALCIUM SERPL-MCNC: 8.4 MG/DL — LOW (ref 8.5–10.5)
CALCIUM SERPL-MCNC: 8.4 MG/DL — LOW (ref 8.5–10.5)
CHLORIDE SERPL-SCNC: 105 MMOL/L — SIGNIFICANT CHANGE UP (ref 96–108)
CHLORIDE SERPL-SCNC: 108 MMOL/L — SIGNIFICANT CHANGE UP (ref 96–108)
CO2 SERPL-SCNC: 23 MMOL/L — SIGNIFICANT CHANGE UP (ref 22–31)
CO2 SERPL-SCNC: 26 MMOL/L — SIGNIFICANT CHANGE UP (ref 22–31)
CREAT SERPL-MCNC: 0.78 MG/DL — SIGNIFICANT CHANGE UP (ref 0.5–1.3)
CREAT SERPL-MCNC: 0.88 MG/DL — SIGNIFICANT CHANGE UP (ref 0.5–1.3)
DACRYOCYTES BLD QL SMEAR: SIGNIFICANT CHANGE UP
EGFR: 101 ML/MIN/1.73M2 — SIGNIFICANT CHANGE UP
EGFR: 98 ML/MIN/1.73M2 — SIGNIFICANT CHANGE UP
EOSINOPHIL # BLD AUTO: 0.15 K/UL — SIGNIFICANT CHANGE UP (ref 0–0.5)
EOSINOPHIL NFR BLD AUTO: 2.1 % — SIGNIFICANT CHANGE UP (ref 0–6)
GLUCOSE SERPL-MCNC: 92 MG/DL — SIGNIFICANT CHANGE UP (ref 70–99)
GLUCOSE SERPL-MCNC: 94 MG/DL — SIGNIFICANT CHANGE UP (ref 70–99)
HCT VFR BLD CALC: 42.3 % — SIGNIFICANT CHANGE UP (ref 39–50)
HGB BLD-MCNC: 14.4 G/DL — SIGNIFICANT CHANGE UP (ref 13–17)
HIV 1 & 2 AB SERPL IA.RAPID: SIGNIFICANT CHANGE UP
IMM GRANULOCYTES NFR BLD AUTO: 0.6 % — SIGNIFICANT CHANGE UP (ref 0–0.9)
LYMPHOCYTES # BLD AUTO: 0.93 K/UL — LOW (ref 1–3.3)
LYMPHOCYTES # BLD AUTO: 13 % — SIGNIFICANT CHANGE UP (ref 13–44)
MACROCYTES BLD QL: SIGNIFICANT CHANGE UP
MANUAL SMEAR VERIFICATION: SIGNIFICANT CHANGE UP
MCHC RBC-ENTMCNC: 34 GM/DL — SIGNIFICANT CHANGE UP (ref 32–36)
MCHC RBC-ENTMCNC: 36.4 PG — HIGH (ref 27–34)
MCV RBC AUTO: 106.8 FL — HIGH (ref 80–100)
MONOCYTES # BLD AUTO: 0.8 K/UL — SIGNIFICANT CHANGE UP (ref 0–0.9)
MONOCYTES NFR BLD AUTO: 11.2 % — SIGNIFICANT CHANGE UP (ref 2–14)
NEUTROPHILS # BLD AUTO: 5.2 K/UL — SIGNIFICANT CHANGE UP (ref 1.8–7.4)
NEUTROPHILS NFR BLD AUTO: 72.5 % — SIGNIFICANT CHANGE UP (ref 43–77)
NRBC # BLD: 0 /100 WBCS — SIGNIFICANT CHANGE UP (ref 0–0)
NT-PROBNP SERPL-SCNC: 48 PG/ML — SIGNIFICANT CHANGE UP
PLAT MORPH BLD: NORMAL — SIGNIFICANT CHANGE UP
PLATELET # BLD AUTO: 290 K/UL — SIGNIFICANT CHANGE UP (ref 150–400)
POTASSIUM SERPL-MCNC: 5 MMOL/L — SIGNIFICANT CHANGE UP (ref 3.5–5.3)
POTASSIUM SERPL-MCNC: SIGNIFICANT CHANGE UP MMOL/L (ref 3.5–5.3)
POTASSIUM SERPL-SCNC: 5 MMOL/L — SIGNIFICANT CHANGE UP (ref 3.5–5.3)
POTASSIUM SERPL-SCNC: SIGNIFICANT CHANGE UP MMOL/L (ref 3.5–5.3)
PROT SERPL-MCNC: 7.2 G/DL — SIGNIFICANT CHANGE UP (ref 6.4–8.2)
PROT SERPL-MCNC: 7.4 G/DL — SIGNIFICANT CHANGE UP (ref 6.4–8.2)
RBC # BLD: 3.96 M/UL — LOW (ref 4.2–5.8)
RBC # FLD: 12.2 % — SIGNIFICANT CHANGE UP (ref 10.3–14.5)
RBC BLD AUTO: ABNORMAL
SARS-COV-2 RNA SPEC QL NAA+PROBE: SIGNIFICANT CHANGE UP
SODIUM SERPL-SCNC: 137 MMOL/L — SIGNIFICANT CHANGE UP (ref 132–145)
SODIUM SERPL-SCNC: 141 MMOL/L — SIGNIFICANT CHANGE UP (ref 132–145)
SPHEROCYTES BLD QL SMEAR: SIGNIFICANT CHANGE UP
TROPONIN I, HIGH SENSITIVITY RESULT: 10.6 NG/L — SIGNIFICANT CHANGE UP
TROPONIN I, HIGH SENSITIVITY RESULT: 11.7 NG/L — SIGNIFICANT CHANGE UP
WBC # BLD: 7.16 K/UL — SIGNIFICANT CHANGE UP (ref 3.8–10.5)
WBC # FLD AUTO: 7.16 K/UL — SIGNIFICANT CHANGE UP (ref 3.8–10.5)

## 2022-11-23 PROCEDURE — 99218: CPT

## 2022-11-23 PROCEDURE — 93010 ELECTROCARDIOGRAM REPORT: CPT

## 2022-11-23 PROCEDURE — 71045 X-RAY EXAM CHEST 1 VIEW: CPT | Mod: 26

## 2022-11-23 RX ORDER — NITROGLYCERIN 6.5 MG
0.4 CAPSULE, EXTENDED RELEASE ORAL
Refills: 0 | Status: DISCONTINUED | OUTPATIENT
Start: 2022-11-23 | End: 2022-11-26

## 2022-11-23 RX ORDER — NITROGLYCERIN 6.5 MG
0.8 CAPSULE, EXTENDED RELEASE ORAL ONCE
Refills: 0 | Status: COMPLETED | OUTPATIENT
Start: 2022-11-23 | End: 2022-11-23

## 2022-11-23 RX ORDER — DILTIAZEM HCL 120 MG
10 CAPSULE, EXT RELEASE 24 HR ORAL ONCE
Refills: 0 | Status: COMPLETED | OUTPATIENT
Start: 2022-11-23 | End: 2022-11-23

## 2022-11-23 RX ORDER — DILTIAZEM HCL 120 MG
30 CAPSULE, EXT RELEASE 24 HR ORAL ONCE
Refills: 0 | Status: COMPLETED | OUTPATIENT
Start: 2022-11-23 | End: 2022-11-23

## 2022-11-23 RX ADMIN — Medication 0.4 MILLIGRAM(S): at 11:06

## 2022-11-23 RX ADMIN — Medication 10 MILLIGRAM(S): at 15:37

## 2022-11-23 RX ADMIN — Medication 0.4 MILLIGRAM(S): at 11:38

## 2022-11-23 RX ADMIN — Medication 0.8 MILLIGRAM(S): at 15:37

## 2022-11-23 RX ADMIN — Medication 30 MILLIGRAM(S): at 14:15

## 2022-11-23 SDOH — ECONOMIC STABILITY - HOUSING INSECURITY: HOMELESSNESS UNSPECIFIED: Z59.00

## 2022-11-23 NOTE — ED ADULT NURSE REASSESSMENT NOTE - NS ED NURSE REASSESS COMMENT FT1
patient was brought to CT scan with radiology tech Kevin Barreto after administration of cardizem and 2 nitro tabs as per MD order; nitro was given on ct table as per MD order; code stroke was called just as patient was on ct table for scan and had to abort scan; patient placed back on stretcher and returned to room; on cardiac monitor; HR never less than 55 but jumping around from 57-80 sinus arrythmia; MD Farr made aware

## 2022-11-23 NOTE — ED ADULT NURSE NOTE - NS ED NURSE LEVEL OF CONSCIOUSNESS ORIENTATION
Speech Therapy Evaluation For Video Fluoroscopy                                        INSURANCE / VISIT COUNT   Visit Count: 1   Progress Note Due:  NA  Primary Insurance: [employer]  Secondary Insurance: MEDICARE  Next Referring Physician Visit: Unknown    DIAGNOSIS / PRESCRIPTION INFORMATION:   Medical Diagnosis: R13.10 Dysphagia, unspecified type   Treatment Diagnosis: Dysphagia, Oropharyngeal Phase  Precautions: None  Referred by: Dr. Juanita Jorge MD  Date of Onset/Injury: Patient reports a 3 week history of difficulty initiating a swallow, denies foods sticking in her throat or s/s of aspiration.    MEDICAL / SURGICAL HISTORY:   Current Medications: refer to PTA Medication section of electronic health record  Relevant Diagnostic Tests: esophagram 5/12/16.    SUBJECTIVE:     Description of onset: Patient notes inconsistent inability to initiate a swallow, denies s/s of aspiration or foods sticking in her throat.  Pain: patient reports pain is not an issue/concern  Prior Treatment:no therapies in the past for current condition. Hospitalization for Current Condition: No, per patient..  Functional Limitations (patient reported): swallowing  Prior Level of function (patient reported): No dysphagia noted previous to current perceived issues.    Safety/Support/Patient Goal(s)   Social Support/Home Environment: Patient lives with significant other with children.  Patient has no assistance from family/friends.  Currently Receiving Home Health Services: No, per patient.  Do you feel safe at home, work and/or school? yes, per patient  Fall History: (fall/near fall in past 12 months): No  Patient Goals / Concerns:  Difficulty initiating swallows.    OBJECTIVE:   Current Status:  • Diet: General, Thin Liquids  • Dentition:  Intact  • Cognition: Intact  • Auditory Comprehension: Intact   • Verbal Expression: Intact  • Feeds Self: Yes  • Oral Motor Screen: Gross assessment of oral mechanism within functional  limits.    Videofluoroscopy Results:   Tested In:  Lateral View  Consistency Trialed: Thin Liquid; Delivery Method: Teaspoon  Cup  Oral Phase Intact   Pharyngeal Phase Residuals noted, Penetration   Amount of Residuals trace   Location of Residuals valleculae   Amount of Penetration/Aspiration trace   Timing of Penetration/Aspiration during the swallow   Penetration Aspiration Scale 2 - Material enters the airway, remains above the vocal folds and is ejected from the airway     Consistency Trialed: [place]lach Thick Liquid; Delivery Method: Teaspoon  Cup  Oral Phase Intact   Pharyngeal Phase Residuals noted, Penetration   Amount of Residuals trace   Location of Residuals valleculae   Amount of Penetration/Aspiration trace   Timing of Penetration/Aspiration during the swallow   Penetration Aspiration Scale 2 - Material enters the airway, remains above the vocal folds and is ejected from the airway     Consistency Trialed: Honey Thick Liquids; Delivery Method: Teaspoon  Oral Phase Intact   Pharyngeal Phase Intact   Amount of Residuals trace   Location of Residuals valleculae   Amount of Penetration/Aspiration none   Timing of Penetration/Aspiration Not applicable   Penetration Aspiration Scale 1 - Material does not enter the airway     Consistency Trialed: Puree; Delivery Method: Teaspoon  Oral Phase Intact   Pharyngeal Phase Intact   Amount of Residuals none   Location of Residuals na   Amount of Penetration/Aspiration none   Timing of Penetration/Aspiration Not applicable   Penetration Aspiration Scale 1 - Material does not enter the airway     Consistency Trialed: Solid; Delivery Method: Teaspoon  Oral Phase Intact   Pharyngeal Phase Intact   Amount of Residuals trace   Location of Residuals valleculae   Amount of Penetration/Aspiration none   Timing of Penetration/Aspiration Not applicable   Penetration Aspiration Scale 1 - Material does not enter the airway     Esophageal Phase:  Not Observed    Outcome  Measures:  National Outcome Measurement System (NOMS):  Swallowing   Level 7: The individual's ability to eat independently is not limited by swallowing function.  Swallowing would be safe and efficient for all consistencies.  Compensatory strategies are effectively used when needed.       TODAY'S TREATMENT:   Evaluation completed    ASSESSMENT:   42 year old female presents to speech therapy to further evaluate complaints of inconsistent inability to initiate a swallow.  The patient denies s/s of aspiration or foods sticking in her throat after swallow.  She was trialed with thin, nectar, honey, puree and cookie consistencies with appropriate oral management and timely swallows.  There was little to no pharyngeal residue after initial swallows, but trace/transient penetration which cleared with completion of swallows was noted with one of two or three swallows of thin and nectar thick liquid.  No aspiration was observed during this study.  The patient's swallow is essentially functional with consistencies that patient typically eats.  Patient was shown film of study which was discussed with her; patient was relieved to know that her swallow is essentially WFL.  There are no recommendations from speech therapy for the patient at this time.      PATIENT EDUCATION:   Who will be receiving education: patient  Are they ready to learn: Yes  Preferred learning style: written, verbal, demonstration  Barriers to learning: no barriers apparent at this time     Therapy Daily Billing:   Primary Insurance: [facility]  Secondary Insurance: MEDICARE    Evaluation Procedures:  1 unit:  Motion Fluoroscopy / Swallow Eval    Treatment Procedures:  No treatment codes were used on this date of service    Total Treatment Time: 55 minutes    Electronically sent for physician signature     Oriented - self; Oriented - place; Oriented - time

## 2022-11-23 NOTE — ED PROVIDER NOTE - NSICDXPASTMEDICALHX_GEN_ALL_CORE_FT
PAST MEDICAL HISTORY:  Acute pancreatitis Pancreatitis    Alcohol withdrawal     BPH (benign prostatic hyperplasia)     Cerebral artery occlusion with cerebral infarction Stroke    COPD (chronic obstructive pulmonary disease)     Crohn's disease     Diverticulosis of large intestine Diverticulosis    ETOH abuse     History of pulmonary embolism     HLD (hyperlipidemia)     HTN (hypertension)     Hypertension     MI (myocardial infarction)     Pulmonary embolism

## 2022-11-23 NOTE — ED ADULT NURSE REASSESSMENT NOTE - NS ED NURSE REASSESS COMMENT FT1
Pt. pending ct coronary study requiring a larger bore IV, Two attempts by nurse unsuccessful MD Farr informed.

## 2022-11-23 NOTE — ED ADULT TRIAGE NOTE - CHIEF COMPLAINT QUOTE
BIBEMS for sudden SOB, chest pain, +nausea/ dizziness + bilateral lower leg edema onset approx 9-10am this am. State he hasn't taken his medications( unsure or med names) for approx 4 months. States something similar happened last  year when he hasn't taken his meds for prolonged period of time.  PMH HLD, HTN, enlarged proistate, crohns. takes warfarin. Given 324 aspirin en route.

## 2022-11-23 NOTE — ED PROVIDER NOTE - PROGRESS NOTE DETAILS
Patient resting comfortably, feels markedly improved. Now asymptomatic. Spoke with Dr. Alvarado. Recommended coronary CTA. will place on obs

## 2022-11-23 NOTE — ED ADULT NURSE NOTE - NSIMPLEMENTINTERV_GEN_ALL_ED
Implemented All Fall Risk Interventions:  [place] to call system. Call bell, personal items and telephone within reach. Instruct patient to call for assistance. Room bathroom lighting operational. Non-slip footwear when patient is off stretcher. Physically safe environment: no spills, clutter or unnecessary equipment. Stretcher in lowest position, wheels locked, appropriate side rails in place. Provide visual cue, wrist band, yellow gown, etc. Monitor gait and stability. Monitor for mental status changes and reorient to person, place, and time. Review medications for side effects contributing to fall risk. Reinforce activity limits and safety measures with patient and family.

## 2022-11-23 NOTE — ED PROVIDER NOTE - OBJECTIVE STATEMENT
Patient reports he woke up this morning around 9 AM with chest pain, shortness of breath, lightheadedness, and leg swelling. Sx worse with ambulation. Patient has history of hypertension and high cholesterol but has not taken his medication for months because he is currently undomiciled.  No fever, cough, abdominal pain, nausea, diaphoresis.

## 2022-11-23 NOTE — ED CDU PROVIDER INITIAL DAY NOTE - PROGRESS NOTE DETAILS
Patient now reports cocaine use last night. I spoke to Dr. Alvarado, who recommended diltiazem 30mg PO. Unable to get patient's HR down sufficiently for cardiac CT. Dr. Alvarado recommended against further cardizem bolus without knowing patient's EF. Patient does not recall the names of his medications. Said he was most recently on Eliquis but doesn't recall dose. Also doesn't know which pharmacy he filled his medications at most recently. Return to the ED immediately if getting worse, not improving, or if having any new or troubling symptoms.

## 2022-11-23 NOTE — SBIRT NOTE ADULT - NSSBIRTALCPASSREFTXDET_GEN_A_CORE
Provided SBIRT services: Full screen positive. Referral to Treatment Attempted. Screening results were reviewed with the patient and patient was provided information about healthy guidelines and potential negative consequences associated with level of risk. Motivation and readiness to reduce or stop use was discussed and goals and activities to make changes were suggested/offered. Patient refused any active referral to inpatient substance abuse treatment at this time, however, he received a list of available substance abuse treatment in the community.

## 2022-11-23 NOTE — ED PROVIDER NOTE - PHYSICAL EXAMINATION
eyeglasses/watch, I pad, cellphone,  wallet with credit cards, drivers license. clothing.
Gen: Well-developed, well-nourished, NAD, VS as noted by nursing. HEENT: NCAT, mmm   Chest: RRR, nl S1 and S2, no m/r/g. Resp: CTAB, no w/r/r  Abd: nl BS, soft, nt/nd. Ext: Warm, dry. 1+ pitting edema to b/l knees  Neuro: CN II-XII intact, normal and equal strength, sensation, and reflexes bilaterally, normal gait  Psych: AAOx3

## 2022-11-23 NOTE — ED PROVIDER NOTE - CLINICAL SUMMARY MEDICAL DECISION MAKING FREE TEXT BOX
Patient with chest pain.  Multiple cardiac risk factors.  Concern for ACS.  Will start cardiac work-up and reassess.

## 2022-11-23 NOTE — ED CDU PROVIDER DISPOSITION NOTE - CLINICAL COURSE
Patient with chest pain, likely due to cocaine use. Resolved after 2 sublingual nitroglycerin. Repeat troponin negative. Unable to perform cardiac CT due to patient's HR. WIll refer for outpatient f/u. Return to the ED immediately if getting worse, not improving, or if having any new or troubling symptoms.

## 2022-11-23 NOTE — ED CDU PROVIDER DISPOSITION NOTE - PATIENT PORTAL LINK FT
You can access the FollowMyHealth Patient Portal offered by [facility] by registering at the following website: [email or web address]. By joining [employer]’s FollowMyHealth portal, you will also be able to view your health information using other applications (apps) compatible with our system.

## 2022-11-23 NOTE — ED CDU PROVIDER INITIAL DAY NOTE - PHYSICAL EXAMINATION
Gen: Well-developed, well-nourished, NAD, VS as noted by nursing. HEENT: NCAT, mmm   Chest: RRR, nl S1 and S2, no m/r/g. Resp: CTAB, no w/r/r  Abd: nl BS, soft, nt/nd. Ext: Warm, dry. 1+ pitting edema to b/l knees  Neuro: CN II-XII intact, normal and equal strength, sensation, and reflexes bilaterally, normal gait  Psych: AAOx3

## 2022-12-07 NOTE — ED ADULT NURSE NOTE - NS ED NURSE RECORD ANOTHER HT AND WT
patient alert ox3 brought in by son with wheezing and SOB and tachypneic since almost 2 weeks. " connected to CM shows NSR. patient is tachypneic and auditory wheezing noted. skin warm and dry ,labs done as ordered. awaiting results and re eval. No

## 2023-01-01 NOTE — ED ADULT NURSE NOTE - PMH
Continue monitoring patient for signs of head injury including unequal pupil sizes, vomiting or inconsolability.  Treat rhinovirus symptomatically and push fluids.    Follow-up with pediatrician or primary care provider as needed.    Return to the ER or go to [facility] for new or worse symptoms.   Acute pancreatitis  Pancreatitis  Cerebral artery occlusion with cerebral infarction  Stroke  Diverticulosis of large intestine  Diverticulosis  History of pulmonary embolism    HTN (hypertension)    MI (myocardial infarction)

## 2023-01-04 ENCOUNTER — INPATIENT (INPATIENT)
Facility: HOSPITAL | Age: 63
LOS: 6 days | Discharge: HOME | DRG: 896 | End: 2023-01-11
Attending: PSYCHIATRY & NEUROLOGY
Payer: MEDICARE

## 2023-01-04 VITALS — RESPIRATION RATE: 18 BRPM | DIASTOLIC BLOOD PRESSURE: 52 MMHG | HEART RATE: 96 BPM | SYSTOLIC BLOOD PRESSURE: 92 MMHG

## 2023-01-04 LAB
ALBUMIN SERPL ELPH-MCNC: 2.1 G/DL — LOW (ref 3.4–5)
ALBUMIN SERPL ELPH-MCNC: 3.8 G/DL — SIGNIFICANT CHANGE UP (ref 3.4–5)
ALP SERPL-CCNC: 48 U/L — SIGNIFICANT CHANGE UP (ref 40–120)
ALP SERPL-CCNC: 77 U/L — SIGNIFICANT CHANGE UP (ref 40–120)
ALT FLD-CCNC: 13 U/L — SIGNIFICANT CHANGE UP (ref 12–42)
ALT FLD-CCNC: 16 U/L — SIGNIFICANT CHANGE UP (ref 12–42)
ANION GAP SERPL CALC-SCNC: 10 MMOL/L — SIGNIFICANT CHANGE UP (ref 9–16)
ANION GAP SERPL CALC-SCNC: 13 MMOL/L — SIGNIFICANT CHANGE UP (ref 9–16)
APPEARANCE UR: CLEAR — SIGNIFICANT CHANGE UP
APTT BLD: 30.8 SEC — SIGNIFICANT CHANGE UP (ref 27.5–35.5)
AST SERPL-CCNC: 22 U/L — SIGNIFICANT CHANGE UP (ref 15–37)
AST SERPL-CCNC: 31 U/L — SIGNIFICANT CHANGE UP (ref 15–37)
BASOPHILS # BLD AUTO: 0.04 K/UL — SIGNIFICANT CHANGE UP (ref 0–0.2)
BASOPHILS NFR BLD AUTO: 0.3 % — SIGNIFICANT CHANGE UP (ref 0–2)
BILIRUB SERPL-MCNC: 0.5 MG/DL — SIGNIFICANT CHANGE UP (ref 0.2–1.2)
BILIRUB SERPL-MCNC: 0.9 MG/DL — SIGNIFICANT CHANGE UP (ref 0.2–1.2)
BILIRUB UR-MCNC: NEGATIVE — SIGNIFICANT CHANGE UP
BUN SERPL-MCNC: 12 MG/DL — SIGNIFICANT CHANGE UP (ref 7–23)
BUN SERPL-MCNC: 13 MG/DL — SIGNIFICANT CHANGE UP (ref 7–23)
CALCIUM SERPL-MCNC: 6.5 MG/DL — CRITICAL LOW (ref 8.5–10.5)
CALCIUM SERPL-MCNC: 8.6 MG/DL — SIGNIFICANT CHANGE UP (ref 8.5–10.5)
CHLORIDE SERPL-SCNC: 106 MMOL/L — SIGNIFICANT CHANGE UP (ref 96–108)
CHLORIDE SERPL-SCNC: 115 MMOL/L — HIGH (ref 96–108)
CO2 SERPL-SCNC: 16 MMOL/L — LOW (ref 22–31)
CO2 SERPL-SCNC: 25 MMOL/L — SIGNIFICANT CHANGE UP (ref 22–31)
COLOR SPEC: YELLOW — SIGNIFICANT CHANGE UP
CREAT SERPL-MCNC: 1.26 MG/DL — SIGNIFICANT CHANGE UP (ref 0.5–1.3)
CREAT SERPL-MCNC: 1.52 MG/DL — HIGH (ref 0.5–1.3)
DIFF PNL FLD: ABNORMAL
EGFR: 52 ML/MIN/1.73M2 — LOW
EGFR: 65 ML/MIN/1.73M2 — SIGNIFICANT CHANGE UP
EOSINOPHIL # BLD AUTO: 0.48 K/UL — SIGNIFICANT CHANGE UP (ref 0–0.5)
EOSINOPHIL NFR BLD AUTO: 3.8 % — SIGNIFICANT CHANGE UP (ref 0–6)
ETHANOL SERPL-MCNC: 29 MG/DL — HIGH
FLUAV AG NPH QL: SIGNIFICANT CHANGE UP
FLUBV AG NPH QL: SIGNIFICANT CHANGE UP
GLUCOSE SERPL-MCNC: 103 MG/DL — HIGH (ref 70–99)
GLUCOSE SERPL-MCNC: 112 MG/DL — HIGH (ref 70–99)
GLUCOSE UR QL: NEGATIVE — SIGNIFICANT CHANGE UP
HCT VFR BLD CALC: 44.8 % — SIGNIFICANT CHANGE UP (ref 39–50)
HGB BLD-MCNC: 14.3 G/DL — SIGNIFICANT CHANGE UP (ref 13–17)
IMM GRANULOCYTES NFR BLD AUTO: 0.5 % — SIGNIFICANT CHANGE UP (ref 0–0.9)
INR BLD: 0.97 — SIGNIFICANT CHANGE UP (ref 0.88–1.16)
KETONES UR-MCNC: NEGATIVE — SIGNIFICANT CHANGE UP
LACTATE SERPL-SCNC: 1.9 MMOL/L — SIGNIFICANT CHANGE UP (ref 0.4–2)
LACTATE SERPL-SCNC: 3.7 MMOL/L — HIGH (ref 0.4–2)
LEUKOCYTE ESTERASE UR-ACNC: NEGATIVE — SIGNIFICANT CHANGE UP
LIDOCAIN IGE QN: 87 U/L — SIGNIFICANT CHANGE UP (ref 73–393)
LYMPHOCYTES # BLD AUTO: 0.84 K/UL — LOW (ref 1–3.3)
LYMPHOCYTES # BLD AUTO: 6.6 % — LOW (ref 13–44)
MAGNESIUM SERPL-MCNC: 1.3 MG/DL — LOW (ref 1.6–2.6)
MAGNESIUM SERPL-MCNC: 2.1 MG/DL — SIGNIFICANT CHANGE UP (ref 1.6–2.6)
MCHC RBC-ENTMCNC: 31.9 GM/DL — LOW (ref 32–36)
MCHC RBC-ENTMCNC: 35 PG — HIGH (ref 27–34)
MCV RBC AUTO: 109.8 FL — HIGH (ref 80–100)
MONOCYTES # BLD AUTO: 1.5 K/UL — HIGH (ref 0–0.9)
MONOCYTES NFR BLD AUTO: 11.8 % — SIGNIFICANT CHANGE UP (ref 2–14)
NEUTROPHILS # BLD AUTO: 9.81 K/UL — HIGH (ref 1.8–7.4)
NEUTROPHILS NFR BLD AUTO: 77 % — SIGNIFICANT CHANGE UP (ref 43–77)
NITRITE UR-MCNC: NEGATIVE — SIGNIFICANT CHANGE UP
NRBC # BLD: 0 /100 WBCS — SIGNIFICANT CHANGE UP (ref 0–0)
NT-PROBNP SERPL-SCNC: 27 PG/ML — SIGNIFICANT CHANGE UP
PCO2 BLDV: 54 MMHG — SIGNIFICANT CHANGE UP (ref 42–55)
PH BLDV: 7.3 — LOW (ref 7.32–7.43)
PH UR: 6 — SIGNIFICANT CHANGE UP (ref 5–8)
PHOSPHATE SERPL-MCNC: 3.5 MG/DL — SIGNIFICANT CHANGE UP (ref 2.5–4.5)
PLATELET # BLD AUTO: 248 K/UL — SIGNIFICANT CHANGE UP (ref 150–400)
PO2 BLDV: <35 MMHG — SIGNIFICANT CHANGE UP (ref 25–45)
POTASSIUM SERPL-MCNC: 2.5 MMOL/L — CRITICAL LOW (ref 3.5–5.3)
POTASSIUM SERPL-MCNC: 3.9 MMOL/L — SIGNIFICANT CHANGE UP (ref 3.5–5.3)
POTASSIUM SERPL-SCNC: 2.5 MMOL/L — CRITICAL LOW (ref 3.5–5.3)
POTASSIUM SERPL-SCNC: 3.9 MMOL/L — SIGNIFICANT CHANGE UP (ref 3.5–5.3)
PROT SERPL-MCNC: 5.2 G/DL — LOW (ref 6.4–8.2)
PROT SERPL-MCNC: 8.1 G/DL — SIGNIFICANT CHANGE UP (ref 6.4–8.2)
PROT UR-MCNC: 30 MG/DL
PROTHROM AB SERPL-ACNC: 11.4 SEC — SIGNIFICANT CHANGE UP (ref 10.5–13.4)
RBC # BLD: 4.08 M/UL — LOW (ref 4.2–5.8)
RBC # FLD: 12.2 % — SIGNIFICANT CHANGE UP (ref 10.3–14.5)
RSV RNA NPH QL NAA+NON-PROBE: SIGNIFICANT CHANGE UP
SAO2 % BLDV: 37.1 % — LOW (ref 67–88)
SARS-COV-2 RNA SPEC QL NAA+PROBE: SIGNIFICANT CHANGE UP
SODIUM SERPL-SCNC: 141 MMOL/L — SIGNIFICANT CHANGE UP (ref 132–145)
SODIUM SERPL-SCNC: 144 MMOL/L — SIGNIFICANT CHANGE UP (ref 132–145)
SP GR SPEC: 1.02 — SIGNIFICANT CHANGE UP (ref 1–1.03)
TROPONIN I, HIGH SENSITIVITY RESULT: 10.7 NG/L — SIGNIFICANT CHANGE UP
UROBILINOGEN FLD QL: 1 E.U./DL — SIGNIFICANT CHANGE UP
WBC # BLD: 12.73 K/UL — HIGH (ref 3.8–10.5)
WBC # FLD AUTO: 12.73 K/UL — HIGH (ref 3.8–10.5)

## 2023-01-04 PROCEDURE — 99223 1ST HOSP IP/OBS HIGH 75: CPT

## 2023-01-04 PROCEDURE — 71045 X-RAY EXAM CHEST 1 VIEW: CPT | Mod: 26

## 2023-01-04 PROCEDURE — 99285 EMERGENCY DEPT VISIT HI MDM: CPT

## 2023-01-04 PROCEDURE — 70450 CT HEAD/BRAIN W/O DYE: CPT | Mod: 26

## 2023-01-04 RX ORDER — MAGNESIUM SULFATE 500 MG/ML
1 VIAL (ML) INJECTION ONCE
Refills: 0 | Status: COMPLETED | OUTPATIENT
Start: 2023-01-04 | End: 2023-01-05

## 2023-01-04 RX ORDER — SODIUM CHLORIDE 9 MG/ML
2000 INJECTION INTRAMUSCULAR; INTRAVENOUS; SUBCUTANEOUS ONCE
Refills: 0 | Status: COMPLETED | OUTPATIENT
Start: 2023-01-04 | End: 2023-01-04

## 2023-01-04 RX ORDER — POTASSIUM CHLORIDE 20 MEQ
10 PACKET (EA) ORAL
Refills: 0 | Status: DISCONTINUED | OUTPATIENT
Start: 2023-01-04 | End: 2023-01-04

## 2023-01-04 RX ORDER — CALCIUM GLUCONATE 100 MG/ML
1 VIAL (ML) INTRAVENOUS ONCE
Refills: 0 | Status: COMPLETED | OUTPATIENT
Start: 2023-01-04 | End: 2023-01-04

## 2023-01-04 RX ORDER — POTASSIUM CHLORIDE 20 MEQ
10 PACKET (EA) ORAL
Refills: 0 | Status: COMPLETED | OUTPATIENT
Start: 2023-01-04 | End: 2023-01-04

## 2023-01-04 RX ORDER — NALOXONE HYDROCHLORIDE 4 MG/.1ML
1 SPRAY NASAL ONCE
Refills: 0 | Status: COMPLETED | OUTPATIENT
Start: 2023-01-04 | End: 2023-01-04

## 2023-01-04 RX ORDER — INFLUENZA VIRUS VACCINE 15; 15; 15; 15 UG/.5ML; UG/.5ML; UG/.5ML; UG/.5ML
0.5 SUSPENSION INTRAMUSCULAR ONCE
Refills: 0 | Status: DISCONTINUED | OUTPATIENT
Start: 2023-01-04 | End: 2023-01-11

## 2023-01-04 RX ORDER — POTASSIUM CHLORIDE 20 MEQ
60 PACKET (EA) ORAL ONCE
Refills: 0 | Status: COMPLETED | OUTPATIENT
Start: 2023-01-04 | End: 2023-01-04

## 2023-01-04 RX ORDER — ONDANSETRON 8 MG/1
4 TABLET, FILM COATED ORAL EVERY 8 HOURS
Refills: 0 | Status: DISCONTINUED | OUTPATIENT
Start: 2023-01-04 | End: 2023-01-05

## 2023-01-04 RX ORDER — ONDANSETRON 8 MG/1
8 TABLET, FILM COATED ORAL ONCE
Refills: 0 | Status: COMPLETED | OUTPATIENT
Start: 2023-01-04 | End: 2023-01-04

## 2023-01-04 RX ORDER — MAGNESIUM SULFATE 500 MG/ML
1 VIAL (ML) INJECTION ONCE
Refills: 0 | Status: COMPLETED | OUTPATIENT
Start: 2023-01-04 | End: 2023-01-04

## 2023-01-04 RX ADMIN — ONDANSETRON 8 MILLIGRAM(S): 8 TABLET, FILM COATED ORAL at 20:28

## 2023-01-04 RX ADMIN — SODIUM CHLORIDE 2000 MILLILITER(S): 9 INJECTION INTRAMUSCULAR; INTRAVENOUS; SUBCUTANEOUS at 17:26

## 2023-01-04 RX ADMIN — Medication 60 MILLIEQUIVALENT(S): at 18:55

## 2023-01-04 RX ADMIN — NALOXONE HYDROCHLORIDE 1 MILLIGRAM(S): 4 SPRAY NASAL at 17:26

## 2023-01-04 RX ADMIN — Medication 100 GRAM(S): at 18:46

## 2023-01-04 RX ADMIN — Medication 100 GRAM(S): at 18:38

## 2023-01-04 RX ADMIN — Medication 100 MILLIEQUIVALENT(S): at 20:27

## 2023-01-04 NOTE — H&P ADULT - PROBLEM SELECTOR PLAN 3
Patient found obtunded in the street after consuming 2 pints of vodka and smoking marijuana. Patient unable to recall events, received Narcan in ED at which time patient responded and patient was A&Ox3 upon arrival to [facility]. CT head negative for acute intracranial pathology. No metabolic causes for AMS on labs other than acute drug use.     Plan:   - continue to monitor , w/ Hb 14.3 - i/s/o alcohol abuse.     Plan:   - f/u B12   - alcohol cessation counseling 2.5 on admission, repleted in ED, repeat 3.9.     Plan:   - f/u am BMP, replete

## 2023-01-04 NOTE — H&P ADULT - ASSESSMENT
Patient is a 62 year old male with a PMH of alcohol and drug abuse, PE, DVT, and stroke in the past, who was found obtunded 2/2 acute intoxication, admitted for alcohol withdrawal and concern for ACS.  Patient is a 62 year old male with a PMH of alcohol and drug abuse, PE, DVT, and stroke in the past, who was found obtunded 2/2 acute intoxication, admitted for acute opiate intoxication and alcohol withdrawal.

## 2023-01-04 NOTE — ED ADULT NURSE REASSESSMENT NOTE - NS ED NURSE REASSESS COMMENT FT1
Received pt from previous RN for break coverage @ 21:15  Pt resting in stretcher with even and unlabored respirations. Pt taken to [facility] in NAD.

## 2023-01-04 NOTE — H&P ADULT - PROBLEM SELECTOR PLAN 10
Again, patient notes that he has had DVT's in the past. Does not remember when. No leg swelling or edema.      Plan:   - f/u lower extremity dopplers   - lovenox 40 subq Self reported by patient, does not remember when this was. On outpatient med reviews, appears to have taken coumadin for some time as well as xarelto. Patient on room air, HR normal.    Plan:   - DVT prophylaxis for now  -  lovenox 40 mg subq  - collateral from [place]

## 2023-01-04 NOTE — H&P ADULT - PROBLEM SELECTOR PLAN 1
Patient found unconciouss by bystander on street, appeared to be acutely intoxicated w/ pinpoint pupils. Responsed to Narcan at [facility]. Patient denies opioid use and denies history of IV drug use.     Plan:   - s/p Narcan in ED   - not showing signs of acute opioid intoxication at this time    - monitor for signs of acute opiate withdrawal   - COWS ordered Patient found unconciouss by bystander on street, appeared to be acutely intoxicated w/ pinpoint pupils. Responsed to Narcan at [facility]. Patient denies opioid use and denies history of IV drug use.     Plan:   - s/p Narcan in ED   - not showing signs of acute opioid intoxication at this time    - monitor for signs of acute opiate withdrawal   - COWS ordered  - f/u urine toxicology Cr 1.26 on admission, increased to 1.52 despite fluids (baseline 0.88). Hypotensive to 90/60 on admission, pressure normalized with fluids. Bladder scan showed no acute urinary retention. No known CKD.     Plan:   - trend Cr   - f/u [place] Patient found unconciouss by bystander on street, appeared to be acutely intoxicated w/ pinpoint pupils. Responsed to Narcan at [facility]. Patient denies opioid use and denies history of IV drug use.     Plan:   - s/p Narcan in ED   - not showing signs of acute opioid intoxication at this time    - monitor for signs of acute opiate withdrawal   - COWS ordered  - f/u urine toxicology

## 2023-01-04 NOTE — SBIRT NOTE ADULT - NSSBIRTALCPOSREINDET_GEN_A_CORE
SW discussed alcohol use with patient and provided support. Patient open to community resources upon discharge.

## 2023-01-04 NOTE — H&P ADULT - PROBLEM SELECTOR PLAN 6
High concern for significant CAD given patient's history of chest pain and ED visit on 11/23. CTA ordered at that time that was never completed. Patient does not have chest pain at this time, trop I in ED only very slightly elevated.     Plan:   - consider AM cardiology consult   - f/u echo (ordered)   - AM trop T   - EKG in AM 2.5 on admission, repleted in ED, repeat 3.9.     Plan:   - f/u am BMP, replete as needed Patient found obtunded in the street after consuming 2 pints of vodka and smoking marijuana. Patient unable to recall events, received Narcan in ED at which time patient responded and patient was A&Ox3 upon arrival to [facility]. CT head negative for acute intracranial pathology. No metabolic causes for AMS on labs other than acute drug use.     Plan:   - continue to monitor

## 2023-01-04 NOTE — H&P ADULT - PROBLEM SELECTOR PROBLEM 9
Diverticulosis of intestine History of embolic stroke History of pulmonary embolus (PE) Long QT interval

## 2023-01-04 NOTE — H&P ADULT - NSHPPHYSICALEXAM_GEN_ALL_CORE
VITALS:   T(C): 36.9 (01-05-23 @ 01:00), Max: 36.9 (01-04-23 @ 19:39)  HR: 82 (01-04-23 @ 22:55) (74 - 96)  BP: 163/85 (01-04-23 @ 22:55) (92/52 - 163/85)  RR: 18 (01-04-23 @ 22:55) (18 - 18)  SpO2: 93% (01-04-23 @ 22:55) (93% - 100%)    GENERAL: NAD, lying in bed comfortably  HEAD:  Atraumatic, normocephalic  EYES: EOMI, PERRLA, conjunctiva and sclera clear  ENT: Moist mucous membranes  NECK: Supple, no JVD  HEART: Regular rate and rhythm, no murmurs, rubs, or gallops  LUNGS: Unlabored respirations.  Clear to auscultation bilaterally, no crackles, wheezing, or rhonchi  ABDOMEN: Soft, nontender, nondistended, +BS  EXTREMITIES: 2+ peripheral pulses bilaterally. No clubbing, cyanosis, or edema  NERVOUS SYSTEM:  A&Ox3, no focal deficits   SKIN: No rashes or lesions VITALS:   T(C): 36.9 (01-05-23 @ 01:00), Max: 36.9 (01-04-23 @ 19:39)  HR: 82 (01-04-23 @ 22:55) (74 - 96)  BP: 163/85 (01-04-23 @ 22:55) (92/52 - 163/85)  RR: 18 (01-04-23 @ 22:55) (18 - 18)  SpO2: 93% (01-04-23 @ 22:55) (93% - 100%)    GENERAL: NAD, lying in bed comfortably  HEAD:  Atraumatic, normocephalic  EYES: EOMI, PERRLA, conjunctiva and sclera clear. Conjunctival injection.   ENT: Moist mucous membranes  NECK: Supple, no JVD  HEART: Regular rate and rhythm, no murmurs, rubs, or gallops  LUNGS: Unlabored respirations.  Clear to auscultation bilaterally, no crackles, wheezing, or rhonchi  ABDOMEN: Soft, nontender, nondistended, +BS  EXTREMITIES: 2+ peripheral pulses bilaterally. No clubbing, cyanosis, or edema  NERVOUS SYSTEM:  A&Ox3, no focal deficits   SKIN: No rashes or lesions VITALS:   T(C): 36.9 (01-05-23 @ 01:00), Max: 36.9 (01-04-23 @ 19:39)  HR: 82 (01-04-23 @ 22:55) (74 - 96)  BP: 163/85 (01-04-23 @ 22:55) (92/52 - 163/85)  RR: 18 (01-04-23 @ 22:55) (18 - 18)  SpO2: 93% (01-04-23 @ 22:55) (93% - 100%)    GENERAL: NAD, lying in bed comfortably  HEAD:  Atraumatic, normocephalic  EYES: EOMI, PERRLA, conjunctiva and sclera clear. Conjunctival injection.   ENT: Moist mucous membranes  NECK: Supple, no JVD  HEART: Regular rate and rhythm, no murmurs, rubs, or gallops  LUNGS: Unlabored respirations.  Clear to auscultation bilaterally, no crackles, wheezing, or rhonchi  ABDOMEN: Soft, nondistended, +BS. Tender to palpation LLQ, LUQ  EXTREMITIES: 2+ peripheral pulses bilaterally. No clubbing, cyanosis, or edema  NERVOUS SYSTEM:  A&Ox3, no focal deficits. No acute signs of withdrawal.   SKIN: No rashes or lesions

## 2023-01-04 NOTE — H&P ADULT - HISTORY OF PRESENT ILLNESS
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 The patient smoked marijuana and dranks two pints of vodka this morning, cocaine last night, and cannot recall events leading up to him waking up in the ED. Per the patient, he has been drinking 2 pints of vodka per day for the past 15 years and has been hospitalized many times in the past for withdrawals, denies seizures or intubations, but cannot recall when his last hospitalization was though he states he usually goes to [facility]. Other than this morning, patient denies loss of consciousness in the past. Of note, patient presented to [facility] in Novemeber 2022 for chest pain at which time EKG showed T wave flattening and cardiology was consulted. CT angiogram was ordered but patient was discharged prior to imaging as his chest pain had resolved. He currently denies chest pain or cardiac history. Patient denies weakness, fever, chills, chest pain, changes in bowel movements/urination. He has not taken any medication in the past three months and is unable to recall which medications he used to take.     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 The patient smoked marijuana and dranks two pints of vodka this morning, cocaine last night, and cannot recall events leading up to him waking up in the ED. Per the patient, he has been drinking 2 pints of vodka per day for the past 15 years and has been hospitalized many times in the past for withdrawals, denies seizures or intubations, but cannot recall when his last hospitalization was though he states he usually goes to [facility]. Other than this morning, patient denies loss of consciousness in the past. Of note, patient presented to [facility] in 2022 for chest pain at which time EKG showed T wave flattening and cardiology was consulted. CT angiogram was ordered but patient was discharged prior to imaging as his chest pain had resolved. He currently denies chest pain or cardiac history. Patient denies weakness, fever, chills, chest pain, changes in bowel movements/urination. He has not taken any medication in the past three months and is unable to recall which medications he used to take.     ED:   Vitals: T: 97 HR: 76, BP: 160/109, RR: 18, 93% on RA  Labs: WBC: 12.73, Hb: 14.3, MCV: 109.8, M.3 (repleted, 2.1), K: 2.5 (repleted 3.9), Cl: 115, Bicarb 16 -->25, lactate 3.7 --> 1.9, pHL 7.30, BAL: 29,   Imaging:   CT head: No acute intracranial hemorrhage, mass effect, or CT evidence   of recent transcortical infarction.  CXR: Mild bibasilar atelectasis.  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 The patient smoked marijuana and dranks two pints of vodka this morning, cocaine last night, and cannot recall events leading up to him waking up in the ED, upon arrival he complained of chest pain which had resolved by the time he arrived to [facility]. Per the patient, he has been drinking 2 pints of vodka per day for the past 15 years and has been hospitalized many times in the past for withdrawals, denies seizures or intubations, but cannot recall when his last hospitalization was though he states he usually goes to [facility]. Other than this morning, patient denies loss of consciousness in the past. Of note, patient presented to [facility] in 2022 for chest pain at which time EKG showed T wave flattening and cardiology was consulted. CT angiogram was ordered but patient was discharged prior to imaging as his chest pain had resolved. He currently denies chest pain or cardiac history. Patient denies weakness, fever, chills, chest pain, changes in bowel movements/urination. He has not taken any medication in the past three months and is unable to recall which medications he used to take.     ED:   Vitals: HR: 96, BP: 92/52, 18 RR on RA   Labs: WBC: 12.73, Hb: 14.3, MCV: 109.8, M.3 (repleted, 2.1), K: 2.5 (repleted 3.9), Cl: 115, Bicarb 16 -->25, lactate 3.7 --> 1.9, pHL 7.30, BAL: 29,   Imaging:   CT head: No acute intracranial hemorrhage, mass effect, or CT evidence   of recent transcortical infarction.  CXR: Mild bibasilar atelectasis.  Intervention: 2L NS, 1 g calcium gluconate, 2 magnesium sulfate IVBP, narcan 1 mg, K repleted

## 2023-01-04 NOTE — H&P ADULT - PROBLEM SELECTOR PLAN 11
CT head:  Stable chronic lacunar infarcts right subinsular region and left basal ganglia.. Per patient, has left sided motor impairment since stroke. Has 5/5 strength RUE,LUE, RLE, 4/5 LLE.     Plan:   - EKG in AM, no Afib on admission   - Lovenox 40 mg subq Again, patient notes that he has had DVT's in the past. Does not remember when. No leg swelling or edema.      Plan:   - f/u lower extremity dopplers   - lovenox 40 subq

## 2023-01-04 NOTE — H&P ADULT - PROBLEM SELECTOR PLAN 8
Again, patient notes that he has had DVT's in the past. Does not remember when. No leg swelling or edema.      Plan:   - f/u lower extremity dopplers   - lovenox 40 subq Multiple ekg's showing prolonged QT interval    Plan:   - replete electrolytes   - do not give QT prolonging medications Patient w/ known history of hypertension. Recalls being on medication at some point but does not remember the name, has not taken medications for the past three months. /109 on arrival.     Plan:   - ntd now   - can consider IV hydral if

## 2023-01-04 NOTE — ED PROVIDER NOTE - CARE PLAN
Principal Discharge DX:	Hypokalemia  Secondary Diagnosis:	Hypomagnesemia  Secondary Diagnosis:	Hypocalcemia  Secondary Diagnosis:	Accidental overdose  Secondary Diagnosis:	Alcohol intoxication, uncomplicated   1

## 2023-01-04 NOTE — ED PROCEDURE NOTE - PROCEDURE ADDITIONAL DETAILS
pt with difficult peripheral IV access.  Unsuccessful attempts by multiple RNs at bedside.  20ga PIV placed under US guidance by me

## 2023-01-04 NOTE — ED PROVIDER NOTE - OBJECTIVE STATEMENT
61 y/o M BIB EMS for Altered Mental Status after being found somnolent but arousable on the street. He admits to drinking EtOH and smoking marijuana today. Patient reports he is "high as hell." As per EMS, patient was hypotensive and hypoxic when they arrived. Patient is now also complaining of chest pain. No Narcan was given prior to arrival. Patient given fluids en route to ED.

## 2023-01-04 NOTE — PATIENT PROFILE ADULT - NSPROPTRIGHTBILLOFRIGHTS_GEN_A_NUR
Hi,  She is still doing ok. I repeated a CT and they mentioned a right sided submandibular nodule and the option of doing an ultrasound. The patient has no symptoms and I did not notice anything when I palpated cervical chain but I did not specifically palpate the submandibular space. I thought I would let you know in case you wanted to follow or do the ultrasound.   Candi patient

## 2023-01-04 NOTE — H&P ADULT - PROBLEM SELECTOR PROBLEM 4
Long QT interval Hypertension ALLISON (acute kidney injury) Opioid abuse with intoxication Macrocytosis without anemia

## 2023-01-04 NOTE — ED ADULT NURSE NOTE - CHIEF COMPLAINT QUOTE
Pt BIBEMS by EMS after being found somnolent on the street. PT awake and alert at this time. PT admits to drinking 1/2 gallon of alcohol and smoking marijuana. Pt hypotensive on arrival. As per EMS pt hypoxic on scene with o2 in 80's. Pt with 20 g to R hand. Pt also complaining of chest pain at this time.

## 2023-01-04 NOTE — ED PROVIDER NOTE - PHYSICAL EXAMINATION
CONST: Appears comfortable in the bed.   ENT: Airway patent, protecting airway. Nasal mucosa clear. No signs of trauma to head, face or neck.   EYES: Sclera clear. Pupils round and symmetrical bilaterally.  CARD: S1, S2 normal; no murmurs, gallops, or rubs. Regular rate and rhythm.  RESP: Breath sounds clear and equal bilaterally.  GI: Abdomen soft, non-distended.   MSK: No signs of acute trauma or injury to all extremities. No apparent tenderness to cervical spine to palpation.  NEURO: Somnolent. Arousable to tactile and verbal stimuli. Moves all extremities.   SKIN: Skin is normal temperature; no diaphoresis; no pallor. No signs of acute trauma or injury.   PSYCH: Clinically intoxicated.

## 2023-01-04 NOTE — H&P ADULT - PROBLEM SELECTOR PLAN 7
Self reported by patient, does not remember when this was. On outpatient med reviews, appears to have taken coumadin for some time as well as xarelto. Patient on room air, HR normal.    Plan:   - DVT prophylaxis for now  -  lovenox 40 mg subq  - collateral from [place] Patient w/ known history of hypertension. Recalls being on medication at some point but does not remember the name, has not taken medications for the past three months. /109 on arrival.     Plan:   - ntd now   - can consider IV hydral if High concern for significant CAD given patient's history of chest pain and ED visit on 11/23, w/ repeat chest pain on arrival to [facility]. CTA ordered on 11/23 which was never completed.  Patient does not have chest pain at this time, trop I in ED: 10. 7.   Patient does endorse cocaine use last night.     Plan:   - consider AM cardiology consult   - f/u echo (ordered)   - AM trop T   - EKG in AM

## 2023-01-04 NOTE — H&P ADULT - PROBLEM SELECTOR PROBLEM 7
History of deep vein thrombosis History of pulmonary embolus (PE) Hypertension T wave inversion in EKG

## 2023-01-04 NOTE — PATIENT PROFILE ADULT - NSTOBACCOWITHDRW_GEN_A_CORE_SD
Progress Note - Joe Sampson 1956, 62 y o  male MRN: [id number]    Unit/Bed#: [facility] 909-01 Encounter: [id number]    Primary Care Provider: Belinda Brown MD   Date and time admitted to hospital: 4/6/2018  5:10 PM        Cerebral edema ([facility] [address] )   Assessment & Plan    Continue decadron  F/u NS        PE (pulmonary thromboembolism) ([facility] [address] )   Assessment & Plan    No AC for now  IVC filter  Appreciate pulmonary recs        Lower leg DVT (deep venous thrombosis) (HCC)   Assessment & Plan    Positive subacute DVT in LLE  IVC filter  Lifelong AC post brain biopsy        Leukocytosis   Assessment & Plan    - likely fluctuating secondary to IV Decadron - monitor WBC count  -WBC 16 36  - remains afebrile        Bipolar disorder   Assessment & Plan    - continue Abilify/Wellbutrin regimen  -mood stable        Insulin-dependent diabetes mellitus   Assessment & Plan    - HgA1c of 8 0- poorly controlled    - optimize SSI coverage to higher algorithm due to concurrent IV steroid administration -Lantus 15U QHS           Hypertension   Assessment & Plan    - low-sodium diet encouraged  -BP stable  - continue Zestril        Alcohol abuse   Assessment & Plan    - counseled on cessation although unlikely to comply - per patient, last drink was on 4/5  - monitor for signs/symptoms of withdrawal - PRN IV Ativan   - started on folic acid/thiamine supplementation   -no signs of Alcohol withdrawal        Tobacco abuse   Assessment & Plan    - still smokes despite metastatic lung cancer diagnosis  - counseled on cessation - transdermal nicotine patch         Lung cancer    Assessment & Plan    - per patient, lung and his metastatic to lymph nodes and pelvis  -stage 4  - PRN pain control/supportive care   -patient confirms he is interested in having brain mass resected, as well as chemo for further tx            * 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    Assessment & Plan    - neurosurgery  - MRI reveals a 2 4 cm mass of the left parietal vertex  - c/w IV Decadron for vasogenic edema/swelling (optimize insulin regimen due to history of diabetes mellitus)   - PRN pain control and supportive care otherwise  -has underlying NSCLC, s/p chemo last 2018  -pulmonary embolism confirmed on CTA   -no AC for now since he is awaiting surgery  -IVC filter   -f/u pulmonary med  -lifelong AC needed   -brain mass resection             VTE Pharmacologic Prophylaxis:   Pharmacologic: Pharmacologic VTE Prophylaxis contraindicated due to brain mass resection tomorrow  Mechanical VTE Prophylaxis in Place: Yes    Patient Centered Rounds: I have performed bedside rounds with nursing staff today  Discussions with Specialists or Other Care Team Provider: yes    Education and Discussions with Family / Patient: yes  Time Spent for Care: 45 minutes  More than 50% of total time spent on counseling and coordination of care as described above  Current Length of Stay: 6 day(s)    Current Patient Status: Inpatient   Certification Statement: The patient will continue to require additional inpatient hospital stay due to med mgt/NS mgt    Discharge Plan: rehab post procedure    Code Status: Level 3 - DNAR and DNI      Subjective:   Patient denied any fever, chest pain, shortness of breath, change in bowel urinary habits  Anticipating brain mass resection tomorrow  Review of systems negative otherwise    Objective:     Vitals:   Temp (24hrs), Av 9 °F (36 6 °C), Min:97 6 °F (36 4 °C), Max:98 2 °F (36 8 °C)    HR:  [] 78  Resp:  [16-20] 18  BP: (126-157)/(60-95) 157/78  SpO2:  [96 %-99 %] 96 %  Body mass index is 28 65 kg/m²  Input and Output Summary (last 24 hours):        Intake/Output Summary (Last 24 hours) at 18 1224  Last data filed at 18 0900   Gross per 24 hour   Intake             1060 ml   Output             1200 ml   Net             -140 ml       Physical Exam:     Physical Exam   Constitutional: He is oriented to person, place, and time  No distress  HENT:   Head: Normocephalic and atraumatic  Mouth/Throat: Oropharynx is clear and moist    Eyes: EOM are normal  Pupils are equal, round, and reactive to light  Neck: Normal range of motion  No JVD present  Cardiovascular: Normal rate  Exam reveals no gallop and no friction rub  No murmur heard  Pulmonary/Chest: Effort normal and breath sounds normal  No respiratory distress  He has no wheezes  Abdominal: Soft  Bowel sounds are normal  He exhibits no distension  There is no tenderness  There is no rebound  Musculoskeletal: Normal range of motion  He exhibits no edema, tenderness or deformity  Neurological: He is alert and oriented to person, place, and time  No cranial nerve deficit  Skin: Skin is warm  No erythema  Psychiatric: He has a normal mood and affect  His behavior is normal  Judgment and thought content normal          Additional Data:     Labs:      Results from last 7 days  Lab Units 04/12/18  0447   WBC Thousand/uL 16 36*   HEMOGLOBIN g/dL 11 0*   HEMATOCRIT % 33 1*   PLATELETS Thousands/uL 237   NEUTROS PCT % 90*   LYMPHS PCT % 6*   MONOS PCT % 4   EOS PCT % 0       Results from last 7 days  Lab Units 04/12/18  0447   SODIUM mmol/L 137   POTASSIUM mmol/L 4 4   CHLORIDE mmol/L 106   CO2 mmol/L 24   BUN mg/dL 28*   CREATININE mg/dL 0 99   CALCIUM mg/dL 8 6   TOTAL PROTEIN g/dL 6 0*   BILIRUBIN TOTAL mg/dL 0 21   ALK PHOS U/L 51   ALT U/L 11*   AST U/L 7   GLUCOSE RANDOM mg/dL 137       Results from last 7 days  Lab Units 04/08/18  0447   INR  1 03       * I Have Reviewed All Lab Data Listed Above  * Additional Pertinent Lab Tests Reviewed:  [address] Admission Reviewed    Imaging:    Imaging Reports Reviewed Today  Recent Cultures (last 7 days):           Last 24 Hours Medication List:     Current Facility-Administered Medications:  acetaminophen 650 mg Oral Q6H PRN Dar Painter PA-C    albuterol 2 puff Inhalation Q6H PRN John Merrill Sobeida Benavides PA-C    ARIPiprazole 10 mg Oral Daily Deja GT Smith    budesonide-formoterol 2 puff Inhalation Q12H [address] Deja , [place]    buPROPion 75 mg Oral Q12H [address] [place] , [place]    calcium carbonate 1,000 mg Oral Daily PRN Deja Luis, GT    dexamethasone 4 mg Intravenous Q12H [address] Kaushik Ortez MD    dextromethorphan-guaiFENesin 10 mL Oral Q4H PRN Kaushik Ortez MD    docusate sodium 100 mg Oral BID Deja GT Smith    folic acid 1 mg Oral Daily Kaushik Ortez MD    insulin glargine 15 Units Subcutaneous HS Kaushik Ortez MD    insulin lispro 1-5 Units Subcutaneous 4x Daily (AC & HS) Kaushik Ortez MD    insulin lispro 20 Units Subcutaneous TID With Meals Kaushik Ortez MD    lisinopril 5 mg Oral Daily Deja GT Smith    LORazepam 1 mg Intravenous Q4H PRN Deja GT Smith    multivitamin-minerals 1 tablet Oral Daily Deja BALTAZAR Smith-TARSHA    nicotine 1 patch Transdermal Daily Deja Luis, PA-TARSHA    pantoprazole 40 mg Oral Early Morning Deja GT Smith    sodium chloride 75 mL/hr Intravenous Continuous Johnny Hyde MD Last Rate: 75 mL/hr (04/11/18 2003)   sodium chloride 1 g Oral BID Deja GT Smith    thiamine 100 mg Oral Daily Deja GT Smith         Today, Patient Was Seen By: Johnny Hyde MD    ** Please Note: Dictation voice to text software may have been used in the creation of this document   ** n.a

## 2023-01-04 NOTE — H&P ADULT - ATTENDING COMMENTS
seen and d/w 7L team  62 male found unconscious.  H/o EtOH and polysubstance abuse.  Currently w/o signs withdrawal.  C/o several d l abdominal pain although  abd soft, some tenderness on deep palpation on left  EKG noted. . tw incersions and prolonged qt   PMH  PE DVT noted  Plan  [facility] protocol  tox screen  ECHO.  Consider cardiology eval given tw inversions and recent h/o cp  repeat wbc.  Consider CT abd if non resolving abd complaint  repeat creatinine.  check cpk

## 2023-01-04 NOTE — ED PROVIDER NOTE - CLINICAL SUMMARY MEDICAL DECISION MAKING FREE TEXT BOX
pt with h/o htn, copd, cva, mi presents with ams. found on ground by pedestrians who called EMS. pt found to be hypotensive and hypoxic on arrival and describes himself as "high as hell." reports alcohol 1 pint vodka daily and smoking marijuana today. denied opiates but pupils constricted. nebulized narcan with improvement in SpO2, BP, and mental status. labs significant for hypomag, hypoK, and hypocalcemia - repleted in ED. also with elevated lactate likely 2/2 EtOH but will add VBG. CXR negative. d/w Dr. Brink ([facility]) who accepts to tele/stepdown.

## 2023-01-04 NOTE — H&P ADULT - PROBLEM SELECTOR PROBLEM 10
Healthcare maintenance Diverticulosis of intestine History of deep vein thrombosis History of pulmonary embolus (PE)

## 2023-01-04 NOTE — H&P ADULT - PROBLEM SELECTOR PLAN 12
Patient w/ pain on palpation of LLQ, RLQ. slightly elevated white count but no fevers, no bloody BM's.     Plan:   - consider CTAP if pain persists Patient w/ pain on palpation of LLQ, RLQ. slightly elevated white count but no fevers, no bloody BM's.     Plan:   - consider CTAP if pain persists    #HCM   F: sp 2L NS  E: K>4, Mg>2  N: NPO  DVT: Lovenox CT head:  Stable chronic lacunar infarcts right subinsular region and left basal ganglia.. Per patient, has left sided motor impairment since stroke. Has 5/5 strength RUE,LUE, RLE, 4/5 LLE.     Plan:   - EKG in AM, no Afib on admission   - Lovenox 40 mg subq    #HCM  F: NPO   E: K>4, Mg>2  N: NPO  DVT: Lovenox subq   CODE: full code

## 2023-01-04 NOTE — PATIENT PROFILE ADULT - FALL HARM RISK - HARM RISK INTERVENTIONS

## 2023-01-04 NOTE — ED ADULT TRIAGE NOTE - CHIEF COMPLAINT QUOTE
Pt BIBEMS by EMS after being found somnolent on the street. PT awake and alert at this time. PT admits to drinking 1/2 gallon of alcohol and smoking pot. Pt hypotensive on arrival. Pt with 20 g to R hand. Pt BIBEMS by EMS after being found somnolent on the street. PT awake and alert at this time. PT admits to drinking 1/2 gallon of alcohol and smoking pot. Pt hypotensive on arrival. Pt with 20 g to R hand. Pt also complaining of chest pain at this time. Pt BIBEMS by EMS after being found somnolent on the street. PT awake and alert at this time. PT admits to drinking 1/2 gallon of alcohol and smoking marijuana. Pt hypotensive on arrival. As per EMS pt hypoxic on scene with o2 in 80's. Pt with 20 g to R hand. Pt also complaining of chest pain at this time.

## 2023-01-04 NOTE — H&P ADULT - PROBLEM SELECTOR PLAN 5
Multiple ekg's showing prolonged QT interval    Plan:   - replete electrolytes   - do not give QT prolonging medications High concern for significant CAD given patient's history of chest pain and ED visit on 11/23. CTA ordered at that time that was never completed. Patient does not have chest pain at this time, trop I in ED only very slightly elevated.     Plan:   - consider AM cardiology consult   - f/u echo (ordered)   - AM trop T   - EKG in AM High concern for significant CAD given patient's history of chest pain and ED visit on 11/23, w/ repeat chest pain on arrival to [facility]. CTA ordered on 11/23 which was never completed.  Patient does not have chest pain at this time, trop I in ED: 10. 7.   Patient does endorse cocaine use last night.     Plan:   - consider AM cardiology consult   - f/u echo (ordered)   - AM trop T   - EKG in AM Patient w/ 15 year history of 2 pints of vodka per day. Has been hospitalized for withdrawals at [place] many times, does not recall when he was last hospitalized. Denies history of seizures or intubations. Last drink at 10 am on 1/4.   BAL on arrival to ED: 29     Plan:   - CIWA   - thiamine 250 mg   - folic acid 1 mg   - seizure precautions  - aspiration precautions

## 2023-01-04 NOTE — H&P ADULT - PROBLEM SELECTOR PLAN 9
Murray Self reported by patient, does not remember when this was. On outpatient med reviews, appears to have taken coumadin for some time as well as xarelto. Patient on room air, HR normal.    Plan:   - DVT prophylaxis for now  -  lovenox 40 mg subq  - collateral from [place] Multiple ekg's showing prolonged QT interval    Plan:   - replete electrolytes   - do not give QT prolonging medications

## 2023-01-04 NOTE — ED PROVIDER NOTE - ATTENDING APP SHARED VISIT CONTRIBUTION OF CARE
I have seen the pt, reviewed all pertinent clinical data, and I agree with the documentation/care/plan executed by BESSIE Colbert. I have seen the pt, reviewed all pertinent clinical data, and I agree with the documentation/care/plan executed by BESSIE Colbert. multiple electrolyte imbalances in setting of etoh intoxication and prolonged QT on EKG, will admit to monitored setting for continued treatment.

## 2023-01-04 NOTE — H&P ADULT - NSHPLABSRESULTS_GEN_ALL_CORE
.  LABS:                         14.3   12.73 )-----------( 248      ( 2023 16:55 )             44.8         141  |  106  |  13  ----------------------------<  112<H>  3.9   |  25  |  1.52<H>    Ca    8.6      2023 20:12  Phos  3.5       Mg     2.1         TPro  8.1  /  Alb  3.8  /  TBili  0.9  /  DBili  x   /  AST  31  /  ALT  16  /  AlkPhos  77      PT/INR - ( 2023 16:55 )   PT: 11.4 sec;   INR: 0.97          PTT - ( 2023 16:55 )  PTT:30.8 sec  Urinalysis Basic - ( 2023 16:55 )    Color: Yellow / Appearance: Clear / S.025 / pH: x  Gluc: x / Ketone: NEGATIVE  / Bili: NEGATIVE / Urobili: 1.0 E.U./dL   Blood: x / Protein: 30 mg/dL / Nitrite: NEGATIVE   Leuk Esterase: NEGATIVE / RBC: < 5 /HPF / WBC < 5 /HPF   Sq Epi: x / Non Sq Epi: 0-5 /HPF / Bacteria: Present /HPF          Serum Pro-Brain Natriuretic Peptide: 27 pg/mL ( @ 16:55)    Lactate, Blood: 1.9 mmoL/L ( @ 20:06)  Lactate, Blood: 3.7 mmoL/L ( @ 16:55)      RADIOLOGY, EKG & ADDITIONAL TESTS: Reviewed.

## 2023-01-04 NOTE — ED ADULT NURSE NOTE - OBJECTIVE STATEMENT
Patient presents to ED for ams s/p being found on the street somnolent. Patient presents aox4 at this time, endorses drinking 1/2 gallon of vodka and smoking marijuna. Denies any other drug use. Pt hypotensive on arrival with a 20g PIV placed by EMS. Patient upgraded by triage RN.

## 2023-01-04 NOTE — H&P ADULT - PROBLEM SELECTOR PLAN 2
Patient w/ 15 year history of 2 pints of vodka per day. Has been hospitalized for withdrawals at [facility] many times, does not recall when he was last hospitalized. Denies history of seizures or intubations. Last drink at 10 am on 1/4.   BAL on arrival to ED: 29     Plan:   - CIWA   - thiamine 250 mg   - folic acid 1 mg   - seizure precautions  - aspiration precautions 2.5 on admission, repleted in ED, repeat 3.9.     Plan:   - f/u am BMP, replete Cr 1.26 on admission, increased to 1.52 despite fluids (baseline 0.88). Hypotensive to 90/60 on admission, pressure normalized with fluids. Bladder scan showed no acute urinary retention. No known CKD.     Plan:   - trend Cr   - f/u [facility]

## 2023-01-04 NOTE — SBIRT NOTE ADULT - NSSBIRTDRGPOSREINDET_GEN_A_CORE
SW discussed SA use with patient and provided support. Patient open to community resources upon discharge.

## 2023-01-04 NOTE — H&P ADULT - PROBLEM SELECTOR PLAN 4
Patient w/ known history of hypertension. Recalls being on medication at some point but does not remember the name, has not taken medications for the past three months. /109 on arrival.     Plan:   - ntd now   - can consider IV hydral if Cr 1.26 on admission, increased to 1.52 despite fluids (baseline 0.88). Hypotensive on admission.     Plan:   - trend Cr   - f/u [facility] Patient found unconciouss by bystander on street, appeared to be acutely intoxicated w/ pinpoint pupils. Responsed to Narcan at [facility]. Patient denies opioid use and denies history of IV drug use.     Plan:   - s/p Narcan in ED   - not showing signs of acute opioid intoxication at this time    - monitor for signs of acute opiate withdrawal   - COWS ordered  - f/u urine toxicology , w/ Hb 14.3 - i/s/o alcohol abuse.     Plan:   - f/u B12   - alcohol cessation counseling

## 2023-01-05 DIAGNOSIS — E87.6 HYPOKALEMIA: ICD-10-CM

## 2023-01-05 DIAGNOSIS — N17.9 ACUTE KIDNEY FAILURE, UNSPECIFIED: ICD-10-CM

## 2023-01-05 DIAGNOSIS — Z00.00 ENCOUNTER FOR GENERAL ADULT MEDICAL EXAMINATION WITHOUT ABNORMAL FINDINGS: ICD-10-CM

## 2023-01-05 DIAGNOSIS — D75.89 OTHER SPECIFIED DISEASES OF BLOOD AND BLOOD-FORMING ORGANS: ICD-10-CM

## 2023-01-05 DIAGNOSIS — Z29.9 ENCOUNTER FOR PROPHYLACTIC MEASURES, UNSPECIFIED: ICD-10-CM

## 2023-01-05 DIAGNOSIS — K57.90 DIVERTICULOSIS OF INTESTINE, PART UNSPECIFIED, WITHOUT PERFORATION OR ABSCESS WITHOUT BLEEDING: ICD-10-CM

## 2023-01-05 DIAGNOSIS — I10 ESSENTIAL (PRIMARY) HYPERTENSION: ICD-10-CM

## 2023-01-05 DIAGNOSIS — R94.31 ABNORMAL ELECTROCARDIOGRAM [ECG] [EKG]: ICD-10-CM

## 2023-01-05 DIAGNOSIS — D53.9 NUTRITIONAL ANEMIA, UNSPECIFIED: ICD-10-CM

## 2023-01-05 DIAGNOSIS — Z86.718 PERSONAL HISTORY OF OTHER VENOUS THROMBOSIS AND EMBOLISM: ICD-10-CM

## 2023-01-05 DIAGNOSIS — F11.129 OPIOID ABUSE WITH INTOXICATION, UNSPECIFIED: ICD-10-CM

## 2023-01-05 DIAGNOSIS — Z86.711 PERSONAL HISTORY OF PULMONARY EMBOLISM: ICD-10-CM

## 2023-01-05 DIAGNOSIS — F10.10 ALCOHOL ABUSE, UNCOMPLICATED: ICD-10-CM

## 2023-01-05 DIAGNOSIS — R41.82 ALTERED MENTAL STATUS, UNSPECIFIED: ICD-10-CM

## 2023-01-05 DIAGNOSIS — Z86.73 PERSONAL HISTORY OF TRANSIENT ISCHEMIC ATTACK (TIA), AND CEREBRAL INFARCTION WITHOUT RESIDUAL DEFICITS: ICD-10-CM

## 2023-01-05 DIAGNOSIS — Z91.89 OTHER SPECIFIED PERSONAL RISK FACTORS, NOT ELSEWHERE CLASSIFIED: ICD-10-CM

## 2023-01-05 LAB
ALBUMIN SERPL ELPH-MCNC: 3.5 G/DL — SIGNIFICANT CHANGE UP (ref 3.3–5)
ALP SERPL-CCNC: 70 U/L — SIGNIFICANT CHANGE UP (ref 40–120)
ALT FLD-CCNC: 12 U/L — SIGNIFICANT CHANGE UP (ref 10–45)
AMPHET UR-MCNC: POSITIVE
ANION GAP SERPL CALC-SCNC: 12 MMOL/L — SIGNIFICANT CHANGE UP (ref 5–17)
AST SERPL-CCNC: 25 U/L — SIGNIFICANT CHANGE UP (ref 10–40)
BARBITURATES UR SCN-MCNC: NEGATIVE — SIGNIFICANT CHANGE UP
BENZODIAZ UR-MCNC: NEGATIVE — SIGNIFICANT CHANGE UP
BILIRUB SERPL-MCNC: 1.7 MG/DL — HIGH (ref 0.2–1.2)
BUN SERPL-MCNC: 10 MG/DL — SIGNIFICANT CHANGE UP (ref 7–23)
CALCIUM SERPL-MCNC: 8.3 MG/DL — LOW (ref 8.4–10.5)
CHLORIDE SERPL-SCNC: 106 MMOL/L — SIGNIFICANT CHANGE UP (ref 96–108)
CO2 SERPL-SCNC: 24 MMOL/L — SIGNIFICANT CHANGE UP (ref 22–31)
COCAINE METAB.OTHER UR-MCNC: POSITIVE
CREAT ?TM UR-MCNC: 302 MG/DL — SIGNIFICANT CHANGE UP
CREAT SERPL-MCNC: 1.07 MG/DL — SIGNIFICANT CHANGE UP (ref 0.5–1.3)
EGFR: 79 ML/MIN/1.73M2 — SIGNIFICANT CHANGE UP
GLUCOSE BLDC GLUCOMTR-MCNC: 125 MG/DL — HIGH (ref 70–99)
GLUCOSE SERPL-MCNC: 91 MG/DL — SIGNIFICANT CHANGE UP (ref 70–99)
HCV AB S/CO SERPL IA: 0.04 S/CO — SIGNIFICANT CHANGE UP
HCV AB SERPL-IMP: SIGNIFICANT CHANGE UP
METHADONE UR-MCNC: NEGATIVE — SIGNIFICANT CHANGE UP
OPIATES UR-MCNC: NEGATIVE — SIGNIFICANT CHANGE UP
PCP SPEC-MCNC: SIGNIFICANT CHANGE UP
PCP UR-MCNC: POSITIVE
POTASSIUM SERPL-MCNC: 3.6 MMOL/L — SIGNIFICANT CHANGE UP (ref 3.5–5.3)
POTASSIUM SERPL-SCNC: 3.6 MMOL/L — SIGNIFICANT CHANGE UP (ref 3.5–5.3)
PROT SERPL-MCNC: 6.7 G/DL — SIGNIFICANT CHANGE UP (ref 6–8.3)
SODIUM SERPL-SCNC: 142 MMOL/L — SIGNIFICANT CHANGE UP (ref 135–145)
SODIUM UR-SCNC: 110 MMOL/L — SIGNIFICANT CHANGE UP
THC UR QL: POSITIVE

## 2023-01-05 PROCEDURE — 93970 EXTREMITY STUDY: CPT | Mod: 26

## 2023-01-05 PROCEDURE — 93306 TTE W/DOPPLER COMPLETE: CPT | Mod: 26

## 2023-01-05 PROCEDURE — 0042T: CPT

## 2023-01-05 PROCEDURE — 99232 SBSQ HOSP IP/OBS MODERATE 35: CPT | Mod: GC

## 2023-01-05 PROCEDURE — 70498 CT ANGIOGRAPHY NECK: CPT | Mod: 26

## 2023-01-05 PROCEDURE — 70496 CT ANGIOGRAPHY HEAD: CPT | Mod: 26

## 2023-01-05 RX ORDER — PROCHLORPERAZINE MALEATE 5 MG
10 TABLET ORAL EVERY 8 HOURS
Refills: 0 | Status: DISCONTINUED | OUTPATIENT
Start: 2023-01-05 | End: 2023-01-05

## 2023-01-05 RX ORDER — THIAMINE MONONITRATE (VIT B1) 100 MG
250 TABLET ORAL EVERY 24 HOURS
Refills: 0 | Status: DISCONTINUED | OUTPATIENT
Start: 2023-01-05 | End: 2023-01-05

## 2023-01-05 RX ORDER — ASPIRIN/CALCIUM CARB/MAGNESIUM 324 MG
81 TABLET ORAL DAILY
Refills: 0 | Status: DISCONTINUED | OUTPATIENT
Start: 2023-01-05 | End: 2023-01-11

## 2023-01-05 RX ORDER — CLOPIDOGREL BISULFATE 75 MG/1
75 TABLET, FILM COATED ORAL EVERY 24 HOURS
Refills: 0 | Status: DISCONTINUED | OUTPATIENT
Start: 2023-01-05 | End: 2023-01-11

## 2023-01-05 RX ORDER — THIAMINE MONONITRATE (VIT B1) 100 MG
250 TABLET ORAL EVERY 24 HOURS
Refills: 0 | Status: DISCONTINUED | OUTPATIENT
Start: 2023-01-05 | End: 2023-01-11

## 2023-01-05 RX ORDER — ENOXAPARIN SODIUM 100 MG/ML
40 INJECTION SUBCUTANEOUS EVERY 24 HOURS
Refills: 0 | Status: DISCONTINUED | OUTPATIENT
Start: 2023-01-05 | End: 2023-01-11

## 2023-01-05 RX ORDER — HYDROXYZINE HCL 10 MG
50 TABLET ORAL EVERY 4 HOURS
Refills: 0 | Status: DISCONTINUED | OUTPATIENT
Start: 2023-01-05 | End: 2023-01-05

## 2023-01-05 RX ORDER — FOLIC ACID 0.8 MG
1 TABLET ORAL ONCE
Refills: 0 | Status: COMPLETED | OUTPATIENT
Start: 2023-01-05 | End: 2023-01-05

## 2023-01-05 RX ORDER — LOPERAMIDE HCL 2 MG
2 TABLET ORAL EVERY 4 HOURS
Refills: 0 | Status: DISCONTINUED | OUTPATIENT
Start: 2023-01-05 | End: 2023-01-05

## 2023-01-05 RX ORDER — ACETAMINOPHEN 500 MG
650 TABLET ORAL EVERY 6 HOURS
Refills: 0 | Status: DISCONTINUED | OUTPATIENT
Start: 2023-01-05 | End: 2023-01-11

## 2023-01-05 RX ADMIN — ENOXAPARIN SODIUM 40 MILLIGRAM(S): 100 INJECTION SUBCUTANEOUS at 11:25

## 2023-01-05 RX ADMIN — Medication 81 MILLIGRAM(S): at 19:14

## 2023-01-05 RX ADMIN — Medication 1 MILLIGRAM(S): at 05:59

## 2023-01-05 RX ADMIN — Medication 102.5 MILLIGRAM(S): at 05:59

## 2023-01-05 RX ADMIN — CLOPIDOGREL BISULFATE 75 MILLIGRAM(S): 75 TABLET, FILM COATED ORAL at 19:14

## 2023-01-05 RX ADMIN — Medication 100 GRAM(S): at 00:23

## 2023-01-05 RX ADMIN — Medication 650 MILLIGRAM(S): at 17:03

## 2023-01-05 NOTE — PROGRESS NOTE ADULT - PROBLEM SELECTOR PLAN 10
Self reported by patient, does not remember when this was. On outpatient med reviews, appears to have taken coumadin for some time as well as xarelto. Patient on room air, HR normal.    Plan:   - lovenox 40 mg subq  - f/u LE dopplers EKG QTc 443  Resolved

## 2023-01-05 NOTE — PROGRESS NOTE ADULT - PROBLEM SELECTOR PLAN 6
Patient found obtunded in the street after consuming 2 pints of vodka and smoking marijuana. Patient unable to recall events, received Narcan in ED at which time patient responded and patient was A&Ox3 upon arrival to [facility]. CT head negative for acute intracranial pathology. No metabolic causes for AMS on labs other than acute drug use.     Plan:   - UTox positive for Amphetamine, PCP, THC, and cocaine

## 2023-01-05 NOTE — PROGRESS NOTE ADULT - SUBJECTIVE AND OBJECTIVE BOX
Medicine Progress Note    SUBJECTIVE / OVERNIGHT EVENTS:  O/N events:  Patient was seen and examined at bedside.  Otherwise negative ROS    MEDICATIONS  (STANDING):  enoxaparin Injectable 40 milliGRAM(s) SubCutaneous every 24 hours  influenza   Vaccine 0.5 milliLiter(s) IntraMuscular once  thiamine IVPB 250 milliGRAM(s) IV Intermittent every 24 hours    MEDICATIONS  (PRN):  aluminum hydroxide/magnesium hydroxide/simethicone Suspension 30 milliLiter(s) Oral every 4 hours PRN Dyspepsia  LORazepam   Injectable 2 milliGRAM(s) IV Push once PRN CIWA >8  LORazepam   Injectable 1 milliGRAM(s) IV Push every 1 hour PRN CIWA-Ar score 8 or greater    CAPILLARY BLOOD GLUCOSE      POCT Blood Glucose.: 103 mg/dL (2023 20:28)  POCT Blood Glucose.: 112 mg/dL (2023 16:55)        OBJECTIVE:  Vital Signs Last 24 Hrs  T(C): 36.6 (2023 12:25), Max: 37.2 (2023 07:18)  T(F): 97.9 (2023 12:25), Max: 98.9 (2023 07:18)  HR: 85 (2023 12:25) (70 - 96)  BP: 153/99 (2023 12:25) (92/52 - 163/85)  BP(mean): 102 (2023 09:02) (101 - 116)  RR: 19 (2023 12:25) (18 - 19)  SpO2: 98% (2023 12:25) (93% - 100%)    Parameters below as of 2023 12:25  Patient On (Oxygen Delivery Method): room air        PHYSICAL EXAM:  General: AAOx3, NAD, euvolemic  Head: NC/AT; MMM; PERRL  Neck: Supple; no JVD  Respiratory: CTAB; no wheezes/rales/rhonchi  Cardiovascular: Regular rhythm/rate; S1/S2+, no m/r/g auscultated  Gastrointestinal: Soft; NTND; bowel sounds normal and present in all 4 quadrants  :   Extremities: WWP; no b/l U/E edema, no b/l L/E edema  Neurological: CNII-XII grossly intact; no obvious focal deficits  I&O's Summary    2023 07:01  -  2023 07:00  --------------------------------------------------------  IN: 0 mL / OUT: 200 mL / NET: -200 mL        LABS:                        14.3   12.73 )-----------( 248      ( 2023 16:55 )             44.8     01-    142  |  106  |  10  ----------------------------<  91  3.6   |  24  |  1.07    Ca    8.3<L>      2023 05:30  Phos  3.5     01-04  Mg     2.1     -04    TPro  6.7  /  Alb  3.5  /  TBili  1.7<H>  /  DBili  x   /  AST  25  /  ALT  12  /  AlkPhos  70  01-05    PT/INR - ( 2023 16:55 )   PT: 11.4 sec;   INR: 0.97          PTT - ( 2023 16:55 )  PTT:30.8 sec      Urinalysis Basic - ( 2023 16:55 )    Color: Yellow / Appearance: Clear / S.025 / pH: x  Gluc: x / Ketone: NEGATIVE  / Bili: NEGATIVE / Urobili: 1.0 E.U./dL   Blood: x / Protein: 30 mg/dL / Nitrite: NEGATIVE   Leuk Esterase: NEGATIVE / RBC: < 5 /HPF / WBC < 5 /HPF   Sq Epi: x / Non Sq Epi: 0-5 /HPF / Bacteria: Present /HPF            RADIOLOGY & ADDITIONAL TESTS:  Imaging from Last 24 Hours: Medicine Progress Note    SUBJECTIVE / OVERNIGHT EVENTS:  O/N events: [place]  Patient was seen and examined at bedside reporting stable L facial, arm shoulder, leg numbness since this morning. No facial droop, pronator drift.   Otherwise negative ROS    MEDICATIONS  (STANDING):  enoxaparin Injectable 40 milliGRAM(s) SubCutaneous every 24 hours  influenza   Vaccine 0.5 milliLiter(s) IntraMuscular once  thiamine IVPB 250 milliGRAM(s) IV Intermittent every 24 hours    MEDICATIONS  (PRN):  aluminum hydroxide/magnesium hydroxide/simethicone Suspension 30 milliLiter(s) Oral every 4 hours PRN Dyspepsia  LORazepam   Injectable 2 milliGRAM(s) IV Push once PRN CIWA >8  LORazepam   Injectable 1 milliGRAM(s) IV Push every 1 hour PRN CIWA-Ar score 8 or greater    CAPILLARY BLOOD GLUCOSE      POCT Blood Glucose.: 103 mg/dL (2023 20:28)  POCT Blood Glucose.: 112 mg/dL (2023 16:55)        OBJECTIVE:  Vital Signs Last 24 Hrs  T(C): 36.6 (2023 12:25), Max: 37.2 (2023 07:18)  T(F): 97.9 (2023 12:25), Max: 98.9 (2023 07:18)  HR: 85 (2023 12:25) (70 - 96)  BP: 153/99 (2023 12:25) (92/52 - 163/85)  BP(mean): 102 (2023 09:02) (101 - 116)  RR: 19 (2023 12:25) (18 - 19)  SpO2: 98% (2023 12:25) (93% - 100%)    Parameters below as of 2023 12:25  Patient On (Oxygen Delivery Method): room air      PHYSICAL EXAM:  General: AAOx3, NAD, euvolemic  Head: NC/AT; MMM; PERRL  Neck: Supple; no JVD  Respiratory: CTAB; no wheezes/rales/rhonchi  Cardiovascular: Regular rhythm/rate; S1/S2+, no m/r/g auscultated  Gastrointestinal: Soft; NTND; bowel sounds normal and present in all 4 quadrants  Extremities: WWP; no b/l U/E edema, no b/l L/E edema  Neurological: CNII-XII grossly intact; no obvious focal deficits, decreased sensation L face, L arm, L leg, 4/5 strength LLE, 5/5 RLE  I&O's Summary    2023 07:01  -  2023 07:00  --------------------------------------------------------  IN: 0 mL / OUT: 200 mL / NET: -200 mL        LABS:                        14.3   12.73 )-----------( 248      ( 2023 16:55 )             44.8     -    142  |  106  |  10  ----------------------------<  91  3.6   |  24  |  1.07    Ca    8.3<L>      2023 05:30  Phos  3.5     -  Mg     2.1     -    TPro  6.7  /  Alb  3.5  /  TBili  1.7<H>  /  DBili  x   /  AST  25  /  ALT  12  /  AlkPhos  70  01-05    PT/INR - ( 2023 16:55 )   PT: 11.4 sec;   INR: 0.97          PTT - ( 2023 16:55 )  PTT:30.8 sec      Urinalysis Basic - ( 2023 16:55 )    Color: Yellow / Appearance: Clear / S.025 / pH: x  Gluc: x / Ketone: NEGATIVE  / Bili: NEGATIVE / Urobili: 1.0 E.U./dL   Blood: x / Protein: 30 mg/dL / Nitrite: NEGATIVE   Leuk Esterase: NEGATIVE / RBC: < 5 /HPF / WBC < 5 /HPF   Sq Epi: x / Non Sq Epi: 0-5 /HPF / Bacteria: Present /HPF            RADIOLOGY & ADDITIONAL TESTS:  Imaging from Last 24 Hours:

## 2023-01-05 NOTE — PROGRESS NOTE ADULT - SUBJECTIVE AND OBJECTIVE BOX
---STEPDOWN FROM [facility] TO [facility]----  Hospital Course:    Incomplete..    O/N Events:    Subjective/ROS: Patient seen and examined at bedside.     Denies Fever/Chills, HA, CP, SOB, n/v, changes in bowel/urinary habits.  12pt ROS otherwise negative.    VITALS  Vital Signs Last 24 Hrs  T(C): 36.7 (2023 10:23), Max: 37.2 (2023 07:18)  T(F): 98 (2023 10:23), Max: 98.9 (2023 07:18)  HR: 70 (2023 09:02) (70 - 96)  BP: 135/81 (2023 09:02) (92/52 - 163/85)  BP(mean): 102 (2023 09:02) (101 - 116)  RR: 19 (2023 09:02) (18 - 19)  SpO2: 93% (2023 09:02) (93% - 100%)    Parameters below as of 2023 09:02  Patient On (Oxygen Delivery Method): room air        CAPILLARY BLOOD GLUCOSE      POCT Blood Glucose.: 103 mg/dL (2023 20:28)  POCT Blood Glucose.: 112 mg/dL (2023 16:55)      PHYSICAL EXAM  General: NAD  Head: NC/AT; MMM; PERRL; EOMI;  Neck: Supple; no JVD  Respiratory: CTAB; no wheezes/rales/rhonchi  Cardiovascular: Regular rhythm/rate; S1/S2+, no murmurs, rubs gallops   Gastrointestinal: Soft; NTND; bowel sounds normal and present  Extremities: WWP; no edema/cyanosis  Neurological: A&Ox3, CNII-XII grossly intact; no obvious focal deficits    MEDICATIONS  (STANDING):  enoxaparin Injectable 40 milliGRAM(s) SubCutaneous every 24 hours  influenza   Vaccine 0.5 milliLiter(s) IntraMuscular once  thiamine IVPB 250 milliGRAM(s) IV Intermittent every 24 hours    MEDICATIONS  (PRN):  aluminum hydroxide/magnesium hydroxide/simethicone Suspension 30 milliLiter(s) Oral every 4 hours PRN Dyspepsia  LORazepam   Injectable 2 milliGRAM(s) IV Push once PRN CIWA >8  LORazepam   Injectable 1 milliGRAM(s) IV Push every 1 hour PRN CIWA-Ar score 8 or greater      codeine (Unknown)      LABS                        14.3   12.73 )-----------( 248      ( 2023 16:55 )             44.8     -    142  |  106  |  10  ----------------------------<  91  3.6   |  24  |  1.07    Ca    8.3<L>      2023 05:30  Phos  3.5     01-04  Mg     2.1     -04    TPro  6.7  /  Alb  3.5  /  TBili  1.7<H>  /  DBili  x   /  AST  25  /  ALT  12  /  AlkPhos  70  01-05    PT/INR - ( 2023 16:55 )   PT: 11.4 sec;   INR: 0.97          PTT - ( 2023 16:55 )  PTT:30.8 sec  Urinalysis Basic - ( 2023 16:55 )    Color: Yellow / Appearance: Clear / S.025 / pH: x  Gluc: x / Ketone: NEGATIVE  / Bili: NEGATIVE / Urobili: 1.0 E.U./dL   Blood: x / Protein: 30 mg/dL / Nitrite: NEGATIVE   Leuk Esterase: NEGATIVE / RBC: < 5 /HPF / WBC < 5 /HPF   Sq Epi: x / Non Sq Epi: 0-5 /HPF / Bacteria: Present /HPF              IMAGING/EKG/ETC   ---STEPDOWN FROM [facility] TO [facility]----  Hospital Course:  Patient is a 63 y/o M, undomiciled for the last 6 months, with pmhx of HTN, HLD, DVT in LLE, PE, three embolic strokes with L-sided deficits requiring a cane, gout, alcohol abuse (2 pints of vodka daily for 15 years), and alcohol abuse (UTox + for amphetamine, PCP, THC, and cocaine) with reported use of crack/cocaine and marijuana. Patient was found passed out on the street and BIBEMS, was given narcan and fluids, woke up in the ED unable to recall the events leading up to his presentation. Upon arrival he complained of CP, which resolved. Trops obtained at [facility] were negative x3. Patient seen at [facility] no longer complaining of CP, but complaining of R-sided numbness. CT head on admission showed no evidence of intracranial hemorrhage. CXR showed bibasilar atelectasis. ECHO was ordered. On admission, vitals were stable, labs were significant for K2.5, which was repleted and Mg 1.3, which was also repleted. Lactate 3.7->1.9, BAL 29. Patient is on CIWA protocol, last drink was 1/4. LE dopplers were ordered given hx of LLE DVT and PE as well as LLE warm on exam and slightly larger than RLE.     Subjective/ROS: Patient seen and examined at bedside.     Denies Fever/Chills, HA, CP, SOB, n/v, changes in bowel/urinary habits.  12pt ROS otherwise negative.    VITALS  Vital Signs Last 24 Hrs  T(C): 36.7 (2023 10:23), Max: 37.2 (2023 07:18)  T(F): 98 (2023 10:23), Max: 98.9 (2023 07:18)  HR: 70 (2023 09:02) (70 - 96)  BP: 135/81 (2023 09:02) (92/52 - 163/85)  BP(mean): 102 (2023 09:02) (101 - 116)  RR: 19 (2023 09:02) (18 - 19)  SpO2: 93% (2023 09:02) (93% - 100%)    Parameters below as of 2023 09:02  Patient On (Oxygen Delivery Method): room air        CAPILLARY BLOOD GLUCOSE      POCT Blood Glucose.: 103 mg/dL (2023 20:28)  POCT Blood Glucose.: 112 mg/dL (2023 16:55)      PHYSICAL EXAM  General: NAD  Head: NC/AT; MMM; PERRL; EOMI;  Neck: Supple; no JVD  Respiratory: CTAB; no wheezes/rales/rhonchi  Cardiovascular: Regular rhythm/rate; S1/S2+, no murmurs, rubs gallops   Gastrointestinal: Soft; NTND; bowel sounds normal and present  Extremities: WWP; no edema/cyanosis  Neurological: A&Ox3, CNII-XII grossly intact; no obvious focal deficits    MEDICATIONS  (STANDING):  enoxaparin Injectable 40 milliGRAM(s) SubCutaneous every 24 hours  influenza   Vaccine 0.5 milliLiter(s) IntraMuscular once  thiamine IVPB 250 milliGRAM(s) IV Intermittent every 24 hours    MEDICATIONS  (PRN):  aluminum hydroxide/magnesium hydroxide/simethicone Suspension 30 milliLiter(s) Oral every 4 hours PRN Dyspepsia  LORazepam   Injectable 2 milliGRAM(s) IV Push once PRN CIWA >8  LORazepam   Injectable 1 milliGRAM(s) IV Push every 1 hour PRN CIWA-Ar score 8 or greater      codeine (Unknown)      LABS                        14.3   12.73 )-----------( 248      ( 2023 16:55 )             44.8     01-05    142  |  106  |  10  ----------------------------<  91  3.6   |  24  |  1.07    Ca    8.3<L>      2023 05:30  Phos  3.5     01-04  Mg     2.1     01-04    TPro  6.7  /  Alb  3.5  /  TBili  1.7<H>  /  DBili  x   /  AST  25  /  ALT  12  /  AlkPhos  70  01-05    PT/INR - ( 2023 16:55 )   PT: 11.4 sec;   INR: 0.97          PTT - ( 2023 16:55 )  PTT:30.8 sec  Urinalysis Basic - ( 2023 16:55 )    Color: Yellow / Appearance: Clear / S.025 / pH: x  Gluc: x / Ketone: NEGATIVE  / Bili: NEGATIVE / Urobili: 1.0 E.U./dL   Blood: x / Protein: 30 mg/dL / Nitrite: NEGATIVE   Leuk Esterase: NEGATIVE / RBC: < 5 /HPF / WBC < 5 /HPF   Sq Epi: x / Non Sq Epi: 0-5 /HPF / Bacteria: Present /HPF              IMAGING/EKG/ETC   ---STEPDOWN FROM [facility] TO [facility]----  Hospital Course:    Patient is a 61 y/o M, undomiciled for the last 6 months, with pmhx of HTN, HLD, DVT in LLE, PE, three embolic strokes with L-sided deficits requiring a cane, gout, alcohol abuse (2 pints of vodka daily for 15 years), and alcohol abuse (UTox + for amphetamine, PCP, THC, and cocaine) with reported use of crack/cocaine and marijuana. Patient was found passed out on the street and BIBEMS, was given narcan and fluids, woke up in the ED unable to recall the events leading up to his presentation. Upon arrival he complained of CP, which resolved. Trops obtained at [facility] were negative x3. Patient seen at [facility] no longer complaining of CP, but complaining of R-sided numbness. CT head on admission showed no evidence of intracranial hemorrhage. CXR showed bibasilar atelectasis. ECHO was ordered. On admission, vitals were stable, labs were significant for K2.5, which was repleted and Mg 1.3, which was also repleted. Lactate 3.7->1.9, BAL 29. Patient is on CIWA protocol, last drink was 1/4. LE dopplers were ordered given hx of LLE DVT and PE as well as LLE warm on exam and slightly larger than RLE.     Subjective/ROS: Patient seen and examined at bedside.     Denies Fever/Chills, HA, CP, SOB, n/v, changes in bowel/urinary habits.  12pt ROS otherwise negative.    VITALS  Vital Signs Last 24 Hrs  T(C): 36.7 (2023 10:23), Max: 37.2 (2023 07:18)  T(F): 98 (2023 10:23), Max: 98.9 (2023 07:18)  HR: 70 (2023 09:02) (70 - 96)  BP: 135/81 (2023 09:02) (92/52 - 163/85)  BP(mean): 102 (2023 09:02) (101 - 116)  RR: 19 (2023 09:02) (18 - 19)  SpO2: 93% (2023 09:02) (93% - 100%)    Parameters below as of 2023 09:02  Patient On (Oxygen Delivery Method): room air        CAPILLARY BLOOD GLUCOSE      POCT Blood Glucose.: 103 mg/dL (2023 20:28)  POCT Blood Glucose.: 112 mg/dL (2023 16:55)      PHYSICAL EXAM  General: NAD  Head: NC/AT; MMM; PERRL; EOMI;  Neck: Supple; no JVD  Respiratory: CTAB; no wheezes/rales/rhonchi  Cardiovascular: Regular rhythm/rate; S1/S2+, no murmurs, rubs gallops   Gastrointestinal: Soft; NTND; bowel sounds normal and present  Extremities: WWP; no edema/cyanosis  Neurological: A&Ox3, CNII-XII grossly intact; no obvious focal deficits    MEDICATIONS  (STANDING):  enoxaparin Injectable 40 milliGRAM(s) SubCutaneous every 24 hours  influenza   Vaccine 0.5 milliLiter(s) IntraMuscular once  thiamine IVPB 250 milliGRAM(s) IV Intermittent every 24 hours    MEDICATIONS  (PRN):  aluminum hydroxide/magnesium hydroxide/simethicone Suspension 30 milliLiter(s) Oral every 4 hours PRN Dyspepsia  LORazepam   Injectable 2 milliGRAM(s) IV Push once PRN CIWA >8  LORazepam   Injectable 1 milliGRAM(s) IV Push every 1 hour PRN CIWA-Ar score 8 or greater      codeine (Unknown)      LABS                        14.3   12.73 )-----------( 248      ( 2023 16:55 )             44.8     01-05    142  |  106  |  10  ----------------------------<  91  3.6   |  24  |  1.07    Ca    8.3<L>      2023 05:30  Phos  3.5     01-04  Mg     2.1     01-04    TPro  6.7  /  Alb  3.5  /  TBili  1.7<H>  /  DBili  x   /  AST  25  /  ALT  12  /  AlkPhos  70  01-05    PT/INR - ( 2023 16:55 )   PT: 11.4 sec;   INR: 0.97          PTT - ( 2023 16:55 )  PTT:30.8 sec  Urinalysis Basic - ( 2023 16:55 )    Color: Yellow / Appearance: Clear / S.025 / pH: x  Gluc: x / Ketone: NEGATIVE  / Bili: NEGATIVE / Urobili: 1.0 E.U./dL   Blood: x / Protein: 30 mg/dL / Nitrite: NEGATIVE   Leuk Esterase: NEGATIVE / RBC: < 5 /HPF / WBC < 5 /HPF   Sq Epi: x / Non Sq Epi: 0-5 /HPF / Bacteria: Present /HPF              IMAGING/EKG/ETC   ---STEPDOWN FROM [facility] TO [facility]----  Hospital Course:    Patient is a 61 y/o M, undomiciled for the last 6 months, with pmhx of HTN, HLD, DVT in LLE, PE, three embolic strokes with L-sided deficits requiring a cane, gout, alcohol abuse (2 pints of vodka daily for 15 years), and alcohol abuse (UTox + for amphetamine, PCP, THC, and cocaine) with reported use of crack/cocaine and marijuana. Patient was found passed out on the street and BIBEMS, was given narcan and fluids, woke up in the ED unable to recall the events leading up to his presentation. Upon arrival he complained of CP, which resolved. Trops obtained at [facility] were negative x3. Patient seen at [facility] no longer complaining of CP, but complaining of R-sided numbness. CT head on admission showed no evidence of intracranial hemorrhage. CXR showed bibasilar atelectasis. ECHO was ordered. On admission, vitals were stable, labs were significant for K2.5, which was repleted and Mg 1.3, which was also repleted. Lactate 3.7->1.9, BAL 29. Patient is on CIWA protocol, last drink was 1/4. LE dopplers were ordered given hx of LLE DVT and PE as well as LLE warm on exam and slightly larger than RLE. Patient is stable for stepdown to [facility].     Subjective/ROS: Patient seen and examined at bedside.     Denies Fever/Chills, HA, CP, SOB, n/v, changes in bowel/urinary habits.  12pt ROS otherwise negative.    VITALS  Vital Signs Last 24 Hrs  T(C): 36.7 (2023 10:23), Max: 37.2 (2023 07:18)  T(F): 98 (2023 10:23), Max: 98.9 (2023 07:18)  HR: 70 (2023 09:02) (70 - 96)  BP: 135/81 (2023 09:02) (92/52 - 163/85)  BP(mean): 102 (2023 09:02) (101 - 116)  RR: 19 (2023 09:02) (18 - 19)  SpO2: 93% (2023 09:02) (93% - 100%)    Parameters below as of 2023 09:02  Patient On (Oxygen Delivery Method): room air        CAPILLARY BLOOD GLUCOSE      POCT Blood Glucose.: 103 mg/dL (2023 20:28)  POCT Blood Glucose.: 112 mg/dL (2023 16:55)      PHYSICAL EXAM  General: NAD  Head: NC/AT; MMM; PERRL; EOMI;  Neck: Supple; no JVD  Respiratory: CTAB; no wheezes/rales/rhonchi  Cardiovascular: Regular rhythm/rate; S1/S2+, no murmurs, rubs gallops   Gastrointestinal: Soft; NTND; bowel sounds normal and present  Extremities: WWP; no edema/cyanosis  Neurological: A&Ox3, CNII-XII grossly intact; no obvious focal deficits    MEDICATIONS  (STANDING):  enoxaparin Injectable 40 milliGRAM(s) SubCutaneous every 24 hours  influenza   Vaccine 0.5 milliLiter(s) IntraMuscular once  thiamine IVPB 250 milliGRAM(s) IV Intermittent every 24 hours    MEDICATIONS  (PRN):  aluminum hydroxide/magnesium hydroxide/simethicone Suspension 30 milliLiter(s) Oral every 4 hours PRN Dyspepsia  LORazepam   Injectable 2 milliGRAM(s) IV Push once PRN CIWA >8  LORazepam   Injectable 1 milliGRAM(s) IV Push every 1 hour PRN CIWA-Ar score 8 or greater      codeine (Unknown)      LABS                        14.3   12.73 )-----------( 248      ( 2023 16:55 )             44.8     -    142  |  106  |  10  ----------------------------<  91  3.6   |  24  |  1.07    Ca    8.3<L>      2023 05:30  Phos  3.5     01-04  Mg     2.1     -04    TPro  6.7  /  Alb  3.5  /  TBili  1.7<H>  /  DBili  x   /  AST  25  /  ALT  12  /  AlkPhos  70  01-05    PT/INR - ( 2023 16:55 )   PT: 11.4 sec;   INR: 0.97          PTT - ( 2023 16:55 )  PTT:30.8 sec  Urinalysis Basic - ( 2023 16:55 )    Color: Yellow / Appearance: Clear / S.025 / pH: x  Gluc: x / Ketone: NEGATIVE  / Bili: NEGATIVE / Urobili: 1.0 E.U./dL   Blood: x / Protein: 30 mg/dL / Nitrite: NEGATIVE   Leuk Esterase: NEGATIVE / RBC: < 5 /HPF / WBC < 5 /HPF   Sq Epi: x / Non Sq Epi: 0-5 /HPF / Bacteria: Present /HPF              IMAGING/EKG/ETC

## 2023-01-05 NOTE — PROGRESS NOTE ADULT - PROBLEM SELECTOR PLAN 11
Again, patient notes that he has had DVT's in the past. Does not remember when. No leg swelling or edema.      Plan:   - f/u LE dopplers   - lovenox 40 subq Hx of PE/DVT per patient, does not remember when this was. On outpatient med reviews, appears to have taken coumadin for some time as well as xarelto. Patient on room air, HR normal.    Plan:   - lovenox 40 mg subq  - f/u LE dopplers

## 2023-01-05 NOTE — PROGRESS NOTE ADULT - PROBLEM SELECTOR PLAN 6
Patient found obtunded in the street after consuming 2 pints of vodka and smoking marijuana. Patient unable to recall events, received Narcan in ED at which time patient responded and patient was A&Ox3 upon arrival to [facility]. CT head negative for acute intracranial pathology. No metabolic causes for AMS on labs other than acute drug use.     Plan:   - UTox positive for Amphetamine, PCP, THC, and cocaine , w/ Hb 14.3 - i/s/o alcohol abuse.     Plan:   - f/u B12   - alcohol cessation counseling

## 2023-01-05 NOTE — PROGRESS NOTE ADULT - PROBLEM SELECTOR PLAN 8
Patient w/ known history of hypertension. Recalls being on medication at some point but does not remember the name, has not taken medications for the past three months. /109 on arrival.     Plan:   - continue to monitor BP for now  - patient cannot recall his PCP name but states that he sees a doctor at a [facility] clinic on [address]

## 2023-01-05 NOTE — PROGRESS NOTE ADULT - PROBLEM SELECTOR PLAN 12
CT head:  Stable chronic lacunar infarcts right subinsular region and left basal ganglia.. Per patient, has left sided motor impairment since stroke. Has 5/5 strength RUE,LUE, RLE, 4/5 LLE.     Plan:   - EKG in AM, no Afib on admission   - Lovenox 40 mg subq    #HCM  F: none, s/p 2L in ED  E: K>4, Mg>2  N: NPO  DVT: Lovenox subq   CODE: full code

## 2023-01-05 NOTE — PROGRESS NOTE ADULT - PROBLEM SELECTOR PLAN 7
High concern for significant CAD given patient's history of chest pain and ED visit on 11/23, w/ repeat chest pain on arrival to [facility]. CTA ordered on 11/23 which was never completed.  Patient does not have chest pain at this time, trop I in ED: 10. 7.   Patient does endorse cocaine use night before admission.     Plan:   - consider cardiology consult   - f/u echo (ordered)   - AM trop T   - obtain EKG Patient found obtunded in the street after consuming 2 pints of vodka and smoking marijuana. Patient unable to recall events, received Narcan in ED at which time patient responded and patient was A&Ox3 upon arrival to [facility]. CT head negative for acute intracranial pathology. No metabolic causes for AMS on labs other than acute drug use.     Plan:   - UTox positive for Amphetamine, PCP, THC, and cocaine

## 2023-01-05 NOTE — PROGRESS NOTE ADULT - PROBLEM SELECTOR PLAN 10
Self reported by patient, does not remember when this was. On outpatient med reviews, appears to have taken coumadin for some time as well as xarelto. Patient on room air, HR normal.    Plan:   - lovenox 40 mg subq  - f/u LE dopplers

## 2023-01-05 NOTE — PROGRESS NOTE ADULT - PROBLEM SELECTOR PLAN 11
Again, patient notes that he has had DVT's in the past. Does not remember when. No leg swelling or edema.      Plan:   - f/u LE dopplers   - lovenox 40 subq

## 2023-01-05 NOTE — PROGRESS NOTE ADULT - PROBLEM SELECTOR PLAN 3
2.5 on admission, repleted in ED, repeat 3.9.     Plan:   - f/u am BMP, replete Patient found unconcious by bystander on street, appeared to be acutely intoxicated w/ pinpoint pupils. Responded to Narcan at [facility]. Patient denies opioid use and denies history of IV drug use.     - s/p Narcan in ED   - not showing signs of acute opioid intoxication at this time    - COWS 0  - Utox +ve for cocaine, THC, methamphetamine, PCP

## 2023-01-05 NOTE — PROGRESS NOTE ADULT - PROBLEM SELECTOR PLAN 2
Cr 1.26 on admission, increased to 1.52 despite fluids (baseline 0.88). Hypotensive to 90/60 on admission, pressure normalized with fluids. Bladder scan showed no acute urinary retention. No known CKD.     Plan:   - trend Cr   - f/u Ulytes

## 2023-01-05 NOTE — PROGRESS NOTE ADULT - PROBLEM SELECTOR PLAN 12
CT head:  Stable chronic lacunar infarcts right subinsular region and left basal ganglia.. Per patient, has left sided motor impairment since stroke. Has 5/5 strength RUE,LUE, RLE, 4/5 LLE.     Plan:   - EKG in AM, no Afib on admission   - Lovenox 40 mg subq    #HCM  F: none, s/p 2L in ED  E: K>4, Mg>2  N: NPO  DVT: Lovenox subq   CODE: full code DVT Prophylaxis: Lovenox 40 sq  E: Replete as necessary K>4 Mg>2  N: NPO   GI prophylaxis:  pantoprazole 40mg  Bowel regimen:   CODE STATUS: Full  DISPO: RMF

## 2023-01-05 NOTE — PROGRESS NOTE ADULT - PROBLEM SELECTOR PLAN 9
Multiple ekg's showing prolonged QT interval    Plan:   - continue to replete electrolytes as needed  - do not give QT prolonging medications Patient w/ known history of hypertension. Recalls being on medication at some point but does not remember the name, has not taken medications for the past three months. /109 on arrival.     Plan:   - continue to monitor BP for now  - patient cannot recall his PCP name but states that he sees a doctor at a [facility] clinic on [address]

## 2023-01-05 NOTE — PROGRESS NOTE ADULT - PROBLEM SELECTOR PLAN 5
Patient w/ 15 year history of 2 pints of vodka per day. Has been hospitalized for withdrawals at [facility] many times, does not recall when he was last hospitalized. Denies history of seizures or intubations. Last drink at 10 am on 1/4.   BAL on arrival to ED: 29     Plan:   - CIWA   - thiamine 250 mg   - folic acid 1 mg   - seizure precautions  - aspiration precautions 2.5 on admission, repleted in ED, repeat 3.9.   RESOLVED

## 2023-01-05 NOTE — PROGRESS NOTE ADULT - ASSESSMENT
Patient is a 62 year old male with a PMH of alcohol and drug abuse, PE, DVT, and stroke in the past, who was found obtunded 2/2 acute intoxication, admitted for acute opiate intoxication and alcohol withdrawal.

## 2023-01-05 NOTE — PROGRESS NOTE ADULT - PROBLEM SELECTOR PLAN 1
Patient found unconciouss by bystander on street, appeared to be acutely intoxicated w/ pinpoint pupils. Responded to Narcan at [facility]. Patient denies opioid use and denies history of IV drug use.     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 Pt has residual L sided weakness uses cane to ambulate. Pt syncopized yesterday likely 2/2 polysubstance abuse  Since this morning (unclear on time but before 10am) pt reports L sided hemiparesthesia from face down to foot. On exam, also slightly decreased L sided hand , L sided leg weakness.     - ncCTH negative, CTA head negative   - f/u ncMR head   - start ASA/plavix

## 2023-01-05 NOTE — PROGRESS NOTE ADULT - PROBLEM SELECTOR PLAN 2
Cr 1.26 on admission, increased to 1.52 despite fluids (baseline 0.88). Hypotensive to 90/60 on admission, pressure normalized with fluids. Bladder scan showed no acute urinary retention. No known CKD.     Plan:   - trend Cr   - f/u Ulytes Patient w/ 15 year history of 2 pints of vodka per day. Has been hospitalized for withdrawals at [facility] many times, does not recall when he was last hospitalized. Denies history of seizures or intubations. Last drink at 10 am on 1/4.   BAL on arrival to ED: 29     Plan:   - CIWA   - thiamine 250 mg   - folic acid 1 mg   - seizure precautions  - aspiration precautions

## 2023-01-05 NOTE — PROGRESS NOTE ADULT - PROBLEM SELECTOR PLAN 4
, w/ Hb 14.3 - i/s/o alcohol abuse.     Plan:   - f/u B12   - alcohol cessation counseling Cr 1.26 on admission, increased to 1.52 despite fluids (baseline 0.88). Hypotensive to 90/60 on admission, pressure normalized with fluids. Bladder scan showed no acute urinary retention. No known CKD.     Plan:   - trend Cr   - f/u Ulytes

## 2023-01-05 NOTE — PROGRESS NOTE ADULT - PROBLEM SELECTOR PLAN 1
Patient found unconciouss by bystander on street, appeared to be acutely intoxicated w/ pinpoint pupils. Responded to Narcan at [facility]. Patient denies opioid use and denies history of IV drug use.     - s/p Narcan in ED   - not showing signs of acute opioid intoxication at this time    - monitor for signs of acute opiate withdrawal   - COWS ordered  - Utox -ve for opiates

## 2023-01-05 NOTE — CONSULT NOTE ADULT - SUBJECTIVE AND OBJECTIVE BOX
**STROKE CODE CONSULT NOTE**    Last known well time/Time of onset of symptoms:    HPI: 62y Male with PMHx of     T(C): 36.6 (23 @ 12:25), Max: 37.2 (23 @ 07:18)  HR: 81 (23 @ 15:29) (70 - 89)  BP: 136/91 (23 @ 15:29) (135/81 - 163/85)  RR: 16 (23 @ 15:29) (16 - 19)  SpO2: 95% (23 @ 15:29) (93% - 100%)    PAST MEDICAL & SURGICAL HISTORY:  Diverticulosis of large intestine  Diverticulosis      Acute pancreatitis  Pancreatitis      Cerebral artery occlusion with cerebral infarction  Stroke      History of pulmonary embolism      MI (myocardial infarction)      HTN (hypertension)      Hypertension      Crohn&#x27;s disease      HLD (hyperlipidemia)      Alcohol withdrawal      Pulmonary embolism      BPH (benign prostatic hyperplasia)      COPD (chronic obstructive pulmonary disease)      ETOH abuse      No significant past surgical history      No significant past surgical history          FAMILY HISTORY:      SOCIAL HISTORY:   Patient lives with *** at ***.   Smoking status:  Drinking:  Drug Use:     ROS: ***  Constitutional: No fever, weight loss or fatigue  Eyes: No eye pain, visual disturbances, or discharge  ENMT:  No difficulty hearing, tinnitus; No sinus or throat pain  Neck: No pain or stiffness  Respiratory: No cough, wheezing, chills or hemoptysis  Cardiovascular: No chest pain, palpitations, shortness of breath, or leg swelling  Gastrointestinal: No abdominal pain. No nausea, vomiting or hematemesis; No diarrhea or constipation. Nohematochezia.  Genitourinary: No dysuria, frequency, hematuria or incontinence  Neurological: As per HPI  Skin: No itching, burning, rashes or lesions   Endocrine: No heat or cold intolerance; No hair loss  Musculoskeletal: No joint pain or swelling; No muscle, back or extremity pain  Heme/Lymph: No easy bruising or bleeding gums    MEDICATIONS  (STANDING):  enoxaparin Injectable 40 milliGRAM(s) SubCutaneous every 24 hours  influenza   Vaccine 0.5 milliLiter(s) IntraMuscular once  thiamine IVPB 250 milliGRAM(s) IV Intermittent every 24 hours    MEDICATIONS  (PRN):  acetaminophen     Tablet .. 650 milliGRAM(s) Oral every 6 hours PRN Mild Pain (1 - 3)  aluminum hydroxide/magnesium hydroxide/simethicone Suspension 30 milliLiter(s) Oral every 4 hours PRN Dyspepsia  LORazepam   Injectable 2 milliGRAM(s) IV Push once PRN CIWA >8  LORazepam   Injectable 1 milliGRAM(s) IV Push every 1 hour PRN CIWA-Ar score 8 or greater    Allergies    codeine (Unknown)    Intolerances      Vital Signs Last 24 Hrs  T(C): 36.6 (2023 12:25), Max: 37.2 (2023 07:18)  T(F): 97.9 (2023 12:25), Max: 98.9 (2023 07:18)  HR: 81 (2023 15:29) (70 - 89)  BP: 136/91 (2023 15:29) (135/81 - 163/85)  BP(mean): 102 (2023 09:02) (101 - 116)  RR: 16 (2023 15:29) (16 - 19)  SpO2: 95% (2023 15:29) (93% - 100%)    Parameters below as of 2023 15:29  Patient On (Oxygen Delivery Method): room air        Physical exam:  Constitutional: No acute distress, conversant  Eyes: Anicteric sclerae, moist conjunctivae, see below for CNs  Neck: trachea midline, FROM, supple, no thyromegaly or lymphadenopathy  Cardiovascular: Regular rate and rhythm, no murmurs, rubs, or gallops. No carotid bruits.   Pulmonary: Anterior breath sounds clear bilaterally, no crackles or wheezing. No use of accessory muscles  GI: Abdomen soft, non-distended, non-tender  Extremities: Radial and DP pulses +2, no edema    Neurologic:  -Mental status: Awake, alert, oriented to person, place, and time. Speech is fluent with intact naming, repetition, and comprehension, no dysarthria. Recent and remote memory intact. Follows commands. Attention/concentration intact. Fund of knowledge appropriate.  -Cranial nerves:   II: Visual fields are full to confrontation.  III, IV, VI: Extraocular movements are intact without nystagmus. Pupils equally round and reactive to light  V:  Facial sensation V1-V3 decreased on left   VII: Face is symmetric with normal eye closure and smile  Motor: Normal bulk and tone. No pronator drift. Strength bilateral upper extremity 5/5. Hand  R>L. Left lower extremity 3/5 able to overcome gravity but falls to bed in <5 seconds. Right lower extremity 5/5. Dorsi and plantar flexion R > L.   Sensation: Decreased on left upper and lower extremity. No neglect or extinction on double simultaneous testing.  Coordination: No dysmetria on finger-to-nose bilaterally      NIHSS: 3     Fingerstick Blood Glucose: CAPILLARY BLOOD GLUCOSE      POCT Blood Glucose.: 125 mg/dL (2023 15:03)    LABS:                        14.3   12.73 )-----------( 248      ( 2023 16:55 )             44.8     01-    142  |  106  |  10  ----------------------------<  91  3.6   |  24  |  1.07    Ca    8.3<L>      2023 05:30  Phos  3.5     01-04  Mg     2.1     -04    TPro  6.7  /  Alb  3.5  /  TBili  1.7<H>  /  DBili  x   /  AST  25  /  ALT  12  /  AlkPhos  70  01-05    PT/INR - ( 2023 16:55 )   PT: 11.4 sec;   INR: 0.97          PTT - ( 2023 16:55 )  PTT:30.8 sec      Urinalysis Basic - ( 2023 16:55 )    Color: Yellow / Appearance: Clear / S.025 / pH: x  Gluc: x / Ketone: NEGATIVE  / Bili: NEGATIVE / Urobili: 1.0 E.U./dL   Blood: x / Protein: 30 mg/dL / Nitrite: NEGATIVE   Leuk Esterase: NEGATIVE / RBC: < 5 /HPF / WBC < 5 /HPF   Sq Epi: x / Non Sq Epi: 0-5 /HPF / Bacteria: Present /HPF        RADIOLOGY & ADDITIONAL STUDIES:  < from: CT Brain Stroke Protocol (23 @ 15:30) >  IMPRESSION: No intracranial hemorrhage or acute transcortical infarct.  Stable exam.    < end of copied text >  < from: CT Angio Head w/ IV Cont (23 @ 15:56) >  IMPRESSION: No large vessel occlusion.    < end of copied text >  < from: CT Angio Head w/ IV Cont (23 @ 15:56) >  IMPRESSION: Normal CTA of the neck.    < end of copied text >  < from: CT Perfusion w/ Maps w/ IV Cont (23 @ 15:54) >  IMPRESSION: Normal CT perfusion study.    < end of copied text >      -----------------------------------------------------------------------------------------------------------------  IV-tPA (Y/N):    N                                Reason IV-tPA not given: patient out of the [place] for TPA    **STROKE CODE CONSULT NOTE**    Last known well time/Time of onset of symptoms: 23 ~10:00    HPI: 62y Male with PMHx of alcohol abuse, drug abuse, three remote embolic strokes w/ residual left sided weakness (walks with a cane), DVT's in the past(Eliquis last prescribed in ), history of PE, gout who was BIBEMS after a bystander called EMS after finding him passed out on the street. The patient smoked marijuana and dranks two pints of vodka this morning, cocaine last night, and cannot recall events leading up to him waking up in the ED, upon arrival he complained of chest pain which had resolved by the time he arrived to [facility]    T(C): 36.6 (23 @ 12:25), Max: 37.2 (23 @ 07:18)  HR: 81 (23 @ 15:29) (70 - 89)  BP: 136/91 (23 @ 15:29) (135/81 - 163/85)  RR: 16 (23 @ 15:29) (16 - 19)  SpO2: 95% (23 @ 15:29) (93% - 100%)    PAST MEDICAL & SURGICAL HISTORY:  Diverticulosis of large intestine  Diverticulosis      Acute pancreatitis  Pancreatitis      Cerebral artery occlusion with cerebral infarction  Stroke      History of pulmonary embolism      MI (myocardial infarction)      HTN (hypertension)      Hypertension      Crohn&#x27;s disease      HLD (hyperlipidemia)      Alcohol withdrawal      Pulmonary embolism      BPH (benign prostatic hyperplasia)      COPD (chronic obstructive pulmonary disease)      ETOH abuse      No significant past surgical history      No significant past surgical history          FAMILY HISTORY:      SOCIAL HISTORY:   Patient lives with *** at ***.   Smoking status:  Drinking:  Drug Use:     ROS: ***  Constitutional: No fever, weight loss or fatigue  Eyes: No eye pain, visual disturbances, or discharge  ENMT:  No difficulty hearing, tinnitus; No sinus or throat pain  Neck: No pain or stiffness  Respiratory: No cough, wheezing, chills or hemoptysis  Cardiovascular: No chest pain, palpitations, shortness of breath, or leg swelling  Gastrointestinal: No abdominal pain. No nausea, vomiting or hematemesis; No diarrhea or constipation. Nohematochezia.  Genitourinary: No dysuria, frequency, hematuria or incontinence  Neurological: As per HPI  Skin: No itching, burning, rashes or lesions   Endocrine: No heat or cold intolerance; No hair loss  Musculoskeletal: No joint pain or swelling; No muscle, back or extremity pain  Heme/Lymph: No easy bruising or bleeding gums    MEDICATIONS  (STANDING):  enoxaparin Injectable 40 milliGRAM(s) SubCutaneous every 24 hours  influenza   Vaccine 0.5 milliLiter(s) IntraMuscular once  thiamine IVPB 250 milliGRAM(s) IV Intermittent every 24 hours    MEDICATIONS  (PRN):  acetaminophen     Tablet .. 650 milliGRAM(s) Oral every 6 hours PRN Mild Pain (1 - 3)  aluminum hydroxide/magnesium hydroxide/simethicone Suspension 30 milliLiter(s) Oral every 4 hours PRN Dyspepsia  LORazepam   Injectable 2 milliGRAM(s) IV Push once PRN CIWA >8  LORazepam   Injectable 1 milliGRAM(s) IV Push every 1 hour PRN CIWA-Ar score 8 or greater    Allergies    codeine (Unknown)    Intolerances      Vital Signs Last 24 Hrs  T(C): 36.6 (2023 12:25), Max: 37.2 (2023 07:18)  T(F): 97.9 (2023 12:25), Max: 98.9 (2023 07:18)  HR: 81 (2023 15:29) (70 - 89)  BP: 136/91 (2023 15:29) (135/81 - 163/85)  BP(mean): 102 (2023 09:02) (101 - 116)  RR: 16 (2023 15:29) (16 - 19)  SpO2: 95% (2023 15:29) (93% - 100%)    Parameters below as of 2023 15:29  Patient On (Oxygen Delivery Method): room air        Physical exam:  Constitutional: No acute distress, conversant  Eyes: Anicteric sclerae, moist conjunctivae, see below for CNs  Neck: trachea midline, FROM, supple, no thyromegaly or lymphadenopathy  Cardiovascular: Regular rate and rhythm, no murmurs, rubs, or gallops. No carotid bruits.   Pulmonary: Anterior breath sounds clear bilaterally, no crackles or wheezing. No use of accessory muscles  GI: Abdomen soft, non-distended, non-tender  Extremities: Radial and DP pulses +2, no edema    Neurologic:  -Mental status: Awake, alert, oriented to person, place, and time. Speech is fluent with intact naming, repetition, and comprehension, no dysarthria. Recent and remote memory intact. Follows commands. Attention/concentration intact. Fund of knowledge appropriate.  -Cranial nerves:   II: Visual fields are full to confrontation.  III, IV, VI: Extraocular movements are intact without nystagmus. Pupils equally round and reactive to light  V:  Facial sensation V1-V3 decreased on left   VII: Face is symmetric with normal eye closure and smile  Motor: Normal bulk and tone. No pronator drift. Strength bilateral upper extremity 5/5. Hand  R>L. Left lower extremity 3/5 able to overcome gravity but falls to bed in <5 seconds. Right lower extremity 5/5. Dorsi and plantar flexion R > L.   Sensation: Decreased on left upper and lower extremity. No neglect or extinction on double simultaneous testing.  Coordination: No dysmetria on finger-to-nose bilaterally      NIHSS: 3     Fingerstick Blood Glucose: CAPILLARY BLOOD GLUCOSE      POCT Blood Glucose.: 125 mg/dL (2023 15:03)    LABS:                        14.3   12.73 )-----------( 248      ( 2023 16:55 )             44.8     01-05    142  |  106  |  10  ----------------------------<  91  3.6   |  24  |  1.07    Ca    8.3<L>      2023 05:30  Phos  3.5     01-04  Mg     2.1     01-04    TPro  6.7  /  Alb  3.5  /  TBili  1.7<H>  /  DBili  x   /  AST  25  /  ALT  12  /  AlkPhos  70  01-05    PT/INR - ( 2023 16:55 )   PT: 11.4 sec;   INR: 0.97          PTT - ( 2023 16:55 )  PTT:30.8 sec      Urinalysis Basic - ( 2023 16:55 )    Color: Yellow / Appearance: Clear / S.025 / pH: x  Gluc: x / Ketone: NEGATIVE  / Bili: NEGATIVE / Urobili: 1.0 E.U./dL   Blood: x / Protein: 30 mg/dL / Nitrite: NEGATIVE   Leuk Esterase: NEGATIVE / RBC: < 5 /HPF / WBC < 5 /HPF   Sq Epi: x / Non Sq Epi: 0-5 /HPF / Bacteria: Present /HPF        RADIOLOGY & ADDITIONAL STUDIES:  < from: CT Brain Stroke Protocol (23 @ 15:30) >  IMPRESSION: No intracranial hemorrhage or acute transcortical infarct.  Stable exam.    < end of copied text >  < from: CT Angio Head w/ IV Cont (23 @ 15:56) >  IMPRESSION: No large vessel occlusion.    < end of copied text >  < from: CT Angio Head w/ IV Cont (23 @ 15:56) >  IMPRESSION: Normal CTA of the neck.    < end of copied text >  < from: CT Perfusion w/ Maps w/ IV Cont (23 @ 15:54) >  IMPRESSION: Normal CT perfusion study.    < end of copied text >      -----------------------------------------------------------------------------------------------------------------  IV-tPA (Y/N):    N                                Reason IV-tPA not given: patient out of the [place] for TPA    **STROKE CODE CONSULT NOTE**    Last known well time/Time of onset of symptoms: 23 ~10:00    HPI: 62y Male with PMHx of alcohol abuse, drug abuse, three remote embolic strokes w/ residual left sided weakness, DVT's in the past(Eliquis last prescribed in  and admits noncompliance), history of PE, gout who was BIBEMS after a bystander called EMS after finding him passed out on the street. The patient smoked marijuana and dranks two pints of vodka this morning, cocaine last night, and cannot recall events leading up to him waking up in the ED, upon arrival he complained of chest pain which had resolved by the time he arrived to [facility]. Utox positive for THC, cocaine, amphetamine, and PCP. Prior strokes have left patient with some residual left sided weakness and he walks with a cane. Unknown etiology of strokes and not sure if he's every had a work up. Stroke code called today for sudden onset of left face, arm, and leg numbness and increased weakness of left arm and leg. Per the patient his numbness began before 10:00 this morning. He has no other associated symptoms and the numbness has remained constant ever since it began.      T(C): 36.6 (23 @ 12:25), Max: 37.2 (23 @ 07:18)  HR: 81 (23 @ 15:29) (70 - 89)  BP: 136/91 (23 @ 15:29) (135/81 - 163/85)  RR: 16 (23 @ 15:29) (16 - 19)  SpO2: 95% (23 @ 15:29) (93% - 100%)    PAST MEDICAL & SURGICAL HISTORY:  Diverticulosis of large intestine  Diverticulosis      Acute pancreatitis  Pancreatitis      Cerebral artery occlusion with cerebral infarction  Stroke      History of pulmonary embolism      MI (myocardial infarction)      HTN (hypertension)      Hypertension      Crohn&#x27;s disease      HLD (hyperlipidemia)      Alcohol withdrawal      Pulmonary embolism      BPH (benign prostatic hyperplasia)      COPD (chronic obstructive pulmonary disease)      ETOH abuse      No significant past surgical history      No significant past surgical history          FAMILY HISTORY:          ROS:   Constitutional: No fever, weight loss or fatigue  Eyes: No eye pain, visual disturbances, or discharge  ENMT:  No difficulty hearing, tinnitus; No sinus or throat pain  Neck: No pain or stiffness  Respiratory: No cough, wheezing, chills or hemoptysis  Cardiovascular: No chest pain, palpitations, shortness of breath, or leg swelling  Gastrointestinal: No abdominal pain. No nausea, vomiting or hematemesis; No diarrhea or constipation. Nohematochezia.  Genitourinary: No dysuria, frequency, hematuria or incontinence  Neurological: As per HPI  Skin: No itching, burning, rashes or lesions   Endocrine: No heat or cold intolerance; No hair loss  Musculoskeletal: No joint pain or swelling; No muscle, back or extremity pain  Heme/Lymph: No easy bruising or bleeding gums    MEDICATIONS  (STANDING):  enoxaparin Injectable 40 milliGRAM(s) SubCutaneous every 24 hours  influenza   Vaccine 0.5 milliLiter(s) IntraMuscular once  thiamine IVPB 250 milliGRAM(s) IV Intermittent every 24 hours    MEDICATIONS  (PRN):  acetaminophen     Tablet .. 650 milliGRAM(s) Oral every 6 hours PRN Mild Pain (1 - 3)  aluminum hydroxide/magnesium hydroxide/simethicone Suspension 30 milliLiter(s) Oral every 4 hours PRN Dyspepsia  LORazepam   Injectable 2 milliGRAM(s) IV Push once PRN CIWA >8  LORazepam   Injectable 1 milliGRAM(s) IV Push every 1 hour PRN CIWA-Ar score 8 or greater    Allergies    codeine (Unknown)    Intolerances      Vital Signs Last 24 Hrs  T(C): 36.6 (2023 12:25), Max: 37.2 (2023 07:18)  T(F): 97.9 (2023 12:25), Max: 98.9 (2023 07:18)  HR: 81 (2023 15:29) (70 - 89)  BP: 136/91 (2023 15:29) (135/81 - 163/85)  BP(mean): 102 (2023 09:02) (101 - 116)  RR: 16 (2023 15:29) (16 - 19)  SpO2: 95% (2023 15:29) (93% - 100%)    Parameters below as of 2023 15:29  Patient On (Oxygen Delivery Method): room air        Physical exam:  Constitutional: No acute distress, conversant  Eyes: Anicteric sclerae, moist conjunctivae, see below for CNs  Neck: trachea midline, FROM, supple, no thyromegaly or lymphadenopathy  Cardiovascular: Regular rate and rhythm, no murmurs, rubs, or gallops. No carotid bruits.   Pulmonary: Anterior breath sounds clear bilaterally, no crackles or wheezing. No use of accessory muscles  GI: Abdomen soft, non-distended, non-tender  Extremities: Radial and DP pulses +2, no edema    Neurologic:  -Mental status: Awake, alert, oriented to person, place, and time. Speech is fluent with intact naming, repetition, and comprehension, no dysarthria. Recent and remote memory intact. Follows commands. Attention/concentration intact. Fund of knowledge appropriate.  -Cranial nerves:   II: Visual fields are full to confrontation.  III, IV, VI: Extraocular movements are intact without nystagmus. Pupils equally round and reactive to light  V:  Facial sensation V1-V3 decreased on left   VII: Face is symmetric with normal eye closure and smile  Motor: Normal bulk and tone. No pronator drift. Strength bilateral upper extremity 5/5. Hand  R>L. Left lower extremity 3/5 able to overcome gravity but falls to bed in <5 seconds. Right lower extremity 5/5. Dorsi and plantar flexion R > L.   Sensation: Decreased on left upper and lower extremity. No neglect or extinction on double simultaneous testing.  Coordination: No dysmetria on finger-to-nose bilaterally      NIHSS: 3     Fingerstick Blood Glucose: CAPILLARY BLOOD GLUCOSE      POCT Blood Glucose.: 125 mg/dL (2023 15:03)    LABS:                        14.3   12.73 )-----------( 248      ( 2023 16:55 )             44.8     -    142  |  106  |  10  ----------------------------<  91  3.6   |  24  |  1.07    Ca    8.3<L>      2023 05:30  Phos  3.5     -04  Mg     2.1     -04    TPro  6.7  /  Alb  3.5  /  TBili  1.7<H>  /  DBili  x   /  AST  25  /  ALT  12  /  AlkPhos  70  -05    PT/INR - ( 2023 16:55 )   PT: 11.4 sec;   INR: 0.97          PTT - ( 2023 16:55 )  PTT:30.8 sec      Urinalysis Basic - ( 2023 16:55 )    Color: Yellow / Appearance: Clear / S.025 / pH: x  Gluc: x / Ketone: NEGATIVE  / Bili: NEGATIVE / Urobili: 1.0 E.U./dL   Blood: x / Protein: 30 mg/dL / Nitrite: NEGATIVE   Leuk Esterase: NEGATIVE / RBC: < 5 /HPF / WBC < 5 /HPF   Sq Epi: x / Non Sq Epi: 0-5 /HPF / Bacteria: Present /HPF        RADIOLOGY & ADDITIONAL STUDIES:  < from: CT Brain Stroke Protocol (23 @ 15:30) >  IMPRESSION: No intracranial hemorrhage or acute transcortical infarct.  Stable exam.    < end of copied text >  < from: CT Angio Head w/ IV Cont (23 @ 15:56) >  IMPRESSION: No large vessel occlusion.    < end of copied text >  < from: CT Angio Head w/ IV Cont (23 @ 15:56) >  IMPRESSION: Normal CTA of the neck.    < end of copied text >  < from: CT Perfusion w/ Maps w/ IV Cont (23 @ 15:54) >  IMPRESSION: Normal CT perfusion study.    < end of copied text >      -----------------------------------------------------------------------------------------------------------------  IV-tPA (Y/N):    N                                Reason IV-tPA not given: patient out of the [place] for TPA

## 2023-01-05 NOTE — PROGRESS NOTE ADULT - PROBLEM SELECTOR PLAN 8
Patient w/ known history of hypertension. Recalls being on medication at some point but does not remember the name, has not taken medications for the past three months. /109 on arrival.     Plan:   - continue to monitor BP for now  - patient cannot recall his PCP name but states that he sees a doctor at a [facility] clinic on [address] High concern for significant CAD given patient's history of chest pain and ED visit on 11/23, w/ repeat chest pain on arrival to [facility]. CTA ordered on 11/23 which was never completed.  Patient does not have chest pain at this time, trop I in ED: 10. 7.   Patient does endorse cocaine use night before admission.     Plan:   - repeat EKG no T wave inv

## 2023-01-05 NOTE — PROGRESS NOTE ADULT - ASSESSMENT
Patient is a 62 year old male with a PMH of alcohol and drug abuse, PE, DVT, and stroke in the past, who was found obtunded 2/2 acute intoxication, admitted for acute opiate intoxication and alcohol withdrawal.  Patient is a 62 year old male with a PMH of alcohol and drug abuse, PE 2017 (now off coumadin), DVT, HTN, HLD, BPH and embolic stroke w/ residual Lsided weakness, who was found obtunded 2/2 acute intoxication, admitted for acute opiate intoxication and alcohol withdrawal now undergoing stroke workup and monitoring for opiate, alcohol withdrawal.

## 2023-01-05 NOTE — CONSULT NOTE ADULT - ASSESSMENT
62y Male with PMHx of alcohol abuse, drug abuse, three remote embolic strokes w/ residual left sided weakness, DVT's in the past(Eliquis last prescribed in June '22 and admits noncompliance), history of PE, gout who was BIBEMS after a bystander called EMS after finding him passed out on the street. Stroke code called this morning for sudden onset of left face, arm, and leg numbness and increased weakness in the left arm and leg. LKW was 10:00am. NIHSS = 3. CTH negative for any acute intracranial pathology. CTA w/o LVO or high grade stenosis. CTP negative for perfusion deficit. Patient out of the window for TPA and not a candidate for any intervention.    1)Secondary stroke prevention  - start ASA 81mg PO daily and Plavix 75mg PO daily  - start Atorvastatin 80mg PO daily      2) Stroke risk factors  - Obtain A1C  - Obtain LDL   - Obtain TSH  - LE doppler pending to assess for DVT's  - HTN    3) Further management  - obtain MRI brain without  - provide stroke education    DVT prophylaxis   -Lovenox SQ and SCDs    Discussed with Neurology Fellow Dr. Silver Razo 62y Male with PMHx of alcohol abuse, drug abuse, three remote embolic strokes w/ residual left sided weakness, DVT's in the past(Eliquis last prescribed in June '22 and admits noncompliance), history of PE, gout who was BIBEMS after a bystander called EMS after finding him passed out on the street. Stroke code called this morning for sudden onset of left face, arm, and leg numbness and increased weakness in the left arm and leg. LKW was 10:00am. NIHSS = 3. CTH negative for any acute intracranial pathology. CTA w/o LVO or high grade stenosis. CTP negative for perfusion deficit. Patient out of the window for TPA and not a candidate for any intervention.    1)Secondary stroke prevention  - start ASA 81mg PO daily and Plavix 75mg PO daily  - start Atorvastatin 80mg PO daily      2) Stroke risk factors  - Obtain A1C  - Obtain LDL   - Obtain TSH  - LE doppler pending to assess for DVT's  - HTN    3) Further management  - obtain MRI brain without  - provide stroke education    DVT prophylaxis   -Lovenox SQ and SCDs    Discussed with Neurology Fellow Dr. Silver Razo. Stroke will continue to follow, please call when MRI is completed.

## 2023-01-05 NOTE — PROGRESS NOTE ADULT - PROBLEM SELECTOR PLAN 7
High concern for significant CAD given patient's history of chest pain and ED visit on 11/23, w/ repeat chest pain on arrival to [facility]. CTA ordered on 11/23 which was never completed.  Patient does not have chest pain at this time, trop I in ED: 10. 7.   Patient does endorse cocaine use night before admission.     Plan:   - consider cardiology consult   - f/u echo (ordered)   - AM trop T   - obtain EKG

## 2023-01-05 NOTE — PROGRESS NOTE ADULT - PROBLEM SELECTOR PLAN 9
Multiple ekg's showing prolonged QT interval    Plan:   - continue to replete electrolytes as needed  - do not give QT prolonging medications

## 2023-01-05 NOTE — PROGRESS NOTE ADULT - PROBLEM SELECTOR PLAN 5
Patient w/ 15 year history of 2 pints of vodka per day. Has been hospitalized for withdrawals at [facility] many times, does not recall when he was last hospitalized. Denies history of seizures or intubations. Last drink at 10 am on 1/4.   BAL on arrival to ED: 29     Plan:   - CIWA   - thiamine 250 mg   - folic acid 1 mg   - seizure precautions  - aspiration precautions

## 2023-01-06 LAB
A1C WITH ESTIMATED AVERAGE GLUCOSE RESULT: 4.7 % — SIGNIFICANT CHANGE UP (ref 4–5.6)
ALBUMIN SERPL ELPH-MCNC: 3.4 G/DL — SIGNIFICANT CHANGE UP (ref 3.3–5)
ALP SERPL-CCNC: 64 U/L — SIGNIFICANT CHANGE UP (ref 40–120)
ALT FLD-CCNC: 10 U/L — SIGNIFICANT CHANGE UP (ref 10–45)
ANION GAP SERPL CALC-SCNC: 12 MMOL/L — SIGNIFICANT CHANGE UP (ref 5–17)
AST SERPL-CCNC: 19 U/L — SIGNIFICANT CHANGE UP (ref 10–40)
BASOPHILS # BLD AUTO: 0.04 K/UL — SIGNIFICANT CHANGE UP (ref 0–0.2)
BASOPHILS NFR BLD AUTO: 0.7 % — SIGNIFICANT CHANGE UP (ref 0–2)
BILIRUB SERPL-MCNC: 1.8 MG/DL — HIGH (ref 0.2–1.2)
BUN SERPL-MCNC: 7 MG/DL — SIGNIFICANT CHANGE UP (ref 7–23)
CALCIUM SERPL-MCNC: 8.4 MG/DL — SIGNIFICANT CHANGE UP (ref 8.4–10.5)
CHLORIDE SERPL-SCNC: 104 MMOL/L — SIGNIFICANT CHANGE UP (ref 96–108)
CHOLEST SERPL-MCNC: 150 MG/DL — SIGNIFICANT CHANGE UP
CO2 SERPL-SCNC: 22 MMOL/L — SIGNIFICANT CHANGE UP (ref 22–31)
CREAT SERPL-MCNC: 0.88 MG/DL — SIGNIFICANT CHANGE UP (ref 0.5–1.3)
EGFR: 98 ML/MIN/1.73M2 — SIGNIFICANT CHANGE UP
EOSINOPHIL # BLD AUTO: 0.72 K/UL — HIGH (ref 0–0.5)
EOSINOPHIL NFR BLD AUTO: 11.7 % — HIGH (ref 0–6)
ESTIMATED AVERAGE GLUCOSE: 88 MG/DL — SIGNIFICANT CHANGE UP (ref 68–114)
GLUCOSE SERPL-MCNC: 94 MG/DL — SIGNIFICANT CHANGE UP (ref 70–99)
HCT VFR BLD CALC: 42.9 % — SIGNIFICANT CHANGE UP (ref 39–50)
HDLC SERPL-MCNC: 46 MG/DL — SIGNIFICANT CHANGE UP
HGB BLD-MCNC: 14.2 G/DL — SIGNIFICANT CHANGE UP (ref 13–17)
IMM GRANULOCYTES NFR BLD AUTO: 0.2 % — SIGNIFICANT CHANGE UP (ref 0–0.9)
LIPID PNL WITH DIRECT LDL SERPL: 78 MG/DL — SIGNIFICANT CHANGE UP
LYMPHOCYTES # BLD AUTO: 1.11 K/UL — SIGNIFICANT CHANGE UP (ref 1–3.3)
LYMPHOCYTES # BLD AUTO: 18.1 % — SIGNIFICANT CHANGE UP (ref 13–44)
MAGNESIUM SERPL-MCNC: 1.6 MG/DL — SIGNIFICANT CHANGE UP (ref 1.6–2.6)
MCHC RBC-ENTMCNC: 33.1 GM/DL — SIGNIFICANT CHANGE UP (ref 32–36)
MCHC RBC-ENTMCNC: 35.3 PG — HIGH (ref 27–34)
MCV RBC AUTO: 106.7 FL — HIGH (ref 80–100)
MONOCYTES # BLD AUTO: 0.64 K/UL — SIGNIFICANT CHANGE UP (ref 0–0.9)
MONOCYTES NFR BLD AUTO: 10.4 % — SIGNIFICANT CHANGE UP (ref 2–14)
NEUTROPHILS # BLD AUTO: 3.62 K/UL — SIGNIFICANT CHANGE UP (ref 1.8–7.4)
NEUTROPHILS NFR BLD AUTO: 58.9 % — SIGNIFICANT CHANGE UP (ref 43–77)
NON HDL CHOLESTEROL: 104 MG/DL — SIGNIFICANT CHANGE UP
NRBC # BLD: 0 /100 WBCS — SIGNIFICANT CHANGE UP (ref 0–0)
PHOSPHATE SERPL-MCNC: 2 MG/DL — LOW (ref 2.5–4.5)
PLATELET # BLD AUTO: 203 K/UL — SIGNIFICANT CHANGE UP (ref 150–400)
POTASSIUM SERPL-MCNC: 3.5 MMOL/L — SIGNIFICANT CHANGE UP (ref 3.5–5.3)
POTASSIUM SERPL-SCNC: 3.5 MMOL/L — SIGNIFICANT CHANGE UP (ref 3.5–5.3)
PROT SERPL-MCNC: 6.8 G/DL — SIGNIFICANT CHANGE UP (ref 6–8.3)
RBC # BLD: 4.02 M/UL — LOW (ref 4.2–5.8)
RBC # FLD: 11.8 % — SIGNIFICANT CHANGE UP (ref 10.3–14.5)
SODIUM SERPL-SCNC: 138 MMOL/L — SIGNIFICANT CHANGE UP (ref 135–145)
TRIGL SERPL-MCNC: 129 MG/DL — SIGNIFICANT CHANGE UP
TSH SERPL-MCNC: 1.38 UIU/ML — SIGNIFICANT CHANGE UP (ref 0.27–4.2)
WBC # BLD: 6.14 K/UL — SIGNIFICANT CHANGE UP (ref 3.8–10.5)
WBC # FLD AUTO: 6.14 K/UL — SIGNIFICANT CHANGE UP (ref 3.8–10.5)

## 2023-01-06 PROCEDURE — 70551 MRI BRAIN STEM W/O DYE: CPT | Mod: 26

## 2023-01-06 PROCEDURE — 99233 SBSQ HOSP IP/OBS HIGH 50: CPT

## 2023-01-06 PROCEDURE — 93312 ECHO TRANSESOPHAGEAL: CPT | Mod: 26

## 2023-01-06 PROCEDURE — 70540 MRI ORBIT/FACE/NECK W/O DYE: CPT | Mod: 26

## 2023-01-06 RX ORDER — ATORVASTATIN CALCIUM 80 MG/1
80 TABLET, FILM COATED ORAL AT BEDTIME
Refills: 0 | Status: DISCONTINUED | OUTPATIENT
Start: 2023-01-07 | End: 2023-01-11

## 2023-01-06 RX ORDER — ATORVASTATIN CALCIUM 80 MG/1
80 TABLET, FILM COATED ORAL ONCE
Refills: 0 | Status: COMPLETED | OUTPATIENT
Start: 2023-01-06 | End: 2023-01-06

## 2023-01-06 RX ADMIN — ENOXAPARIN SODIUM 40 MILLIGRAM(S): 100 INJECTION SUBCUTANEOUS at 12:12

## 2023-01-06 RX ADMIN — CLOPIDOGREL BISULFATE 75 MILLIGRAM(S): 75 TABLET, FILM COATED ORAL at 16:52

## 2023-01-06 RX ADMIN — Medication 650 MILLIGRAM(S): at 06:43

## 2023-01-06 RX ADMIN — Medication 102.5 MILLIGRAM(S): at 01:48

## 2023-01-06 RX ADMIN — Medication 650 MILLIGRAM(S): at 13:35

## 2023-01-06 RX ADMIN — Medication 81 MILLIGRAM(S): at 12:12

## 2023-01-06 RX ADMIN — Medication 650 MILLIGRAM(S): at 01:05

## 2023-01-06 RX ADMIN — Medication 650 MILLIGRAM(S): at 06:21

## 2023-01-06 RX ADMIN — ATORVASTATIN CALCIUM 80 MILLIGRAM(S): 80 TABLET, FILM COATED ORAL at 06:21

## 2023-01-06 RX ADMIN — Medication 650 MILLIGRAM(S): at 01:59

## 2023-01-06 RX ADMIN — Medication 650 MILLIGRAM(S): at 12:28

## 2023-01-06 NOTE — PROGRESS NOTE ADULT - PROBLEM SELECTOR PLAN 2
Patient w/ 15 year history of 2 pints of vodka per day. Has been hospitalized for withdrawals at [facility] many times, does not recall when he was last hospitalized. Denies history of seizures or intubations. Last drink at 10 am on 1/4.   BAL on arrival to ED: 29     Plan:   - CIWA   - thiamine 250 mg   - folic acid 1 mg   - seizure precautions  - aspiration precautions

## 2023-01-06 NOTE — OCCUPATIONAL THERAPY INITIAL EVALUATION ADULT - GENERAL OBSERVATIONS, REHAB EVAL
Pt received semi-supine MRS 4. Pt received semi-supine in bed, +tele, MRS 1. Pt received semi-supine in bed, +tele, +heplock, in NAD and agreeable to OT. Cleared by JACLYN Paredes to see

## 2023-01-06 NOTE — PHYSICAL THERAPY INITIAL EVALUATION ADULT - PERTINENT HX OF CURRENT PROBLEM, REHAB EVAL
61yo M with a PMH alcohol and drug abuse, PE 2017 (now off coumadin), DVT, HTN, HLD, BPH and embolic strokes w/ residual L sided weakness, who was found obtunded 2/2 acute intoxication, admitted for acute opiate intoxication and alcohol withdrawal. Found with embolic appearing infarcts on MRI in setting of worsening left sided weakness and new LLE numbness.

## 2023-01-06 NOTE — PHYSICAL THERAPY INITIAL EVALUATION ADULT - GAIT PATTERN USED, PT EVAL
Pt ambulated from bedside into hallway and required seated rest break due to LLE pain. Further ambulation deferred./3-point gait

## 2023-01-06 NOTE — PROVIDER CONTACT NOTE (OTHER) - BACKGROUND
ETOH abuse, COPD, embolic stroke X3, R parietal and frontal infarct, L infarcts, BPH, DVT, HTN, HLD.

## 2023-01-06 NOTE — OCCUPATIONAL THERAPY INITIAL EVALUATION ADULT - MODALITIES TREATMENT COMMENTS
Cranial Nerves II - XII: II: PERRLA; visual fields are full to confrontation III, IV, VI: EOMI, no nystagmus appreciated V: facial sensation intact to light touch V1-V3 b/l VII: no ptosis, no facial droop, symmetric eyebrow raise and closure VIII: hearing intact to finger rub b/l  XI: head turning and shoulder shrug intact b/l XII: tongue protrusion midline. Vision H-test: smooth bilateral pursuits. Vision Quadrant Test: WFL bilaterally

## 2023-01-06 NOTE — PROGRESS NOTE ADULT - SUBJECTIVE AND OBJECTIVE BOX
Neurology Stroke Progress Note/Transfer Note to Stroke Telemetry from [facility]     Hospital Course:  63 y/o M, undomiciled for the last 6 months, with pmhx of HTN, HLD, DVT in LLE, PE, three embolic strokes with L-sided deficits requiring a cane, gout, alcohol abuse (2 pints of vodka daily for 15 years), and alcohol abuse (UTox + for amphetamine, PCP, THC, and cocaine) with reported use of crack/cocaine and marijuana. Patient was found passed out on the street and BIBEMS, was given narcan and fluids, woke up in the ED unable to recall the events leading up to his presentation. Upon arrival he complained of CP, which resolved. Trops obtained at [facility] were negative x3. Patient seen at [facility] no longer complaining of CP, but complaining of R-sided numbness. CT head on admission showed no evidence of intracranial hemorrhage. CXR showed bibasilar atelectasis. Potassium and Mg repleted. Lactate 3.7->1.9, BAL 29. Patient is on CIWA protocol, last drink was /. Le dopplers negative for DVT. Echo with dilated LA. Stroke code 1/5 for worsening left sided weakness and new LLE numbness. CT imaging unrevealing. Started on DAPT. MRI brain with embolic appearing infarcts in the right frontal and temporal lobes, left cerebellum. Upgrade and transfer to stroke telemetry for frequent neuro stroke checks.     INTERVAL HPI/OVERNIGHT EVENTS:  Patient seen and examined. MRI with acute stroke findings.     MEDICATIONS  (STANDING):  aspirin  chewable 81 milliGRAM(s) Oral daily  atorvastatin 80 milliGRAM(s) Oral once  clopidogrel Tablet 75 milliGRAM(s) Oral every 24 hours  enoxaparin Injectable 40 milliGRAM(s) SubCutaneous every 24 hours  influenza   Vaccine 0.5 milliLiter(s) IntraMuscular once  thiamine IVPB 250 milliGRAM(s) IV Intermittent every 24 hours    MEDICATIONS  (PRN):  acetaminophen     Tablet .. 650 milliGRAM(s) Oral every 6 hours PRN Mild Pain (1 - 3)  aluminum hydroxide/magnesium hydroxide/simethicone Suspension 30 milliLiter(s) Oral every 4 hours PRN Dyspepsia  LORazepam   Injectable 2 milliGRAM(s) IV Push once PRN CIWA >8  LORazepam   Injectable 1 milliGRAM(s) IV Push every 1 hour PRN CIWA-Ar score 8 or greater      Allergies    codeine (Unknown)    Intolerances        Vital Signs Last 24 Hrs  T(C): 36.9 (2023 04:09), Max: 37.2 (2023 07:18)  T(F): 98.4 (2023 04:09), Max: 98.9 (2023 07:18)  HR: 66 (2023 04:09) (63 - 89)  BP: 149/89 (2023 04:09) (130/76 - 155/106)  BP(mean): 102 (2023 09:02) (102 - 102)  RR: 18 (2023 04:09) (16 - 19)  SpO2: 98% (2023 04:09) (93% - 98%)    Parameters below as of 2023 20:25  Patient On (Oxygen Delivery Method): room air        Physical exam:  General: No acute distress, awake and alert  Eyes: Anicteric sclerae, moist conjunctivae, see below for CNs  Neck: trachea midline, FROM, supple, no thyromegaly or lymphadenopathy  Pulmonary: No increased work of breathing. No use of accessory muscles  GI: Abdomen soft, non-distended, non-tender    Neurologic:  -Mental status: Awake, alert, oriented to person, place, and time. Speech is fluent with intact naming, repetition, and comprehension, no dysarthria. Recent and remote memory intact. Follows commands. Attention/concentration intact. Fund of knowledge appropriate.  -Cranial nerves:   II: Visual fields are full to confrontation.  III, IV, VI: Extraocular movements are intact without nystagmus. Pupils equally round and reactive to light  V:  Decreased sensation along left V1-V3  VII: Face is symmetric with normal eye closure and smile  VIII: Hearing is bilaterally intact  Motor: Normal bulk and tone. Mild left arm pronator drift, does not hit bed. Strength bilateral upper extremity 5/5. Decreased hand  left. LLE with drift, does  not hit bed, strength 3/5. RLE with no drift, 5/5.   Sensation: No sensation to light touch along left leg. Extinguishes on DST on the LLE.   Coordination: Dysmetria on LUE     LABS:                        14.3   12.73 )-----------( 248      ( 2023 16:55 )             44.8     -    142  |  106  |  10  ----------------------------<  91  3.6   |  24  |  1.07    Ca    8.3<L>      2023 05:30  Phos  3.5     -04  Mg     2.1     -04    TPro  6.7  /  Alb  3.5  /  TBili  1.7<H>  /  DBili  x   /  AST  25  /  ALT  12  /  AlkPhos  70  -05    PT/INR - ( 2023 16:55 )   PT: 11.4 sec;   INR: 0.97          PTT - ( 2023 16:55 )  PTT:30.8 sec  Urinalysis Basic - ( 2023 16:55 )    Color: Yellow / Appearance: Clear / S.025 / pH: x  Gluc: x / Ketone: NEGATIVE  / Bili: NEGATIVE / Urobili: 1.0 E.U./dL   Blood: x / Protein: 30 mg/dL / Nitrite: NEGATIVE   Leuk Esterase: NEGATIVE / RBC: < 5 /HPF / WBC < 5 /HPF   Sq Epi: x / Non Sq Epi: 0-5 /HPF / Bacteria: Present /HPF        RADIOLOGY & ADDITIONAL TESTS:  < from: MR Head No Cont (23 @ 00:01) >  IMPRESSION:  Scattered cortical infarcts throughout the right frontal and parietal   lobes and  the left cerebellar hemisphere.  Different vascular distributions is   consistent  with an embolic source, likely cardiac.    < end of copied text >  < from: MR Orbit, Face, and/or Neck No Cont (23 @ 00:24) >    IMPRESSION:  Right mastoid effusion.  No other acute facial abnormality.    < end of copied text >

## 2023-01-06 NOTE — PROGRESS NOTE ADULT - PROBLEM SELECTOR PLAN 8
High concern for significant CAD given patient's history of chest pain and ED visit on 11/23, w/ repeat chest pain on arrival to [facility]. CTA ordered on 11/23 which was never completed.  Patient does not have chest pain at this time, trop I in ED: 10. 7.   Patient does endorse cocaine use night before admission.     Plan:   - repeat EKG no T wave inv

## 2023-01-06 NOTE — CONSULT NOTE ADULT - ASSESSMENT
per Neurology    62 year old male with a PMH alcohol and drug abuse, PE 2017 (now off coumadin), DVT, HTN, HLD, BPH and embolic strokes w/ residual L sided weakness, who was found obtunded 2/2 acute intoxication, admitted for acute opiate intoxication and alcohol withdrawal. Found with embolic appearing infarcts on MRI in setting of worsening left sided weakness and new LLE numbness.     Neuro  #CVA workup  hx of PE (2017, on warfarin), embolic infarcts (off eliquis x2 months since undomiciled)  - continue aspirin 81mg and plavix 75mg daily  - continue atorvastatin 80mg daily  - q4hr stroke neuro checks and vitals  - MRI Brain without contrast: new acute embolic appearing infarcts in right frontal and parietal lobe, left cerebellum   - LE duplex: neg DVT  - will likely need EP consult for ILR if KOTA negative   - Stroke Code HCT Results: neg  - Stroke Code CTA Results: neg  - Stroke education    #Alcohol Abuse  15 year history of 2 pints of vodka per day. Has been hospitalized for withdrawals at [facility] many times, does not recall when he was last hospitalized. Denies history of seizures or intubations. Last drink at 10 am on 1/4.   BAL on arrival to ED: 29   - c/w thiamine 250mg, folic aicd 1mg  - CIWA    #Opioid use  Found down, unconscious with pinpoint pupils. Responded to narcan GV. UDS+ cocaine, THC, methamphetamine, PCP  - COWS    Cards  #HTN  - permissive hypertension, Goal -180  - TTE with bubble: dilated LA, EF 59%  - obtain KOTA  - EKG Results: NSR    #HLD  - high dose statin as above in CVA  - LDL results:     Pulm  - call provider if SPO2 < 94%    GI  #Nutrition/Fluids/Electrolytes   - replete K<4 and Mg <2  - Diet: DASH    Endocrine  - A1C results:   - TSH results:    Heme  #Macrocytosis without anemia  , Hgb 14.3  - f/u B12    DVT Prophylaxis  - lovenox sq for DVT prophylaxis   - SCDs for DVT prophylaxis       IDR Goals: Goals reviewed at interdisciplinary rounds with case management, social work, physical therapy, occupational therapy, and speech language pathology.   Please see specific therapy  notes for in depth goals.  Dispo: pending PM&R/PT/OT

## 2023-01-06 NOTE — PROGRESS NOTE ADULT - PROBLEM SELECTOR PLAN 7
Patient found obtunded in the street after consuming 2 pints of vodka and smoking marijuana. Patient unable to recall events, received Narcan in ED at which time patient responded and patient was A&Ox3 upon arrival to [facility]. CT head negative for acute intracranial pathology. No metabolic causes for AMS on labs other than acute drug use.     Plan:   - UTox positive for Amphetamine, PCP, THC, and cocaine

## 2023-01-06 NOTE — PROGRESS NOTE ADULT - PROBLEM SELECTOR PLAN 3
Patient found unconcious by bystander on street, appeared to be acutely intoxicated w/ pinpoint pupils. Responded to Narcan at [facility]. Patient denies opioid use and denies history of IV drug use.     - s/p Narcan in ED   - not showing signs of acute opioid intoxication at this time    - COWS 0  - Utox +ve for cocaine, THC, methamphetamine, PCP

## 2023-01-06 NOTE — PROGRESS NOTE ADULT - PROBLEM SELECTOR PLAN 12
DVT Prophylaxis: Lovenox 40 sq  E: Replete as necessary K>4 Mg>2  N: NPO   GI prophylaxis:  pantoprazole 40mg  Bowel regimen:   CODE STATUS: Full  DISPO: RMF

## 2023-01-06 NOTE — PROGRESS NOTE ADULT - PROBLEM SELECTOR PLAN 1
Pt w/ hx of PE (2017, prev on coumadin), embolic CVA  has been off eliquis for approximately 2 months since being undomiciled. Pt has residual L sided weakness uses cane to ambulate. Pt synopsized yesterday likely 2/2 polysubstance abuse Since this morning (unclear on time but before 10am) pt reports L sided hemiparesthesia from face down to foot. On exam, also slightly decreased L sided hand , L sided leg weakness. CTH, CTA negative. MRI brain resulted overnight with embolic appearing infarcts in the right frontal and temporal lobes, left cerebellum.    -Care being escalated to stroke telemetry service for frequent neuro checks  -C/w ASA/plavix  -F/u KOTA (NPO for procedure)  -Start atorvastatin 80 mg qhs   -F/u a1c, lipid panel, TSH in AM

## 2023-01-06 NOTE — PROGRESS NOTE ADULT - ASSESSMENT
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

## 2023-01-06 NOTE — PROGRESS NOTE ADULT - SUBJECTIVE AND OBJECTIVE BOX
**Transfer from  to [facility] stroke telemetry**    Hospital course: 61 y/o M, undomiciled for the last 6 months, with pmhx of HTN, HLD, DVT in LLE, PE, three embolic strokes with L-sided deficits requiring a cane, gout, alcohol abuse (2 pints of vodka daily for 15 years), and alcohol abuse (UTox + for amphetamine, PCP, THC, and cocaine) with reported use of crack/cocaine and marijuana. Patient was found passed out on the street and BIBEMS, was given narcan and fluids, woke up in the ED unable to recall the events leading up to his presentation. Upon arrival he complained of CP, which resolved. Trops obtained at [facility] were negative x3. Patient seen at [facility] no longer complaining of CP, but complaining of R-sided numbness. CT head on admission showed no evidence of intracranial hemorrhage. CXR showed bibasilar atelectasis. Potassium and Mg repleted. Lactate 3.7->1.9, BAL 29. Patient is on CIWA protocol, last drink was . Le dopplers negative for DVT. Echo with dilated LA. Stroke code 1/5 for worsening left sided weakness and new LLE numbness. CT imaging unrevealing. Started on DAPT. MRI brain with embolic appearing infarcts in the right frontal and temporal lobes, left cerebellum. Upgrade and transfer to stroke telemetry for frequent neuro stroke checks.     SUBJECTIVE / OVERNIGHT EVENTS:  Patient was seen and examined at bedside with mild headache but otherwise reporting stable L facial, arm shoulder, leg numbness since this morning. No facial droop, pronator drift. Otherwise negative ROS    MEDICATIONS  (STANDING):  enoxaparin Injectable 40 milliGRAM(s) SubCutaneous every 24 hours  influenza   Vaccine 0.5 milliLiter(s) IntraMuscular once  thiamine IVPB 250 milliGRAM(s) IV Intermittent every 24 hours    MEDICATIONS  (PRN):  aluminum hydroxide/magnesium hydroxide/simethicone Suspension 30 milliLiter(s) Oral every 4 hours PRN Dyspepsia  LORazepam   Injectable 2 milliGRAM(s) IV Push once PRN CIWA >8  LORazepam   Injectable 1 milliGRAM(s) IV Push every 1 hour PRN CIWA-Ar score 8 or greater    CAPILLARY BLOOD GLUCOSE      POCT Blood Glucose.: 103 mg/dL (2023 20:28)  POCT Blood Glucose.: 112 mg/dL (2023 16:55)        OBJECTIVE:  Vital Signs Last 24 Hrs  T(C): 36.6 (2023 12:25), Max: 37.2 (2023 07:18)  T(F): 97.9 (2023 12:25), Max: 98.9 (2023 07:18)  HR: 85 (2023 12:25) (70 - 96)  BP: 153/99 (2023 12:25) (92/52 - 163/85)  BP(mean): 102 (2023 09:02) (101 - 116)  RR: 19 (2023 12:25) (18 - 19)  SpO2: 98% (2023 12:25) (93% - 100%)    Parameters below as of 2023 12:25  Patient On (Oxygen Delivery Method): room air      PHYSICAL EXAM:  General: AAOx3, NAD, euvolemic  Head: NC/AT; MMM; PERRL  Neck: Supple; no JVD  Respiratory: CTAB; no wheezes/rales/rhonchi  Cardiovascular: Regular rhythm/rate; S1/S2+, no m/r/g auscultated  Gastrointestinal: Soft; NTND; bowel sounds normal and present in all 4 quadrants  Extremities: WWP; no b/l U/E edema, no b/l L/E edema  Neurological: CNII-XII grossly intact; no obvious focal deficits, decreased sensation L face, L arm, L leg, 4/5 strength LLE, 5/5 RLE  I&O's Summary    2023 07:01  -  2023 07:00  --------------------------------------------------------  IN: 0 mL / OUT: 200 mL / NET: -200 mL        LABS:                        14.3   12.73 )-----------( 248      ( 2023 16:55 )             44.8         142  |  106  |  10  ----------------------------<  91  3.6   |  24  |  1.07    Ca    8.3<L>      2023 05:30  Phos  3.5     01-04  Mg     2.1     -04    TPro  6.7  /  Alb  3.5  /  TBili  1.7<H>  /  DBili  x   /  AST  25  /  ALT  12  /  AlkPhos  70  01-05    PT/INR - ( 2023 16:55 )   PT: 11.4 sec;   INR: 0.97          PTT - ( 2023 16:55 )  PTT:30.8 sec      Urinalysis Basic - ( 2023 16:55 )    Color: Yellow / Appearance: Clear / S.025 / pH: x  Gluc: x / Ketone: NEGATIVE  / Bili: NEGATIVE / Urobili: 1.0 E.U./dL   Blood: x / Protein: 30 mg/dL / Nitrite: NEGATIVE   Leuk Esterase: NEGATIVE / RBC: < 5 /HPF / WBC < 5 /HPF   Sq Epi: x / Non Sq Epi: 0-5 /HPF / Bacteria: Present /HPF            RADIOLOGY & ADDITIONAL TESTS:  Imaging from Last 24 Hours:

## 2023-01-06 NOTE — CHART NOTE - NSCHARTNOTEFT_GEN_A_CORE
Notified by primary team regarding MRI findings showing b/l infarcts involving right frontal and parietal lobe, left cerebellum.   TTE with no PFO, with dilated LA.   LE duplex negative for DVT.     Plan:  - c/w aspirin 81mg and plavix 75mg daily  - please obtain A1c, LDL, TSH  - recommend KOTA r/o LA thrombus  - if KOTA negative for thrombus, patient will likely need ILR placement to monitor for atrial fibrillation    Will update day stroke consult team in am

## 2023-01-06 NOTE — OCCUPATIONAL THERAPY INITIAL EVALUATION ADULT - ADDITIONAL COMMENTS
Pt is homeless. Prior to admit, pt reports using a cane to ambulate, [place] the subway, and being independent with all ADLs/IADLs. Pt is R hand dominant and does not wear glasses

## 2023-01-06 NOTE — PHYSICAL THERAPY INITIAL EVALUATION ADULT - BED MOBILITY LIMITATIONS, REHAB EVAL
Increased time to perform supine to sit transfer, but does not require assistance. Pt reports 7/10 lightheadedness after supine to sit transfer; BP within parameters. Lightheadedness sustained throughout EOB exam.

## 2023-01-06 NOTE — PROGRESS NOTE ADULT - ASSESSMENT
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 Found with embolic appearing infarcts on MRI in setting of worsening left sided weakness and new LLE numbness.     Neuro  #CVA workup  hx of PE (2017, on warfarin), embolic infarcts (off eliquis x2 months since undomiciled)  - continue aspirin 81mg and plavix 75mg daily  - continue atorvastatin 80mg daily  - q4hr stroke neuro checks and vitals  - MRI Brain without contrast: new acute embolic appearing infarcts in right frontal and parietal lobe, left cerebellum   - LE duplex: neg DVT  - will likely need EP consult for ILR if KOTA negative   - Stroke Code HCT Results: neg  - Stroke Code CTA Results: neg  - Stroke education    #Alcohol Abuse  15 year history of 2 pints of vodka per day. Has been hospitalized for withdrawals at [facility] many times, does not recall when he was last hospitalized. Denies history of seizures or intubations. Last drink at 10 am on 1/4.   BAL on arrival to ED: 29   - c/w thiamine 250mg, folic aicd 1mg  - CIWA    #Opioid use  Found down, unconscious with pinpoint pupils. Responded to narcan GV. UDS+ cocaine, THC, methamphetamine, PCP  - COWS    Cards  #HTN  - permissive hypertension, Goal -180  - TTE with bubble: dilated LA, EF 59%  - obtain KOTA  - EKG Results: NSR    #HLD  - high dose statin as above in CVA  - LDL results:     Pulm  - call provider if SPO2 < 94%    GI  #Nutrition/Fluids/Electrolytes   - replete K<4 and Mg <2  - Diet: DASH    Endocrine  - A1C results:   - TSH results:    Heme  #Macrocytosis without anemia  , Hgb 14.3  - f/u B12    DVT Prophylaxis  - lovenox sq for DVT prophylaxis   - SCDs for DVT prophylaxis       IDR Goals: Goals reviewed at interdisciplinary rounds with case management, social work, physical therapy, occupational therapy, and speech language pathology.   Please see specific therapy  notes for in depth goals.  Dispo: pending PT/OT     Discussed daily hospital plans and goals with patient and family at bedside. (Called and updated family.)    Discussed with Stroke Fellow Dr. 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 Found with embolic appearing infarcts on MRI in setting of worsening left sided weakness and new LLE numbness.     Neuro  #CVA workup  hx of PE (2017, on warfarin), embolic infarcts (off eliquis x2 months since undomiciled)  - continue aspirin 81mg and plavix 75mg daily  - continue atorvastatin 80mg daily  - q4hr stroke neuro checks and vitals  - MRI Brain without contrast: new acute embolic appearing infarcts in right frontal and parietal lobe, left cerebellum   - LE duplex: neg DVT  - will likely need EP consult for ILR, however, patient is homeless so may not be able to maintain monitoring once outside of the hospital  - Stroke Code HCT Results: neg  - Stroke Code CTA Results: neg  - Stroke education    #Alcohol Abuse  15 year history of 2 pints of vodka per day. Has been hospitalized for withdrawals at [facility] many times, does not recall when he was last hospitalized. Denies history of seizures or intubations. Last drink at 10 am on 1/4.   BAL on arrival to ED: 29   - c/w thiamine 250mg, folic aicd 1mg  - CIWA    #Opioid use  Found down, unconscious with pinpoint pupils. Responded to narcan GV. UDS+ cocaine, THC, methamphetamine, PCP  - COWS    Cards  #HTN  - permissive hypertension, Goal -180  - TTE with bubble: dilated LA, EF 59%  - obtain KOTA, will f/u results  - EKG Results: NSR    #HLD  - high dose statin as above in CVA  - LDL results: 78    Pulm  - call provider if SPO2 < 94%    GI  #Nutrition/Fluids/Electrolytes   - replete K<4 and Mg <2  - Diet: Regular    Endocrine  - A1C results: 4.7  - TSH results: 1.380    Heme  #Macrocytosis without anemia  , Hgb 14.3  - f/u B12    DVT Prophylaxis  - lovenox sq for DVT prophylaxis   - SCDs for DVT prophylaxis       IDR Goals: Goals reviewed at interdisciplinary rounds with case management, social work, physical therapy, occupational therapy, and speech language pathology.   Please see specific therapy  notes for in depth goals.  Dispo: pending PT/OT     Discussed daily hospital plans and goals with patient and family at bedside. (Called and updated family.)    Discussed with Stroke Fellow Dr. Razo and Dr. 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 Found with embolic appearing infarcts on MRI in setting of worsening left sided weakness and new LLE numbness.     Neuro  #CVA workup  hx of PE (2017, on warfarin), embolic infarcts (off eliquis x4 months since undomiciled) - unsure of reason why he was originally prescribed coumadin and then switched to eliquis  - continue aspirin 81mg and plavix 75mg daily  - continue atorvastatin 80mg daily  - q4hr stroke neuro checks and vitals  - MRI Brain without contrast: new acute embolic appearing infarcts in right frontal and parietal lobe, left cerebellum   - LE duplex: neg DVT  - will likely need EP consult for ILR, however, patient is homeless so may not be able to maintain monitoring once outside of the hospital  - Stroke Code HCT Results: neg  - Stroke Code CTA Results: neg  - Stroke education    #Alcohol Abuse  15 year history of 2 pints of vodka per day. Has been hospitalized for withdrawals at [facility] many times, does not recall when he was last hospitalized. Denies history of seizures or intubations. Last drink at 10 am on 1/4.   BAL on arrival to ED: 29   - c/w thiamine 250mg, folic aicd 1mg  - CIWA    #Opioid use  Found down, unconscious with pinpoint pupils. Responded to narcan L[facility]V. UDS+ cocaine, THC, methamphetamine, PCP  - COWS    Cards  #HTN  - permissive hypertension, Goal -180  - TTE with bubble: dilated LA, EF 59%  - obtain KOTA, will f/u results  - EKG Results: NSR    #HLD  - high dose statin as above in CVA  - LDL results: 78    Pulm  - call provider if SPO2 < 94%    GI  #Nutrition/Fluids/Electrolytes   - replete K<4 and Mg <2  - Diet: Regular    Endocrine  - A1C results: 4.7  - TSH results: 1.380    Heme  #Macrocytosis without anemia  , Hgb 14.3  - f/u B12    DVT Prophylaxis  - lovenox sq for DVT prophylaxis   - SCDs for DVT prophylaxis       IDR Goals: Goals reviewed at interdisciplinary rounds with case management, social work, physical therapy, occupational therapy, and speech language pathology.   Please see specific therapy  notes for in depth goals.  Dispo: pending PT/OT     Discussed daily hospital plans and goals with patient and family at bedside. (Called and updated family.)    Discussed with Stroke Fellow Dr. Razo and Dr. Hernandez

## 2023-01-06 NOTE — PROGRESS NOTE ADULT - ASSESSMENT
62M w EtOH (2 pints vodka/d - last 1/4 and polysubstance use (+Amphetamine, THC, Cocaine, PCP), PE 2017 previously on eliquis but stopped after becoming homeless 2mo ago. BPH, HTN, HLD, h/o embolic stroke w residual L sided weakness, found intoxicated and admitted for EtOH withdrawal, also found to have hypoK, ALLISON - improved - recommended for outpatient PT    #CVA - concern for embolic stroke  -DAPT ASA + Plavix  Stroke code – neg imaging  MR Head – scattered cortical infarcts – R frontal + parietal lobes and L ceregbellum – suspect cardioembolic  BLE Doppler NEG  TTE - Nml    #Hypokalemia - resolved. 3.5 today. initially 2.5  #PE – prev on eliquis.   #ALLISON Resolved. Cr 0.88. Was up to 1.5 from 0.88 in 11/2022  #Lactate resolved 3.7 - 1.9  #Polysubstance use – Reports tobacco use, marijuana, and smoking cocaine. Denies IVDU. UDS positive for THC, Cocaine, Amphetamine, PCP  #EtOH – JOYCE 29 on 1/4. - 2 pints vodka daily.   CIWA 4/4/4 - HA - does not appear in acute withdrawal at this time  Ativan 2mg IV; 1mg IV q1h  #Obesity – 32 - affects all aspects of care  #HTN – -170s. BP target per stroke neuro. Prior prescription records show pt on amlodipine 10; lisinopril 20    Recommend  Overall appears to be stable. Does not appear in acute withdrawal. Continue CIWA monitoring w PRN ativan as he remains in window for EtOH withdrawal.  CBC w diff; would obtain CMP in AM to f/u elevated TBil  Would discuss w SW regarding shelter placement  NPO for KOTA - f/u results    DISPO: PT recommend outpatient PT. Pending above

## 2023-01-06 NOTE — PROGRESS NOTE ADULT - PROBLEM SELECTOR PLAN 9
Patient w/ known history of hypertension. Recalls being on medication at some point but does not remember the name, has not taken medications for the past three months. /109 on arrival.     Plan:   - continue to monitor BP for now  - patient cannot recall his PCP name but states that he sees a doctor at a [facility] clinic on [address]

## 2023-01-06 NOTE — CONSULT NOTE ADULT - SUBJECTIVE AND OBJECTIVE BOX
Patient is a 62y old  Male who presents with a chief complaint of Acute drug intoxication (2023 04:50)        HPI:  Patient is a 62 year old undomiciled male with a PMH alcohol abuse, drug abuse, three remote embolic strokes w/ left sided weakness, DVT's in the past, history of PE, gout who was BIBEMS after a bystander called EMS after finding him passed out on the street. The patient smoked marijuana and dranks two pints of vodka this morning, cocaine last night, and cannot recall events leading up to him waking up in the ED, upon arrival he complained of chest pain which had resolved by the time he arrived to [facility]. Per the patient, he has been drinking 2 pints of vodka per day for the past 15 years and has been hospitalized many times in the past for withdrawals, denies seizures or intubations, but cannot recall when his last hospitalization was though he states he usually goes to [facility]. Other than this morning, patient denies loss of consciousness in the past. Of note, patient presented to [facility] in 2022 for chest pain at which time EKG showed T wave flattening and cardiology was consulted. CT angiogram was ordered but patient was discharged prior to imaging as his chest pain had resolved. He currently denies chest pain or cardiac history. Patient denies weakness, fever, chills, chest pain, changes in bowel movements/urination. He has not taken any medication in the past three months and is unable to recall which medications he used to take.     ED:   Vitals: HR: 96, BP: 92/52, 18 RR on RA   Labs: WBC: 12.73, Hb: 14.3, MCV: 109.8, M.3 (repleted, 2.1), K: 2.5 (repleted 3.9), Cl: 115, Bicarb 16 -->25, lactate 3.7 --> 1.9, pHL 7.30, BAL: 29,   Imaging:   CT head: No acute intracranial hemorrhage, mass effect, or CT evidence   of recent transcortical infarction.  CXR: Mild bibasilar atelectasis.  Intervention: 2L NS, 1 g calcium gluconate, 2 magnesium sulfate IVBP, narcan 1 mg, K repleted (2023 23:32)      PAST MEDICAL & SURGICAL HISTORY:  Diverticulosis of large intestine  Diverticulosis      Acute pancreatitis  Pancreatitis      Cerebral artery occlusion with cerebral infarction  Stroke      History of pulmonary embolism      MI (myocardial infarction)      HTN (hypertension)      Hypertension      Crohn&#x27;s disease      HLD (hyperlipidemia)      Alcohol withdrawal      Pulmonary embolism      BPH (benign prostatic hyperplasia)      COPD (chronic obstructive pulmonary disease)      ETOH abuse      No significant past surgical history      No significant past surgical history          MEDICATIONS  (STANDING):  aspirin  chewable 81 milliGRAM(s) Oral daily  clopidogrel Tablet 75 milliGRAM(s) Oral every 24 hours  enoxaparin Injectable 40 milliGRAM(s) SubCutaneous every 24 hours  influenza   Vaccine 0.5 milliLiter(s) IntraMuscular once  thiamine IVPB 250 milliGRAM(s) IV Intermittent every 24 hours    MEDICATIONS  (PRN):  acetaminophen     Tablet .. 650 milliGRAM(s) Oral every 6 hours PRN Mild Pain (1 - 3)  aluminum hydroxide/magnesium hydroxide/simethicone Suspension 30 milliLiter(s) Oral every 4 hours PRN Dyspepsia  LORazepam   Injectable 2 milliGRAM(s) IV Push once PRN CIWA >8  LORazepam   Injectable 1 milliGRAM(s) IV Push every 1 hour PRN CIWA-Ar score 8 or greater        Social History:                   -  Home Living Status :  undomiciled     Baseline Functional Level Prior to Admission :             - ADL's/ IADL's :  independent            - Ambulatory status prior to admission :   walked with a cane      FAMILY HISTORY:    CBC Full  -  ( 2023 16:55 )  WBC Count : 12.73 K/uL  RBC Count : 4.08 M/uL  Hemoglobin : 14.3 g/dL  Hematocrit : 44.8 %  Platelet Count - Automated : 248 K/uL  Mean Cell Volume : 109.8 fl  Mean Cell Hemoglobin : 35.0 pg  Mean Cell Hemoglobin Concentration : 31.9 gm/dL  Auto Neutrophil # : 9.81 K/uL  Auto Lymphocyte # : 0.84 K/uL  Auto Monocyte # : 1.50 K/uL  Auto Eosinophil # : 0.48 K/uL  Auto Basophil # : 0.04 K/uL  Auto Neutrophil % : 77.0 %  Auto Lymphocyte % : 6.6 %  Auto Monocyte % : 11.8 %  Auto Eosinophil % : 3.8 %  Auto Basophil % : 0.3 %      -05    142  |  106  |  10  ----------------------------<  91  3.6   |  24  |  1.07    Ca    8.3<L>      2023 05:30  Phos  3.5     01-04  Mg     2.1     -04    TPro  6.7  /  Alb  3.5  /  TBili  1.7<H>  /  DBili  x   /  AST  25  /  ALT  12  /  AlkPhos  70        Urinalysis Basic - ( 2023 16:55 )    Color: Yellow / Appearance: Clear / S.025 / pH: x  Gluc: x / Ketone: NEGATIVE  / Bili: NEGATIVE / Urobili: 1.0 E.U./dL   Blood: x / Protein: 30 mg/dL / Nitrite: NEGATIVE   Leuk Esterase: NEGATIVE / RBC: < 5 /HPF / WBC < 5 /HPF   Sq Epi: x / Non Sq Epi: 0-5 /HPF / Bacteria: Present /HPF          Radiology :     < from: CT Brain Stroke Protocol (23 @ 15:30) >  ACC: [id number] EXAM:  CT BRAIN STROKE PROTOCOL                          PROCEDURE DATE:  2023          INTERPRETATION:  PROCEDURE: CT head without intravenous contrast    INDICATION: New left face and arm weakness    TECHNIQUE: Multiple axial images were obtained at 5 mm intervals from the   skull base to the vertex. Sagittal and coronal reformatted images were   obtained from the axial data set. The images were reviewed in brain and   bone windows.    COMPARISON: CT's head 2023 and 2021    FINDINGS: The CT examination demonstrates the ventricles, cisternal   spaces, and cortical sulci to be within normal limits. There is no   midline shift or extra axial collections. The gray white differentiation   appears within normal limits. There is no intracranial hemorrhage or   acute transcortical infarct. There are old lacunar infarcts within the   basal ganglia and right subinsular region.    The bony windows demonstrates no fractures. The visualized paranasal   sinuses are within normal limits. The mastoid air cells are well aerated.    The study was performed at 3:24 PM and the above findings were discussed   with Stroke ACP Krupa Victoria at 3:30 PM.    IMPRESSION: No intracranial hemorrhage or acute transcortical infarct.  Stable exam.      < from: CT Perfusion w/ Maps w/ IV Cont (23 @ 15:54) >  ACC: [id number] EXAM:  CT PERFUSION W MAPS IC                          PROCEDURE DATE:  2023          INTERPRETATION:  PROCEDURE: CT Perfusion with intravenous contrast.    INDICATION: New left face and arm weakness    TECHNIQUE: Following the intravenous administration of 40 ml of   Isovue-370, serial axial images were obtained through the brain. The CT   perfusion data set was post processed per [facility] protocol   generating color maps of CBF, CBV, MTT, and Tmax.    COMPARISON: None    FINDINGS: The CT perfusion study demonstrates no perfusion abnormality.   There is no mismatch volume.    IMPRESSION: Normal CT perfusion study.      < from: CT Angio Head w/ IV Cont (23 @ 15:56) >  ACC: [id number] EXAM:  CT ANGIO BRAIN (W)AW IC                        ACC: [id number] EXAM:  CT ANGIO NECK (W)AW IC                          PROCEDURE DATE:  2023          INTERPRETATION:  PROCEDURE: CTA brain with and without intravenous   contrast.    INDICATION: New left face and arm weakness    TECHNIQUE: Multiple thin section axial images were obtained through the   [place] following the intravenous injection of 80 ml of   Isovue-370. Multiple 3-D reformatted images were generated from the axial   cuts.    COMPARISON: None    FINDINGS: The CTA examination demonstrates the internal carotid arteries   to be normal in caliber. There is a normal bifurcation into the A1 and M1   segments. There is a normal MCA bifurcation. The vertebral arteries are   normal in caliber. There is an extradural origin to the left posterior   inferior cerebellar artery The basilar artery is normal in caliber. There   is a normal bifurcation into the posterior cerebral arteries. The right   P1 segment is hypoplastic with a prominent right posterior communicating   artery supplying the right PCA territory. There are no areas of stenosis,   dilatation or aneurysm.    IMPRESSION: No large vessel occlusion.      PROCEDURE: CTA neck with and without intravenous contrast.    INDICATION: New left face and arm weakness    TECHNIQUE: Multiple axial thin section were obtained through the neck   following the intravenous injection of 80 ml of Isovue-370. Multiple 3-D   reformatted images were generated from the axial cuts.    COMPARISON: None    FINDINGS: The CTA examination demonstrates the right common carotid   artery to be normal in caliber. There is a normal bifurcation into the   right internal and external carotid arteries. There is no hemodynamically   significant stenosis.    The left common carotid artery is normal in caliber. There is a normal   bifurcation into the left internal and external carotid arteries. There   is no hemodynamically significant stenosis.    The vertebral arteries are normal in caliber.    There is a common origin to the innominate and left common carotid   artery. The aortic arch appears intact without narrowing of the origin of   the great vessels.    IMPRESSION: Normal CTA of the neck.                    Vital Signs Last 24 Hrs  T(C): 36.9 (2023 04:09), Max: 36.9 (2023 20:25)  T(F): 98.4 (2023 04:09), Max: 98.5 (2023 20:25)  HR: 58 (2023 05:15) (58 - 89)  BP: 152/89 (2023 05:15) (130/76 - 155/106)  BP(mean): 115 (2023 05:15) (102 - 115)  RR: 18 (2023 05:15) (16 - 19)  SpO2: 95% (2023 05:15) (93% - 98%)    Parameters below as of 2023 05:15  Patient On (Oxygen Delivery Method): room air            REVIEW OF SYSTEMS:      CONSTITUTIONAL: No fever, weight loss, or fatigue  EYES: No eye pain, visual disturbances, or discharge  ENMT:  No difficulty hearing, tinnitus, vertigo; No sinus or throat pain  NECK: No pain or stiffness  BREASTS: No pain, masses, or nipple discharge  RESPIRATORY: No cough, wheezing, chills or hemoptysis; No shortness of breath  CARDIOVASCULAR: No chest pain, palpitations, dizziness, or leg swelling  GASTROINTESTINAL: No abdominal or epigastric pain. No nausea, vomiting, or hematemesis; No diarrhea or constipation. No melena or hematochezia.  GENITOURINARY: No dysuria, frequency, hematuria, or incontinence  NEUROLOGICAL:  per hpi   SKIN: No itching, burning, rashes, or lesions   LYMPH NODES: No enlarged glands  ENDOCRINE: No heat or cold intolerance; No hair loss  MUSCULOSKELETAL: No joint pain or swelling; No muscle, back, or extremity pain  PSYCHIATRIC: No depression, anxiety, mood swings, or difficulty sleeping  HEME/LYMPH: No easy bruising, or bleeding gums  ALLERGY AND IMMUNOLOGIC: No hives or eczema  VASCULAR: no swelling , erythema        Physical Exam:  thin , unkempt , 62 y o AA man lying comfortably in semi Gresham's position , awake , alert , no new complaints     Head : normocephalic , atraumatic    Eyes : PERRLA , EOMI , no nystagmus , sclera anicteric    ENT : nasal discharge , uvula midline , no oropharyngeal erythema / exudate    Neck : supple , negative JVD , negative carotid bruits , no thyromegaly    Chest : CTA bilaterally , neg wheeze / rhonchi / rales / crackles / egophany    Cardiovascular: regular rate and rhythm , neg murmurs / rubs / gallops    Abdomen : soft , non distended , non tender to palpation in all 4 quadrants , negative rebound / guarding , normal bowel sounds    Extremities : WWP , neg cyanosis /clubbing / edema      Neurologic Exam:    Alert and oriented to person , place , date/year, speech fluent w/o dysarthria , follows commands , recent and remote memory intact , repetition intact , comprehension intact ,  attention/concentration intact , fund of knowledge appropriate    Cranial Nerves:     II :                         pupils equal , round and reactive to light , visual fields intact   III/ IV/VI :              extraocular movements intact , neg nystagmus , neg ptosis  V :                        facial sensation intact , V1-3 normal  VII :                      face symmetric , no droop , normal eye closure and smile  VIII :                     hearing intact to finger rub bilaterally  IX and X :             no hoarseness , gag intact , palate/ uvula rise symmetrically  XI :                       SCM / trapezius strength intact bilateral  XII :                      no tongue deviation    Motor Exam:          Right UE:               no focal weakness ,  > 4/5 throughout  , no drift                                Left UE:                 no focal weakness,  > 4/5 throughout  , no drift         Right LE:                no focal weakness,  > 4/5 throughout        Left LE:                  no focal weakness,  > 4/5 throughout             Sensation:         intact to light touch x 4 extremities                         no neglect or extinction on double simultaneous testing                       DTR :                     biceps/brachioradialis : equal                                              patella/ankle : equal                                                                               neg Babinski         Coordination :      Finger to Nose :  neg dysmetria bilaterally     Gait :  not tested        PM&R Impression :     1) admitted for c/o left leg weakness and numbness    2) no acute pathology on CT brain imaging    3) no focal weakness      Recommendations / Plan :     1) Physical / Occupational therapy focusing on therapeutic exercises , equipment evaluation , bed mobility/transfer out of bed evaluation , progressive ambulation with assistive devices prn .    2) Current disposition plan recommendation  :  pending functional progress

## 2023-01-06 NOTE — PROVIDER CONTACT NOTE (CRITICAL VALUE NOTIFICATION) - SITUATION
multiple infarct in both cerebral and cerebellar hemisphere, multiple vascular distributions, probably the heart

## 2023-01-06 NOTE — OCCUPATIONAL THERAPY INITIAL EVALUATION ADULT - MODIFIED CLINICAL TEST OF SENSORY INTEGRATION IN BALANCE TEST
Pt able to ambulate ~30' with straight cane and SBA, no LOB noted, however pt reporting increasing LLE pain to ~8/10 with ambulation, requesting to return back to bed.

## 2023-01-06 NOTE — PHYSICAL THERAPY INITIAL EVALUATION ADULT - ADDITIONAL COMMENTS
Pt reports he has been using a cane for ~11 years. He uses [employer] for traveling and reports no difficulty with stair climbing in train stations. Pt also denies fall history besides passing out prior to admission. At baseline, pt reports L side weakness from prior strokes but states he is now feeling more weakness.

## 2023-01-06 NOTE — PROGRESS NOTE ADULT - PROBLEM SELECTOR PLAN 11
Hx of PE/DVT per patient, does not remember when this was. On outpatient med reviews, appears to have taken coumadin for some time as well as xarelto. Patient on room air, HR normal.    Plan:   - lovenox 40 mg subq  - f/u LE dopplers

## 2023-01-06 NOTE — OCCUPATIONAL THERAPY INITIAL EVALUATION ADULT - LIGHT TOUCH SENSATION, LUE, REHAB EVAL
pt reporting ~60% diminished sensation throughout LUE but able to localize light touch throughout/mild impairment

## 2023-01-06 NOTE — PROGRESS NOTE ADULT - NS ATTEND AMEND GEN_ALL_CORE FT
The patient is a 62-year-old gentleman with history of prior PE (2017, previously on Coumadin), prior embolic strokes with residual left hemiparesis (off a/C), alcohol abuse, and polysubstance use who was initially admitted with altered mental status due to alcohol/substance intoxication who was later found to have multiple, embolic appearing strokes after complaining of worsening left-sided weakness.  Plan for KOTA.  We will consider ILR/hypercoagulable studies.  For now, continue DAPT, statin.  Monitor for withdrawal. Will need to obtain collateral re prior PE strokes/mechanism to best determine choice of antithrombotic in future.

## 2023-01-06 NOTE — PHYSICAL THERAPY INITIAL EVALUATION ADULT - GENERAL OBSERVATIONS, REHAB EVAL
PT IE Completed. MRS: #1. Pt received semi-supine in bed, +heplock, +tele, A&Ox4, +room air, in NAD and agreeable to work with PT, JACLYN Paredes notified. OT Robert present throughout. Pt found obtunded 2/2 acute intoxication, admitted for withdrawal and found to have embolic R frontal, parietal and L cerebellar hemisphere infacts on MRI . PT exam shows LLE sensation and strength impairments with onset of achy LLE pain and lightheadness with mobility. Pt completed bed mob, transfers and gait with standard cane (baseline).  Pt left as found, +bed alarm, +call triana within reach, JCALYN Paredes notified. Pt can benefit from PT in order to improve quality of life by increasing strength/endurance/balance with functional mobility and ADLS.

## 2023-01-06 NOTE — PHYSICAL THERAPY INITIAL EVALUATION ADULT - TRANSFER SAFETY CONCERNS NOTED: SIT/STAND, REHAB EVAL
Pt performed STS from EOB at normal seat height and into chair during ambulation./decreased weight-shifting ability

## 2023-01-06 NOTE — OCCUPATIONAL THERAPY INITIAL EVALUATION ADULT - DIAGNOSIS, OT EVAL
Pt admitted for polysubstance abuse with MRI finding scattered cortical infarcts throughout the right frontal and parietal lobes and L cerebellar hemisphere, presents with L sided weakness (LLE>LUE), decreased sensation in LLE, LLE pain, and decreased balance impacting overall ease of completing ADLs and functional mobility tasks at prior level of function.

## 2023-01-06 NOTE — PROGRESS NOTE ADULT - SUBJECTIVE AND OBJECTIVE BOX
INTERVAL HPI/OVERNIGHT EVENTS:     SUBJECTIVE: Patient seen and examined at bedside.   Pt reports some lightheadedness sitting up w PT/OT today. Does report some paresthesias however no tremors, nausea, anxiety, visual hallucinations. No fever, chills, sweats. Eating wo N/V/Abd pain. No diarrhea or dysuria.    Still reporting decreased sensation over L side    Reports he has been street homeless over the last 2 months. Has not had his eliquis during this time. He is interested in entering a shelter and speaking w .     OBJECTIVE:    VITAL SIGNS:  ICU Vital Signs Last 24 Hrs  T(C): 36.9 (2023 09:09), Max: 36.9 (2023 20:25)  T(F): 98.4 (2023 09:09), Max: 98.5 (2023 20:25)  HR: 56 (2023 12:18) (56 - 89)  BP: 172/88 (2023 12:18) (130/76 - 172/88)  BP(mean): 127 (2023 12:18) (115 - 127)  ABP: --  ABP(mean): --  RR: 16 (2023 12:18) (16 - 18)  SpO2: 95% (2023 12:18) (94% - 98%)    O2 Parameters below as of 2023 12:18  Patient On (Oxygen Delivery Method): room air               @ 07: @ 07:00  --------------------------------------------------------  IN: 0 mL / OUT: 203 mL / NET: -203 mL     @ 07:01  -   @ 14:23  --------------------------------------------------------  IN: 0 mL / OUT: 300 mL / NET: -300 mL      CAPILLARY BLOOD GLUCOSE      POCT Blood Glucose.: 125 mg/dL (2023 15:03)      PHYSICAL EXAM:  GEN: Male in NAD on RA  HEENT: NC/AT, MMM  CV: RRR, nml S1S2, no murmurs  PULM: nml effort, CTAB  ABD: Soft, non-distended, NABS, non-tender  NEURO  CN II-XI grossly intact  A/O x3, moving all extremities,   4+/5 in LUE and LLE. Decreased sensation on L side.  PSYCH: Appropriate    MEDICATIONS:  MEDICATIONS  (STANDING):  aspirin  chewable 81 milliGRAM(s) Oral daily  clopidogrel Tablet 75 milliGRAM(s) Oral every 24 hours  enoxaparin Injectable 40 milliGRAM(s) SubCutaneous every 24 hours  influenza   Vaccine 0.5 milliLiter(s) IntraMuscular once  thiamine IVPB 250 milliGRAM(s) IV Intermittent every 24 hours    MEDICATIONS  (PRN):  acetaminophen     Tablet .. 650 milliGRAM(s) Oral every 6 hours PRN Mild Pain (1 - 3)  aluminum hydroxide/magnesium hydroxide/simethicone Suspension 30 milliLiter(s) Oral every 4 hours PRN Dyspepsia  LORazepam   Injectable 2 milliGRAM(s) IV Push once PRN CIWA >8  LORazepam   Injectable 1 milliGRAM(s) IV Push every 1 hour PRN CIWA-Ar score 8 or greater      ALLERGIES:  Allergies    codeine (Unknown)    Intolerances        LABS:                        14.2   6.14  )-----------(       ( 2023 11:31 )             42.9     01-06    138  |  104  |  7   ----------------------------<  94  3.5   |  22  |  0.88    Ca    8.4      2023 11:31  Phos  2.0     01-06  Mg     1.6     -06    TPro  6.8  /  Alb  3.4  /  TBili  1.8<H>  /  DBili  x   /  AST  19  /  ALT  10  /  AlkPhos  64  01-06    PT/INR - ( 2023 16:55 )   PT: 11.4 sec;   INR: 0.97          PTT - ( 2023 16:55 )  PTT:30.8 sec  Urinalysis Basic - ( 2023 16:55 )    Color: Yellow / Appearance: Clear / S.025 / pH: x  Gluc: x / Ketone: NEGATIVE  / Bili: NEGATIVE / Urobili: 1.0 E.U./dL   Blood: x / Protein: 30 mg/dL / Nitrite: NEGATIVE   Leuk Esterase: NEGATIVE / RBC: < 5 /HPF / WBC < 5 /HPF   Sq Epi: x / Non Sq Epi: 0-5 /HPF / Bacteria: Present /HPF        RADIOLOGY & ADDITIONAL TESTS: Reviewed.

## 2023-01-07 LAB
ALBUMIN SERPL ELPH-MCNC: 3.5 G/DL — SIGNIFICANT CHANGE UP (ref 3.3–5)
ALP SERPL-CCNC: 62 U/L — SIGNIFICANT CHANGE UP (ref 40–120)
ALT FLD-CCNC: 9 U/L — LOW (ref 10–45)
ANION GAP SERPL CALC-SCNC: 10 MMOL/L — SIGNIFICANT CHANGE UP (ref 5–17)
AST SERPL-CCNC: 16 U/L — SIGNIFICANT CHANGE UP (ref 10–40)
BILIRUB DIRECT SERPL-MCNC: 0.2 MG/DL — SIGNIFICANT CHANGE UP (ref 0–0.3)
BILIRUB INDIRECT FLD-MCNC: 1.1 MG/DL — HIGH (ref 0.2–1)
BILIRUB SERPL-MCNC: 1.3 MG/DL — HIGH (ref 0.2–1.2)
BUN SERPL-MCNC: 7 MG/DL — SIGNIFICANT CHANGE UP (ref 7–23)
CALCIUM SERPL-MCNC: 8.4 MG/DL — SIGNIFICANT CHANGE UP (ref 8.4–10.5)
CHLORIDE SERPL-SCNC: 104 MMOL/L — SIGNIFICANT CHANGE UP (ref 96–108)
CO2 SERPL-SCNC: 24 MMOL/L — SIGNIFICANT CHANGE UP (ref 22–31)
CREAT SERPL-MCNC: 0.92 MG/DL — SIGNIFICANT CHANGE UP (ref 0.5–1.3)
EGFR: 95 ML/MIN/1.73M2 — SIGNIFICANT CHANGE UP
GLUCOSE SERPL-MCNC: 98 MG/DL — SIGNIFICANT CHANGE UP (ref 70–99)
HCT VFR BLD CALC: 42.3 % — SIGNIFICANT CHANGE UP (ref 39–50)
HGB BLD-MCNC: 14.3 G/DL — SIGNIFICANT CHANGE UP (ref 13–17)
MAGNESIUM SERPL-MCNC: 1.4 MG/DL — LOW (ref 1.6–2.6)
MCHC RBC-ENTMCNC: 33.8 GM/DL — SIGNIFICANT CHANGE UP (ref 32–36)
MCHC RBC-ENTMCNC: 35.4 PG — HIGH (ref 27–34)
MCV RBC AUTO: 104.7 FL — HIGH (ref 80–100)
NRBC # BLD: 0 /100 WBCS — SIGNIFICANT CHANGE UP (ref 0–0)
PHOSPHATE SERPL-MCNC: 2.1 MG/DL — LOW (ref 2.5–4.5)
PLATELET # BLD AUTO: 207 K/UL — SIGNIFICANT CHANGE UP (ref 150–400)
POTASSIUM SERPL-MCNC: 3.4 MMOL/L — LOW (ref 3.5–5.3)
POTASSIUM SERPL-SCNC: 3.4 MMOL/L — LOW (ref 3.5–5.3)
PROT SERPL-MCNC: 6.7 G/DL — SIGNIFICANT CHANGE UP (ref 6–8.3)
RBC # BLD: 4.04 M/UL — LOW (ref 4.2–5.8)
RBC # FLD: 11.6 % — SIGNIFICANT CHANGE UP (ref 10.3–14.5)
SODIUM SERPL-SCNC: 138 MMOL/L — SIGNIFICANT CHANGE UP (ref 135–145)
VIT B12 SERPL-MCNC: 455 PG/ML — SIGNIFICANT CHANGE UP (ref 232–1245)
WBC # BLD: 6.65 K/UL — SIGNIFICANT CHANGE UP (ref 3.8–10.5)
WBC # FLD AUTO: 6.65 K/UL — SIGNIFICANT CHANGE UP (ref 3.8–10.5)

## 2023-01-07 PROCEDURE — 99233 SBSQ HOSP IP/OBS HIGH 50: CPT

## 2023-01-07 PROCEDURE — 99232 SBSQ HOSP IP/OBS MODERATE 35: CPT

## 2023-01-07 RX ORDER — POTASSIUM CHLORIDE 20 MEQ
40 PACKET (EA) ORAL ONCE
Refills: 0 | Status: COMPLETED | OUTPATIENT
Start: 2023-01-07 | End: 2023-01-07

## 2023-01-07 RX ORDER — MAGNESIUM OXIDE 400 MG ORAL TABLET 241.3 MG
400 TABLET ORAL ONCE
Refills: 0 | Status: COMPLETED | OUTPATIENT
Start: 2023-01-07 | End: 2023-01-07

## 2023-01-07 RX ORDER — MAGNESIUM SULFATE 500 MG/ML
4 VIAL (ML) INJECTION ONCE
Refills: 0 | Status: DISCONTINUED | OUTPATIENT
Start: 2023-01-07 | End: 2023-01-07

## 2023-01-07 RX ADMIN — CLOPIDOGREL BISULFATE 75 MILLIGRAM(S): 75 TABLET, FILM COATED ORAL at 17:28

## 2023-01-07 RX ADMIN — Medication 102.5 MILLIGRAM(S): at 00:24

## 2023-01-07 RX ADMIN — MAGNESIUM OXIDE 400 MG ORAL TABLET 400 MILLIGRAM(S): 241.3 TABLET ORAL at 15:03

## 2023-01-07 RX ADMIN — Medication 40 MILLIEQUIVALENT(S): at 15:07

## 2023-01-07 RX ADMIN — Medication 81 MILLIGRAM(S): at 11:34

## 2023-01-07 RX ADMIN — ATORVASTATIN CALCIUM 80 MILLIGRAM(S): 80 TABLET, FILM COATED ORAL at 22:36

## 2023-01-07 RX ADMIN — ENOXAPARIN SODIUM 40 MILLIGRAM(S): 100 INJECTION SUBCUTANEOUS at 11:34

## 2023-01-07 NOTE — PROGRESS NOTE ADULT - SUBJECTIVE AND OBJECTIVE BOX
INTERVAL HPI/OVERNIGHT EVENTS:  Patient was seen and examined at bedside. As per nurse and patient, no o/n events, patient resting comfortably. No complaints at this time. Patient denies: fever, chills, dizziness, weakness, HA, Changes in vision, CP, palpitations, SOB, cough, N/V/D/C, dysuria, changes in bowel movements, LE edema. ROS otherwise negative.    VITAL SIGNS:  T(F): 98.3 (01-07-23 @ 13:31)  HR: 60 (01-07-23 @ 11:33)  BP: 156/102 (01-07-23 @ 11:33)  RR: 18 (01-07-23 @ 11:33)  SpO2: 98% (01-07-23 @ 11:33)  Wt(kg): --      01-06-23 @ 07:01  -  01-07-23 @ 07:00  --------------------------------------------------------  IN: 30 mL / OUT: 795 mL / NET: -765 mL    01-07-23 @ 07:01  -  01-07-23 @ 14:51  --------------------------------------------------------  IN: 360 mL / OUT: 175 mL / NET: 185 mL        PHYSICAL EXAM:    Constitutional: WDWN resting comfortably in bed; NAD  HEENT: NC/AT, PERRL, EOMI, anicteric sclera, no nasal discharge; uvula midline, no oropharyngeal erythema or exudates; MMM  Neck: supple; no JVD or thyromegaly  Respiratory: CTA B/L; no W/R/R, no retractions  Cardiac: +S1/S2; RRR; no M/R/G; PMI non-displaced  Gastrointestinal: soft, NT/ND; no rebound or guarding; +BSx4  Genitourinary: normal external genitalia  Back: spine midline, no bony tenderness or step-offs; no CVAT B/L  Extremities: WWP, no clubbing or cyanosis; no peripheral edema  Musculoskeletal: NROM x4; no joint swelling, tenderness or erythema  Vascular: 2+ radial, DP/PT pulses B/L  Dermatologic: skin warm, dry and intact; no rashes, wounds, or scars  Lymphatic: no submandibular or cervical LAD  Neurologic: AAOx3; CNII-XII grossly intact; no focal deficits  Psychiatric: affect and characteristics of appearance, verbalizations, behaviors are appropriate, denies SI/HI/AH/VH    MEDICATIONS  (STANDING):  aspirin  chewable 81 milliGRAM(s) Oral daily  atorvastatin 80 milliGRAM(s) Oral at bedtime  clopidogrel Tablet 75 milliGRAM(s) Oral every 24 hours  enoxaparin Injectable 40 milliGRAM(s) SubCutaneous every 24 hours  influenza   Vaccine 0.5 milliLiter(s) IntraMuscular once  magnesium oxide 400 milliGRAM(s) Oral once  potassium chloride    Tablet ER 40 milliEquivalent(s) Oral once  thiamine IVPB 250 milliGRAM(s) IV Intermittent every 24 hours    MEDICATIONS  (PRN):  acetaminophen     Tablet .. 650 milliGRAM(s) Oral every 6 hours PRN Mild Pain (1 - 3)  aluminum hydroxide/magnesium hydroxide/simethicone Suspension 30 milliLiter(s) Oral every 4 hours PRN Dyspepsia  LORazepam   Injectable 2 milliGRAM(s) IV Push once PRN CIWA >8  LORazepam   Injectable 1 milliGRAM(s) IV Push every 1 hour PRN CIWA-Ar score 8 or greater      Allergies    codeine (Unknown)    Intolerances        LABS:                        14.3   6.65  )-----------( 207      ( 07 Jan 2023 06:26 )             42.3     01-07    138  |  104  |  7   ----------------------------<  98  3.4<L>   |  24  |  0.92    Ca    8.4      07 Jan 2023 06:26  Phos  2.1     01-07  Mg     1.4     01-07    TPro  6.7  /  Alb  3.5  /  TBili  1.3<H>  /  DBili  0.2  /  AST  16  /  ALT  9<L>  /  AlkPhos  62  01-07          RADIOLOGY & ADDITIONAL TESTS:  Reviewed

## 2023-01-07 NOTE — PROGRESS NOTE ADULT - ASSESSMENT
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 Found with embolic appearing infarcts on MRI in setting of worsening left sided weakness and new LLE numbness.     Neuro  #CVA workup  hx of PE (2017, on warfarin), embolic infarcts (off eliquis x4 months since undomiciled) - unsure of reason why he was originally prescribed coumadin and then switched to eliquis  - continue aspirin 81mg and plavix 75mg daily  - continue atorvastatin 80mg daily  - q4hr stroke neuro checks and vitals  - MRI Brain without contrast: new acute embolic appearing infarcts in right frontal and parietal lobe, left cerebellum   - LE duplex: neg DVT  - ILR monday  - Stroke Code HCT Results: neg  - Stroke Code CTA Results: neg  - Stroke education    #Alcohol Use Disorder  15 year history of 2 pints of vodka per day. Has been hospitalized for withdrawals at [facility] many times, does not recall when he was last hospitalized. Denies history of seizures or intubations. Last drink at 10 am on 1/4.   BAL on arrival to ED: 29   - c/w thiamine 250mg, folic aicd 1mg  - CIWA    #Opioid use  Found down, unconscious with pinpoint pupils. Responded to narcan LHGV. UDS+ cocaine, THC, methamphetamine, PCP  - COWS    Cards  #HTN  - permissive hypertension, Goal -180  - TTE with bubble: dilated LA, EF 59%  - obtain KOTA, will f/u results  - EKG Results: NSR    #HLD  - high dose statin as above in CVA  - LDL results: 78    Pulm  - call provider if SPO2 < 94%    GI  #Nutrition/Fluids/Electrolytes   - replete K<4 and Mg <2  - Diet: Regular    Endocrine  - A1C results: 4.7  - TSH results: 1.380    Heme  #Macrocytosis without anemia, likely 2/2 ETOH  , Hgb 14.3  - f/u B12    DVT Prophylaxis  - lovenox sq for DVT prophylaxis   - SCDs for DVT prophylaxis       IDR Goals: Goals reviewed at interdisciplinary rounds with case management, social work, physical therapy, occupational therapy, and speech language pathology.   Please see specific therapy  notes for in depth goals.  Dispo: pending PT/OT     Discussed daily hospital plans and goals with patient and family at bedside. (Called and updated family.)    Discussed with Stroke attending Dr. Hernandez

## 2023-01-07 NOTE — PROGRESS NOTE ADULT - SUBJECTIVE AND OBJECTIVE BOX
INTERVAL HPI/OVERNIGHT EVENTS: AUSTIN O/N    SUBJECTIVE: Patient seen and examined at bedside.   Pt feels well overall. Still reporting numbness in L side (UE, LE, and abdomin). No anxiety, tremors, visual/auditory hallucinations. Eating well wo N/V/Abd pain. No diarrhea, dysuria.    OBJECTIVE:    VITAL SIGNS:  ICU Vital Signs Last 24 Hrs  T(C): 36.8 (07 Jan 2023 13:31), Max: 37.1 (07 Jan 2023 09:00)  T(F): 98.3 (07 Jan 2023 13:31), Max: 98.8 (07 Jan 2023 09:00)  HR: 60 (07 Jan 2023 11:33) (52 - 86)  BP: 156/102 (07 Jan 2023 11:33) (149/73 - 163/96)  BP(mean): 124 (07 Jan 2023 11:33) (104 - 124)  ABP: --  ABP(mean): --  RR: 18 (07 Jan 2023 11:33) (17 - 18)  SpO2: 98% (07 Jan 2023 11:33) (95% - 98%)    O2 Parameters below as of 07 Jan 2023 11:33  Patient On (Oxygen Delivery Method): room air              01-06 @ 07:01 - 01-07 @ 07:00  --------------------------------------------------------  IN: 30 mL / OUT: 795 mL / NET: -765 mL    01-07 @ 07:01  -  01-07 @ 14:04  --------------------------------------------------------  IN: 360 mL / OUT: 175 mL / NET: 185 mL      CAPILLARY BLOOD GLUCOSE      POCT Blood Glucose.: 125 mg/dL (05 Jan 2023 15:03)      PHYSICAL EXAM:  GEN: Male in NAD on RA  HEENT: NC/AT, MMM  CV: RRR, nml S1S2, no murmurs  PULM: nml effort, CTAB  ABD: Soft, non-distended, NABS, non-tender  NEURO  CN II-XI grossly intact  A/O x3, moving all extremities,   4+/5 in LUE and LLE. Decreased sensation on L side.  PSYCH: Appropriate    MEDICATIONS:  MEDICATIONS  (STANDING):  aspirin  chewable 81 milliGRAM(s) Oral daily  atorvastatin 80 milliGRAM(s) Oral at bedtime  clopidogrel Tablet 75 milliGRAM(s) Oral every 24 hours  enoxaparin Injectable 40 milliGRAM(s) SubCutaneous every 24 hours  influenza   Vaccine 0.5 milliLiter(s) IntraMuscular once  thiamine IVPB 250 milliGRAM(s) IV Intermittent every 24 hours    MEDICATIONS  (PRN):  acetaminophen     Tablet .. 650 milliGRAM(s) Oral every 6 hours PRN Mild Pain (1 - 3)  aluminum hydroxide/magnesium hydroxide/simethicone Suspension 30 milliLiter(s) Oral every 4 hours PRN Dyspepsia  LORazepam   Injectable 2 milliGRAM(s) IV Push once PRN CIWA >8  LORazepam   Injectable 1 milliGRAM(s) IV Push every 1 hour PRN CIWA-Ar score 8 or greater      ALLERGIES:  Allergies    codeine (Unknown)    Intolerances        LABS:                        14.3   6.65  )-----------( 207      ( 07 Jan 2023 06:26 )             42.3     01-07    138  |  104  |  7   ----------------------------<  98  3.4<L>   |  24  |  0.92    Ca    8.4      07 Jan 2023 06:26  Phos  2.1     01-07  Mg     1.4     01-07    TPro  6.7  /  Alb  3.5  /  TBili  1.3<H>  /  DBili  0.2  /  AST  16  /  ALT  9<L>  /  AlkPhos  62  01-07          RADIOLOGY & ADDITIONAL TESTS: Reviewed.

## 2023-01-07 NOTE — PROGRESS NOTE ADULT - ATTENDING COMMENTS
The patient is a 62-year-old gentleman with history of prior PE (2017, previously on Coumadin), prior embolic strokes with residual left hemiparesis (off a/c, ?Eliquis), alcohol abuse, and polysubstance use who was initially admitted with altered mental status due to alcohol/substance intoxication who was later found to have multiple, embolic appearing strokes after complaining of worsening left-sided weakness.  TTE/KOTA unrevealing. Will attempt to obtain outside records re: w/u for hypercoag state.  For now, continue DAPT, statin. Will likely transition to a/c if we can find a medication assistance plan for him- will discuss with SW
Electrolytes repleted and Cr decreased post hydration. Continue care on regional floor.

## 2023-01-07 NOTE — PROGRESS NOTE ADULT - ASSESSMENT
62M w EtOH (2 pints vodka/d - last 1/4 and polysubstance use (+Amphetamine, THC, Cocaine, PCP), PE 2017 previously on eliquis but stopped after becoming homeless 2mo ago. BPH, HTN, HLD, h/o embolic stroke w residual L sided weakness, found intoxicated and admitted for EtOH withdrawal, also found to have hypoK, ALLISON - improved - recommended for outpatient PT    #CVA - concern for embolic stroke  -DAPT ASA + Plavix  Stroke code – neg imaging  MR Head – scattered cortical infarcts – R frontal + parietal lobes and L ceregbellum – suspect cardioembolic  BLE Doppler NEG  TTE - Nml  KOTA - nml w negative bubble study    #Hypokalemia - 3.4 today. Mg 1.4. Suspect due to total body K/Mg decreased from EtOH use/poor PO intake prior to admission. Not on diuretics and has good PO intake now.   #PE – prev on eliquis.   #ALLISON Resolved. Cr 0.92 from 0.88. Was up to 1.5 from 0.88 in 11/2022  #Lactate resolved 3.7 -> 1.9  #Polysubstance use – Reports tobacco use, marijuana, and smoking cocaine. Denies IVDU. UDS positive for THC, Cocaine, Amphetamine, PCP  #EtOH – JOYCE 29 on 1/4. - 2 pints vodka daily.   CIWA 0/0/0 - HA - does not appear in acute withdrawal at this time  Ativan 2mg IV; 1mg IV q1h  #Obesity – 32 - affects all aspects of care  #HTN – BP 150s / 100. BP target per stroke neuro. Prior prescription records show pt on amlodipine 10; lisinopril 20    Recommend  Overall appears to be stable. Does not appear in acute withdrawal. Would continue CIWA monitoring for one more day. TBil improving today  Supplement K/Mg to >4 and 2, respectively  Pending outpatient records from [facility] provider regarding previous being on eliquis for PE/DVT.    Would discuss w SW regarding shelter placement    DISPO: PT recommend outpatient PT. Pending above

## 2023-01-08 LAB
ANION GAP SERPL CALC-SCNC: 12 MMOL/L — SIGNIFICANT CHANGE UP (ref 5–17)
BUN SERPL-MCNC: 10 MG/DL — SIGNIFICANT CHANGE UP (ref 7–23)
CALCIUM SERPL-MCNC: 8.7 MG/DL — SIGNIFICANT CHANGE UP (ref 8.4–10.5)
CHLORIDE SERPL-SCNC: 106 MMOL/L — SIGNIFICANT CHANGE UP (ref 96–108)
CO2 SERPL-SCNC: 22 MMOL/L — SIGNIFICANT CHANGE UP (ref 22–31)
CREAT SERPL-MCNC: 0.92 MG/DL — SIGNIFICANT CHANGE UP (ref 0.5–1.3)
EGFR: 95 ML/MIN/1.73M2 — SIGNIFICANT CHANGE UP
GLUCOSE BLDC GLUCOMTR-MCNC: 118 MG/DL — HIGH (ref 70–99)
GLUCOSE SERPL-MCNC: 101 MG/DL — HIGH (ref 70–99)
HCT VFR BLD CALC: 43.9 % — SIGNIFICANT CHANGE UP (ref 39–50)
HGB BLD-MCNC: 15 G/DL — SIGNIFICANT CHANGE UP (ref 13–17)
MAGNESIUM SERPL-MCNC: 1.5 MG/DL — LOW (ref 1.6–2.6)
MCHC RBC-ENTMCNC: 34.2 GM/DL — SIGNIFICANT CHANGE UP (ref 32–36)
MCHC RBC-ENTMCNC: 35.5 PG — HIGH (ref 27–34)
MCV RBC AUTO: 103.8 FL — HIGH (ref 80–100)
NRBC # BLD: 0 /100 WBCS — SIGNIFICANT CHANGE UP (ref 0–0)
PHOSPHATE SERPL-MCNC: 3 MG/DL — SIGNIFICANT CHANGE UP (ref 2.5–4.5)
PLATELET # BLD AUTO: 228 K/UL — SIGNIFICANT CHANGE UP (ref 150–400)
POTASSIUM SERPL-MCNC: 3.4 MMOL/L — LOW (ref 3.5–5.3)
POTASSIUM SERPL-SCNC: 3.4 MMOL/L — LOW (ref 3.5–5.3)
RBC # BLD: 4.23 M/UL — SIGNIFICANT CHANGE UP (ref 4.2–5.8)
RBC # FLD: 11.6 % — SIGNIFICANT CHANGE UP (ref 10.3–14.5)
SODIUM SERPL-SCNC: 140 MMOL/L — SIGNIFICANT CHANGE UP (ref 135–145)
WBC # BLD: 6.46 K/UL — SIGNIFICANT CHANGE UP (ref 3.8–10.5)
WBC # FLD AUTO: 6.46 K/UL — SIGNIFICANT CHANGE UP (ref 3.8–10.5)

## 2023-01-08 PROCEDURE — 99232 SBSQ HOSP IP/OBS MODERATE 35: CPT

## 2023-01-08 PROCEDURE — 99233 SBSQ HOSP IP/OBS HIGH 50: CPT

## 2023-01-08 RX ORDER — POTASSIUM CHLORIDE 20 MEQ
40 PACKET (EA) ORAL ONCE
Refills: 0 | Status: COMPLETED | OUTPATIENT
Start: 2023-01-08 | End: 2023-01-08

## 2023-01-08 RX ORDER — MAGNESIUM SULFATE 500 MG/ML
1 VIAL (ML) INJECTION ONCE
Refills: 0 | Status: COMPLETED | OUTPATIENT
Start: 2023-01-08 | End: 2023-01-08

## 2023-01-08 RX ADMIN — Medication 102.5 MILLIGRAM(S): at 00:03

## 2023-01-08 RX ADMIN — Medication 650 MILLIGRAM(S): at 12:24

## 2023-01-08 RX ADMIN — CLOPIDOGREL BISULFATE 75 MILLIGRAM(S): 75 TABLET, FILM COATED ORAL at 16:16

## 2023-01-08 RX ADMIN — Medication 40 MILLIEQUIVALENT(S): at 06:49

## 2023-01-08 RX ADMIN — Medication 650 MILLIGRAM(S): at 11:38

## 2023-01-08 RX ADMIN — Medication 650 MILLIGRAM(S): at 00:11

## 2023-01-08 RX ADMIN — Medication 650 MILLIGRAM(S): at 01:02

## 2023-01-08 RX ADMIN — Medication 100 GRAM(S): at 06:48

## 2023-01-08 RX ADMIN — Medication 81 MILLIGRAM(S): at 11:33

## 2023-01-08 RX ADMIN — ENOXAPARIN SODIUM 40 MILLIGRAM(S): 100 INJECTION SUBCUTANEOUS at 11:33

## 2023-01-08 RX ADMIN — ATORVASTATIN CALCIUM 80 MILLIGRAM(S): 80 TABLET, FILM COATED ORAL at 21:20

## 2023-01-08 NOTE — PROGRESS NOTE ADULT - NS ATTEND AMEND GEN_ALL_CORE FT
The patient is a 62-year-old gentleman with history of prior PE (2017, previously on Coumadin), prior embolic strokes with residual left hemiparesis (off a/c, ?Eliquis), alcohol abuse, and polysubstance use who was initially admitted with altered mental status due to alcohol/substance intoxication who was later found to have multiple, embolic appearing strokes after complaining of worsening left-sided weakness.  TTE/KOTA unrevealing. Will attempt to obtain outside records re: w/u for hypercoag state.  For now, continue DAPT, statin. Will likely transition to a/c if we can find a medication assistance plan for him- will discuss with SW The patient is a 62-year-old gentleman with history of prior PE (2017, previously on Coumadin), prior strokes with residual left hemiparesis (off a/c, ?Eliquis), pancreatitis, remote GIB, alcohol abuse, and polysubstance use who was initially admitted with altered mental status due to alcohol/substance intoxication who was later found to have multiple, embolic appearing strokes after complaining of worsening left-sided weakness.  TTE/KOTA unrevealing. Will attempt to obtain outside records re: w/u for hypercoag state/stroke mechanisms.  For now, continue DAPT, statin. Will likely transition to a/c if we can find a medication assistance plan for him- will discuss with SW

## 2023-01-08 NOTE — PROGRESS NOTE ADULT - SUBJECTIVE AND OBJECTIVE BOX
INTERVAL HPI/OVERNIGHT EVENTS: AUSTIN O/N    SUBJECTIVE: Patient seen and examined at bedside.   PT feels well. Still reports numbness on L side. Eating wo N/V/Abd pain. Spending time in chair. No LH/Dizziness when he walks. No chest pain, dyspnea. Voiding wo issues. No fever, sore throat, cough  Denies any anxiety, tremors, visual/auditory hallucinations    OBJECTIVE:    VITAL SIGNS:  ICU Vital Signs Last 24 Hrs  T(C): 36.7 (08 Jan 2023 14:00), Max: 37.2 (08 Jan 2023 01:00)  T(F): 98 (08 Jan 2023 14:00), Max: 98.9 (08 Jan 2023 01:00)  HR: 54 (08 Jan 2023 12:00) (54 - 66)  BP: 164/88 (08 Jan 2023 12:00) (146/84 - 176/98)  BP(mean): 118 (08 Jan 2023 12:00) (108 - 131)  ABP: --  ABP(mean): --  RR: 15 (08 Jan 2023 12:00) (15 - 18)  SpO2: 96% (08 Jan 2023 12:00) (96% - 100%)    O2 Parameters below as of 08 Jan 2023 12:00  Patient On (Oxygen Delivery Method): room air              01-07 @ 07:01 - 01-08 @ 07:00  --------------------------------------------------------  IN: 560 mL / OUT: 175 mL / NET: 385 mL    01-08 @ 07:01  -  01-08 @ 16:10  --------------------------------------------------------  IN: 0 mL / OUT: 580 mL / NET: -580 mL      CAPILLARY BLOOD GLUCOSE      POCT Blood Glucose.: 118 mg/dL (08 Jan 2023 11:16)      PHYSICAL EXAM:  GEN: Male in NAD on RA  HEENT: NC/AT, MMM  CV: RRR, nml S1S2, no murmurs  PULM: nml effort, CTAB  ABD: Soft, non-distended, NABS, non-tender  NEURO  CN II-XI grossly intact  A/O x3, moving all extremities,   4+/5 in LUE and LLE. Decreased sensation on L side.  No tremors  PSYCH: Appropriate      MEDICATIONS:  MEDICATIONS  (STANDING):  aspirin  chewable 81 milliGRAM(s) Oral daily  atorvastatin 80 milliGRAM(s) Oral at bedtime  clopidogrel Tablet 75 milliGRAM(s) Oral every 24 hours  enoxaparin Injectable 40 milliGRAM(s) SubCutaneous every 24 hours  influenza   Vaccine 0.5 milliLiter(s) IntraMuscular once  thiamine IVPB 250 milliGRAM(s) IV Intermittent every 24 hours    MEDICATIONS  (PRN):  acetaminophen     Tablet .. 650 milliGRAM(s) Oral every 6 hours PRN Mild Pain (1 - 3)  aluminum hydroxide/magnesium hydroxide/simethicone Suspension 30 milliLiter(s) Oral every 4 hours PRN Dyspepsia  LORazepam   Injectable 2 milliGRAM(s) IV Push once PRN CIWA >8  LORazepam   Injectable 1 milliGRAM(s) IV Push every 1 hour PRN CIWA-Ar score 8 or greater      ALLERGIES:  Allergies    codeine (Unknown)    Intolerances        LABS:                        15.0   6.46  )-----------( 228      ( 08 Jan 2023 05:28 )             43.9     01-08    140  |  106  |  10  ----------------------------<  101<H>  3.4<L>   |  22  |  0.92    Ca    8.7      08 Jan 2023 05:28  Phos  3.0     01-08  Mg     1.5     01-08    TPro  6.7  /  Alb  3.5  /  TBili  1.3<H>  /  DBili  0.2  /  AST  16  /  ALT  9<L>  /  AlkPhos  62  01-07          RADIOLOGY & ADDITIONAL TESTS: Reviewed.

## 2023-01-08 NOTE — PROGRESS NOTE ADULT - ASSESSMENT
62M w EtOH (2 pints vodka/d - last 1/4 and polysubstance use (+Amphetamine, THC, Cocaine, PCP), PE 2017 previously on eliquis but stopped after becoming homeless 2mo ago. BPH, HTN, HLD, h/o embolic stroke w residual L sided weakness, found intoxicated and admitted for EtOH withdrawal, also found to have hypoK, ALLISON - improved - recommended for outpatient PT    #CVA - concern for embolic stroke  -DAPT ASA + Plavix  Stroke code – neg imaging  MR Head – scattered cortical infarcts – R frontal + parietal lobes and L ceregbellum – suspect cardioembolic  BLE Doppler NEG  TTE - Nml  KOTA - nml w negative bubble study    #Hypokalemia - 3.4 today. Mg 1.5. Suspect due to total body K/Mg decreased from EtOH use/poor PO intake prior to admission - though pt reports eating ok while drinking EtOH Not on diuretics and has good PO intake now.   #PE – prev on eliquis.   #ALLISON Resolved. Stable at Cr 0.92. Was up to 1.5 from 0.88 in 11/2022  #Lactate resolved 3.7 -> 1.9  #Polysubstance use – Reports tobacco use, marijuana, and smoking cocaine. Denies IVDU. UDS positive for THC, Cocaine, Amphetamine, PCP  #EtOH – JOYCE 29 on 1/4. - 2 pints vodka daily.   CIWA 0/0/0 - HA - does not appear in acute withdrawal at this time  Ativan 2mg IV; 1mg IV q1h  #Obesity – 32 - affects all aspects of care  #HTN – BP 150s / 100. BP target per stroke neuro. Prior prescription records show pt on amlodipine 10; lisinopril 20    Recommend  Overall appears to be stable. Does not appear in acute withdrawal. Can DC CIWA monitoring and PRN Ativan.  CMP to evaluate TBil tomorrow  Supplement K/Mg to >4 and 2, respectively  Plan for ILR Monday    Pending outpatient records from [facility] provider regarding previous being on eliquis for PE/DVT.    Would discuss w SW regarding shelter placement    DISPO: PT recommend outpatient PT. Pending above

## 2023-01-08 NOTE — PROGRESS NOTE ADULT - SUBJECTIVE AND OBJECTIVE BOX
Neurology Stroke Progress Note    INTERVAL HPI/OVERNIGHT EVENTS:  Patient seen and examined no acute overnight events. Denies any complaints this morning.     MEDICATIONS  (STANDING):  aspirin  chewable 81 milliGRAM(s) Oral daily  atorvastatin 80 milliGRAM(s) Oral at bedtime  clopidogrel Tablet 75 milliGRAM(s) Oral every 24 hours  enoxaparin Injectable 40 milliGRAM(s) SubCutaneous every 24 hours  influenza   Vaccine 0.5 milliLiter(s) IntraMuscular once  thiamine IVPB 250 milliGRAM(s) IV Intermittent every 24 hours    MEDICATIONS  (PRN):  acetaminophen     Tablet .. 650 milliGRAM(s) Oral every 6 hours PRN Mild Pain (1 - 3)  aluminum hydroxide/magnesium hydroxide/simethicone Suspension 30 milliLiter(s) Oral every 4 hours PRN Dyspepsia  LORazepam   Injectable 2 milliGRAM(s) IV Push once PRN CIWA >8  LORazepam   Injectable 1 milliGRAM(s) IV Push every 1 hour PRN CIWA-Ar score 8 or greater      Allergies    codeine (Unknown)    Intolerances        Vital Signs Last 24 Hrs  T(C): 36.6 (08 Jan 2023 17:30), Max: 37.2 (08 Jan 2023 01:00)    Physical exam:  General: No acute distress, awake and alert  Eyes: Anicteric sclerae, moist conjunctivae, see below for CNs  Neck: trachea midline  Pulmonary: No use of accessory muscles  GI: Abdomen soft    Neurologic:  -Mental status: Awake, alert, oriented to person, place, and time. Speech is fluent with intact naming, repetition, and comprehension, no dysarthria. Recent and remote memory intact. Follows commands. Attention/concentration intact. Fund of knowledge appropriate.  -Cranial nerves:   II: Visual fields are full to confrontation.  III, IV, VI: Extraocular movements are intact without nystagmus. Pupils equally round and reactive to light  V:  Decreased sensation along left V1-V3  VII: Face is symmetric with normal eye closure and smile  VIII: Hearing is bilaterally intact  Motor: Normal bulk and tone. Mild left arm pronator drift. Strength bilateral upper extremity 5/5. Decreased hand  left. LLE with drift, does  not hit bed, strength 4/5. RLE 5/5.   Sensation: No sensation to light touch along left leg. Extinguishes on DST on the LLE ? d/t loss of sensation.   Coordination: No dysmetria on finger to nose      T(F): 97.8 (08 Jan 2023 17:30), Max: 98.9 (08 Jan 2023 01:00)  HR: 64 (08 Jan 2023 16:16) (54 - 66)  BP: 143/96 (08 Jan 2023 16:16) (143/96 - 170/92)  BP(mean): 116 (08 Jan 2023 16:16) (108 - 124)  RR: 15 (08 Jan 2023 16:16) (15 - 17)  SpO2: 96% (08 Jan 2023 16:16) (96% - 98%)    Parameters below as of 08 Jan 2023 16:16  Patient On (Oxygen Delivery Method): room air          LABS:                        15.0   6.46  )-----------( 228      ( 08 Jan 2023 05:28 )             43.9     01-08    140  |  106  |  10  ----------------------------<  101<H>  3.4<L>   |  22  |  0.92    Ca    8.7      08 Jan 2023 05:28  Phos  3.0     01-08  Mg     1.5     01-08    TPro  6.7  /  Alb  3.5  /  TBili  1.3<H>  /  DBili  0.2  /  AST  16  /  ALT  9<L>  /  AlkPhos  62  01-07          RADIOLOGY & ADDITIONAL TESTS:  < from: MR Head No Cont (01.06.23 @ 00:01) >  IMPRESSION:  Scattered cortical infarcts throughout the right frontal and parietal   lobes and  both cerebellar hemispheres. Different vascular distributions is   consistent  with an embolic source, likely cardiac.  There are also punctate infarcts in the left occipital lobe cortex.    < end of copied text >

## 2023-01-08 NOTE — PROGRESS NOTE ADULT - ASSESSMENT
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 Found with embolic appearing infarcts on MRI in setting of worsening left sided weakness and new LLE numbness. TTE/KOTA unrevealing. Plan to obtain outside records for prior stroke workup including hypercoag workup and why patient was previously on AC.     Neuro  #CVA workup  hx of PE (2017, on warfarin), embolic infarcts (off eliquis x4 months since undomiciled) - unsure of reason why he was originally prescribed coumadin and then switched to eliquis  - continue aspirin 81mg and plavix 75mg daily  - continue atorvastatin 80mg daily  - q4hr stroke neuro checks and vitals  - MRI Brain without contrast: new acute embolic appearing infarcts in right frontal and parietal lobe, left cerebellum   - LE duplex: neg DVT  - ILR monday  - Stroke Code HCT Results: neg  - Stroke Code CTA Results: neg  - Stroke education    #Alcohol Use Disorder  15 year history of 2 pints of vodka per day. Has been hospitalized for withdrawals at [facility] many times, does not recall when he was last hospitalized. Denies history of seizures or intubations. Last drink at 10 am on 1/4.   BAL on arrival to ED: 29   - c/w thiamine 250mg, folic aicd 1mg  - CIWA    #Opioid use  Found down, unconscious with pinpoint pupils. Responded to narcan GV. UDS+ cocaine, THC, methamphetamine, PCP  - COWS    Cards  #HTN  - permissive hypertension, Goal -180  - TTE with bubble: dilated LA, EF 59%  - KOTA on 1/6- Normal LV and RV function. No thrombus seen.  - EKG Results: NSR    #HLD  - high dose statin as above in CVA  - LDL results: 78    Pulm  - call provider if SPO2 < 94%    GI  #Nutrition/Fluids/Electrolytes   - replete K<4 and Mg <2  - Diet: Regular    Endocrine  - A1C results: 4.7  - TSH results: 1.380    Heme  #Macrocytosis without anemia, likely 2/2 ETOH  , Hgb 14.3  - B12- 455    DVT Prophylaxis  - lovenox sq for DVT prophylaxis   - SCDs for DVT prophylaxis     IDR Goals: Goals reviewed at interdisciplinary rounds with case management, social work, physical therapy, occupational therapy, and speech language pathology.   Please see specific therapy  notes for in depth goals.  Dispo: outpatient PT/OT     Discussed daily hospital plans and goals with patient and family at bedside.     Discussed with Stroke attending Dr. Hernandez

## 2023-01-09 ENCOUNTER — TRANSCRIPTION ENCOUNTER (OUTPATIENT)
Age: 63
End: 2023-01-09

## 2023-01-09 LAB
ANION GAP SERPL CALC-SCNC: 11 MMOL/L — SIGNIFICANT CHANGE UP (ref 5–17)
BUN SERPL-MCNC: 12 MG/DL — SIGNIFICANT CHANGE UP (ref 7–23)
CALCIUM SERPL-MCNC: 8.6 MG/DL — SIGNIFICANT CHANGE UP (ref 8.4–10.5)
CHLORIDE SERPL-SCNC: 106 MMOL/L — SIGNIFICANT CHANGE UP (ref 96–108)
CO2 SERPL-SCNC: 21 MMOL/L — LOW (ref 22–31)
CREAT SERPL-MCNC: 0.95 MG/DL — SIGNIFICANT CHANGE UP (ref 0.5–1.3)
EGFR: 91 ML/MIN/1.73M2 — SIGNIFICANT CHANGE UP
GLUCOSE BLDC GLUCOMTR-MCNC: 97 MG/DL — SIGNIFICANT CHANGE UP (ref 70–99)
GLUCOSE SERPL-MCNC: 105 MG/DL — HIGH (ref 70–99)
HCT VFR BLD CALC: 44.6 % — SIGNIFICANT CHANGE UP (ref 39–50)
HGB BLD-MCNC: 15.1 G/DL — SIGNIFICANT CHANGE UP (ref 13–17)
MAGNESIUM SERPL-MCNC: 1.6 MG/DL — SIGNIFICANT CHANGE UP (ref 1.6–2.6)
MCHC RBC-ENTMCNC: 33.9 GM/DL — SIGNIFICANT CHANGE UP (ref 32–36)
MCHC RBC-ENTMCNC: 35.3 PG — HIGH (ref 27–34)
MCV RBC AUTO: 104.2 FL — HIGH (ref 80–100)
NRBC # BLD: 0 /100 WBCS — SIGNIFICANT CHANGE UP (ref 0–0)
PHOSPHATE SERPL-MCNC: 3.5 MG/DL — SIGNIFICANT CHANGE UP (ref 2.5–4.5)
PLATELET # BLD AUTO: 266 K/UL — SIGNIFICANT CHANGE UP (ref 150–400)
POTASSIUM SERPL-MCNC: 3.5 MMOL/L — SIGNIFICANT CHANGE UP (ref 3.5–5.3)
POTASSIUM SERPL-SCNC: 3.5 MMOL/L — SIGNIFICANT CHANGE UP (ref 3.5–5.3)
RBC # BLD: 4.28 M/UL — SIGNIFICANT CHANGE UP (ref 4.2–5.8)
RBC # FLD: 11.6 % — SIGNIFICANT CHANGE UP (ref 10.3–14.5)
SODIUM SERPL-SCNC: 138 MMOL/L — SIGNIFICANT CHANGE UP (ref 135–145)
WBC # BLD: 7.28 K/UL — SIGNIFICANT CHANGE UP (ref 3.8–10.5)
WBC # FLD AUTO: 7.28 K/UL — SIGNIFICANT CHANGE UP (ref 3.8–10.5)

## 2023-01-09 PROCEDURE — 99233 SBSQ HOSP IP/OBS HIGH 50: CPT

## 2023-01-09 PROCEDURE — 99232 SBSQ HOSP IP/OBS MODERATE 35: CPT

## 2023-01-09 PROCEDURE — 99222 1ST HOSP IP/OBS MODERATE 55: CPT

## 2023-01-09 RX ORDER — ATORVASTATIN CALCIUM 80 MG/1
0 TABLET, FILM COATED ORAL
Qty: 0 | Refills: 0 | DISCHARGE

## 2023-01-09 RX ORDER — RIVAROXABAN 15 MG-20MG
0 KIT ORAL
Qty: 0 | Refills: 0 | DISCHARGE

## 2023-01-09 RX ORDER — APIXABAN 2.5 MG/1
0 TABLET, FILM COATED ORAL
Qty: 0 | Refills: 0 | DISCHARGE

## 2023-01-09 RX ORDER — TAMSULOSIN HYDROCHLORIDE 0.4 MG/1
1 CAPSULE ORAL
Qty: 0 | Refills: 0 | DISCHARGE

## 2023-01-09 RX ORDER — APIXABAN 2.5 MG/1
1 TABLET, FILM COATED ORAL
Qty: 60 | Refills: 2
Start: 2023-01-09 | End: 2023-04-08

## 2023-01-09 RX ORDER — LIDOCAINE HYDROCHLORIDE AND EPINEPHRINE 10; 10 MG/ML; UG/ML
25 INJECTION, SOLUTION INFILTRATION; PERINEURAL ONCE
Refills: 0 | Status: DISCONTINUED | OUTPATIENT
Start: 2023-01-09 | End: 2023-01-09

## 2023-01-09 RX ORDER — LEVOFLOXACIN 5 MG/ML
0 INJECTION, SOLUTION INTRAVENOUS
Qty: 0 | Refills: 0 | DISCHARGE

## 2023-01-09 RX ORDER — ALBUTEROL 90 UG/1
0 AEROSOL, METERED ORAL
Qty: 0 | Refills: 0 | DISCHARGE

## 2023-01-09 RX ORDER — LISINOPRIL 2.5 MG/1
1 TABLET ORAL
Qty: 0 | Refills: 0 | DISCHARGE

## 2023-01-09 RX ORDER — PREGABALIN 225 MG/1
1000 CAPSULE ORAL DAILY
Refills: 0 | Status: DISCONTINUED | OUTPATIENT
Start: 2023-01-09 | End: 2023-01-09

## 2023-01-09 RX ORDER — AMLODIPINE BESYLATE 2.5 MG/1
1 TABLET ORAL
Qty: 0 | Refills: 0 | DISCHARGE

## 2023-01-09 RX ORDER — AMLODIPINE BESYLATE 2.5 MG/1
0 TABLET ORAL
Qty: 0 | Refills: 0 | DISCHARGE

## 2023-01-09 RX ORDER — LISINOPRIL 2.5 MG/1
0 TABLET ORAL
Qty: 0 | Refills: 0 | DISCHARGE

## 2023-01-09 RX ORDER — FOLIC ACID 0.8 MG
1 TABLET ORAL DAILY
Refills: 0 | Status: DISCONTINUED | OUTPATIENT
Start: 2023-01-09 | End: 2023-01-11

## 2023-01-09 RX ORDER — TAMSULOSIN HYDROCHLORIDE 0.4 MG/1
0 CAPSULE ORAL
Qty: 0 | Refills: 0 | DISCHARGE

## 2023-01-09 RX ADMIN — Medication 650 MILLIGRAM(S): at 12:36

## 2023-01-09 RX ADMIN — Medication 102.5 MILLIGRAM(S): at 00:57

## 2023-01-09 RX ADMIN — Medication 81 MILLIGRAM(S): at 12:36

## 2023-01-09 RX ADMIN — CLOPIDOGREL BISULFATE 75 MILLIGRAM(S): 75 TABLET, FILM COATED ORAL at 16:15

## 2023-01-09 RX ADMIN — ENOXAPARIN SODIUM 40 MILLIGRAM(S): 100 INJECTION SUBCUTANEOUS at 12:36

## 2023-01-09 RX ADMIN — Medication 1 MILLIGRAM(S): at 16:16

## 2023-01-09 RX ADMIN — Medication 650 MILLIGRAM(S): at 13:45

## 2023-01-09 RX ADMIN — Medication 102.5 MILLIGRAM(S): at 23:40

## 2023-01-09 RX ADMIN — ATORVASTATIN CALCIUM 80 MILLIGRAM(S): 80 TABLET, FILM COATED ORAL at 21:52

## 2023-01-09 RX ADMIN — Medication 1 TABLET(S): at 16:15

## 2023-01-09 NOTE — CONSULT NOTE ADULT - SUBJECTIVE AND OBJECTIVE BOX
Electrophysiology Consult Note:     CHIEF COMPLAINT:  Patient is a 62y old  Male who presents with a chief complaint of Acute drug intoxication (2023 16:10)        HISTORY OF PRESENT ILLNESS:   61 y/o M with a PMH alcohol and polysubstance misuse, PE 2017 previously on eliquis but stopped after becoming homeless 2mo ago, DVT, HTN, HLD, BPH and h/o embolic strokes w/ residual L sided weakness, who was found obtunded 2/2 acute intoxication, admitted for acute opiate intoxication and alcohol withdrawal. Stroke code called on  morning due to worsening left sided weakness and new LLE numbness. Found with embolic appearing infarcts on MRI.  TTE/KOTA unrevealing.    EPS is asked to do ILR for stroke workup.          PAST MEDICAL & SURGICAL HISTORY:  Diverticulosis of large intestine  Acute pancreatitis  Cerebral artery occlusion with cerebral infarction  History of pulmonary embolism  MI (myocardial infarction)  HTN (hypertension)  Hypertension  Crohn's disease  HLD (hyperlipidemia)  Alcohol withdrawal  Pulmonary embolism  BPH (benign prostatic hyperplasia)  COPD (chronic obstructive pulmonary disease)  ETOH abuse  No significant past surgical history      FAMILY HISTORY: n/a      SOCIAL HISTORY:    homeless currently   2 pints of volka a day for 15 years.   polysubstance use (+Amphetamine, THC, Cocaine, PCP)      Allergies  codeine (Unknown)      MEDICATIONS  (STANDING):  aspirin  chewable 81 milliGRAM(s) Oral daily  atorvastatin 80 milliGRAM(s) Oral at bedtime  clopidogrel Tablet 75 milliGRAM(s) Oral every 24 hours  enoxaparin Injectable 40 milliGRAM(s) SubCutaneous every 24 hours  influenza   Vaccine 0.5 milliLiter(s) IntraMuscular once  lidocaine 1%/epinephrine 1:100,000 Inj 25 milliLiter(s) Local Injection once  thiamine IVPB 250 milliGRAM(s) IV Intermittent every 24 hours    MEDICATIONS  (PRN):  acetaminophen     Tablet .. 650 milliGRAM(s) Oral every 6 hours PRN Mild Pain (1 - 3)  aluminum hydroxide/magnesium hydroxide/simethicone Suspension 30 milliLiter(s) Oral every 4 hours PRN Dyspepsia  LORazepam   Injectable 1 milliGRAM(s) IV Push every 1 hour PRN CIWA-Ar score 8 or greater      REVIEW OF SYSTEMS:  CONSTITUTIONAL: No fever, weight loss, or fatigue  EYES: No eye pain, visual disturbances, or discharge  ENMT:  No difficulty hearing, tinnitus, vertigo; No sinus or throat pain  NECK: No pain or stiffness  RESPIRATORY: No cough, wheezing, chills or hemoptysis; No Shortness of Breath  CARDIOVASCULAR: No chest pain, palpitations, dizziness, or leg swelling  GASTROINTESTINAL: No abdominal or epigastric pain. No nausea, vomiting, or hematemesis; No diarrhea or constipation. No melena or hematochezia.  GENITOURINARY: No dysuria, frequency, hematuria, or incontinence  NEUROLOGICAL: see HPI.   SKIN: No itching, burning, rashes, or lesions   LYMPH Nodes: No enlarged glands  ENDOCRINE: No heat or cold intolerance; No hair loss  MUSCULOSKELETAL: No joint pain or swelling; No muscle, back, or extremity pain  PSYCHIATRIC: No depression, anxiety, mood swings, or difficulty sleeping  HEME/LYMPH: No easy bruising, or bleeding gums  ALLERY AND IMMUNOLOGIC: No hives or eczema	      PHYSICAL EXAM:  Vital Signs Last 24 Hrs  T(C): 36.4 (2023 10:21), Max: 37 (2023 06:41)  T(F): 97.6 (2023 10:21), Max: 98.6 (2023 06:41)  HR: 62 (2023 08:35) (54 - 66)  BP: 149/92 (2023 08:35) (138/84 - 164/88)  BP(mean): 117 (2023 08:35) (102 - 125)  RR: 18 (2023 08:35) (15 - 18)  SpO2: 98% (2023 08:35) (92% - 100%)    Parameters below as of 2023 08:35  Patient On (Oxygen Delivery Method): room air    Constitutional: NAD	  HEENT:   Normal oral mucosa, PERRL, EOMI	  CVS: Normal S1 / S2, RRR, No murmurs  Pulm: CTA. No wheeze or rale  GI:  + BS, soft, NT / ND   Ext: No LE edema  Neurologic: A&O x 3, left sided weakness   Psych: Pleasant  Skin: No rash or lesion       	  LABS:	                         15.1   7.28  )-----------( 266      ( 2023 05:30 )             44.6         138  |  106  |  12  ----------------------------<  105<H>  3.5   |  21<L>  |  0.95    Ca    8.6      2023 05:30  Phos  3.5       Mg     1.6           EK23: SB 54 bpm. Normal  ms and QRS 80 ms. QTc 489 ms.   Telemetry: NSR HR 60s.  No arrythmia        TTE Echo Complete w/o Contrast w/ Doppler (23 @ 09:48) >   1. Mild symmetric left ventricular hypertrophy.   2. Normal left ventricular systolic function.   3. Normal right ventricular size and systolic function.   4. Dilated left atrium.   5. Aortic sclerosis without significant stenosis.   6. Mild aortic regurgitation.   7. No evidence of pulmonary hypertension.   8. No pericardial effusion.   9. Mildly dilated aortic root.  10. No prior echo is available for comparison.       KOTA w/Doppler (23 @ 16:46) >   1. Normal left and right ventricular size and systolic function.  LVEF 55-60%.    2. No LA/RA/NACHO/RAA thrombus seen.   3. No evidence of an intracardiac shunt.   4. Mild aortic regurgitation.   5. No pericardial effusion.   6. There is mild non-mobile plaque seen in the visualized portion of the   descending aorta. There is no significant plaque seen in the visualized   portion of the aortic arch.

## 2023-01-09 NOTE — PROGRESS NOTE ADULT - TIME BILLING
Review of chart information, interpretation of data, evaluation of patient, coordination of care.
Review of chart information, interpretation of data, evaluation of patient, coordination of care
Review of chart information, interpretation of data, evaluation of patient, coordination of care
review of patient information including recent vital signs, labs, imaging, and notes; assessing, examining patient; updating patient/family; discussion and coordination of care with multidisciplinary team.

## 2023-01-09 NOTE — DISCHARGE NOTE PROVIDER - NSDCMRMEDTOKEN_GEN_ALL_CORE_FT
amLODIPine 10 mg oral tablet: 1 tab(s) orally once a day  atorvastatin 40 mg oral tablet: 1 tab(s) orally once a day  folic acid 1 mg oral tablet: 1 tab(s) orally once a day  tamsulosin 0.4 mg oral capsule: 1 cap(s) orally once a day  thiamine 100 mg oral tablet: 1 tab(s) orally once a day   amLODIPine 10 mg oral tablet: 1 tab(s) orally once a day  atorvastatin 80 mg oral tablet: 1 tab(s) orally once a day (at bedtime)  folic acid 1 mg oral tablet: 1 tab(s) orally once a day  tamsulosin 0.4 mg oral capsule: 1 cap(s) orally once a day  thiamine 100 mg oral tablet: 1 tab(s) orally once a day

## 2023-01-09 NOTE — DISCHARGE NOTE PROVIDER - NSDCFUADDAPPT_GEN_ALL_CORE_FT
You will receive a call to schedule a follow up appointment with a member of our Stroke Team  You are scheduled to follow up with the stroke team on January 31st at 2 PM at our office on [address], third floor. Please call our office at [phone] if you need reschedule your appointment.    Follow up with your PCP within 2 weeks of discharge.

## 2023-01-09 NOTE — DIETITIAN INITIAL EVALUATION ADULT - ADD RECOMMEND
-Continue current diet   -Gather full nutrition hx as able at f/u   -Encourage good PO intake  -Honor food preferences as able  -Monitor chemistry, GI fxn, and skin integrity  -Continue current diet   -Add MVI and folic acid order   -Gather full nutrition hx as able at f/u   -Encourage good PO intake  -Honor food preferences as able  -Monitor chemistry, GI fxn, and skin integrity

## 2023-01-09 NOTE — DISCHARGE NOTE PROVIDER - NSDCQMSTROKERISK_NEU_ALL_CORE
----- Message from Apolonia Medina MD sent at 5/16/2022 10:45 AM EDT -----  No bony abnormality seen over the hematoma area. High blood pressure/History of a stroke or TIA

## 2023-01-09 NOTE — PROGRESS NOTE ADULT - NS ATTEND AMEND GEN_ALL_CORE FT
The patient is a 62-year-old gentleman with history of prior PE (2017, previously on Coumadin), prior strokes with residual left hemiparesis (off a/c, ?Eliquis), pancreatitis, remote GIB, alcohol abuse, and polysubstance use who was initially admitted with altered mental status due to alcohol/substance intoxication who was later found to have multiple, embolic appearing strokes after complaining of worsening left-sided weakness.  TTE/KOTA unrevealing. Will attempt to obtain outside records re: w/u for hypercoag state/stroke mechanisms as he previously required lifelong a/c for unclear reasons.  For now, continue DAPT, statin. Will likely transition to a/c if we can find a medication assistance plan for him- will discuss with JOSE.

## 2023-01-09 NOTE — DISCHARGE NOTE PROVIDER - CARE PROVIDER_API CALL
Lisa Hernandez)  Neurology  [address] [address]  Phone: [phone]  Fax: [phone]  Follow Up Time: 1 month

## 2023-01-09 NOTE — DIETITIAN INITIAL EVALUATION ADULT - PERTINENT LABORATORY DATA
01-09    138  |  106  |  12  ----------------------------<  105<H>  3.5   |  21<L>  |  0.95    Ca    8.6      09 Jan 2023 05:30  Phos  3.5     01-09  Mg     1.6     01-09    A1C with Estimated Average Glucose Result: 4.7 % (01-06-23 @ 11:31)

## 2023-01-09 NOTE — DIETITIAN INITIAL EVALUATION ADULT - OTHER INFO
Patient is a 62 year old male with a PMH of alcohol and drug abuse, PE, DVT, and stroke in the past, who was found obtunded 2/2 acute intoxication, admitted for acute opiate intoxication and alcohol withdrawal.     Pt assessed at bedside. Rx and labs reviewed. Pt presents with no overt signs of nutritional wasting. Pt unable to participate in nutritional assessment at this time; pt unresponsive to verbal cues x2 attempts. Pt noted to be treated for ETOH withdraw; on thiamine, but no folic acid or MVI on board -spoke with provider who is agreeable to additional supplementation. Pt reportedly tolerating diet; gather full nutrition hx as able at f/u. No reported changes in appetite, PO intake, or wt PTA. No reported c/o NVCD or difficult chew/swallow. NKA to food. RDN will continue to reassess, intervene, and monitor as appropriate.     Pain: 0 per chart review   GI: Abdomen ND/NT, +BS x4, LBM 1/6  Skin: WDI, no edema assessed

## 2023-01-09 NOTE — CONSULT NOTE ADULT - ASSESSMENT
63 y/o M with a PMH alcohol and polysubstance misuse, PE 2017 previously on eliquis but stopped after becoming homeless 2mo ago, DVT, HTN, HLD, BPH and h/o embolic strokes w/ residual L sided weakness, who was found obtunded 2/2 acute intoxication, admitted for acute opiate intoxication and alcohol withdrawal. Stroke code called on 1/5 morning due to worsening left sided weakness and new LLE numbness. Found with embolic appearing infarcts on MRI.  TTE/KOTA unrevealing.  EPS is asked to do ILR for stroke workup.    - He is waiting for shelter placement. Has no cellphone.  Explained to him that because he has no cellphone we have no way of informing him of any arrhythmia events if found on Remote Monitor.  In that case, no Home Remote Monitor will be given to him.  I explained that he would need to come to the office every 2-3 months for in-person Loop Recorder interrogation, which he is agreeable to.    - Consent for ILR today.

## 2023-01-09 NOTE — PROGRESS NOTE ADULT - ASSESSMENT
62 year old male with a PMHx alcohol and drug abuse, PE 2017 (now off coumadin), DVT, HTN, HLD, BPH and embolic strokes w/ residual L sided weakness, who was found obtunded 2/2 acute intoxication, admitted for acute opiate intoxication and alcohol withdrawal. Found with embolic appearing infarcts on MRI in setting of worsening left sided weakness and new LLE numbness. TTE/KOTA unrevealing. Plan to obtain outside records for prior stroke workup including hypercoag workup and why patient was previously on AC.     Neuro  #CVA workup  hx of PE (2017, on warfarin), embolic infarcts (off eliquis x4 months since undomiciled) - unsure of reason why he was originally prescribed coumadin and then switched to eliquis  - continue aspirin 81mg and plavix 75mg daily  - continue atorvastatin 80mg daily  - q4hr stroke neuro checks and vitals  - MRI Brain without contrast: new acute embolic appearing infarcts in right frontal and parietal lobe, left cerebellum   - LE duplex: neg DVT  - ILR not done as patient already requires lifelong AC given hx of prior DVT/PE and embolic infarcts  - Stroke Code HCT Results: neg  - Stroke Code CTA Results: neg  - Stroke education    #Alcohol Use Disorder  15 year history of 2 pints of vodka per day. Has been hospitalized for withdrawals at [facility] many times, does not recall when he was last hospitalized. Denies history of seizures or intubations. Last drink at 10 am on 1/4.   BAL on arrival to ED: 29   - c/w thiamine 250mg, folic acid 1mg, and multivitamin    #Opioid use  Found down, unconscious with pinpoint pupils. Responded to narcan GV. UDS+ cocaine, THC, methamphetamine, PCP    Cards  #HTN  - permissive hypertension, Goal -180  - TTE with bubble: dilated LA, EF 59%  - KOTA on 1/6- Normal LV and RV function. No thrombus seen.  - EKG Results: NSR    #HLD  - high dose statin as above in CVA  - LDL results: 78    Pulm  - call provider if SPO2 < 94%    GI  #Nutrition/Fluids/Electrolytes   - replete K<4 and Mg <2  - Diet: Regular    Endocrine  - A1C results: 4.7  - TSH results: 1.380    Heme  #Macrocytosis without anemia, likely 2/2 ETOH  , Hgb 14.3  - B12- 455    DVT Prophylaxis  - Lovenox sq for DVT prophylaxis   - SCDs for DVT prophylaxis     IDR Goals: Goals reviewed at interdisciplinary rounds with case management, social work, physical therapy, occupational therapy, and speech language pathology.   Please see specific therapy  notes for in depth goals.  Dispo: outpatient PT/OT     Discussed daily hospital plans and goals with patient and family at bedside.     Discussed with Stroke attending Dr. Hernandez and Stroke Fellow, Dr. Chapa

## 2023-01-09 NOTE — DIETITIAN INITIAL EVALUATION ADULT - OTHER CALCULATIONS
IBW used to calculate needs due to pt's current body weight exceeding 120% of IBW adjusted for maintenance

## 2023-01-09 NOTE — DISCHARGE NOTE PROVIDER - HOSPITAL COURSE
Hospital course:  62y Male with PMH prior PE (2017, previously on Coumadin), prior strokes with residual left hemiparesis (off a/c, ?Eliquis), pancreatitis, remote GIB, alcohol abuse, and polysubstance use who was initially admitted with altered mental status due to alcohol/substance intoxication. Stroke code called for left sided numbness and worsening weakness, CT imaging non-revealing. MRI confirmed multiple embolic appearing strokes involving right frontal and parietal lobe, left cerebellum and was transferred to Stroke service. Started on DAPT, statin. TTE/KOTA with dilated left atrium, otherwise wnl. GI consulted to determine AC regimen as pt with hx of GIB, recommended to obtain records from OSH     Will attempt to obtain outside records re: w/u for hypercoag state/stroke mechanisms as he previously required lifelong a/c for unclear reasons.  For now, continue DAPT, statin. Will likely transition to a/c if we can find a medication assistance plan for him- will discuss with SW.     62-year-old gentleman with history of prior PE (2017, previously on Coumadin), prior strokes with residual left hemiparesis (off a/c, ?Eliquis), pancreatitis, remote GIB, alcohol abuse, and polysubstance use who was initially admitted with altered mental status due to alcohol/substance intoxication who was later found to have multiple, embolic appearing strokes after complaining of worsening left-sided weakness.  TTE/KOTA unrevealing. Will attempt to obtain outside records re: w/u for hypercoag state/stroke mechanisms as he previously required lifelong a/c for unclear reasons.  For now, continue DAPT, statin. Will likely transition to a/c if we can find a medication assistance plan for him- will discuss with SW.     During this hospital course, patient had a (ischemic/hemorrhagic) stroke located in (left/right.....) as seen on (MRI/CT).   The stroke etiology is likely secondary to:  []atrial fibrillation  []small vessel disease from atherosclerotic risk factors  []other:  []etiology workup still in progress    Patient had the following workup done in house:  CT Head:   MR Head Non Contrast:  CT Angio Head:  CT Angio Neck:  []echo  []labs  []other    Physical exam at discharge:    New medications on discharge:  Labs to be followed up:  Imaging to be done as outpatient:  Further outpatient workup:   Hospital course:  62y Male with PMH prior PE (2017, previously on Coumadin), prior strokes with residual left hemiparesis (off a/c, ?Eliquis), pancreatitis, remote GIB, alcohol abuse, and polysubstance use who was initially admitted with altered mental status due to alcohol/substance intoxication. Stroke code called for left sided numbness and worsening weakness, CT imaging non-revealing. MRI confirmed multiple embolic appearing strokes involving right frontal and parietal lobe, left cerebellum and was transferred to Stroke service. Started on DAPT, statin. TTE/KOTA with dilated left atrium, otherwise wnl. GI consulted to determine AC regimen as pt with hx of GIB, recommended to obtain records from OSH and deferred scope ath this time. Attempted to obtain outside records re: w/u for hypercoag state/stroke mechanisms as he previously required lifelong a/c for unclear reasons ***     During this hospital course, patient had a multiple embolic ischemic strokes involving right frontal and parietal lobe, left cerebellum as seen on MRI   The stroke etiology is likely secondary to:  []atrial fibrillation  []small vessel disease from atherosclerotic risk factors  []other:  [x]etiology workup still in progress - other hypercoagulable state due to hx of PE vs cardioembolic     Patient had the following workup done in house:  CT Brain Stroke Protocol  IMPRESSION: No intracranial hemorrhage or acute transcortical infarct    CT Angio Head/Neck:  IMPRESSION: No large vessel occlusion. Normal CTA of the neck.    CT Perfusion w/ Maps w/ IV Cont  IMPRESSION: Normal CT perfusion study.    MR Head No Cont (01.06.23 @ 00:01)  DWI+ for recent embolic type infarcts. throughout right frontoparietal cortical sites, b/l cerebellar hemispheres    The T2-weighted FLAIR images show a background of small vessel ischemic   change and there are a few punctate old microhemorrhages. Query   hypertensiveangiopathy.    MR Orbit, Face, and/or Neck No Cont - ordered for left face numbness  Noncontrast MRI face is essentially unremarkable/noncontributory.  See   MRI brain report for detail of ischemic findings.    TTE Echo Complete w/o Contrast w/ Doppler (01.05.23 @ 09:48)  CONCLUSIONS:   1. Mild symmetric left ventricular hypertrophy.   2. Normal left ventricular systolic function.   3. Normal right ventricular size and systolic function.   4. Dilated left atrium.   5. Aortic sclerosis without significant stenosis.   6. Mild aortic regurgitation.   7. No evidence of pulmonary hypertension.   8. No pericardial effusion.   9. Mildly dilated aortic root.  10. No prior echo is available for comparison.    KOTA w/Doppler (01.06.23 @ 16:46)   CONCLUSIONS:   1. Normal left and right ventricular size and systolic function.   2. No LA/RA/NACHO/RAA thrombus seen.   3. No evidence of an intracardiac shunt.   4. Mild aortic regurgitation.   5. No pericardial effusion.   6. There is mild non-mobile plaque seen in the visualized portion of the   descending aorta. There is no significant plaque seen in the visualized   portion of the aortic arch.    Labs: A1C: 4.7, LDL: 78, TSH: 1.380    Physical exam at discharge:    New medications on discharge:  Labs to be followed up:  Imaging to be done as outpatient:  Further outpatient workup:   Hospital course:  62y Male with PMH prior PE (2017, previously on Coumadin), prior strokes with residual left hemiparesis (off a/c, ?Eliquis), pancreatitis, remote GIB, alcohol abuse, and polysubstance use who was initially admitted with altered mental status due to alcohol/substance intoxication. Stroke code called for left sided numbness and worsening weakness, CT imaging non-revealing. MRI confirmed multiple embolic appearing strokes involving right frontal and parietal lobe, left cerebellum and was transferred to Stroke service. Started on DAPT, statin. TTE/KOTA with dilated left atrium, otherwise wnl. GI consulted to determine AC regimen as pt with hx of GIB, recommended to obtain records from OSH and deferred scope ath this time. Multiple attempts made to obtain outside records re: w/u for hypercoag state/stroke mechanisms as he previously required lifelong a/c for unclear reasons however was unable to do so. Patient states that when he was on Eliquis he was only taking it once a day, unsure of the dose at that time. Decision made to d/c on Eliquis 5 mg BID and statin with outpt f/u scheduled on 01/31 @ 2 PM. Stressed importance of compliance with AC and trying to take it at the same time everyday.     During this hospital course, patient had a multiple embolic ischemic strokes involving right frontal and parietal lobe, left cerebellum as seen on MRI   The stroke etiology is likely secondary to:  []atrial fibrillation  []small vessel disease from atherosclerotic risk factors  []other:  [x]etiology workup still in progress - other hypercoagulable state due to hx of PE vs cardioembolic     Patient had the following workup done in house:  CT Brain Stroke Protocol  IMPRESSION: No intracranial hemorrhage or acute transcortical infarct    CT Angio Head/Neck:  IMPRESSION: No large vessel occlusion. Normal CTA of the neck.    CT Perfusion w/ Maps w/ IV Cont  IMPRESSION: Normal CT perfusion study.    MR Head No Cont (01.06.23 @ 00:01)  DWI+ for recent embolic type infarcts. throughout right frontoparietal cortical sites, b/l cerebellar hemispheres    The T2-weighted FLAIR images show a background of small vessel ischemic   change and there are a few punctate old microhemorrhages. Query   hypertensiveangiopathy.    MR Orbit, Face, and/or Neck No Cont - ordered for left face numbness  Noncontrast MRI face is essentially unremarkable/noncontributory.  See   MRI brain report for detail of ischemic findings.    TTE Echo Complete w/o Contrast w/ Doppler (01.05.23 @ 09:48)  CONCLUSIONS:   1. Mild symmetric left ventricular hypertrophy.   2. Normal left ventricular systolic function.   3. Normal right ventricular size and systolic function.   4. Dilated left atrium.   5. Aortic sclerosis without significant stenosis.   6. Mild aortic regurgitation.   7. No evidence of pulmonary hypertension.   8. No pericardial effusion.   9. Mildly dilated aortic root.  10. No prior echo is available for comparison.    KOTA w/Doppler (01.06.23 @ 16:46)   CONCLUSIONS:   1. Normal left and right ventricular size and systolic function.   2. No LA/RA/NACHO/RAA thrombus seen.   3. No evidence of an intracardiac shunt.   4. Mild aortic regurgitation.   5. No pericardial effusion.   6. There is mild non-mobile plaque seen in the visualized portion of the   descending aorta. There is no significant plaque seen in the visualized   portion of the aortic arch.    Labs: A1C: 4.7, LDL: 78, TSH: 1.380    Physical exam at discharge:  Neurologic:  -Mental status: Awake, alert, oriented to person, place, and time. Speech is fluent with intact naming, repetition, and comprehension, no dysarthria. Follows commands. Attention/concentration intact. Fund of knowledge appropriate.  -Cranial nerves:   II: Visual fields are full to confrontation.  III, IV, VI: Extraocular movements are intact without nystagmus. Pupils equally round and reactive to light  V:  Facial sensation V1-V3 equal and intact   VII: Face is symmetric with normal eye closure and smile  IX, X: Uvula is midline  XII: Tongue protrudes midline  Motor: Normal bulk and tone. No UE pronator drift. LUE 5/5. RUE 5/5. LLE 4+/5 with subtle drift, does not hit the bed. RLE 4+/5 with subtle drift, does not hit the bed.  Rapid alternating movements intact and symmetric  Sensation: No diminished sensation on DST or neglect.  Coordination: No dysmetria on finger-to-nose   Gait: Deferred    Discharge NIHSS: 2    New medications on discharge: Eliquis 5 mg BID, Atorvastatin 80 mg PO  Labs to be followed up: N/A  Imaging to be done as outpatient: N/A  Further outpatient workup: N/A   Hospital course:  62y Male with PMH prior PE (2017, previously on Coumadin), prior strokes with residual left hemiparesis (off a/c, ?Eliquis), pancreatitis, remote GIB, alcohol abuse, and polysubstance use who was initially admitted with altered mental status due to alcohol/substance intoxication. Stroke code called for left sided numbness and worsening weakness, CT imaging non-revealing. MRI confirmed multiple embolic appearing strokes involving right frontal and parietal lobe, left cerebellum and was transferred to Stroke service. Started on DAPT, statin. TTE/KOTA with dilated left atrium, otherwise wnl. GI consulted to determine AC regimen as pt with hx of GIB, recommended to obtain records from OSH and deferred scope ath this time. Multiple attempts made to obtain outside records re: w/u for hypercoag state/stroke mechanisms as he previously required lifelong a/c for unclear reasons however was unable to do so. Patient states that when he was on Eliquis he was only taking it once a day, unsure of the dose at that time. Decision made to d/c on Eliquis 5 mg BID and statin with outpt f/u scheduled on 01/31 @ 2 PM. Stressed importance of compliance with AC and trying to take it at the same time everyday.     During this hospital course, patient had a multiple embolic ischemic strokes involving right frontal and parietal lobe, left cerebellum as seen on MRI   The stroke etiology is likely secondary to:  []atrial fibrillation  []small vessel disease from atherosclerotic risk factors  []other:  [x]etiology workup still in progress - other hypercoagulable state due to hx of PE vs cardioembolic     Patient had the following workup done in house:  CT Brain Stroke Protocol  IMPRESSION: No intracranial hemorrhage or acute transcortical infarct    CT Angio Head/Neck:  IMPRESSION: No large vessel occlusion. Normal CTA of the neck.    CT Perfusion w/ Maps w/ IV Cont  IMPRESSION: Normal CT perfusion study.    MR Head No Cont (01.06.23 @ 00:01)  DWI+ for recent embolic type infarcts. throughout right frontoparietal cortical sites, b/l cerebellar hemispheres    The T2-weighted FLAIR images show a background of small vessel ischemic   change and there are a few punctate old microhemorrhages. Query   hypertensiveangiopathy.    MR Orbit, Face, and/or Neck No Cont - ordered for left face numbness  Noncontrast MRI face is essentially unremarkable/noncontributory.  See   MRI brain report for detail of ischemic findings.    TTE Echo Complete w/o Contrast w/ Doppler (01.05.23 @ 09:48)  CONCLUSIONS:   1. Mild symmetric left ventricular hypertrophy.   2. Normal left ventricular systolic function.   3. Normal right ventricular size and systolic function.   4. Dilated left atrium.   5. Aortic sclerosis without significant stenosis.   6. Mild aortic regurgitation.   7. No evidence of pulmonary hypertension.   8. No pericardial effusion.   9. Mildly dilated aortic root.  10. No prior echo is available for comparison.    KOTA w/Doppler (01.06.23 @ 16:46)   CONCLUSIONS:   1. Normal left and right ventricular size and systolic function.   2. No LA/RA/NACHO/RAA thrombus seen.   3. No evidence of an intracardiac shunt.   4. Mild aortic regurgitation.   5. No pericardial effusion.   6. There is mild non-mobile plaque seen in the visualized portion of the   descending aorta. There is no significant plaque seen in the visualized   portion of the aortic arch.    Labs: A1C: 4.7, LDL: 78, TSH: 1.380    Physical exam at discharge:  Neurologic:  -Mental status: Awake, alert, oriented to person, place, and time. Speech is fluent with intact naming, repetition, and comprehension, no dysarthria. Follows commands. Attention/concentration intact. Fund of knowledge appropriate.  -Cranial nerves:   II: Visual fields are full to confrontation.  III, IV, VI: Extraocular movements are intact without nystagmus. Pupils equally round and reactive to light  V:  Facial sensation V1-V3 equal and intact   VII: Face is symmetric with normal eye closure and smile  IX, X: Uvula is midline  XII: Tongue protrudes midline  Motor: Normal bulk and tone. No UE pronator drift. LUE 5/5. RUE 5/5. LLE 4+/5 with subtle drift, does not hit the bed. RLE 4+/5 with subtle drift, does not hit the bed.  Rapid alternating movements intact and symmetric  Sensation: No diminished sensation on light touch or DST. No neglect.  Coordination: No dysmetria on finger-to-nose   Gait: Deferred    Discharge NIHSS: 2    New medications on discharge: Eliquis 5 mg BID, Atorvastatin 80 mg PO  Labs to be followed up: N/A  Imaging to be done as outpatient: N/A  Further outpatient workup: N/A

## 2023-01-09 NOTE — PROGRESS NOTE ADULT - SUBJECTIVE AND OBJECTIVE BOX
Neurology Stroke Progress Note    INTERVAL HPI/OVERNIGHT EVENTS:  Patient seen and examined. AUSTIN O/N. Multiple attempts made to obtain outpatient records so a decision can be made regarding which type of AC pt needs. Will likely d/c home tomorrow.    MEDICATIONS  (STANDING):  aspirin  chewable 81 milliGRAM(s) Oral daily  atorvastatin 80 milliGRAM(s) Oral at bedtime  clopidogrel Tablet 75 milliGRAM(s) Oral every 24 hours  enoxaparin Injectable 40 milliGRAM(s) SubCutaneous every 24 hours  folic acid 1 milliGRAM(s) Oral daily  influenza   Vaccine 0.5 milliLiter(s) IntraMuscular once  multivitamin 1 Tablet(s) Oral daily  thiamine IVPB 250 milliGRAM(s) IV Intermittent every 24 hours    MEDICATIONS  (PRN):  acetaminophen     Tablet .. 650 milliGRAM(s) Oral every 6 hours PRN Mild Pain (1 - 3)  aluminum hydroxide/magnesium hydroxide/simethicone Suspension 30 milliLiter(s) Oral every 4 hours PRN Dyspepsia      Allergies    codeine (Unknown)    Intolerances        Vital Signs Last 24 Hrs  T(C): 36.1 (09 Jan 2023 18:01), Max: 37 (09 Jan 2023 06:41)  T(F): 97 (09 Jan 2023 18:01), Max: 98.6 (09 Jan 2023 06:41)  HR: 66 (09 Jan 2023 16:15) (56 - 66)  BP: 154/99 (09 Jan 2023 16:15) (138/84 - 161/98)  BP(mean): 121 (09 Jan 2023 16:15) (102 - 125)  RR: 18 (09 Jan 2023 16:15) (18 - 18)  SpO2: 97% (09 Jan 2023 16:15) (92% - 100%)    Parameters below as of 09 Jan 2023 16:15  Patient On (Oxygen Delivery Method): room air        Physical exam:  General: No acute distress, awake and alert  Eyes: Anicteric sclerae, moist conjunctivae, see below for CNs  Extremities: No edema    Neurologic:  -Mental status: Awake, alert, oriented to person, place, and time. Speech is fluent with intact naming, repetition, and comprehension, no dysarthria. Follows commands. Attention/concentration intact. Fund of knowledge appropriate.  -Cranial nerves:   II: Visual fields are full to confrontation.  III, IV, VI: Extraocular movements are intact without nystagmus. Pupils equally round and reactive to light  V:  Facial sensation V1-V3 equal and intact   VII: Face is symmetric with normal eye closure and smile  IX, X: Uvula is midline  XII: Tongue protrudes midline  Motor: Normal bulk and tone. MILD LUE pronator drift. LUE 5/5. RUE 5/5. LLE 4+/5, no notable drift. RLE 5/5.   Rapid alternating movements intact and symmetric  Sensation: Diminished sensation along LLE. Neglect LLE on DST. Sensation intact along RUE/RLE/LUE.  Coordination: No dysmetria on finger-to-nose   Gait: Deferred    LABS:                        15.1   7.28  )-----------( 266      ( 09 Jan 2023 05:30 )             44.6     01-09    138  |  106  |  12  ----------------------------<  105<H>  3.5   |  21<L>  |  0.95    Ca    8.6      09 Jan 2023 05:30  Phos  3.5     01-09  Mg     1.6     01-09            RADIOLOGY & ADDITIONAL TESTS: Reviewed.     2 = A lot of assistance

## 2023-01-09 NOTE — DIETITIAN INITIAL EVALUATION ADULT - PERTINENT MEDS FT
MEDICATIONS  (STANDING):  aspirin  chewable 81 milliGRAM(s) Oral daily  atorvastatin 80 milliGRAM(s) Oral at bedtime  clopidogrel Tablet 75 milliGRAM(s) Oral every 24 hours  enoxaparin Injectable 40 milliGRAM(s) SubCutaneous every 24 hours  folic acid 1 milliGRAM(s) Oral daily  influenza   Vaccine 0.5 milliLiter(s) IntraMuscular once  multivitamin 1 Tablet(s) Oral daily  thiamine IVPB 250 milliGRAM(s) IV Intermittent every 24 hours    MEDICATIONS  (PRN):  acetaminophen     Tablet .. 650 milliGRAM(s) Oral every 6 hours PRN Mild Pain (1 - 3)  aluminum hydroxide/magnesium hydroxide/simethicone Suspension 30 milliLiter(s) Oral every 4 hours PRN Dyspepsia  LORazepam   Injectable 1 milliGRAM(s) IV Push every 1 hour PRN CIWA-Ar score 8 or greater

## 2023-01-09 NOTE — PROGRESS NOTE ADULT - SUBJECTIVE AND OBJECTIVE BOX
INTERVAL HPI/OVERNIGHT EVENTS: AUSTIN O/N    SUBJECTIVE: Patient seen and examined at bedside.   Pt reports feeling a bit lightheaded during transfers. No presyncope. Still ambulating. L sided numbness is persisting. Reports some nausea but pt states no vomiting and he just needs to eat slower. Passing BM and voiding. No chest pain, dyspnea, fevers.    OBJECTIVE:    VITAL SIGNS:  ICU Vital Signs Last 24 Hrs  T(C): 36.4 (09 Jan 2023 14:15), Max: 37 (09 Jan 2023 06:41)  T(F): 97.6 (09 Jan 2023 14:15), Max: 98.6 (09 Jan 2023 06:41)  HR: 56 (09 Jan 2023 12:35) (56 - 66)  BP: 160/91 (09 Jan 2023 12:35) (138/84 - 161/98)  BP(mean): 119 (09 Jan 2023 12:35) (102 - 125)  ABP: --  ABP(mean): --  RR: 18 (09 Jan 2023 12:35) (15 - 18)  SpO2: 100% (09 Jan 2023 12:35) (92% - 100%)    O2 Parameters below as of 09 Jan 2023 08:35  Patient On (Oxygen Delivery Method): room air              01-08 @ 07:01  -  01-09 @ 07:00  --------------------------------------------------------  IN: 0 mL / OUT: 680 mL / NET: -680 mL      CAPILLARY BLOOD GLUCOSE      POCT Blood Glucose.: 118 mg/dL (08 Jan 2023 11:16)      PHYSICAL EXAM:  GEN: Male in NAD on RA  HEENT: NC/AT, MMM  CV: RRR, nml S1S2, no murmurs  PULM: nml effort, CTAB  ABD: Soft, non-distended, NABS, non-tender  NEURO  CN II-XI grossly intact  A/O x3, moving all extremities,   4+/5 in LUE and LLE. Decreased sensation on L side.  No tremors  PSYCH: Appropriate    MEDICATIONS:  MEDICATIONS  (STANDING):  aspirin  chewable 81 milliGRAM(s) Oral daily  atorvastatin 80 milliGRAM(s) Oral at bedtime  clopidogrel Tablet 75 milliGRAM(s) Oral every 24 hours  enoxaparin Injectable 40 milliGRAM(s) SubCutaneous every 24 hours  folic acid 1 milliGRAM(s) Oral daily  influenza   Vaccine 0.5 milliLiter(s) IntraMuscular once  multivitamin 1 Tablet(s) Oral daily  thiamine IVPB 250 milliGRAM(s) IV Intermittent every 24 hours    MEDICATIONS  (PRN):  acetaminophen     Tablet .. 650 milliGRAM(s) Oral every 6 hours PRN Mild Pain (1 - 3)  aluminum hydroxide/magnesium hydroxide/simethicone Suspension 30 milliLiter(s) Oral every 4 hours PRN Dyspepsia  LORazepam   Injectable 1 milliGRAM(s) IV Push every 1 hour PRN CIWA-Ar score 8 or greater      ALLERGIES:  Allergies    codeine (Unknown)    Intolerances        LABS:                        15.1   7.28  )-----------( 266      ( 09 Jan 2023 05:30 )             44.6     01-09    138  |  106  |  12  ----------------------------<  105<H>  3.5   |  21<L>  |  0.95    Ca    8.6      09 Jan 2023 05:30  Phos  3.5     01-09  Mg     1.6     01-09            RADIOLOGY & ADDITIONAL TESTS: Reviewed.

## 2023-01-09 NOTE — PROGRESS NOTE ADULT - ASSESSMENT
62M w EtOH (2 pints vodka/d - last 1/4 and polysubstance use (+Amphetamine, THC, Cocaine, PCP), PE 2017 previously on eliquis but stopped after becoming homeless 2mo ago. BPH, HTN, HLD, h/o embolic stroke w residual L sided weakness, found intoxicated and admitted for EtOH withdrawal, also found to have hypoK, ALLISON - improved - recommended for outpatient PT    #CVA - concern for embolic stroke  -DAPT ASA + Plavix  Stroke code – neg imaging  MR Head – scattered cortical infarcts – R frontal + parietal lobes and L ceregbellum – suspect cardioembolic  BLE Doppler NEG  TTE - Nml  KOTA - nml w negative bubble study    #Hypokalemia - 3.5 today. Mg 1.6. Suspect due to total body K/Mg decreased from EtOH use/poor PO intake prior to admission.  #PE – prev on eliquis.   #ALLISON Resolved. Stable at Cr 0.95. Was up to 1.5 from 0.88 in 11/2022  #Lactate resolved 3.7 -> 1.9  #Polysubstance use – Reports tobacco use, marijuana, and smoking cocaine. Denies IVDU. UDS positive for THC, Cocaine, Amphetamine, PCP  #EtOH – JOYCE 29 on 1/4. - 2 pints vodka daily.   CIWA 0/0/0 - HA - does not appear in acute withdrawal at this time  Ativan 2mg IV; 1mg IV q1h  #Obesity – 32 - affects all aspects of care  #HTN – BP 150s / 100. BP target per stroke neuro. Prior prescription records show pt on amlodipine 10; lisinopril 20    Recommend  Overall appears to be stable. Does not appear in acute withdrawal. Can DC CIWA monitoring and PRN Ativan.  Obtain Orthostatics for positional Lightheadedness    Called PCP office - [facility] [address]   Med list as follows: atorva 40 daily, lisinopril 40 daily, amlodipine 10 daily, D3, FLomax 0.4mg HS, Nicotine patch.   -- eliquis 5mg BID  They are faxing most recent office note 6/2022.    -BP above target 130-150s - can consider starting amlodipine 5mg daily if orthostatics negative.    Plan for ILR w EP    Would discuss w SW regarding shelter placement. Would get reliable contact number for patient prior to dc --current number in chart is not usable.    DISPO: PT recommend outpatient PT.

## 2023-01-09 NOTE — DISCHARGE NOTE PROVIDER - NSDCCPCAREPLAN_GEN_ALL_CORE_FT
PRINCIPAL DISCHARGE DIAGNOSIS  Diagnosis: Acute embolic stroke  Assessment and Plan of Treatment: During this hospital admission, you had an ischemic stroke. During an ischemic stroke, blood stops flowing to part of your brain because of a blockage in the blood vessel. This can damage areas in the brain that control other parts of the body.  Please take your ***[aspirin and plavix///Eliquis] for blood thinning and Atorvastatin for cholesterol medication/blood vessel protection as prescribed to prevent further strokes. Do not skip doses and do not run low on your medication. If you run low on your medication, please contact your doctor.  You will follow up outpatient with the stroke Nurse Practitioner as scheduled below.  Doing your regular tasks may be difficult after you've had a stroke, but you can learn new ways to manage your daily activities. In fact, doing daily activities may help you to regain muscle strength. Be patient, give yourself time to adjust, and appreciate the progress you make. For example, when showering or bathing, test the water temperature with a hand or foot that was not affected by the stroke, use grab bars, a shower seat, a hand-held showerhead, etc. It is normal to feel fatigue after a stroke, while some days may be worse than others, you will continue to improve.  Call 911 right away if you have any of the following symptoms of another stroke:  B: Balance: Sudden: Dizziness, loss of balance, or a sense of falling, difficulty with coordinating movement  E: Eyes: Sudden double vision or trouble seeing in one or both eyes  F: Face: Sudden uneven face  A: Arms (Legs): Sudden weakness, tingling, or loss of feeling on one side of your face or body  S: Speech: Sudden trouble talking or slurred speech, sudden difficulty understanding others  T: Time: Please call 911 right away and go to the emergency room  •Sudden, severe headache  •Blackouts or seizures      SECONDARY DISCHARGE DIAGNOSES  Diagnosis: Accidental overdose  Assessment and Plan of Treatment:     Diagnosis: Alcohol intoxication, uncomplicated  Assessment and Plan of Treatment:      PRINCIPAL DISCHARGE DIAGNOSIS  Diagnosis: Acute embolic stroke  Assessment and Plan of Treatment: During this hospital admission, you had an ischemic stroke. During an ischemic stroke, blood stops flowing to part of your brain because of a blockage in the blood vessel. This can damage areas in the brain that control other parts of the body.  Please take your Eliquis 5 mg twice a day for blood thinning and Atorvastatin for cholesterol medication/blood vessel protection as prescribed to prevent further strokes. Do not skip doses and do not run low on your medication. If you run low on your medication, please contact your doctor.  You will follow up outpatient with the stroke Nurse Practitioner as scheduled below.  Doing your regular tasks may be difficult after you've had a stroke, but you can learn new ways to manage your daily activities. In fact, doing daily activities may help you to regain muscle strength. Be patient, give yourself time to adjust, and appreciate the progress you make. For example, when showering or bathing, test the water temperature with a hand or foot that was not affected by the stroke, use grab bars, a shower seat, a hand-held showerhead, etc. It is normal to feel fatigue after a stroke, while some days may be worse than others, you will continue to improve.  Call 911 right away if you have any of the following symptoms of another stroke:  B: Balance: Sudden: Dizziness, loss of balance, or a sense of falling, difficulty with coordinating movement  E: Eyes: Sudden double vision or trouble seeing in one or both eyes  F: Face: Sudden uneven face  A: Arms (Legs): Sudden weakness, tingling, or loss of feeling on one side of your face or body  S: Speech: Sudden trouble talking or slurred speech, sudden difficulty understanding others  T: Time: Please call 911 right away and go to the emergency room  •Sudden, severe headache  •Blackouts or seizures      SECONDARY DISCHARGE DIAGNOSES  Diagnosis: Accidental overdose  Assessment and Plan of Treatment:     Diagnosis: Alcohol intoxication, uncomplicated  Assessment and Plan of Treatment:

## 2023-01-10 ENCOUNTER — TRANSCRIPTION ENCOUNTER (OUTPATIENT)
Age: 63
End: 2023-01-10

## 2023-01-10 PROCEDURE — 82570 ASSAY OF URINE CREATININE: CPT

## 2023-01-10 PROCEDURE — 97116 GAIT TRAINING THERAPY: CPT

## 2023-01-10 PROCEDURE — 80061 LIPID PANEL: CPT

## 2023-01-10 PROCEDURE — 93306 TTE W/DOPPLER COMPLETE: CPT

## 2023-01-10 PROCEDURE — 80048 BASIC METABOLIC PNL TOTAL CA: CPT

## 2023-01-10 PROCEDURE — 83605 ASSAY OF LACTIC ACID: CPT

## 2023-01-10 PROCEDURE — 97162 PT EVAL MOD COMPLEX 30 MIN: CPT

## 2023-01-10 PROCEDURE — 93970 EXTREMITY STUDY: CPT

## 2023-01-10 PROCEDURE — 83735 ASSAY OF MAGNESIUM: CPT

## 2023-01-10 PROCEDURE — 87637 SARSCOV2&INF A&B&RSV AMP PRB: CPT

## 2023-01-10 PROCEDURE — 70450 CT HEAD/BRAIN W/O DYE: CPT

## 2023-01-10 PROCEDURE — 70551 MRI BRAIN STEM W/O DYE: CPT

## 2023-01-10 PROCEDURE — 82962 GLUCOSE BLOOD TEST: CPT

## 2023-01-10 PROCEDURE — 99285 EMERGENCY DEPT VISIT HI MDM: CPT

## 2023-01-10 PROCEDURE — 83690 ASSAY OF LIPASE: CPT

## 2023-01-10 PROCEDURE — 85730 THROMBOPLASTIN TIME PARTIAL: CPT

## 2023-01-10 PROCEDURE — 86803 HEPATITIS C AB TEST: CPT

## 2023-01-10 PROCEDURE — 99232 SBSQ HOSP IP/OBS MODERATE 35: CPT

## 2023-01-10 PROCEDURE — 84484 ASSAY OF TROPONIN QUANT: CPT

## 2023-01-10 PROCEDURE — 85610 PROTHROMBIN TIME: CPT

## 2023-01-10 PROCEDURE — 83880 ASSAY OF NATRIURETIC PEPTIDE: CPT

## 2023-01-10 PROCEDURE — 36415 COLL VENOUS BLD VENIPUNCTURE: CPT

## 2023-01-10 PROCEDURE — 84100 ASSAY OF PHOSPHORUS: CPT

## 2023-01-10 PROCEDURE — 80307 DRUG TEST PRSMV CHEM ANLYZR: CPT

## 2023-01-10 PROCEDURE — 97165 OT EVAL LOW COMPLEX 30 MIN: CPT

## 2023-01-10 PROCEDURE — 80053 COMPREHEN METABOLIC PANEL: CPT

## 2023-01-10 PROCEDURE — 82607 VITAMIN B-12: CPT

## 2023-01-10 PROCEDURE — 84443 ASSAY THYROID STIM HORMONE: CPT

## 2023-01-10 PROCEDURE — 80076 HEPATIC FUNCTION PANEL: CPT

## 2023-01-10 PROCEDURE — 81001 URINALYSIS AUTO W/SCOPE: CPT

## 2023-01-10 PROCEDURE — 99239 HOSP IP/OBS DSCHRG MGMT >30: CPT

## 2023-01-10 PROCEDURE — 71045 X-RAY EXAM CHEST 1 VIEW: CPT

## 2023-01-10 PROCEDURE — 83036 HEMOGLOBIN GLYCOSYLATED A1C: CPT

## 2023-01-10 PROCEDURE — 85027 COMPLETE CBC AUTOMATED: CPT

## 2023-01-10 PROCEDURE — 93312 ECHO TRANSESOPHAGEAL: CPT

## 2023-01-10 PROCEDURE — 82803 BLOOD GASES ANY COMBINATION: CPT

## 2023-01-10 PROCEDURE — 85025 COMPLETE CBC W/AUTO DIFF WBC: CPT

## 2023-01-10 PROCEDURE — 70540 MRI ORBIT/FACE/NECK W/O DYE: CPT

## 2023-01-10 PROCEDURE — 70498 CT ANGIOGRAPHY NECK: CPT

## 2023-01-10 PROCEDURE — 70496 CT ANGIOGRAPHY HEAD: CPT

## 2023-01-10 PROCEDURE — 96375 TX/PRO/DX INJ NEW DRUG ADDON: CPT

## 2023-01-10 PROCEDURE — 0042T: CPT

## 2023-01-10 PROCEDURE — 84300 ASSAY OF URINE SODIUM: CPT

## 2023-01-10 PROCEDURE — 96374 THER/PROPH/DIAG INJ IV PUSH: CPT

## 2023-01-10 RX ORDER — ATORVASTATIN CALCIUM 80 MG/1
1 TABLET, FILM COATED ORAL
Qty: 0 | Refills: 0 | DISCHARGE

## 2023-01-10 RX ORDER — FOLIC ACID 0.8 MG
1 TABLET ORAL
Qty: 0 | Refills: 0 | DISCHARGE

## 2023-01-10 RX ORDER — THIAMINE MONONITRATE (VIT B1) 100 MG
1 TABLET ORAL
Qty: 0 | Refills: 0 | DISCHARGE

## 2023-01-10 RX ORDER — THIAMINE MONONITRATE (VIT B1) 100 MG
1 TABLET ORAL
Qty: 30 | Refills: 0
Start: 2023-01-10 | End: 2023-02-08

## 2023-01-10 RX ORDER — FOLIC ACID 0.8 MG
1 TABLET ORAL
Qty: 30 | Refills: 0
Start: 2023-01-10 | End: 2023-02-08

## 2023-01-10 RX ORDER — ATORVASTATIN CALCIUM 80 MG/1
1 TABLET, FILM COATED ORAL
Qty: 30 | Refills: 2
Start: 2023-01-10 | End: 2023-04-09

## 2023-01-10 RX ADMIN — CLOPIDOGREL BISULFATE 75 MILLIGRAM(S): 75 TABLET, FILM COATED ORAL at 16:23

## 2023-01-10 RX ADMIN — ATORVASTATIN CALCIUM 80 MILLIGRAM(S): 80 TABLET, FILM COATED ORAL at 22:02

## 2023-01-10 RX ADMIN — Medication 81 MILLIGRAM(S): at 12:00

## 2023-01-10 RX ADMIN — Medication 1 TABLET(S): at 12:00

## 2023-01-10 RX ADMIN — ENOXAPARIN SODIUM 40 MILLIGRAM(S): 100 INJECTION SUBCUTANEOUS at 11:59

## 2023-01-10 RX ADMIN — Medication 1 MILLIGRAM(S): at 12:00

## 2023-01-10 RX ADMIN — Medication 102.5 MILLIGRAM(S): at 23:22

## 2023-01-10 NOTE — PROGRESS NOTE ADULT - SUBJECTIVE AND OBJECTIVE BOX
INTERVAL HPI/OVERNIGHT EVENTS: AUSTIN O/N    SUBJECTIVE: Patient seen and examined at bedside.   Pt reports slight lightheadedness/dizziness upon change in position that improves over w short period of time. Orthostatics reviewed w nurse - BPs in 140-150s. wo increase in HR. He is ambulating.  Pt does report watery BM overnight x1. No N/V/Abd pain - no melena or hematochezia. - instructed to inform nurse at next occurence.  Reports continued numbness over L abd and LE. no new weakness. No fever, chills, sweats, chest pain, dyspnea, dysuria.    OBJECTIVE:    VITAL SIGNS:  ICU Vital Signs Last 24 Hrs  T(C): 36.7 (10 Lebron 2023 10:00), Max: 36.7 (09 Jan 2023 20:28)  T(F): 98 (10 Lebron 2023 10:00), Max: 98.1 (09 Jan 2023 20:28)  HR: 64 (10 Lebron 2023 08:00) (56 - 94)  BP: 141/96 (10 Lebron 2023 08:00) (130/82 - 160/91)  BP(mean): 113 (10 Lebron 2023 08:00) (101 - 126)  ABP: --  ABP(mean): --  RR: 18 (10 Lebron 2023 08:00) (18 - 18)  SpO2: 93% (10 Lebron 2023 08:00) (91% - 100%)    O2 Parameters below as of 10 Lebron 2023 08:00  Patient On (Oxygen Delivery Method): room air              01-09 @ 07:01  -  01-10 @ 07:00  --------------------------------------------------------  IN: 0 mL / OUT: 400 mL / NET: -400 mL      CAPILLARY BLOOD GLUCOSE      POCT Blood Glucose.: 97 mg/dL (09 Jan 2023 21:28)      PHYSICAL EXAM:    GEN: Male in NAD on RA  HEENT: NC/AT, MMM  CV: RRR, nml S1S2, no murmurs  PULM: nml effort, CTAB  ABD: Soft, non-distended, NABS, non-tender  NEURO  CN II-XI grossly intact  A/O x3, moving all extremities,   5/5 in BUE and BLE. Equal sensation b/l  No tremors  PSYCH: Appropriate    MEDICATIONS:  MEDICATIONS  (STANDING):  aspirin  chewable 81 milliGRAM(s) Oral daily  atorvastatin 80 milliGRAM(s) Oral at bedtime  clopidogrel Tablet 75 milliGRAM(s) Oral every 24 hours  enoxaparin Injectable 40 milliGRAM(s) SubCutaneous every 24 hours  folic acid 1 milliGRAM(s) Oral daily  influenza   Vaccine 0.5 milliLiter(s) IntraMuscular once  multivitamin 1 Tablet(s) Oral daily  thiamine IVPB 250 milliGRAM(s) IV Intermittent every 24 hours    MEDICATIONS  (PRN):  acetaminophen     Tablet .. 650 milliGRAM(s) Oral every 6 hours PRN Mild Pain (1 - 3)  aluminum hydroxide/magnesium hydroxide/simethicone Suspension 30 milliLiter(s) Oral every 4 hours PRN Dyspepsia      ALLERGIES:  Allergies    codeine (Unknown)    Intolerances        LABS:                        15.1   7.28  )-----------( 266      ( 09 Jan 2023 05:30 )             44.6     01-09    138  |  106  |  12  ----------------------------<  105<H>  3.5   |  21<L>  |  0.95    Ca    8.6      09 Jan 2023 05:30  Phos  3.5     01-09  Mg     1.6     01-09            RADIOLOGY & ADDITIONAL TESTS: Reviewed.

## 2023-01-10 NOTE — DISCHARGE NOTE NURSING/CASE MANAGEMENT/SOCIAL WORK - NSDCFUADDAPPT_GEN_ALL_CORE_FT
You are scheduled to follow up with the stroke team on January 31st at 2 PM at our office on [address], third floor. Please call our office at [phone] if you need reschedule your appointment.    Follow up with your PCP within 2 weeks of discharge.

## 2023-01-10 NOTE — PROGRESS NOTE ADULT - ASSESSMENT
62 year old male with a PMHx alcohol and drug abuse, PE 2017 (now off coumadin), DVT, HTN, HLD, BPH and embolic strokes w/ residual L sided weakness, who was found obtunded 2/2 acute intoxication, admitted for acute opiate intoxication and alcohol withdrawal. Found with embolic appearing infarcts on MRI in setting of worsening left sided weakness and new LLE numbness. TTE/KOTA unrevealing. See discharge summary for further plan.      Discussed with Stroke attending Dr. Hernandez and Stroke Fellow, Dr. Chapa

## 2023-01-10 NOTE — PROGRESS NOTE ADULT - PROVIDER SPECIALTY LIST ADULT
Internal Medicine
Hospitalist
Neurology
Hospitalist
Neurology
Internal Medicine
Neurology
Internal Medicine

## 2023-01-10 NOTE — PROGRESS NOTE ADULT - SUBJECTIVE AND OBJECTIVE BOX
Neurology Stroke Progress Note    INTERVAL HPI/OVERNIGHT EVENTS:  Patient seen and examined. AUSTIN O/N. Offers no complaints at this time, has had no further headaches. Multiple attempts made to obtain outpatient records by neuro provider and medicine attending however were unsuccessful.    MEDICATIONS  (STANDING):  aspirin  chewable 81 milliGRAM(s) Oral daily  atorvastatin 80 milliGRAM(s) Oral at bedtime  clopidogrel Tablet 75 milliGRAM(s) Oral every 24 hours  enoxaparin Injectable 40 milliGRAM(s) SubCutaneous every 24 hours  folic acid 1 milliGRAM(s) Oral daily  influenza   Vaccine 0.5 milliLiter(s) IntraMuscular once  multivitamin 1 Tablet(s) Oral daily  thiamine IVPB 250 milliGRAM(s) IV Intermittent every 24 hours    MEDICATIONS  (PRN):  acetaminophen     Tablet .. 650 milliGRAM(s) Oral every 6 hours PRN Mild Pain (1 - 3)  aluminum hydroxide/magnesium hydroxide/simethicone Suspension 30 milliLiter(s) Oral every 4 hours PRN Dyspepsia      Allergies    codeine (Unknown)    Intolerances        Vital Signs Last 24 Hrs  T(C): 36.9 (10 Lebron 2023 16:23), Max: 36.9 (10 Lebron 2023 16:23)  T(F): 98.5 (10 Lebron 2023 16:23), Max: 98.5 (10 Lebron 2023 16:23)  HR: 72 (10 Lebron 2023 15:55) (62 - 94)  BP: 153/95 (10 Lebron 2023 15:55) (130/82 - 157/107)  BP(mean): 117 (10 Lebron 2023 15:55) (101 - 126)  RR: 18 (10 Lebron 2023 15:55) (18 - 18)  SpO2: 95% (10 Lebron 2023 15:55) (91% - 95%)    Parameters below as of 10 Lebron 2023 15:55  Patient On (Oxygen Delivery Method): room air        Physical exam:  General: No acute distress, awake and alert  Eyes: Anicteric sclerae, moist conjunctivae, see below for CNs  Extremities: No edema    Neurologic:  -Mental status: Awake, alert, oriented to person, place, and time. Speech is fluent with intact naming, repetition, and comprehension, no dysarthria. Follows commands. Attention/concentration intact. Fund of knowledge appropriate.  -Cranial nerves:   II: Visual fields are full to confrontation.  III, IV, VI: Extraocular movements are intact without nystagmus. Pupils equally round and reactive to light  V:  Facial sensation V1-V3 equal and intact   VII: Face is symmetric with normal eye closure and smile  IX, X: Uvula is midline  XII: Tongue protrudes midline  Motor: Normal bulk and tone. No UE pronator drift. LUE 5/5. RUE 5/5. LLE 4+/5 with subtle drift, does not hit the bed. RLE 4+/5 with subtle drift, does not hit the bed.  Rapid alternating movements intact and symmetric  Sensation: No diminished sensation on light touch or DST. No neglect.  Coordination: No dysmetria on finger-to-nose   Gait: Deferred    LABS:                        15.1   7.28  )-----------( 266      ( 09 Jan 2023 05:30 )             44.6     01-09    138  |  106  |  12  ----------------------------<  105<H>  3.5   |  21<L>  |  0.95    Ca    8.6      09 Jan 2023 05:30  Phos  3.5     01-09  Mg     1.6     01-09            RADIOLOGY & ADDITIONAL TESTS: Reviewed.

## 2023-01-10 NOTE — DISCHARGE NOTE NURSING/CASE MANAGEMENT/SOCIAL WORK - NSDCPEFALRISK_GEN_ALL_CORE
For information on Fall & Injury Prevention, visit: [email or web address] OR  [email or web address] OR  https://www.cdc.gov/steadi/patient.html

## 2023-01-10 NOTE — PROGRESS NOTE ADULT - ASSESSMENT
62M w EtOH (2 pints vodka/d - last 1/4 and polysubstance use (+Amphetamine, THC, Cocaine, PCP), PE 2017 previously on eliquis but stopped after becoming homeless 2mo ago. BPH, HTN, HLD, h/o embolic stroke w residual L sided weakness, found intoxicated and admitted for EtOH withdrawal, also found to have hypoK, ALLISON - improved - recommended for outpatient PT    #CVA - concern for embolic stroke  -DAPT ASA + Plavix  Stroke code – neg imaging  MR Head – scattered cortical infarcts – R frontal + parietal lobes and L ceregbellum – suspect cardioembolic  BLE Doppler NEG  TTE - Nml  KOTA - nml w negative bubble study    #Hypokalemia - 3.5 today. Mg 1.6. Suspect due to total body K/Mg decreased from EtOH use/poor PO intake prior to admission.  #PE+DVT – prev on eliquis   #ALLISON Resolved. Stable at Cr 0.95. Was up to 1.5 from 0.88 in 11/2022  #Lactate resolved 3.7 -> 1.9  #Polysubstance use – Reports tobacco use, marijuana, and smoking cocaine. Denies IVDU. UDS positive for THC, Cocaine, Amphetamine, PCP  #EtOH – JOYCE 29 on 1/4. - 2 pints vodka daily. Not in withdrawal.   #Obesity – 32 - affects all aspects of care  #HTN – -140s. BP target per stroke neuro. Prior prescription records show pt on amlodipine 10; lisinopril 20    Recommend  Dizziness likely related to stroke. Orthostatics negative and pt volume appears euvolemic.   In setting of prior h/o DVT/PE pt reports and PCP office having him on eliquis 5mg bid - would recommend dc w eliquis 5mg BID    Does not appear in acute withdrawal    Called PCP office - [place] [facility] [address]   Med list as follows: atorva 40 daily, lisinopril 40 daily, amlodipine 10 daily, D3, FLomax 0.4mg HS, Nicotine patch.   -- eliquis 5mg BID  They are faxing most recent office note 6/2022.    DISPO: PT recommend outpatient PT - pt for dispo to Shel[facility].   Counseled pt to call offices for refills when he is running out of medication. He reports that PCPs office does offer free phones.

## 2023-01-10 NOTE — DISCHARGE NOTE NURSING/CASE MANAGEMENT/SOCIAL WORK - PATIENT PORTAL LINK FT
You can access the FollowMyHealth Patient Portal offered by [facility] by registering at the following website: [email or web address]. By joining [employer]’s FollowMyHealth portal, you will also be able to view your health information using other applications (apps) compatible with our system.

## 2023-01-11 VITALS — TEMPERATURE: 98 F

## 2023-01-14 DIAGNOSIS — E87.6 HYPOKALEMIA: ICD-10-CM

## 2023-01-14 DIAGNOSIS — Z91.14 PATIENT'S OTHER NONCOMPLIANCE WITH MEDICATION REGIMEN: ICD-10-CM

## 2023-01-14 DIAGNOSIS — Z86.718 PERSONAL HISTORY OF OTHER VENOUS THROMBOSIS AND EMBOLISM: ICD-10-CM

## 2023-01-14 DIAGNOSIS — R29.703 NIHSS SCORE 3: ICD-10-CM

## 2023-01-14 DIAGNOSIS — Z86.711 PERSONAL HISTORY OF PULMONARY EMBOLISM: ICD-10-CM

## 2023-01-14 DIAGNOSIS — F11.129 OPIOID ABUSE WITH INTOXICATION, UNSPECIFIED: ICD-10-CM

## 2023-01-14 DIAGNOSIS — I95.9 HYPOTENSION, UNSPECIFIED: ICD-10-CM
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
## 2023-01-14 DIAGNOSIS — I10 ESSENTIAL (PRIMARY) HYPERTENSION: ICD-10-CM

## 2023-01-14 DIAGNOSIS — I25.2 OLD MYOCARDIAL INFARCTION: ICD-10-CM

## 2023-01-14 DIAGNOSIS — N17.9 ACUTE KIDNEY FAILURE, UNSPECIFIED: ICD-10-CM

## 2023-01-14 DIAGNOSIS — K50.90 CROHN'S DISEASE, UNSPECIFIED, WITHOUT COMPLICATIONS: ICD-10-CM

## 2023-01-14 DIAGNOSIS — M10.9 GOUT, UNSPECIFIED: ICD-10-CM

## 2023-01-14 DIAGNOSIS — N40.0 BENIGN PROSTATIC HYPERPLASIA WITHOUT LOWER URINARY TRACT SYMPTOMS: ICD-10-CM

## 2023-01-14 DIAGNOSIS — Z59.02 UNSHELTERED HOMELESSNESS: ICD-10-CM

## 2023-01-14 DIAGNOSIS — E66.9 OBESITY, UNSPECIFIED: ICD-10-CM

## 2023-01-14 DIAGNOSIS — R94.31 ABNORMAL ELECTROCARDIOGRAM [ECG] [EKG]: ICD-10-CM

## 2023-01-14 DIAGNOSIS — D75.89 OTHER SPECIFIED DISEASES OF BLOOD AND BLOOD-FORMING ORGANS: ICD-10-CM

## 2023-01-14 DIAGNOSIS — E83.51 HYPOCALCEMIA: ICD-10-CM

## 2023-01-14 DIAGNOSIS — I63.40 CEREBRAL INFARCTION DUE TO EMBOLISM OF UNSPECIFIED CEREBRAL ARTERY: ICD-10-CM

## 2023-01-14 DIAGNOSIS — I69.354 HEMIPLEGIA AND HEMIPARESIS FOLLOWING CEREBRAL INFARCTION AFFECTING LEFT NON-DOMINANT SIDE: ICD-10-CM

## 2023-01-14 DIAGNOSIS — F10.129 ALCOHOL ABUSE WITH INTOXICATION, UNSPECIFIED: ICD-10-CM

## 2023-01-14 DIAGNOSIS — T45.516A UNDERDOSING OF ANTICOAGULANTS, INITIAL ENCOUNTER: ICD-10-CM

## 2023-01-14 DIAGNOSIS — E83.42 HYPOMAGNESEMIA: ICD-10-CM

## 2023-01-14 SDOH — ECONOMIC STABILITY - HOUSING INSECURITY: UNSHELTERED HOMELESSNESS: Z59.02

## 2023-02-01 NOTE — ED PROVIDER NOTE - OBJECTIVE STATEMENT
Yes
58 yo male pmhx cocaone/etoh abuse to ED c/ sudden onset n/v/d and abdominal cramps this am. Pt states he "ate some bad food". Denies cp/soa.

## 2023-02-06 ENCOUNTER — EMERGENCY (EMERGENCY)
Facility: HOSPITAL | Age: 63
LOS: 1 days | Discharge: HOME | End: 2023-02-06
Attending: EMERGENCY MEDICINE | Admitting: EMERGENCY MEDICINE
Payer: MEDICARE

## 2023-02-06 VITALS
RESPIRATION RATE: 18 BRPM | HEART RATE: 93 BPM | TEMPERATURE: 98 F | SYSTOLIC BLOOD PRESSURE: 137 MMHG | DIASTOLIC BLOOD PRESSURE: 83 MMHG | OXYGEN SATURATION: 96 %

## 2023-02-06 VITALS
DIASTOLIC BLOOD PRESSURE: 111 MMHG | SYSTOLIC BLOOD PRESSURE: 162 MMHG | OXYGEN SATURATION: 97 % | HEIGHT: 71 IN | TEMPERATURE: 98 F | WEIGHT: 199.96 LBS | HEART RATE: 89 BPM | RESPIRATION RATE: 16 BRPM

## 2023-02-06 DIAGNOSIS — M10.9 GOUT, UNSPECIFIED: ICD-10-CM

## 2023-02-06 DIAGNOSIS — I69.354 HEMIPLEGIA AND HEMIPARESIS FOLLOWING CEREBRAL INFARCTION AFFECTING LEFT NON-DOMINANT SIDE: ICD-10-CM
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
## 2023-02-06 DIAGNOSIS — Z88.5 ALLERGY STATUS TO NARCOTIC AGENT: ICD-10-CM

## 2023-02-06 DIAGNOSIS — M79.18 MYALGIA, OTHER SITE: ICD-10-CM

## 2023-02-06 DIAGNOSIS — Z20.822 CONTACT WITH AND (SUSPECTED) EXPOSURE TO COVID-19: ICD-10-CM

## 2023-02-06 DIAGNOSIS — M54.50 LOW BACK PAIN, UNSPECIFIED: ICD-10-CM

## 2023-02-06 DIAGNOSIS — Z86.711 PERSONAL HISTORY OF PULMONARY EMBOLISM: ICD-10-CM
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
## 2023-02-06 DIAGNOSIS — Z86.718 PERSONAL HISTORY OF OTHER VENOUS THROMBOSIS AND EMBOLISM: ICD-10-CM

## 2023-02-06 DIAGNOSIS — F17.200 NICOTINE DEPENDENCE, UNSPECIFIED, UNCOMPLICATED: ICD-10-CM

## 2023-02-06 LAB
ALBUMIN SERPL ELPH-MCNC: 3.6 G/DL — SIGNIFICANT CHANGE UP (ref 3.4–5)
ALP SERPL-CCNC: 79 U/L — SIGNIFICANT CHANGE UP (ref 40–120)
ALT FLD-CCNC: 21 U/L — SIGNIFICANT CHANGE UP (ref 12–42)
ANION GAP SERPL CALC-SCNC: 14 MMOL/L — SIGNIFICANT CHANGE UP (ref 9–16)
APTT BLD: 32.3 SEC — SIGNIFICANT CHANGE UP (ref 27.5–35.5)
AST SERPL-CCNC: 28 U/L — SIGNIFICANT CHANGE UP (ref 15–37)
BASOPHILS # BLD AUTO: 0.03 K/UL — SIGNIFICANT CHANGE UP (ref 0–0.2)
BASOPHILS NFR BLD AUTO: 0.4 % — SIGNIFICANT CHANGE UP (ref 0–2)
BILIRUB SERPL-MCNC: 1.6 MG/DL — HIGH (ref 0.2–1.2)
BUN SERPL-MCNC: 15 MG/DL — SIGNIFICANT CHANGE UP (ref 7–23)
CALCIUM SERPL-MCNC: 9.1 MG/DL — SIGNIFICANT CHANGE UP (ref 8.5–10.5)
CHLORIDE SERPL-SCNC: 103 MMOL/L — SIGNIFICANT CHANGE UP (ref 96–108)
CO2 SERPL-SCNC: 25 MMOL/L — SIGNIFICANT CHANGE UP (ref 22–31)
CREAT SERPL-MCNC: 1.01 MG/DL — SIGNIFICANT CHANGE UP (ref 0.5–1.3)
EGFR: 85 ML/MIN/1.73M2 — SIGNIFICANT CHANGE UP
EOSINOPHIL # BLD AUTO: 0.17 K/UL — SIGNIFICANT CHANGE UP (ref 0–0.5)
EOSINOPHIL NFR BLD AUTO: 2.5 % — SIGNIFICANT CHANGE UP (ref 0–6)
ETHANOL SERPL-MCNC: <3 MG/DL — SIGNIFICANT CHANGE UP
GLUCOSE SERPL-MCNC: 92 MG/DL — SIGNIFICANT CHANGE UP (ref 70–99)
HCT VFR BLD CALC: 43 % — SIGNIFICANT CHANGE UP (ref 39–50)
HGB BLD-MCNC: 14.2 G/DL — SIGNIFICANT CHANGE UP (ref 13–17)
IMM GRANULOCYTES NFR BLD AUTO: 0.1 % — SIGNIFICANT CHANGE UP (ref 0–0.9)
INR BLD: 1.04 — SIGNIFICANT CHANGE UP (ref 0.88–1.16)
LYMPHOCYTES # BLD AUTO: 0.84 K/UL — LOW (ref 1–3.3)
LYMPHOCYTES # BLD AUTO: 12.2 % — LOW (ref 13–44)
MCHC RBC-ENTMCNC: 33 GM/DL — SIGNIFICANT CHANGE UP (ref 32–36)
MCHC RBC-ENTMCNC: 35.1 PG — HIGH (ref 27–34)
MCV RBC AUTO: 106.4 FL — HIGH (ref 80–100)
MONOCYTES # BLD AUTO: 0.67 K/UL — SIGNIFICANT CHANGE UP (ref 0–0.9)
MONOCYTES NFR BLD AUTO: 9.7 % — SIGNIFICANT CHANGE UP (ref 2–14)
NEUTROPHILS # BLD AUTO: 5.18 K/UL — SIGNIFICANT CHANGE UP (ref 1.8–7.4)
NEUTROPHILS NFR BLD AUTO: 75.1 % — SIGNIFICANT CHANGE UP (ref 43–77)
NRBC # BLD: 0 /100 WBCS — SIGNIFICANT CHANGE UP (ref 0–0)
PLATELET # BLD AUTO: 208 K/UL — SIGNIFICANT CHANGE UP (ref 150–400)
POTASSIUM SERPL-MCNC: 3.4 MMOL/L — LOW (ref 3.5–5.3)
POTASSIUM SERPL-SCNC: 3.4 MMOL/L — LOW (ref 3.5–5.3)
PROT SERPL-MCNC: 7.5 G/DL — SIGNIFICANT CHANGE UP (ref 6.4–8.2)
PROTHROM AB SERPL-ACNC: 12.2 SEC — SIGNIFICANT CHANGE UP (ref 10.5–13.4)
RBC # BLD: 4.04 M/UL — LOW (ref 4.2–5.8)
RBC # FLD: 12.2 % — SIGNIFICANT CHANGE UP (ref 10.3–14.5)
SARS-COV-2 RNA SPEC QL NAA+PROBE: SIGNIFICANT CHANGE UP
SODIUM SERPL-SCNC: 142 MMOL/L — SIGNIFICANT CHANGE UP (ref 132–145)
TROPONIN I, HIGH SENSITIVITY RESULT: 14.1 NG/L — SIGNIFICANT CHANGE UP
WBC # BLD: 6.9 K/UL — SIGNIFICANT CHANGE UP (ref 3.8–10.5)
WBC # FLD AUTO: 6.9 K/UL — SIGNIFICANT CHANGE UP (ref 3.8–10.5)

## 2023-02-06 PROCEDURE — 72131 CT LUMBAR SPINE W/O DYE: CPT | Mod: 26

## 2023-02-06 PROCEDURE — 99285 EMERGENCY DEPT VISIT HI MDM: CPT

## 2023-02-06 RX ORDER — MORPHINE SULFATE 50 MG/1
4 CAPSULE, EXTENDED RELEASE ORAL ONCE
Refills: 0 | Status: DISCONTINUED | OUTPATIENT
Start: 2023-02-06 | End: 2023-02-06

## 2023-02-06 RX ORDER — KETOROLAC TROMETHAMINE 30 MG/ML
15 SYRINGE (ML) INJECTION ONCE
Refills: 0 | Status: DISCONTINUED | OUTPATIENT
Start: 2023-02-06 | End: 2023-02-06

## 2023-02-06 RX ORDER — LISINOPRIL 2.5 MG/1
40 TABLET ORAL ONCE
Refills: 0 | Status: COMPLETED | OUTPATIENT
Start: 2023-02-06 | End: 2023-02-06

## 2023-02-06 RX ORDER — GABAPENTIN 400 MG/1
1 CAPSULE ORAL
Qty: 15 | Refills: 0
Start: 2023-02-06 | End: 2023-02-10

## 2023-02-06 RX ORDER — CYCLOBENZAPRINE HYDROCHLORIDE 10 MG/1
1 TABLET, FILM COATED ORAL
Qty: 15 | Refills: 0
Start: 2023-02-06 | End: 2023-02-10

## 2023-02-06 RX ORDER — IBUPROFEN 200 MG
1 TABLET ORAL
Qty: 20 | Refills: 0
Start: 2023-02-06 | End: 2023-02-10

## 2023-02-06 RX ADMIN — MORPHINE SULFATE 4 MILLIGRAM(S): 50 CAPSULE, EXTENDED RELEASE ORAL at 03:53

## 2023-02-06 RX ADMIN — Medication 15 MILLIGRAM(S): at 03:53

## 2023-02-06 RX ADMIN — LISINOPRIL 40 MILLIGRAM(S): 2.5 TABLET ORAL at 03:54

## 2023-02-06 NOTE — ED ADULT TRIAGE NOTE - CHIEF COMPLAINT QUOTE
Pt with complaint of dizziness and back pain X 3 days. Pt states he has dizziness because he hasn't taken his blood pressure medications for about 2-3 months  now (currently on lisinopril 20mg daily and another medication he cannot remember). Pt also complains of lower back pain stating he fell three days ago and hurt himself. Denies hitting head during fall. Pt on eliquis daily for a history of DVT and PE.

## 2023-02-06 NOTE — ED PROVIDER NOTE - PHYSICAL EXAMINATION
VITAL SIGNS: I have reviewed nursing notes and confirm.  CONSTITUTIONAL: Well-developed; well-nourished; in no acute distress.  SKIN: Skin exam is warm and dry, no acute rash.  HEAD: Normocephalic; atraumatic.  EYES: PERRL, EOM intact; conjunctiva and sclera clear.  ENT: No nasal discharge; airway clear.  NECK: Supple; non tender.  CARD: S1, S2 normal; no murmurs, gallops, or rubs. Regular rate and rhythm.  RESP: Unlabored. No wheezes, rales or rhonchi.  ABD: soft; non-distended; non-tender  BACK: lumbar mid-spine and R sided paraspinal TTP w/out overlying skin changes or stepoffs  EXT: Normal ROM. No cyanosis or edema. Non-ttp all ext, distal pulses intact  LYMPH: No acute cervical adenopathy.  NEURO: Alert, oriented. Grossly unremarkable.  PSYCH: Cooperative, appropriate.

## 2023-02-06 NOTE — ED PROVIDER NOTE - PATIENT PORTAL LINK FT
You can access the FollowMyHealth Patient Portal offered by [facility] by registering at the following website: [email or web address]. By joining [employer]’s FollowMyHealth portal, you will also be able to view your health information using other applications (apps) compatible with our system.

## 2023-02-06 NOTE — ED PROVIDER NOTE - OBJECTIVE STATEMENT
61 y/o M w/PMH alcohol abuse, drug abuse, three remote embolic strokes w/ left sided weakness, DVT's in the past, history of PE, gout, reports that he fell 3 days ago landing on his lumbar spine with severe pain in lower back radiating down R buttock. Denies head strike or other injury. Denies LE numbness/tingling/weakness. No bowel/bladder dysfunction.

## 2023-02-06 NOTE — ED PROVIDER NOTE - NSFOLLOWUPINSTRUCTIONS_ED_ALL_ED_FT
Acute Low Back Pain    WHAT YOU NEED TO KNOW:    Acute low back pain is sudden discomfort that lasts up to 6 weeks and makes activity difficult.    DISCHARGE INSTRUCTIONS:    Return to the emergency department if:   •You have severe pain.      •You have sudden stiffness and heaviness on both buttocks down to both legs.      •You have numbness or weakness in one leg, or pain in both legs.      •You have numbness in your genital area or across your lower back.      •You cannot control your urine or bowel movements.      Call your doctor if:   •You have a fever.      •You have pain at night or when you rest.      •Your pain does not get better with treatment.      •You have pain that worsens when you cough or sneeze.      •You suddenly feel something pop or snap in your back.      •You have questions or concerns about your condition or care.      Medicines: You may need any of the following:  •NSAIDs, such as ibuprofen, help decrease swelling, pain, and fever. This medicine is available with or without a doctor's order. NSAIDs can cause stomach bleeding or kidney problems in certain people. If you take blood thinner medicine, always ask your healthcare provider if NSAIDs are safe for you. Always read the medicine label and follow directions.      •Acetaminophen decreases pain and fever. It is available without a doctor's order. Ask how much to take and how often to take it. Follow directions. Read the labels of all other medicines you are using to see if they also contain acetaminophen, or ask your doctor or pharmacist. Acetaminophen can cause liver damage if not taken correctly.      •Muscle relaxers decrease pain by relaxing the muscles in your lower spine.      •Prescription pain medicine may be given. Ask your healthcare provider how to take this medicine safely. Some prescription pain medicines contain acetaminophen. Do not take other medicines that contain acetaminophen without talking to your healthcare provider. Too much acetaminophen may cause liver damage. Prescription pain medicine may cause constipation. Ask your healthcare provider how to prevent or treat constipation.       Self-care:   •Stay active as much as you can without causing more pain. Bed rest could make your back pain worse. Start with some light exercises, such as walking. Avoid heavy lifting until your pain is gone. Ask for more information about the activities or exercises that are right for you.   Family Walking for Exercise           •Apply heat on your back for 20 to 30 minutes every 2 hours for as many days as directed. Heat helps decrease pain and muscle spasms. Alternate heat and ice.      •Apply ice on your back for 15 to 20 minutes every hour or as directed. Use an ice pack, or put crushed ice in a plastic bag. Cover it with a towel before you apply it to your skin. Ice helps prevent tissue damage and decreases swelling and pain.      Prevent acute low back pain:   •Use proper body mechanics. ?Bend at the hips and knees when you  objects. Do not bend from the waist. Use your leg muscles as you lift the load. Do not use your back. Keep the object close to your chest as you lift it. Try not to twist or lift anything above your waist.  How to Lift Items Safely           ?Change your position often when you stand for long periods of time. Rest one foot on a small box or footrest, and then switch to the other foot often.      ?Try not to sit for long periods of time. When you do, sit in a straight-backed chair with your feet flat on the floor. Never reach, pull, or push while you are sitting.      •Do exercises that strengthen your back muscles. Warm up before you exercise. Ask your healthcare provider the best exercises for you.  Warm up and Cool Down            •Maintain a healthy weight. Ask your healthcare provider what a healthy weight is for you. Ask him or her to help you create a weight loss plan if you are overweight.      Follow up with your doctor as directed: Return for a follow-up visit if you still have pain after 1 to 3 weeks of treatment. You may need to visit an orthopedist if your back pain lasts longer than 12 weeks. Write down your questions so you remember to ask them during your visits.

## 2023-02-09 ENCOUNTER — INPATIENT (INPATIENT)
Facility: HOSPITAL | Age: 63
LOS: 5 days | Discharge: HOME | DRG: 378 | End: 2023-02-15
Attending: SURGERY | Admitting: SURGERY
Payer: MEDICARE

## 2023-02-09 VITALS
OXYGEN SATURATION: 98 % | HEART RATE: 80 BPM | HEIGHT: 71 IN | DIASTOLIC BLOOD PRESSURE: 99 MMHG | TEMPERATURE: 98 F | WEIGHT: 190.04 LBS | RESPIRATION RATE: 18 BRPM | SYSTOLIC BLOOD PRESSURE: 148 MMHG

## 2023-02-09 DIAGNOSIS — N28.9 DISORDER OF KIDNEY AND URETER, UNSPECIFIED: ICD-10-CM

## 2023-02-09 LAB
ALBUMIN SERPL ELPH-MCNC: 3.2 G/DL — LOW (ref 3.3–5)
ALBUMIN SERPL ELPH-MCNC: 4 G/DL — SIGNIFICANT CHANGE UP (ref 3.4–5)
ALP SERPL-CCNC: 63 U/L — SIGNIFICANT CHANGE UP (ref 40–120)
ALP SERPL-CCNC: 83 U/L — SIGNIFICANT CHANGE UP (ref 40–120)
ALT FLD-CCNC: 10 U/L — SIGNIFICANT CHANGE UP (ref 10–45)
ALT FLD-CCNC: 21 U/L — SIGNIFICANT CHANGE UP (ref 12–42)
ANION GAP SERPL CALC-SCNC: 9 MMOL/L — SIGNIFICANT CHANGE UP (ref 5–17)
ANION GAP SERPL CALC-SCNC: 9 MMOL/L — SIGNIFICANT CHANGE UP (ref 9–16)
APTT BLD: 28.7 SEC — SIGNIFICANT CHANGE UP (ref 27.5–35.5)
APTT BLD: 34.3 SEC — SIGNIFICANT CHANGE UP (ref 27.5–35.5)
AST SERPL-CCNC: 13 U/L — SIGNIFICANT CHANGE UP (ref 10–40)
AST SERPL-CCNC: 35 U/L — SIGNIFICANT CHANGE UP (ref 15–37)
BASOPHILS # BLD AUTO: 0.02 K/UL — SIGNIFICANT CHANGE UP (ref 0–0.2)
BASOPHILS NFR BLD AUTO: 0.4 % — SIGNIFICANT CHANGE UP (ref 0–2)
BILIRUB SERPL-MCNC: 1.2 MG/DL — SIGNIFICANT CHANGE UP (ref 0.2–1.2)
BILIRUB SERPL-MCNC: 1.5 MG/DL — HIGH (ref 0.2–1.2)
BLD GP AB SCN SERPL QL: NEGATIVE — SIGNIFICANT CHANGE UP
BUN SERPL-MCNC: 13 MG/DL — SIGNIFICANT CHANGE UP (ref 7–23)
BUN SERPL-MCNC: 9 MG/DL — SIGNIFICANT CHANGE UP (ref 7–23)
CALCIUM SERPL-MCNC: 8.1 MG/DL — LOW (ref 8.4–10.5)
CALCIUM SERPL-MCNC: 9.3 MG/DL — SIGNIFICANT CHANGE UP (ref 8.5–10.5)
CHLORIDE SERPL-SCNC: 101 MMOL/L — SIGNIFICANT CHANGE UP (ref 96–108)
CHLORIDE SERPL-SCNC: 104 MMOL/L — SIGNIFICANT CHANGE UP (ref 96–108)
CO2 SERPL-SCNC: 22 MMOL/L — SIGNIFICANT CHANGE UP (ref 22–31)
CO2 SERPL-SCNC: 29 MMOL/L — SIGNIFICANT CHANGE UP (ref 22–31)
CREAT SERPL-MCNC: 0.85 MG/DL — SIGNIFICANT CHANGE UP (ref 0.5–1.3)
CREAT SERPL-MCNC: 0.96 MG/DL — SIGNIFICANT CHANGE UP (ref 0.5–1.3)
CRP SERPL-MCNC: 11 MG/L — HIGH (ref 0–9)
EGFR: 90 ML/MIN/1.73M2 — SIGNIFICANT CHANGE UP
EGFR: 99 ML/MIN/1.73M2 — SIGNIFICANT CHANGE UP
EOSINOPHIL # BLD AUTO: 0.11 K/UL — SIGNIFICANT CHANGE UP (ref 0–0.5)
EOSINOPHIL NFR BLD AUTO: 2.2 % — SIGNIFICANT CHANGE UP (ref 0–6)
GLUCOSE SERPL-MCNC: 100 MG/DL — HIGH (ref 70–99)
GLUCOSE SERPL-MCNC: 107 MG/DL — HIGH (ref 70–99)
HCT VFR BLD CALC: 32.1 % — LOW (ref 39–50)
HCT VFR BLD CALC: 37.9 % — LOW (ref 39–50)
HGB BLD-MCNC: 10.8 G/DL — LOW (ref 13–17)
HGB BLD-MCNC: 12.6 G/DL — LOW (ref 13–17)
HIV 1 & 2 AB SERPL IA.RAPID: SIGNIFICANT CHANGE UP
IMM GRANULOCYTES NFR BLD AUTO: 0.2 % — SIGNIFICANT CHANGE UP (ref 0–0.9)
INR BLD: 1.08 — SIGNIFICANT CHANGE UP (ref 0.88–1.16)
INR BLD: 1.17 — HIGH (ref 0.88–1.16)
LACTATE SERPL-SCNC: 0.8 MMOL/L — SIGNIFICANT CHANGE UP (ref 0.4–2)
LYMPHOCYTES # BLD AUTO: 0.92 K/UL — LOW (ref 1–3.3)
LYMPHOCYTES # BLD AUTO: 18.6 % — SIGNIFICANT CHANGE UP (ref 13–44)
MACROCYTES BLD QL: SLIGHT — SIGNIFICANT CHANGE UP
MAGNESIUM SERPL-MCNC: 1.3 MG/DL — LOW (ref 1.6–2.6)
MANUAL SMEAR VERIFICATION: SIGNIFICANT CHANGE UP
MCHC RBC-ENTMCNC: 33.2 GM/DL — SIGNIFICANT CHANGE UP (ref 32–36)
MCHC RBC-ENTMCNC: 33.6 GM/DL — SIGNIFICANT CHANGE UP (ref 32–36)
MCHC RBC-ENTMCNC: 35.3 PG — HIGH (ref 27–34)
MCHC RBC-ENTMCNC: 35.5 PG — HIGH (ref 27–34)
MCV RBC AUTO: 104.9 FL — HIGH (ref 80–100)
MCV RBC AUTO: 106.8 FL — HIGH (ref 80–100)
MONOCYTES # BLD AUTO: 0.58 K/UL — SIGNIFICANT CHANGE UP (ref 0–0.9)
MONOCYTES NFR BLD AUTO: 11.7 % — SIGNIFICANT CHANGE UP (ref 2–14)
NEUTROPHILS # BLD AUTO: 3.3 K/UL — SIGNIFICANT CHANGE UP (ref 1.8–7.4)
NEUTROPHILS NFR BLD AUTO: 66.9 % — SIGNIFICANT CHANGE UP (ref 43–77)
NRBC # BLD: 0 /100 WBCS — SIGNIFICANT CHANGE UP (ref 0–0)
NRBC # BLD: 0 /100 WBCS — SIGNIFICANT CHANGE UP (ref 0–0)
OB PNL STL: POSITIVE
PHOSPHATE SERPL-MCNC: 2.3 MG/DL — LOW (ref 2.5–4.5)
PLAT MORPH BLD: NORMAL — SIGNIFICANT CHANGE UP
PLATELET # BLD AUTO: 219 K/UL — SIGNIFICANT CHANGE UP (ref 150–400)
PLATELET # BLD AUTO: 226 K/UL — SIGNIFICANT CHANGE UP (ref 150–400)
PLATELET COUNT - ESTIMATE: NORMAL — SIGNIFICANT CHANGE UP
POTASSIUM SERPL-MCNC: 3.5 MMOL/L — SIGNIFICANT CHANGE UP (ref 3.5–5.3)
POTASSIUM SERPL-MCNC: 4.2 MMOL/L — SIGNIFICANT CHANGE UP (ref 3.5–5.3)
POTASSIUM SERPL-SCNC: 3.5 MMOL/L — SIGNIFICANT CHANGE UP (ref 3.5–5.3)
POTASSIUM SERPL-SCNC: 4.2 MMOL/L — SIGNIFICANT CHANGE UP (ref 3.5–5.3)
PROT SERPL-MCNC: 6.1 G/DL — SIGNIFICANT CHANGE UP (ref 6–8.3)
PROT SERPL-MCNC: 8.8 G/DL — HIGH (ref 6.4–8.2)
PROTHROM AB SERPL-ACNC: 12.5 SEC — SIGNIFICANT CHANGE UP (ref 10.5–13.4)
PROTHROM AB SERPL-ACNC: 14 SEC — HIGH (ref 10.5–13.4)
RBC # BLD: 3.06 M/UL — LOW (ref 4.2–5.8)
RBC # BLD: 3.55 M/UL — LOW (ref 4.2–5.8)
RBC # FLD: 11.9 % — SIGNIFICANT CHANGE UP (ref 10.3–14.5)
RBC # FLD: 12 % — SIGNIFICANT CHANGE UP (ref 10.3–14.5)
RBC BLD AUTO: ABNORMAL
RH IG SCN BLD-IMP: POSITIVE — SIGNIFICANT CHANGE UP
SARS-COV-2 RNA SPEC QL NAA+PROBE: SIGNIFICANT CHANGE UP
SODIUM SERPL-SCNC: 135 MMOL/L — SIGNIFICANT CHANGE UP (ref 135–145)
SODIUM SERPL-SCNC: 139 MMOL/L — SIGNIFICANT CHANGE UP (ref 132–145)
SPHEROCYTES BLD QL SMEAR: SLIGHT — SIGNIFICANT CHANGE UP
WBC # BLD: 4.86 K/UL — SIGNIFICANT CHANGE UP (ref 3.8–10.5)
WBC # BLD: 4.94 K/UL — SIGNIFICANT CHANGE UP (ref 3.8–10.5)
WBC # FLD AUTO: 4.86 K/UL — SIGNIFICANT CHANGE UP (ref 3.8–10.5)
WBC # FLD AUTO: 4.94 K/UL — SIGNIFICANT CHANGE UP (ref 3.8–10.5)

## 2023-02-09 PROCEDURE — 74174 CTA ABD&PLVS W/CONTRAST: CPT | Mod: 26

## 2023-02-09 PROCEDURE — 99285 EMERGENCY DEPT VISIT HI MDM: CPT

## 2023-02-09 PROCEDURE — 99221 1ST HOSP IP/OBS SF/LOW 40: CPT

## 2023-02-09 RX ORDER — LISINOPRIL 2.5 MG/1
1 TABLET ORAL
Qty: 0 | Refills: 0 | DISCHARGE

## 2023-02-09 RX ORDER — SOD SULF/SODIUM/NAHCO3/KCL/PEG
4000 SOLUTION, RECONSTITUTED, ORAL ORAL ONCE
Refills: 0 | Status: COMPLETED | OUTPATIENT
Start: 2023-02-09 | End: 2023-02-09

## 2023-02-09 RX ORDER — PANTOPRAZOLE SODIUM 20 MG/1
40 TABLET, DELAYED RELEASE ORAL ONCE
Refills: 0 | Status: COMPLETED | OUTPATIENT
Start: 2023-02-09 | End: 2023-02-09

## 2023-02-09 RX ORDER — PANTOPRAZOLE SODIUM 20 MG/1
40 TABLET, DELAYED RELEASE ORAL DAILY
Refills: 0 | Status: DISCONTINUED | OUTPATIENT
Start: 2023-02-09 | End: 2023-02-15

## 2023-02-09 RX ORDER — SODIUM CHLORIDE 9 MG/ML
1000 INJECTION INTRAMUSCULAR; INTRAVENOUS; SUBCUTANEOUS ONCE
Refills: 0 | Status: COMPLETED | OUTPATIENT
Start: 2023-02-09 | End: 2023-02-09

## 2023-02-09 RX ORDER — ACETAMINOPHEN 500 MG
650 TABLET ORAL ONCE
Refills: 0 | Status: COMPLETED | OUTPATIENT
Start: 2023-02-09 | End: 2023-02-09

## 2023-02-09 RX ORDER — THIAMINE MONONITRATE (VIT B1) 100 MG
100 TABLET ORAL DAILY
Refills: 0 | Status: COMPLETED | OUTPATIENT
Start: 2023-02-09 | End: 2023-02-12

## 2023-02-09 RX ORDER — CHLORHEXIDINE GLUCONATE 213 G/1000ML
1 SOLUTION TOPICAL
Refills: 0 | Status: DISCONTINUED | OUTPATIENT
Start: 2023-02-09 | End: 2023-02-13

## 2023-02-09 RX ORDER — APIXABAN 2.5 MG/1
1 TABLET, FILM COATED ORAL
Qty: 0 | Refills: 0 | DISCHARGE

## 2023-02-09 RX ORDER — FOLIC ACID 0.8 MG
1 TABLET ORAL DAILY
Refills: 0 | Status: DISCONTINUED | OUTPATIENT
Start: 2023-02-09 | End: 2023-02-15

## 2023-02-09 RX ORDER — INSULIN LISPRO 100/ML
VIAL (ML) SUBCUTANEOUS EVERY 6 HOURS
Refills: 0 | Status: DISCONTINUED | OUTPATIENT
Start: 2023-02-09 | End: 2023-02-15

## 2023-02-09 RX ORDER — POTASSIUM PHOSPHATE, MONOBASIC POTASSIUM PHOSPHATE, DIBASIC 236; 224 MG/ML; MG/ML
15 INJECTION, SOLUTION INTRAVENOUS ONCE
Refills: 0 | Status: COMPLETED | OUTPATIENT
Start: 2023-02-09 | End: 2023-02-09

## 2023-02-09 RX ORDER — MAGNESIUM SULFATE 500 MG/ML
2 VIAL (ML) INJECTION ONCE
Refills: 0 | Status: COMPLETED | OUTPATIENT
Start: 2023-02-09 | End: 2023-02-09

## 2023-02-09 RX ADMIN — Medication 50 MILLIGRAM(S): at 20:03

## 2023-02-09 RX ADMIN — Medication 1 MILLIGRAM(S): at 20:06

## 2023-02-09 RX ADMIN — Medication 650 MILLIGRAM(S): at 15:55

## 2023-02-09 RX ADMIN — CHLORHEXIDINE GLUCONATE 1 APPLICATION(S): 213 SOLUTION TOPICAL at 20:06

## 2023-02-09 RX ADMIN — Medication 4000 MILLILITER(S): at 23:39

## 2023-02-09 RX ADMIN — Medication 1 TABLET(S): at 20:06

## 2023-02-09 RX ADMIN — SODIUM CHLORIDE 1000 MILLILITER(S): 9 INJECTION INTRAMUSCULAR; INTRAVENOUS; SUBCUTANEOUS at 15:43

## 2023-02-09 RX ADMIN — POTASSIUM PHOSPHATE, MONOBASIC POTASSIUM PHOSPHATE, DIBASIC 62.5 MILLIMOLE(S): 236; 224 INJECTION, SOLUTION INTRAVENOUS at 21:47

## 2023-02-09 RX ADMIN — PANTOPRAZOLE SODIUM 40 MILLIGRAM(S): 20 TABLET, DELAYED RELEASE ORAL at 20:04

## 2023-02-09 RX ADMIN — Medication 25 GRAM(S): at 20:50

## 2023-02-09 RX ADMIN — PANTOPRAZOLE SODIUM 40 MILLIGRAM(S): 20 TABLET, DELAYED RELEASE ORAL at 15:43

## 2023-02-09 NOTE — CONSULT NOTE ADULT - PROBLEM SELECTOR RECOMMENDATION 2
- CT read "irregular soft tissue at the trigon, bladder or prostate neoplasm should be excluded."  - recommend obtaining PSA   - if PSA elevated prostate MRI recommended   - if PSA within normal limits, outpatient follow up   - rest of care as per primary team   - discussed with  team

## 2023-02-09 NOTE — H&P ADULT - ASSESSMENT
63yo Male pt with PMH Crohn disease, DVT/PE (on Eliquis), prior CVA x3, PSA, Ethanol use disorder, BPH, and PSH of appendectomy presents to [facility] with 1 day history of BRBPR and lower abdominal pain. Afebrile, non tachycardic and normotensive. Exam significant for LLQ abdominal tenderness and dark blood on IVAN. Labs with reassuring Hgb of 12.5, normal WBC, normal BUN/Cr. CTA without evidence of bleeding, with diverticulosis, and concern for urological mass. Differential for LGIB includes diverticulosis given history, less likely Crohn flare, less likely PRIETO in setting of cocaine use. Admitted to SICU for HD monitoring      - NPO/IVF  - Pain and Nausea control  - CBC q4  - CIWA protocol  - PPI  - 2 large bore IV  - Current TandS  - GI consult  - SCD/ hold SQH i/s/o LGIB  - IS/OOB  - Home medications as appropriate, holding Eliquis

## 2023-02-09 NOTE — ED PROVIDER NOTE - CLINICAL SUMMARY MEDICAL DECISION MAKING FREE TEXT BOX
62-year-old male history of Crohn's disease, DVT/PE (on Eliquis), CVA presents with 1 day of dark red blood per rectum associated with left lower quadrant pain.  Patient states pain is aching, 8/10, nonradiating, tender to palpation, with no alleviating factors.  Patient has had 4 episodes of blood per rectum since this morning.  Currently reports mild lightheadedness upon standing and walking, but denies chest pain, shortness of breath, fevers/chills, nausea/vomiting, urinary symptoms.  Last took his Eliquis yesterday.  Had a colonoscopy approximately 5 or 6 years ago showing diverticulosis.  On exam patient has dark red blood from the rectum and is tender to palpation in the left lower quadrant.  Suspect that this is a GI bleed from either diverticulosis or his Crohn's disease.  Will obtain labs, imaging, and transfer for further management.  Patient amenable to this plan and work-up.

## 2023-02-09 NOTE — ED PROVIDER NOTE - OBJECTIVE STATEMENT
62-year-old male history of Crohn's disease, DVT/PE (on Eliquis), CVA presents with 1 day of dark red blood per rectum associated with left lower quadrant pain.  Patient states pain is aching, 8/10, nonradiating, tender to palpation, with no alleviating factors.  Patient has had 4 episodes of blood per rectum since this morning.  Currently reports mild lightheadedness upon standing and walking, but denies chest pain, shortness of breath, fevers/chills, nausea/vomiting, urinary symptoms.  Last took his Eliquis yesterday.  Had a colonoscopy approximately 5 or 6 years ago showing diverticulosis.

## 2023-02-09 NOTE — CONSULT NOTE ADULT - SUBJECTIVE AND OBJECTIVE BOX
HPI: 63yo Male pt with PMH Crohn disease, DVT/PE (on Eliquis), prior CVA x3, PSA, Ethanol use disorder, BPH, and PSH of appendectomy presents to [facility] with 1 day history of BRBPR and lower abdominal pain. Patient states he awoke to have bowel movement this AM and noticed BRB in toilet, associated with initially 8/10, now 3/10 LLQ non radiating abdominal pain. No clear palliating or provoking factors. Has had 4-5 episodes of bleeding today, no clots or stool. Reported slight dizziness earlier, no fevers, chills, nausea, vomiting, diarrhea. Had colonoscopy 5y ago and diagnosed with Crohn dx and diverticulosis. Does not follow with GI, has not had flare, is not on maintenance Rx, no steroid history. Presents to [facility] from  [facility] on 2/9/2023 with LGIB, direct admission to SICU for hemodynamic monitoring.       PAST MEDICAL & SURGICAL HISTORY:  Diverticulosis  Acute pancreatitis  Cerebral artery occlusion with cerebral infarction  Stroke  History of pulmonary embolism  MI (myocardial infarction)  HTN (hypertension)  Hypertension  Crohns disease  HLD (hyperlipidemia)  Alcohol withdrawal  Pulmonary embolism  BPH (benign prostatic hyperplasia)  COPD (chronic obstructive pulmonary disease)  ETOH abuse  No significant past surgical history      Allergies  codeine (Unknown)      Home Medications:  amLODIPine 10 mg oral tablet: 1 tab(s) orally once a day (09 Jan 2023 16:24)  Eliquis 5 mg oral tablet: 1 tab(s) orally 2 times a day (09 Feb 2023 19:51)  lisinopril 20 mg oral tablet: 1 tab(s) orally once a day (09 Feb 2023 19:51)  tamsulosin 0.4 mg oral capsule: 1 cap(s) orally once a day (09 Jan 2023 16:24)    MEDICATIONS  (STANDING):  chlordiazePOXIDE   Oral   chlordiazePOXIDE 50 milliGRAM(s) Oral every 6 hours  chlorhexidine 2% Cloths 1 Application(s) Topical <User Schedule>  folic acid 1 milliGRAM(s) Oral daily  insulin lispro (ADMELOG) corrective regimen sliding scale   SubCutaneous every 6 hours  multivitamin 1 Tablet(s) Oral daily  pantoprazole  Injectable 40 milliGRAM(s) IV Push daily  thiamine 100 milliGRAM(s) Oral daily    MEDICATIONS  (PRN):  LORazepam     Tablet 2 milliGRAM(s) Oral every 2 hours PRN CIWA-Ar score increase by 2 points and a total score of 7 or less  LORazepam   Injectable 2 milliGRAM(s) IV Push every 1 hour PRN CIWA-Ar score 8 or greater      Physical Exam:   General: Well appearing, resting comfortably in bed in no acute distress  Neuro: Grossly intact bilaterally   HEENT: Normocephalic, atraumatic, no JVD, trachea midline   Heart: Regular S1/S2, no murmus rubs or gallops    Lungs: Unlabored breathing on RA; Clear to auscultation bilaterally, no adventitious sounds   Abdomen: Soft, non-tender, normoactive bowel sounds, no tenderness to palpation in all 4 quadrants   Upper Extremities: No edema, freely mobile bilaterally   Lower Extremities: No edema, SCDs in place, 2+ DP pulse bilaterally   Skin: Warm, non-diaphoretic throughout       Labs:                         10.8   4.86  )-----------( 219      ( 09 Feb 2023 20:14 )             32.1     02-09    139  |  101  |  13  ----------------------------<  107<H>  4.2   |  29  |  0.96    Ca    9.3      09 Feb 2023 12:02    TPro  8.8<H>  /  Alb  4.0  /  TBili  1.5<H>  /  DBili  x   /  AST  35  /  ALT  21  /  AlkPhos  83  02-09    PT/INR - ( 09 Feb 2023 12:02 )   PT: 12.5 sec;   INR: 1.08     PTT - ( 09 Feb 2023 12:02 )  PTT:28.7 sec    COVID-19 PCR: NotDetec (09 Feb 2023 12:02)  COVID-19 PCR: NotDetec (06 Feb 2023 03:14)  COVID-19 PCR: NotDetec (23 Nov 2022 10:50)      Vital Signs:   Vital Signs Last 24 Hrs  T(C): 36.8 (09 Feb 2023 19:17), Max: 37.2 (09 Feb 2023 13:55)  T(F): 98.3 (09 Feb 2023 19:17), Max: 98.9 (09 Feb 2023 13:55)  HR: 77 (09 Feb 2023 20:00) (74 - 88)  BP: 153/87 (09 Feb 2023 20:00) (135/88 - 159/93)  BP(mean): 114 (09 Feb 2023 20:00) (114 - 120)  RR: 18 (09 Feb 2023 20:00) (18 - 20)  SpO2: 98% (09 Feb 2023 20:00) (96% - 98%)    Parameters below as of 09 Feb 2023 20:00  Patient On (Oxygen Delivery Method): room air          Input/Output:   I&O's Detail    Daily Height in cm: 180.34 (09 Feb 2023 11:18)    Daily     Height (cm): 180.3 (02-09-23 @ 11:18)  Weight (kg): 86.2 (02-09-23 @ 11:18)  BMI (kg/m2): 26.5 (02-09-23 @ 11:18)  BSA (m2): 2.06 (02-09-23 @ 11:18)

## 2023-02-09 NOTE — CONSULT NOTE ADULT - SUBJECTIVE AND OBJECTIVE BOX
Patient is a 62y old  Male who presents with a chief complaint of LGIB (09 Feb 2023 20:25)  HPI:  63yo Male pt with PMH Crohn disease, DVT/PE (on Eliquis), prior CVA x3, PSA, Ethanol use disorder, BPH, and PSH of appendectomy presents to [facility] with 1 day history of BRBPR and lower abdominal pain. Patient states he awoke to have bowel movement this AM and noticed BRB in toilet, asoociated with initially 8/10, now 3/10 LLQ non radiating abdominal pain. No clear palliating or provoking factors. Has had 4-5 episodes of bleeidng today, no clots or stool. Reported slight dizziness earlier, no fevers, chills, nausea, vomiting, diarrhea. Had colonoscopy 5y ago and diagnosed with Crohn dx and diverticulosis. Does not follow with GI, has not had flare, is not on maintanence Rx, no steroid history.     : As stated above. Urology consulted for left renal lesion and irregular soft tissue at bladder trigone. Patient denies any significant past urologic history. States he believes he had his PSA draw years ago but is unsure what it was. He states he did experience an episode of left flank pain 2 weeks ago but the pain subsided. Denies difficulty urinating, hematuria. Denies past urologic surgeries. Denies seeing a urologist in the past. States he is a current smoker with a 40+ year pack history.     PMH: DVT/PE (on eliquis), CVA, Crohn disease, Ethanol use disorder, PSA, BPH, CVA x3 last 5yr ago  PSH:appendectomy  Meds: Eliquis (last 2 days ago), lisinopril, gabapentin, amlodipine  All: NKDA  Social Hx: every day ETOH 1 pint last this am, cocaine use qd  Family Hx: Denies family hx of IBS, Crohns, UC, or colon cancer.  Functional capacity: >4METS  Last colonoscopy/EGD: 5 years ago    In the ED:   - Afebrile, nontachycardic, normotensive, and satting well on RA   T(F): 97.9, Max: 98.9 (02-09-23 @ 13:55)  HR: 88 (80 - 88)  BP: 143/96 (135/88 - 148/99)  RR: 20 (18 - 20)  SpO2: 96% (96% - 98%)    - Labs significant for: Hgb 12.5, normal BUN/Cr  - Imaging: IMPRESSION:    Extensive diverticulosis of the sigmoid and descending colon. No active   bleeding.    Question mass at the base of the urinary bladder, prostate or urothelial,   suggest endoscopy.    Indeterminate right renal lesion, enlarged from 2019, further   characterization with contrast-enhanced MRI is recommended. Other   incidental comments as above.      Physical Exam  General: AAOx3, NAD, laying comfortably in bed  Cardio: RRR  Pulm: Lungs bilaterally clear to auscultation  Abdomen: soft non distended, tender in LLQ, IVAN with dark blood no stool  Extremities: WWP, peripheral pulses appreciated                          12.6   4.94  )-----------( 226      ( 09 Feb 2023 14:16 )             37.9     02-09    139  |  101  |  13  ----------------------------<  107<H>  4.2   |  29  |  0.96    Ca    9.3      09 Feb 2023 12:02    TPro  8.8<H>  /  Alb  4.0  /  TBili  1.5<H>  /  DBili  x   /  AST  35  /  ALT  21  /  AlkPhos  83  02-09    LIVER FUNCTIONS - ( 09 Feb 2023 12:02 )  Alb: 4.0 g/dL / Pro: 8.8 g/dL / ALK PHOS: 83 U/L / ALT: 21 U/L / AST: 35 U/L / GGT: x           PT/INR - ( 09 Feb 2023 12:02 )   PT: 12.5 sec;   INR: 1.08          PTT - ( 09 Feb 2023 12:02 )  PTT:28.7 sec   (09 Feb 2023 19:52)      Vital Signs Last 24 Hrs  T(C): 36.8 (09 Feb 2023 19:17), Max: 37.2 (09 Feb 2023 13:55)  T(F): 98.3 (09 Feb 2023 19:17), Max: 98.9 (09 Feb 2023 13:55)  HR: 77 (09 Feb 2023 20:00) (74 - 88)  BP: 153/87 (09 Feb 2023 20:00) (135/88 - 159/93)  BP(mean): 114 (09 Feb 2023 20:00) (114 - 120)  RR: 18 (09 Feb 2023 20:00) (18 - 20)  SpO2: 98% (09 Feb 2023 20:00) (96% - 98%)    Parameters below as of 09 Feb 2023 20:00  Patient On (Oxygen Delivery Method): room air      I&O's Summary      PE:  Gen: alert and oriented. no acute distress   Abd: soft, tender to palpation in left lower quadrant   : no left CVAT. no right CVAT. no suprapubic tenderness     LABS:                        10.8   4.86  )-----------( 219      ( 09 Feb 2023 20:14 )             32.1     02-09    135  |  104  |  9   ----------------------------<  100<H>  3.5   |  22  |  0.85    Ca    8.1<L>      09 Feb 2023 20:14  Phos  2.3     02-09  Mg     1.3     02-09    TPro  6.1  /  Alb  3.2<L>  /  TBili  1.2  /  DBili  x   /  AST  13  /  ALT  10  /  AlkPhos  63  02-09    PT/INR - ( 09 Feb 2023 20:14 )   PT: 14.0 sec;   INR: 1.17          PTT - ( 09 Feb 2023 20:14 )  PTT:34.3 sec     < from: CT Angio Abdomen and Pelvis w/ IV Cont (02.09.23 @ 15:57) >  ACC: [id number] EXAM:  CT ANGIO ABD PELV (W)AW IC   ORDERED BY: AMANDA POPE     PROCEDURE DATE:  02/09/2023          INTERPRETATION:  CLINICAL INFORMATION: GI bleeding, left lower quadrant   pain    COMPARISON: 2019    CONTRAST/COMPLICATIONS:  IV Contrast: Omnipaque 350  200 cc administered   100 cc discarded  Oral Contrast: NONE  Complications: None reported at time of study completion    PROCEDURE:  CT of the Abdomen and Pelvis was performed.  Sagittal and coronal reformats were performed.    FINDINGS:  LOWER CHEST: Small hiatal hernia.    LIVER: Steatosis.  BILE DUCTS: Normal caliber.  GALLBLADDER: Cholelithiasis.  SPLEEN: Within normal limits.  PANCREAS: Within normal limits.  ADRENALS: Within normal limits.  KIDNEYS/URETERS: 1.5 cmindeterminate lesion lower pole right kidney,   enlarged from 1 cm in 2019. Bilateral hypodensities, some too small to   characterize.    BLADDER: Irregular soft tissue at the trigone, bladder or prostate   neoplasm should be excluded.  REPRODUCTIVE ORGANS : Prostate is enlarged and indents the base of the   bladder.    BOWEL: No bowel obstruction. Appendix is not visualized. No evidence of   inflammation in the pericecal region. Extensive diverticulosis of the   sigmoid and descending colon. No active bleeding.  PERITONEUM: No ascites.  VESSELS: Within normal limits.  RETROPERITONEUM/LYMPH NODES: No lymphadenopathy.  ABDOMINAL WALL: Within normal limits.  BONES: Extensive productive degenerative changes in the spine.    IMPRESSION:    Extensive diverticulosis of the sigmoid and descending colon. No active   bleeding.    Question mass at the base of the urinary bladder, prostate or urothelial,   suggest endoscopy.    Indeterminate right renal lesion, enlarged from 2019, further   characterization with contrast-enhanced MRI is recommended. Other   incidental comments as above.    --- End of Report ---            CLARICE SANTIZO MD; Attending Radiologist  This document has been electronically signed. Feb 9 2023  4:32PM    < end of copied text >

## 2023-02-09 NOTE — ED ADULT NURSE REASSESSMENT NOTE - NS ED NURSE REASSESS COMMENT FT1
pt noted with slight tremor to both hands, reports hx of ETOH withdrawal, VS stable, pt assessed by Tunde Coyle, will transfer to ICU now

## 2023-02-09 NOTE — ED ADULT NURSE REASSESSMENT NOTE - NS ED NURSE REASSESS COMMENT FT1
patient needs US guided iv since patient was poked over 4 times for IV attempts; MDSchmidt spoke to and he said he would do an US guided IV; needs repeat labs as well

## 2023-02-09 NOTE — ED ADULT TRIAGE NOTE - CHIEF COMPLAINT QUOTE
Pt [facility] from [facility] with c/o LLQ pain and 2x bloody stool starting 4 hrs PTA. PMH Crohn's and stroke. Last use cocaine at around 4:00am today. Denies CP or any new neurological symptoms

## 2023-02-09 NOTE — ED ADULT TRIAGE NOTE - ESI TRIAGE ACUITY LEVEL, MLM
H/O bariatric surgery  Gastric sleeve  H/O hiatal hernia    History of esophagogastroduodenoscopy (EGD)  2017 3

## 2023-02-09 NOTE — ED ADULT NURSE NOTE - OBJECTIVE STATEMENT
Pt c/o abd pain to left lower quad and bloody stool that started this morning. Denies SOB, Denies chest pain. Denies N/V. states " I had diarrhea twice today". ambulates w/ a cane.

## 2023-02-09 NOTE — CONSULT NOTE ADULT - PROBLEM SELECTOR RECOMMENDATION 9
-no emergent urologic intervention indicated at this time   - renal mass increased in 0.5cm over 4 years   - outpatient follow up for renal mass protocol   - rest of care as per primary team

## 2023-02-09 NOTE — ED ADULT NURSE NOTE - PAIN: BODY LOCATION
LLQ abd/abdomen/lower quadrant/Left: Quality 474: Zoster Vaccination Status: Shingrix Vaccination not Administered or Previously Received, Reason not Otherwise Specified Quality 111:Pneumonia Vaccination Status For Older Adults: Pneumococcal Vaccination not Administered or Previously Received, Reason not Otherwise Specified Detail Level: Detailed Quality 226: Preventive Care And Screening: Tobacco Use: Screening And Cessation Intervention: Patient screened for tobacco use and is an ex/non-smoker Quality 130: Documentation Of Current Medications In The Medical Record: Current Medications Documented Name And Contact Information For Health Care Proxy: Osman Owusu Quality 47: Advance Care Plan: Advance Care Planning discussed and documented; advance care plan or surrogate decision maker documented in the medical record. Quality 110: Preventive Care And Screening: Influenza Immunization: Influenza Immunization not Administered due to Patient Allergy. Quality 431: Preventive Care And Screening: Unhealthy Alcohol Use - Screening: Patient screened for unhealthy alcohol use using a single question and scores less than 2 times per year

## 2023-02-09 NOTE — ED ADULT TRIAGE NOTE - TEMPERATURE IN FAHRENHEIT (DEGREES F)
From: Marycruz Mora  To: Gildardo Amador  Sent: 6/9/2021 6:28 AM CDT  Subject: Medication Question    Good morning I was wondering if you could please refill my prescription for tramadol thank you   98.1

## 2023-02-09 NOTE — ED PROVIDER NOTE - CONSIDERATION OF ADMISSION OBSERVATION
Patient will likely require admission as he has dark red blood from his rectum.  He will require a GI bleed work-up. Consideration of Admission/Observation

## 2023-02-09 NOTE — ED ADULT NURSE REASSESSMENT NOTE - NS ED NURSE REASSESS COMMENT FT1
patient had US guided IV placed by MD Maciel in right upper arm/bicep area; was used for CT scan iv contrast and as per radiology tech "it blew"; over 100cc of contrast went into bicep; iv removed and hot packs applied by radiology techs on table; MD Maciel was aware and also went into ct room to put in a new IV; new 18g in left upper arm/brachial area placed around 1515 for contrast CT scan

## 2023-02-09 NOTE — ED ADULT TRIAGE NOTE - HEIGHT IN CM
Date: Monday, 9/13/2021  Time: 3:00 pm - 4:00 pm  Provider: Lucinda Jane MA  Supervised by: Amber Scott MA  [facility]  Location: [place] [address]  Phone: [phone]  Type: Teletherapy  
180.34

## 2023-02-09 NOTE — H&P ADULT - HISTORY OF PRESENT ILLNESS
63yo Male pt with PMH Crohn disease, DVT/PE (on Eliquis), prior CVA x3, PSA, Ethanol use disorder, BPH, and PSH of appendectomy presents to [facility] with 1 day history of BRBPR and lower abdominal pain. Patient states he awoke to have bowel movement this AM and noticed BRB in toilet, asoociated with initially 8/10, now 3/10 LLQ non radiating abdominal pain. No clear palliating or provoking factors. Has had 4-5 episodes of bleeidng today, no clots or stool. Reported slight dizziness earlier, no fevers, chills, nausea, vomiting, diarrhea. Had colonoscopy 5y ago and diagnosed with Crohn dx and diverticulosis. Does not follow with GI, has not had flare, is not on maintanence Rx, no steroid history.       PMH: DVT/PE (on eliquis), CVA, Crohn disease, Ethanol use disorder, PSA, BPH, CVA x3 last 5yr ago  PSH:appendectomy  Meds: Eliquis (last 2 days ago), lisinopril, gabapentin, amlodipine  All: NKDA  Social Hx: every day ETOH 1 pint last this am, cocaine use qd  Family Hx: Denies family hx of IBS, Crohns, UC, or colon cancer.  Functional capacity: >4METS  Last colonoscopy/EGD: 5 years ago    In the ED:   - Afebrile, nontachycardic, normotensive, and satting well on RA   T(F): 97.9, Max: 98.9 (02-09-23 @ 13:55)  HR: 88 (80 - 88)  BP: 143/96 (135/88 - 148/99)  RR: 20 (18 - 20)  SpO2: 96% (96% - 98%)    - Labs significant for: Hgb 12.5, normal BUN/Cr  - Imaging: IMPRESSION:    Extensive diverticulosis of the sigmoid and descending colon. No active   bleeding.    Question mass at the base of the urinary bladder, prostate or urothelial,   suggest endoscopy.    Indeterminate right renal lesion, enlarged from 2019, further   characterization with contrast-enhanced MRI is recommended. Other   incidental comments as above.      Physical Exam  General: AAOx3, NAD, laying comfortably in bed  Cardio: RRR  Pulm: Lungs bilaterally clear to auscultation  Abdomen: soft non distended, tender in LLQ, IVAN with dark blood no stool  Extremities: WWP, peripheral pulses appreciated                          12.6   4.94  )-----------( 226      ( 09 Feb 2023 14:16 )             37.9     02-09    139  |  101  |  13  ----------------------------<  107<H>  4.2   |  29  |  0.96    Ca    9.3      09 Feb 2023 12:02    TPro  8.8<H>  /  Alb  4.0  /  TBili  1.5<H>  /  DBili  x   /  AST  35  /  ALT  21  /  AlkPhos  83  02-09    LIVER FUNCTIONS - ( 09 Feb 2023 12:02 )  Alb: 4.0 g/dL / Pro: 8.8 g/dL / ALK PHOS: 83 U/L / ALT: 21 U/L / AST: 35 U/L / GGT: x           PT/INR - ( 09 Feb 2023 12:02 )   PT: 12.5 sec;   INR: 1.08          PTT - ( 09 Feb 2023 12:02 )  PTT:28.7 sec

## 2023-02-09 NOTE — CONSULT NOTE ADULT - ATTENDING COMMENTS
Jorgito Aguirre [id number]  This is a 61 y/o male with h/o Crohn disease, diverticulosis, COPD, DVT, PE (on Eliquis but was stopped 2 days ago), alcohol use disorder, appendectomy with a 1 day h/o BRBPR.  NAD, hgb 10.8 g/dL, VSS  -acute hemorrhagic anemia 2/2 GIB  -GIB likely lower with h/o diverticulosis and was on Eliquis  -h/o DVT/PE  -cocaine use disorder  >CIWA with ativan  >thiamine, folate  >continue to hold the Eliquis  >keep MAP >65 mmHg, maintain 2 large bore PIVs  >add PPI  >hold lisinopril  This case was reviewed thoroughly with the resident and PA, please see above for the rest of the details.

## 2023-02-10 ENCOUNTER — TRANSCRIPTION ENCOUNTER (OUTPATIENT)
Age: 63
End: 2023-02-10

## 2023-02-10 DIAGNOSIS — N32.9 BLADDER DISORDER, UNSPECIFIED: ICD-10-CM

## 2023-02-10 LAB
ALBUMIN SERPL ELPH-MCNC: 2.9 G/DL — LOW (ref 3.3–5)
ALP SERPL-CCNC: 52 U/L — SIGNIFICANT CHANGE UP (ref 40–120)
ALT FLD-CCNC: 9 U/L — LOW (ref 10–45)
AMPHET UR-MCNC: NEGATIVE — SIGNIFICANT CHANGE UP
ANION GAP SERPL CALC-SCNC: 11 MMOL/L — SIGNIFICANT CHANGE UP (ref 5–17)
APTT BLD: 31.3 SEC — SIGNIFICANT CHANGE UP (ref 27.5–35.5)
AST SERPL-CCNC: 13 U/L — SIGNIFICANT CHANGE UP (ref 10–40)
BARBITURATES UR SCN-MCNC: NEGATIVE — SIGNIFICANT CHANGE UP
BASOPHILS # BLD AUTO: 0.02 K/UL — SIGNIFICANT CHANGE UP (ref 0–0.2)
BASOPHILS NFR BLD AUTO: 0.4 % — SIGNIFICANT CHANGE UP (ref 0–2)
BENZODIAZ UR-MCNC: POSITIVE
BILIRUB SERPL-MCNC: 1 MG/DL — SIGNIFICANT CHANGE UP (ref 0.2–1.2)
BLD GP AB SCN SERPL QL: NEGATIVE — SIGNIFICANT CHANGE UP
BUN SERPL-MCNC: 8 MG/DL — SIGNIFICANT CHANGE UP (ref 7–23)
CALCIUM SERPL-MCNC: 7.7 MG/DL — LOW (ref 8.4–10.5)
CHLORIDE SERPL-SCNC: 107 MMOL/L — SIGNIFICANT CHANGE UP (ref 96–108)
CO2 SERPL-SCNC: 21 MMOL/L — LOW (ref 22–31)
COCAINE METAB.OTHER UR-MCNC: POSITIVE
CREAT SERPL-MCNC: 0.84 MG/DL — SIGNIFICANT CHANGE UP (ref 0.5–1.3)
CRP SERPL-MCNC: 6.2 MG/L — HIGH (ref 0–4)
D DIMER BLD IA.RAPID-MCNC: 161 NG/ML DDU — SIGNIFICANT CHANGE UP
EGFR: 99 ML/MIN/1.73M2 — SIGNIFICANT CHANGE UP
EOSINOPHIL # BLD AUTO: 0.11 K/UL — SIGNIFICANT CHANGE UP (ref 0–0.5)
EOSINOPHIL NFR BLD AUTO: 2.1 % — SIGNIFICANT CHANGE UP (ref 0–6)
FIBRINOGEN PPP-MCNC: 262 MG/DL — SIGNIFICANT CHANGE UP (ref 258–438)
GLUCOSE BLDC GLUCOMTR-MCNC: 196 MG/DL — HIGH (ref 70–99)
GLUCOSE BLDC GLUCOMTR-MCNC: 77 MG/DL — SIGNIFICANT CHANGE UP (ref 70–99)
GLUCOSE BLDC GLUCOMTR-MCNC: 81 MG/DL — SIGNIFICANT CHANGE UP (ref 70–99)
GLUCOSE BLDC GLUCOMTR-MCNC: 94 MG/DL — SIGNIFICANT CHANGE UP (ref 70–99)
GLUCOSE SERPL-MCNC: 90 MG/DL — SIGNIFICANT CHANGE UP (ref 70–99)
HCT VFR BLD CALC: 27.8 % — LOW (ref 39–50)
HCT VFR BLD CALC: 29.5 % — LOW (ref 39–50)
HCT VFR BLD CALC: 31.7 % — LOW (ref 39–50)
HGB BLD-MCNC: 11 G/DL — LOW (ref 13–17)
HGB BLD-MCNC: 9.4 G/DL — LOW (ref 13–17)
HGB BLD-MCNC: 9.7 G/DL — LOW (ref 13–17)
IMM GRANULOCYTES NFR BLD AUTO: 0.4 % — SIGNIFICANT CHANGE UP (ref 0–0.9)
INR BLD: 1.12 — SIGNIFICANT CHANGE UP (ref 0.88–1.16)
LYMPHOCYTES # BLD AUTO: 0.89 K/UL — LOW (ref 1–3.3)
LYMPHOCYTES # BLD AUTO: 16.9 % — SIGNIFICANT CHANGE UP (ref 13–44)
MAGNESIUM SERPL-MCNC: 1.7 MG/DL — SIGNIFICANT CHANGE UP (ref 1.6–2.6)
MCHC RBC-ENTMCNC: 32.9 GM/DL — SIGNIFICANT CHANGE UP (ref 32–36)
MCHC RBC-ENTMCNC: 33.8 GM/DL — SIGNIFICANT CHANGE UP (ref 32–36)
MCHC RBC-ENTMCNC: 34.7 GM/DL — SIGNIFICANT CHANGE UP (ref 32–36)
MCHC RBC-ENTMCNC: 34.9 PG — HIGH (ref 27–34)
MCHC RBC-ENTMCNC: 35.5 PG — HIGH (ref 27–34)
MCHC RBC-ENTMCNC: 37.9 PG — HIGH (ref 27–34)
MCV RBC AUTO: 104.9 FL — HIGH (ref 80–100)
MCV RBC AUTO: 106.1 FL — HIGH (ref 80–100)
MCV RBC AUTO: 109.3 FL — HIGH (ref 80–100)
METHADONE UR-MCNC: NEGATIVE — SIGNIFICANT CHANGE UP
MONOCYTES # BLD AUTO: 0.59 K/UL — SIGNIFICANT CHANGE UP (ref 0–0.9)
MONOCYTES NFR BLD AUTO: 11.2 % — SIGNIFICANT CHANGE UP (ref 2–14)
NEUTROPHILS # BLD AUTO: 3.65 K/UL — SIGNIFICANT CHANGE UP (ref 1.8–7.4)
NEUTROPHILS NFR BLD AUTO: 69 % — SIGNIFICANT CHANGE UP (ref 43–77)
NRBC # BLD: 0 /100 WBCS — SIGNIFICANT CHANGE UP (ref 0–0)
OPIATES UR-MCNC: NEGATIVE — SIGNIFICANT CHANGE UP
PCP SPEC-MCNC: SIGNIFICANT CHANGE UP
PCP UR-MCNC: NEGATIVE — SIGNIFICANT CHANGE UP
PHOSPHATE SERPL-MCNC: 2.6 MG/DL — SIGNIFICANT CHANGE UP (ref 2.5–4.5)
PLATELET # BLD AUTO: 144 K/UL — LOW (ref 150–400)
PLATELET # BLD AUTO: 200 K/UL — SIGNIFICANT CHANGE UP (ref 150–400)
PLATELET # BLD AUTO: 214 K/UL — SIGNIFICANT CHANGE UP (ref 150–400)
POTASSIUM SERPL-MCNC: 3.5 MMOL/L — SIGNIFICANT CHANGE UP (ref 3.5–5.3)
POTASSIUM SERPL-SCNC: 3.5 MMOL/L — SIGNIFICANT CHANGE UP (ref 3.5–5.3)
PROT SERPL-MCNC: 5.2 G/DL — LOW (ref 6–8.3)
PROTHROM AB SERPL-ACNC: 13.3 SEC — SIGNIFICANT CHANGE UP (ref 10.5–13.4)
PSA FLD-MCNC: 6.06 NG/ML — HIGH (ref 0–4)
RBC # BLD: 2.65 M/UL — LOW (ref 4.2–5.8)
RBC # BLD: 2.78 M/UL — LOW (ref 4.2–5.8)
RBC # BLD: 2.9 M/UL — LOW (ref 4.2–5.8)
RBC # FLD: 11.9 % — SIGNIFICANT CHANGE UP (ref 10.3–14.5)
RBC # FLD: 12 % — SIGNIFICANT CHANGE UP (ref 10.3–14.5)
RBC # FLD: 12.1 % — SIGNIFICANT CHANGE UP (ref 10.3–14.5)
RH IG SCN BLD-IMP: POSITIVE — SIGNIFICANT CHANGE UP
SODIUM SERPL-SCNC: 139 MMOL/L — SIGNIFICANT CHANGE UP (ref 135–145)
THC UR QL: NEGATIVE — SIGNIFICANT CHANGE UP
WBC # BLD: 5.15 K/UL — SIGNIFICANT CHANGE UP (ref 3.8–10.5)
WBC # BLD: 5.28 K/UL — SIGNIFICANT CHANGE UP (ref 3.8–10.5)
WBC # BLD: 5.36 K/UL — SIGNIFICANT CHANGE UP (ref 3.8–10.5)
WBC # FLD AUTO: 5.15 K/UL — SIGNIFICANT CHANGE UP (ref 3.8–10.5)
WBC # FLD AUTO: 5.28 K/UL — SIGNIFICANT CHANGE UP (ref 3.8–10.5)
WBC # FLD AUTO: 5.36 K/UL — SIGNIFICANT CHANGE UP (ref 3.8–10.5)

## 2023-02-10 PROCEDURE — 99231 SBSQ HOSP IP/OBS SF/LOW 25: CPT

## 2023-02-10 PROCEDURE — 45378 DIAGNOSTIC COLONOSCOPY: CPT

## 2023-02-10 PROCEDURE — 99233 SBSQ HOSP IP/OBS HIGH 50: CPT | Mod: GC

## 2023-02-10 PROCEDURE — 99223 1ST HOSP IP/OBS HIGH 75: CPT | Mod: 25

## 2023-02-10 RX ORDER — POTASSIUM PHOSPHATE, MONOBASIC POTASSIUM PHOSPHATE, DIBASIC 236; 224 MG/ML; MG/ML
30 INJECTION, SOLUTION INTRAVENOUS ONCE
Refills: 0 | Status: DISCONTINUED | OUTPATIENT
Start: 2023-02-10 | End: 2023-02-10

## 2023-02-10 RX ORDER — MAGNESIUM SULFATE 500 MG/ML
2 VIAL (ML) INJECTION ONCE
Refills: 0 | Status: COMPLETED | OUTPATIENT
Start: 2023-02-10 | End: 2023-02-10

## 2023-02-10 RX ORDER — MIDAZOLAM HYDROCHLORIDE 1 MG/ML
10 INJECTION, SOLUTION INTRAMUSCULAR; INTRAVENOUS ONCE
Refills: 0 | Status: DISCONTINUED | OUTPATIENT
Start: 2023-02-10 | End: 2023-02-10

## 2023-02-10 RX ORDER — MIDAZOLAM HYDROCHLORIDE 1 MG/ML
6 INJECTION, SOLUTION INTRAMUSCULAR; INTRAVENOUS ONCE
Refills: 0 | Status: DISCONTINUED | OUTPATIENT
Start: 2023-02-10 | End: 2023-02-10

## 2023-02-10 RX ORDER — POTASSIUM CHLORIDE 20 MEQ
40 PACKET (EA) ORAL ONCE
Refills: 0 | Status: COMPLETED | OUTPATIENT
Start: 2023-02-10 | End: 2023-02-10

## 2023-02-10 RX ORDER — SODIUM,POTASSIUM PHOSPHATES 278-250MG
1 POWDER IN PACKET (EA) ORAL ONCE
Refills: 0 | Status: COMPLETED | OUTPATIENT
Start: 2023-02-10 | End: 2023-02-10

## 2023-02-10 RX ORDER — FENTANYL CITRATE 50 UG/ML
100 INJECTION INTRAVENOUS ONCE
Refills: 0 | Status: DISCONTINUED | OUTPATIENT
Start: 2023-02-10 | End: 2023-02-10

## 2023-02-10 RX ADMIN — Medication 1 TABLET(S): at 11:50

## 2023-02-10 RX ADMIN — PANTOPRAZOLE SODIUM 40 MILLIGRAM(S): 20 TABLET, DELAYED RELEASE ORAL at 11:50

## 2023-02-10 RX ADMIN — Medication 40 MILLIEQUIVALENT(S): at 10:57

## 2023-02-10 RX ADMIN — Medication 2: at 18:57

## 2023-02-10 RX ADMIN — Medication 25 GRAM(S): at 07:48

## 2023-02-10 RX ADMIN — Medication 100 MILLIGRAM(S): at 11:50

## 2023-02-10 RX ADMIN — Medication 50 MILLIGRAM(S): at 00:40

## 2023-02-10 RX ADMIN — Medication 1 PACKET(S): at 07:48

## 2023-02-10 RX ADMIN — Medication 50 MILLIGRAM(S): at 06:47

## 2023-02-10 RX ADMIN — FENTANYL CITRATE 100 MICROGRAM(S): 50 INJECTION INTRAVENOUS at 15:30

## 2023-02-10 RX ADMIN — MIDAZOLAM HYDROCHLORIDE 6 MILLIGRAM(S): 1 INJECTION, SOLUTION INTRAMUSCULAR; INTRAVENOUS at 17:09

## 2023-02-10 RX ADMIN — Medication 1 MILLIGRAM(S): at 11:50

## 2023-02-10 RX ADMIN — CHLORHEXIDINE GLUCONATE 1 APPLICATION(S): 213 SOLUTION TOPICAL at 06:47

## 2023-02-10 RX ADMIN — FENTANYL CITRATE 100 MICROGRAM(S): 50 INJECTION INTRAVENOUS at 14:31

## 2023-02-10 NOTE — DIETITIAN INITIAL EVALUATION ADULT - CALCULATED FROM (CAL/KG)

## 2023-02-10 NOTE — DIETITIAN INITIAL EVALUATION ADULT - OTHER INFO
Diagnosis:Z47.89 (ICD-10-CM) - V54.9 (ICD-9-CM) - Orthopedic aftercare  S42.295D (ICD-10-CM) - V54.11 (ICD-9-CM) - Other closed nondisplaced fracture of proximal end of left humerus with routine healing, subsequent encounter    Authorized # of Visits:  10 all planes  LLLD stretch for ER  GH jt mobs grade II inf and post glides   STM to left posterior cuff, subscapularis, triceps/biceps, pect minor   Assisted pect minor stretches.     Assessment: Patient with exacerbation of pain with loss of motion last nigh 61yo Male PMH Crohn disease, DVT/PE (on Eliquis), prior CVA x3, PSA, Ethanol use disorder, BPH, and PSH of appendectomy presents to [facility] with 1 day history of BRBPR and lower abdominal pain. Pt states he awoke to have bowel movement and noticed BRB in toilet, asoociated with initially 8/10, now 3/10 LLQ non radiating abdominal pain. CTA without evidence of bleeding, with diverticulosis, and concern for urological mass. Differential for LGIB includes diverticulosis given history, less likely Crohn flare, less likely PRIETO in setting of cocaine use. Admitted to SICU for HD monitoring. Tentative plan for bedside colonoscopy however UDS recommended given polysubstance use disorder/prior cocaine use to help assess risk related to endoscopy and procedural sedation.    Pt on 5EAST, Under SICU Team. Noted Pt has remained hemodynamically stable overnight. Bleeding seemed to have stopped with prep. Pt confirms BM this AM without bleeding. Denies N/V. Protonix ordered. Reports some soreness to ABD. Pt NPO this AM, reports hunger. Good PO PTA, Regular diet. NKFA. No issues chewing/swallowing. Unsure of UBW however does not feel he has gained or wt/lost wt. No pain. CIWA 0; ordered for folic acid MVI B1. Na K Mg Phos WDL. Lalito 23. 1+BL foot Edema. No pressure ulcers.    Please see below for nutritions recommendations.

## 2023-02-10 NOTE — CONSULT NOTE ADULT - ATTENDING COMMENTS
Patient seen and d/w Dr. Woodard.  Colonoscopy performed at bedside for hematochezia. Colonoscopy with diverticulosis, suspect diverticular bleeding without active bleeding. Continue supportive management.

## 2023-02-10 NOTE — CONSULT NOTE ADULT - SUBJECTIVE AND OBJECTIVE BOX
Hematology Consult Note    HPI:  61yo Male pt with PMH Crohn disease, DVT/PE (on Eliquis), prior CVA x3, PSA, Ethanol use disorder, BPH, and PSH of appendectomy presents to [facility] with 1 day history of BRBPR and lower abdominal pain. Patient states he awoke to have bowel movement this AM and noticed BRB in toilet, asoociated with initially 8/10, now 3/10 LLQ non radiating abdominal pain. No clear palliating or provoking factors. Has had 4-5 episodes of bleeidng today, no clots or stool. Reported slight dizziness earlier, no fevers, chills, nausea, vomiting, diarrhea. Had colonoscopy 5y ago and diagnosed with Crohn dx and diverticulosis. Does not follow with GI, has not had flare, is not on maintanence Rx, no steroid history.       PMH: DVT/PE (on eliquis), CVA, Crohn disease, Ethanol use disorder, PSA, BPH, CVA x3 last 5yr ago  PSH:appendectomy  Meds: Eliquis (last 2 days ago), lisinopril, gabapentin, amlodipine  All: NKDA  Social Hx: every day ETOH 1 pint last this am, cocaine use qd  Family Hx: Denies family hx of IBS, Crohns, UC, or colon cancer.  Functional capacity: >4METS  Last colonoscopy/EGD: 5 years ago    In the ED:   - Afebrile, nontachycardic, normotensive, and satting well on RA   T(F): 97.9, Max: 98.9 (02-09-23 @ 13:55)  HR: 88 (80 - 88)  BP: 143/96 (135/88 - 148/99)  RR: 20 (18 - 20)  SpO2: 96% (96% - 98%)    - Labs significant for: Hgb 12.5, normal BUN/Cr  - Imaging: IMPRESSION:    Extensive diverticulosis of the sigmoid and descending colon. No active   bleeding.    Question mass at the base of the urinary bladder, prostate or urothelial,   suggest endoscopy.    Indeterminate right renal lesion, enlarged from 2019, further   characterization with contrast-enhanced MRI is recommended. Other   incidental comments as above.      Physical Exam  General: AAOx3, NAD, laying comfortably in bed  Cardio: RRR  Pulm: Lungs bilaterally clear to auscultation  Abdomen: soft non distended, tender in LLQ, IVAN with dark blood no stool  Extremities: WWP, peripheral pulses appreciated                          12.6   4.94  )-----------( 226      ( 09 Feb 2023 14:16 )             37.9     02-09    139  |  101  |  13  ----------------------------<  107<H>  4.2   |  29  |  0.96    Ca    9.3      09 Feb 2023 12:02    TPro  8.8<H>  /  Alb  4.0  /  TBili  1.5<H>  /  DBili  x   /  AST  35  /  ALT  21  /  AlkPhos  83  02-09    LIVER FUNCTIONS - ( 09 Feb 2023 12:02 )  Alb: 4.0 g/dL / Pro: 8.8 g/dL / ALK PHOS: 83 U/L / ALT: 21 U/L / AST: 35 U/L / GGT: x           PT/INR - ( 09 Feb 2023 12:02 )   PT: 12.5 sec;   INR: 1.08          PTT - ( 09 Feb 2023 12:02 )  PTT:28.7 sec   (09 Feb 2023 19:52)      Allergies    codeine (Unknown)    Intolerances        MEDICATIONS  (STANDING):  chlorhexidine 2% Cloths 1 Application(s) Topical <User Schedule>  folic acid 1 milliGRAM(s) Oral daily  insulin lispro (ADMELOG) corrective regimen sliding scale   SubCutaneous every 6 hours  multivitamin 1 Tablet(s) Oral daily  pantoprazole  Injectable 40 milliGRAM(s) IV Push daily  thiamine 100 milliGRAM(s) Oral daily    MEDICATIONS  (PRN):  LORazepam     Tablet 2 milliGRAM(s) Oral every 2 hours PRN CIWA-Ar score increase by 2 points and a total score of 7 or less  LORazepam   Injectable 2 milliGRAM(s) IV Push every 1 hour PRN CIWA-Ar score 8 or greater      PAST MEDICAL & SURGICAL HISTORY:  Diverticulosis of large intestine  Diverticulosis  Acute pancreatitis  Pancreatitis  Cerebral artery occlusion with cerebral infarction  Stroke  History of pulmonary embolism  MI (myocardial infarction)  HTN (hypertension)  Hypertension  Crohn&#x27;s disease  HLD (hyperlipidemia)  Alcohol withdrawal  Pulmonary embolism  BPH (benign prostatic hyperplasia)  COPD (chronic obstructive pulmonary disease)  ETOH abuse  No significant past surgical history  No significant past surgical history          FAMILY HISTORY:      SOCIAL HISTORY: No EtOH, no tobacco    REVIEW OF SYSTEMS:    CONSTITUTIONAL: No weakness, fevers or chills  EYES/ENT: No visual changes;  No vertigo or throat pain   NECK: No pain or stiffness  RESPIRATORY: No cough, wheezing, hemoptysis; No shortness of breath  CARDIOVASCULAR: No chest pain or palpitations  GASTROINTESTINAL: No abdominal or epigastric pain. No nausea, vomiting, or hematemesis; No diarrhea or constipation. No melena or hematochezia.  GENITOURINARY: No dysuria, frequency or hematuria  NEUROLOGICAL: No numbness or weakness  SKIN: No itching, burning, rashes, or lesions   All other review of systems is negative unless indicated above.        T(F): 98.7 (02-10-23 @ 09:05), Max: 98.9 (02-09-23 @ 13:55)  HR: 86 (02-10-23 @ 11:57)  BP: 129/81 (02-10-23 @ 11:57)  RR: 21 (02-10-23 @ 11:57)  SpO2: 98% (02-10-23 @ 11:57)  Wt(kg): --    GENERAL: NAD, well-developed  HEAD:  Atraumatic, Normocephalic  EYES: EOMI, PERRLA, conjunctiva and sclera clear  NECK: Supple, No JVD  CHEST/LUNG: Clear to auscultation bilaterally; No wheeze  HEART: Regular rate and rhythm; No murmurs, rubs, or gallops  ABDOMEN: Soft, Nontender, Nondistended; Bowel sounds present  EXTREMITIES:  2+ Peripheral Pulses, No clubbing, cyanosis, or edema  NEUROLOGY: non-focal  SKIN: No rashes or lesions                          9.4    5.28  )-----------( 200      ( 10 Feb 2023 05:43 )             27.8       02-10    139  |  107  |  8   ----------------------------<  90  3.5   |  21<L>  |  0.84    Ca    7.7<L>      10 Feb 2023 05:43  Phos  2.6     02-10  Mg     1.7     02-10    TPro  5.2<L>  /  Alb  2.9<L>  /  TBili  1.0  /  DBili  x   /  AST  13  /  ALT  9<L>  /  AlkPhos  52  02-10      Magnesium, Serum: 1.7 mg/dL (02-10 @ 05:43)  Phosphorus Level, Serum: 2.6 mg/dL (02-10 @ 05:43)  Magnesium, Serum: 1.3 mg/dL (02-09 @ 20:14)  Phosphorus Level, Serum: 2.3 mg/dL (02-09 @ 20:14)       Hematology Consult Note    HPI:  63yo Male pt with PMH Crohn disease, DVT/PE (on Eliquis), prior CVA x3, PSA, Ethanol use disorder, BPH, and PSH of appendectomy presents to [facility] with 1 day history of BRBPR and lower abdominal pain. Patient states he awoke to have bowel movement this AM and noticed BRB in toilet, asoociated with initially 8/10, now 3/10 LLQ non radiating abdominal pain. No clear palliating or provoking factors. Has had 4-5 episodes of bleeidng today, no clots or stool. Reported slight dizziness earlier, no fevers, chills, nausea, vomiting, diarrhea. Had colonoscopy 5y ago and diagnosed with Crohn dx and diverticulosis. Does not follow with GI, has not had flare, is not on maintanence Rx, no steroid history.     Additional history: The patient first had his DVT found at the same time as his first CVA which was around 5-6 years ago. He can't recall any provoking factors such as trauma, prolonged travel, or prolonged immobilization. He doesn't remember any hypercoagulable workup. He was discharged on coumadin. However, he subsequently developed a PE (timeline unclear) and was transitioned to Eliquis - for which he has not been consistently compliant. Denies any family history of clotting or other blood disorders.       PMH: DVT/PE (on eliquis), CVA, Crohn disease, Ethanol use disorder, PSA, BPH, CVA x3 last 5yr ago  PSH:appendectomy  Meds: Eliquis (last 2 days ago), lisinopril, gabapentin, amlodipine  All: NKDA  Social Hx: every day ETOH 1 pint last this am, cocaine use qd  Family Hx: Denies family hx of IBS, Crohns, UC, or colon cancer.  Functional capacity: >4METS  Last colonoscopy/EGD: 5 years ago    In the ED:   - Afebrile, nontachycardic, normotensive, and satting well on RA   T(F): 97.9, Max: 98.9 (02-09-23 @ 13:55)  HR: 88 (80 - 88)  BP: 143/96 (135/88 - 148/99)  RR: 20 (18 - 20)  SpO2: 96% (96% - 98%)    - Labs significant for: Hgb 12.5, normal BUN/Cr  - Imaging: IMPRESSION:    Extensive diverticulosis of the sigmoid and descending colon. No active   bleeding.    Question mass at the base of the urinary bladder, prostate or urothelial,   suggest endoscopy.    Indeterminate right renal lesion, enlarged from 2019, further   characterization with contrast-enhanced MRI is recommended. Other   incidental comments as above.                          12.6   4.94  )-----------( 226      ( 09 Feb 2023 14:16 )             37.9     02-09    139  |  101  |  13  ----------------------------<  107<H>  4.2   |  29  |  0.96    Ca    9.3      09 Feb 2023 12:02    TPro  8.8<H>  /  Alb  4.0  /  TBili  1.5<H>  /  DBili  x   /  AST  35  /  ALT  21  /  AlkPhos  83  02-09    LIVER FUNCTIONS - ( 09 Feb 2023 12:02 )  Alb: 4.0 g/dL / Pro: 8.8 g/dL / ALK PHOS: 83 U/L / ALT: 21 U/L / AST: 35 U/L / GGT: x           PT/INR - ( 09 Feb 2023 12:02 )   PT: 12.5 sec;   INR: 1.08          PTT - ( 09 Feb 2023 12:02 )  PTT:28.7 sec   (09 Feb 2023 19:52)      Allergies    codeine (Unknown)    Intolerances        MEDICATIONS  (STANDING):  chlorhexidine 2% Cloths 1 Application(s) Topical <User Schedule>  folic acid 1 milliGRAM(s) Oral daily  insulin lispro (ADMELOG) corrective regimen sliding scale   SubCutaneous every 6 hours  multivitamin 1 Tablet(s) Oral daily  pantoprazole  Injectable 40 milliGRAM(s) IV Push daily  thiamine 100 milliGRAM(s) Oral daily    MEDICATIONS  (PRN):  LORazepam     Tablet 2 milliGRAM(s) Oral every 2 hours PRN CIWA-Ar score increase by 2 points and a total score of 7 or less  LORazepam   Injectable 2 milliGRAM(s) IV Push every 1 hour PRN CIWA-Ar score 8 or greater      PAST MEDICAL & SURGICAL HISTORY:  Diverticulosis of large intestine  Diverticulosis  Acute pancreatitis  Pancreatitis  Cerebral artery occlusion with cerebral infarction  Stroke  History of pulmonary embolism  MI (myocardial infarction)  HTN (hypertension)  Hypertension  Crohn&#x27;s disease  HLD (hyperlipidemia)  Alcohol withdrawal  Pulmonary embolism  BPH (benign prostatic hyperplasia)  COPD (chronic obstructive pulmonary disease)  ETOH abuse  No significant past surgical history  No significant past surgical history      REVIEW OF SYSTEMS:    CONSTITUTIONAL: No weakness, fevers or chills, no weight changes  EYES/ENT: No visual changes;  No vertigo or throat pain   NECK: No pain or stiffness  RESPIRATORY: No cough, wheezing, hemoptysis; No shortness of breath  CARDIOVASCULAR: No chest pain or palpitations  GASTROINTESTINAL: No abdominal or epigastric pain. No nausea, vomiting, or hematemesis; No diarrhea or constipation. No melena; +hematochezia.  GENITOURINARY: No dysuria, frequency or hematuria  NEUROLOGICAL: No numbness, + chronic residual L sided weakness  SKIN: No itching, burning, rashes, or lesions   All other review of systems is negative unless indicated above.        T(F): 98.7 (02-10-23 @ 09:05), Max: 98.9 (02-09-23 @ 13:55)  HR: 86 (02-10-23 @ 11:57)  BP: 129/81 (02-10-23 @ 11:57)  RR: 21 (02-10-23 @ 11:57)  SpO2: 98% (02-10-23 @ 11:57)  Wt(kg): --    GENERAL: NAD  HEAD:  Atraumatic, Normocephalic  EYES: EOMI, PERRLA, conjunctiva and sclera clear  NECK: Supple, No JVD  CHEST/LUNG: Clear to auscultation bilaterally; No wheeze  HEART: Regular rate and rhythm; No murmurs, rubs, or gallops  ABDOMEN: Soft, Nontender, Nondistended; Bowel sounds present  EXTREMITIES:  2+ Peripheral Pulses, No clubbing, cyanosis, or edema  NEUROLOGY: non-focal  SKIN: No rashes or lesions                          9.4    5.28  )-----------( 200      ( 10 Feb 2023 05:43 )             27.8       02-10    139  |  107  |  8   ----------------------------<  90  3.5   |  21<L>  |  0.84    Ca    7.7<L>      10 Feb 2023 05:43  Phos  2.6     02-10  Mg     1.7     02-10    TPro  5.2<L>  /  Alb  2.9<L>  /  TBili  1.0  /  DBili  x   /  AST  13  /  ALT  9<L>  /  AlkPhos  52  02-10      Magnesium, Serum: 1.7 mg/dL (02-10 @ 05:43)  Phosphorus Level, Serum: 2.6 mg/dL (02-10 @ 05:43)  Magnesium, Serum: 1.3 mg/dL (02-09 @ 20:14)  Phosphorus Level, Serum: 2.3 mg/dL (02-09 @ 20:14)

## 2023-02-10 NOTE — DIETITIAN INITIAL EVALUATION ADULT - ADD RECOMMEND
1. Diet to be advanced in 24-48hrs as medically feasible: clears -> full liquids -> soft low fiber diet PRN Pending Gi w/u.   2. Monitor %PO intake Diet tolerance.   -- If PO diet unable to be adv and alternate means of nutrition are indicated, please reconsult for Recs.   3. Pain/GI per team.  4. Monitor Skin, Wt, Labs, GOC.  5. RD to remain available for additional nutrition interventions as needed.

## 2023-02-10 NOTE — CHART NOTE - NSCHARTNOTEFT_GEN_A_CORE
Colonoscopy performed at bedside for hematochezia on admission.  [facility] [place], present supervising case. Findings as noted:    -Normal mucosa in the terminal ileum.  -Diverticulosis of the whole colon which may be the source of hematochezia.  -Polyps in the cecum and ascending colon.  -No stigmata of recent or active bleeding.  -The colon was otherwise unremarkable.    Plan:	  -Continue current medical regimen.  -High Fiber Diet  -Repeat colonoscopy as out?patient for colon cancer screening and evaluation of Crohn's disease history  -Please request that the patient schedule GI follow-up at the clinic located on the fourth floor of [address] [place] [address] by calling the office at [phone]   -Monitor for clinical bleeding.    Gastroenterology service will sign off  Recommendations discussed with primary team  Case discussed with attending physician  Please recall as needed with any GI related questions    Thank you for this consult.     Jose Woodard DO  Gastroenterology Fellow  Pager: [phone]

## 2023-02-10 NOTE — PROGRESS NOTE ADULT - ASSESSMENT
63yo Male pt with PMH Crohn disease, DVT/PE (on Eliquis), prior CVA x3, PSA, Ethanol use disorder, BPH, and PSH of appendectomy presents to [facility] with 1 day history of BRBPR and lower abdominal pain. Afebrile, non tachycardic and normotensive. Exam significant for LLQ abdominal tenderness and dark blood on IVAN. Labs with reassuring Hgb of 12.5, normal WBC, normal BUN/Cr. CTA without evidence of bleeding, with diverticulosis, and concern for urological mass. Differential for LGIB includes diverticulosis given history, less likely Crohn flare, less likely PRIETO in setting of cocaine use. Admitted to SICU for HD monitoring.   Urology consulted left renal lesion and irregular soft tissue at bladder trigone. CT showing indeterminate lesion lower pole of the right kidney, enlarge from 1cm in 2019. Irregular soft tissue at the trigon, bladder or prostate neoplasm should be excluded." Patient without urologic history. Denies seeing a urologist. No hematuria. UA with trace blood. Significant smoking history.     1. Renal lesion  - renal mass increased in 0.5cm over 4 years   - outpatient follow up for renal mass protocol   - rest of care as per primary team.    2. Bladder lesion  -pending AM PSA   -if PSA elevated, please obtain prostate MRI  -if PSA within normal limits, outpatient follow up with Dr. Alvarado   -rest of care per primary team, thank you

## 2023-02-10 NOTE — DIETITIAN INITIAL EVALUATION ADULT - NS FNS DIET ORDER
Diet, NPO after Midnight:      NPO Start Date: 09-Feb-2023,   NPO Start Time: 23:59  Except Medications (02-09-23 @ 21:21)

## 2023-02-10 NOTE — DIETITIAN INITIAL EVALUATION ADULT - NUTRITIONGOAL OUTCOME1
Diet to be advanced in 24-48hrs as medically feasible / Pt will meet at least 75% of nutrient needs via feasible route

## 2023-02-10 NOTE — PROCEDURAL SAFETY CHECKLIST WITH OR WITHOUT SEDATION - NSPOSTCOMMENTFT_GEN_ALL_CORE
Pt had colonoscopy done. Pt given Fentanyl and Versed as ordered. Pt hemodynamically stable. Pt awake following commands

## 2023-02-10 NOTE — DIETITIAN INITIAL EVALUATION ADULT - PERSON TAUGHT/METHOD
Reviewed typical diet progression./verbal instruction/teach back - (Patient repeats in own words)/patient instructed

## 2023-02-10 NOTE — DIETITIAN INITIAL EVALUATION ADULT - OTHER CALCULATIONS
5'11''  pounds +-10%; %[id number]  current body wt used for energy calculations as pt falls within % IBW  adjust for age, ICU level of care

## 2023-02-10 NOTE — DIETITIAN INITIAL EVALUATION ADULT - PERTINENT LABORATORY DATA
02-10    139  |  107  |  8   ----------------------------<  90  3.5   |  21<L>  |  0.84    Ca    7.7<L>      10 Feb 2023 05:43  Phos  2.6     02-10  Mg     1.7     02-10    TPro  5.2<L>  /  Alb  2.9<L>  /  TBili  1.0  /  DBili  x   /  AST  13  /  ALT  9<L>  /  AlkPhos  52  02-10  POCT Blood Glucose.: 81 mg/dL (02-09-23 @ 23:59)  A1C with Estimated Average Glucose Result: 4.7 % (01-06-23 @ 11:31)

## 2023-02-10 NOTE — CONSULT NOTE ADULT - SUBJECTIVE AND OBJECTIVE BOX
61yo Male pt with PMH Crohn disease, DVT/PE (on Eliquis), prior CVA x3, PSA, polysubstance use disorder, BPH, and PSH of appendectomy presents to [facility] with 1 day history of BRBPR and lower abdominal pain.     Patient states he awoke to have bowel movement this AM and noticed BRB in toilet, associated with initially 8/10, now 3/10 LLQ non radiating abdominal pain. No clear palliating or provoking factors. Has had 4-5 episodes of bleeding on day of admission, no clots or stool. Reported slight dizziness earlier, no fevers, chills, nausea, vomiting, diarrhea.     Had colonoscopy 5y ago and diagnosed with Crohn dx and diverticulosis doesnt recall where this was and he is not on treatment for IBD.  Does not follow with GI, has not had flare. No steroid hx.    At bedside, pt endorses no further bleeding with overnight colon prep.  Diffuse abd pain remains, but improved. No nausea/vomiting.    Endorses prior hx of 'tremors' withdrawing from ETOH but no DTs. Also describes occasional cocaine and 'other' drug use.    Dark blood on IVAN per surgery.    PMH: DVT/PE (on eliquis), CVA, Crohn disease, Ethanol use disorder, PSA, BPH, CVA x3 last 5yr ago  PSH:appendectomy  Meds: Eliquis (last 2 days ago), lisinopril, gabapentin, amlodipine  All: NKDA  Social Hx: every day ETOH 1 pint last this am, cocaine use qd  Family Hx: Denies family hx of IBS, Crohns, UC, or colon cancer.  Functional capacity: >4METS  Last colonoscopy/EGD: 5 years ago    In the ED:   - Afebrile, nontachycardic, normotensive, and satting well on RA   T(F): 97.9, Max: 98.9 (02-09-23 @ 13:55)  HR: 88 (80 - 88)  BP: 143/96 (135/88 - 148/99)  RR: 20 (18 - 20)  SpO2: 96% (96% - 98%)    - Labs significant for: Hgb 12.5, normal BUN/Cr      CT abd/pelvis    Extensive diverticulosis of the sigmoid and descending colon. No active   bleeding.    Question mass at the base of the urinary bladder, prostate or urothelial,   suggest endoscopy.    Indeterminate right renal lesion, enlarged from 2019, further   characterization with contrast-enhanced MRI is recommended. Other   incidental comments as above.    Vital Signs Last 24 Hrs  T(C): 37.1 (10 Feb 2023 09:05), Max: 37.2 (09 Feb 2023 13:55)  T(F): 98.7 (10 Feb 2023 09:05), Max: 98.9 (09 Feb 2023 13:55)  HR: 84 (10 Feb 2023 09:00) (69 - 88)  BP: 136/88 (10 Feb 2023 09:00) (125/73 - 159/93)  BP(mean): 108 (10 Feb 2023 09:00) (94 - 121)  RR: 25 (10 Feb 2023 09:00) (18 - 25)  SpO2: 98% (10 Feb 2023 09:00) (96% - 99%)    Parameters below as of 10 Feb 2023 09:00  Patient On (Oxygen Delivery Method): room air        02-09 @ 07:01  -  02-10 @ 07:00  --------------------------------------------------------  IN: 325 mL / OUT: 0 mL / NET: 325 mL    02-10 @ 07:01  -  02-10 @ 09:39  --------------------------------------------------------  IN: 25 mL / OUT: 0 mL / NET: 25 mL        PHYSICAL EXAM:    General: No acute distress  Eyes: Anicteric sclerae, moist conjunctivae  HENT: Moist mucous membranes  Neck: Trachea midline, supple  Lungs: Normal respiratory effort and no intercostal retractions  Cardiovascular: RRR  Abdomen: Soft, L sided TTP on-distended; No rebound or guarding  Extremities: No edema  Neurological: Alert and oriented x3  Skin: Warm and dry. No obvious rash    LABS:                        9.4    5.28  )-----------( 200      ( 10 Feb 2023 05:43 )             27.8     02-10    139  |  107  |  8   ----------------------------<  90  3.5   |  21<L>  |  0.84    Ca    7.7<L>      10 Feb 2023 05:43  Phos  2.6     02-10  Mg     1.7     02-10    TPro  5.2<L>  /  Alb  2.9<L>  /  TBili  1.0  /  DBili  x   /  AST  13  /  ALT  9<L>  /  AlkPhos  52  02-10        PT/INR - ( 10 Feb 2023 05:43 )   PT: 13.3 sec;   INR: 1.12          PTT - ( 10 Feb 2023 05:43 )  PTT:31.3 sec    RADIOLOGY & ADDITIONAL STUDIES:     Reviewed

## 2023-02-11 LAB
ANION GAP SERPL CALC-SCNC: 8 MMOL/L — SIGNIFICANT CHANGE UP (ref 5–17)
B2 GLYCOPROT1 AB SER QL: NEGATIVE — SIGNIFICANT CHANGE UP
BUN SERPL-MCNC: 6 MG/DL — LOW (ref 7–23)
CALCIUM SERPL-MCNC: 8 MG/DL — LOW (ref 8.4–10.5)
CARDIOLIPIN AB SER-ACNC: NEGATIVE — SIGNIFICANT CHANGE UP
CHLORIDE SERPL-SCNC: 106 MMOL/L — SIGNIFICANT CHANGE UP (ref 96–108)
CO2 SERPL-SCNC: 25 MMOL/L — SIGNIFICANT CHANGE UP (ref 22–31)
CREAT SERPL-MCNC: 0.95 MG/DL — SIGNIFICANT CHANGE UP (ref 0.5–1.3)
EGFR: 91 ML/MIN/1.73M2 — SIGNIFICANT CHANGE UP
FERRITIN SERPL-MCNC: 234 NG/ML — SIGNIFICANT CHANGE UP (ref 30–400)
FOLATE SERPL-MCNC: 12.9 NG/ML — SIGNIFICANT CHANGE UP
GLUCOSE BLDC GLUCOMTR-MCNC: 104 MG/DL — HIGH (ref 70–99)
GLUCOSE BLDC GLUCOMTR-MCNC: 111 MG/DL — HIGH (ref 70–99)
GLUCOSE BLDC GLUCOMTR-MCNC: 130 MG/DL — HIGH (ref 70–99)
GLUCOSE SERPL-MCNC: 86 MG/DL — SIGNIFICANT CHANGE UP (ref 70–99)
HCT VFR BLD CALC: 27.8 % — LOW (ref 39–50)
HGB BLD-MCNC: 9.2 G/DL — LOW (ref 13–17)
IRON SATN MFR SERPL: 29 % — SIGNIFICANT CHANGE UP (ref 16–55)
IRON SATN MFR SERPL: 55 UG/DL — SIGNIFICANT CHANGE UP (ref 45–165)
MAGNESIUM SERPL-MCNC: 1.7 MG/DL — SIGNIFICANT CHANGE UP (ref 1.6–2.6)
MCHC RBC-ENTMCNC: 33.1 GM/DL — SIGNIFICANT CHANGE UP (ref 32–36)
MCHC RBC-ENTMCNC: 35.8 PG — HIGH (ref 27–34)
MCV RBC AUTO: 108.2 FL — HIGH (ref 80–100)
NRBC # BLD: 0 /100 WBCS — SIGNIFICANT CHANGE UP (ref 0–0)
PHOSPHATE SERPL-MCNC: 2.5 MG/DL — SIGNIFICANT CHANGE UP (ref 2.5–4.5)
PLATELET # BLD AUTO: 221 K/UL — SIGNIFICANT CHANGE UP (ref 150–400)
POTASSIUM SERPL-MCNC: 3.7 MMOL/L — SIGNIFICANT CHANGE UP (ref 3.5–5.3)
POTASSIUM SERPL-SCNC: 3.7 MMOL/L — SIGNIFICANT CHANGE UP (ref 3.5–5.3)
RBC # BLD: 2.57 M/UL — LOW (ref 4.2–5.8)
RBC # FLD: 11.9 % — SIGNIFICANT CHANGE UP (ref 10.3–14.5)
SODIUM SERPL-SCNC: 139 MMOL/L — SIGNIFICANT CHANGE UP (ref 135–145)
TIBC SERPL-MCNC: 192 UG/DL — LOW (ref 220–430)
UIBC SERPL-MCNC: 137 UG/DL — SIGNIFICANT CHANGE UP (ref 110–370)
VIT B12 SERPL-MCNC: 539 PG/ML — SIGNIFICANT CHANGE UP (ref 232–1245)
WBC # BLD: 5.41 K/UL — SIGNIFICANT CHANGE UP (ref 3.8–10.5)
WBC # FLD AUTO: 5.41 K/UL — SIGNIFICANT CHANGE UP (ref 3.8–10.5)

## 2023-02-11 PROCEDURE — 93970 EXTREMITY STUDY: CPT | Mod: 26

## 2023-02-11 PROCEDURE — 99233 SBSQ HOSP IP/OBS HIGH 50: CPT

## 2023-02-11 RX ORDER — ACETAMINOPHEN 500 MG
100 TABLET ORAL ONCE
Refills: 0 | Status: DISCONTINUED | OUTPATIENT
Start: 2023-02-11 | End: 2023-02-12

## 2023-02-11 RX ORDER — ATORVASTATIN CALCIUM 80 MG/1
80 TABLET, FILM COATED ORAL AT BEDTIME
Refills: 0 | Status: DISCONTINUED | OUTPATIENT
Start: 2023-02-11 | End: 2023-02-15

## 2023-02-11 RX ORDER — SODIUM,POTASSIUM PHOSPHATES 278-250MG
1 POWDER IN PACKET (EA) ORAL ONCE
Refills: 0 | Status: COMPLETED | OUTPATIENT
Start: 2023-02-11 | End: 2023-02-11

## 2023-02-11 RX ORDER — MAGNESIUM SULFATE 500 MG/ML
2 VIAL (ML) INJECTION ONCE
Refills: 0 | Status: COMPLETED | OUTPATIENT
Start: 2023-02-11 | End: 2023-02-11

## 2023-02-11 RX ORDER — INFLUENZA VIRUS VACCINE 15; 15; 15; 15 UG/.5ML; UG/.5ML; UG/.5ML; UG/.5ML
0.5 SUSPENSION INTRAMUSCULAR ONCE
Refills: 0 | Status: DISCONTINUED | OUTPATIENT
Start: 2023-02-11 | End: 2023-02-15

## 2023-02-11 RX ADMIN — CHLORHEXIDINE GLUCONATE 1 APPLICATION(S): 213 SOLUTION TOPICAL at 06:41

## 2023-02-11 RX ADMIN — Medication 1 TABLET(S): at 12:48

## 2023-02-11 RX ADMIN — PANTOPRAZOLE SODIUM 40 MILLIGRAM(S): 20 TABLET, DELAYED RELEASE ORAL at 12:48

## 2023-02-11 RX ADMIN — Medication 1 PACKET(S): at 06:43

## 2023-02-11 RX ADMIN — Medication 100 MILLIGRAM(S): at 12:49

## 2023-02-11 RX ADMIN — Medication 25 GRAM(S): at 06:43

## 2023-02-11 RX ADMIN — Medication 1 MILLIGRAM(S): at 12:49

## 2023-02-11 RX ADMIN — ATORVASTATIN CALCIUM 80 MILLIGRAM(S): 80 TABLET, FILM COATED ORAL at 21:23

## 2023-02-11 NOTE — PROGRESS NOTE ADULT - ASSESSMENT
63yo Male pt with PMH Crohn disease, DVT/PE (on Eliquis), prior CVA x3, PSA, Ethanol use disorder, BPH, and PSH of appendectomy presents to [facility] with 1 day history of BRBPR and lower abdominal pain. Afebrile, non tachycardic and normotensive. Exam significant for LLQ abdominal tenderness and dark blood on IVAN. Labs with reassuring Hgb of 12.5, normal WBC, normal BUN/Cr. CTA without evidence of bleeding, with diverticulosis, and concern for urological mass. Differential for LGIB includes diverticulosis given history, less likely Crohn flare, less likely PRIETO in setting of cocaine use. Will be stepdown form SICU to tele.     Plan:   Neuro: Hx CVA x3- monitor; Hx ETOH use disorder- continue CIWA protocol, monitor for sxs withdrawal  CV: Maintain MAP >65; Hx PE/DVT 4 years ago - Patient reports taking Eliquis on and off. Holding in the setting of GIB, rpt duplex this admission negative. Will gissel hold on future OP AC. Hx HTN- hold home Amlodipine, lisinopril; Hx HLD- hold home Lipitor at this time    Pulm: RA, IS  GI/FEN: LGIB; S/p colonoscopy. Polyps identified. No intervention at this time. Advance diet as tolerated   : Hx BPH-  hold home Flomax; Voids, strict I&Os  Endo: mISS Liu: Active T&S;   ID: No active issues  PPX: SCDs, holding SQH in LGIB setting  L/D/T: PIV  PT/OT: Not ordered  Dispo: SDU

## 2023-02-11 NOTE — PROGRESS NOTE ADULT - ASSESSMENT
61yo Male pt with PMH Crohn disease, DVT/PE (on Eliquis), prior CVA x3, PSA, polysubstance use disorder, BPH, and PSH of appendectomy admitted with GIB in setting of abd pain.    #Crohns disease  #Anemia  #GIB  #Abd pain    Pt remained hemodynamically stable overnight. Bleeding seemed to have stopped with prep.  Hgb 10.8-->11-->9.4 -->9.7 --> 9.2    Unclear hx of Crohns in absence of collateral hx of pathology to confirm.  CRP 11-->6 and CT abd/pelvis noting extensive diverticulosis of the sigmoid and descending colon. No active bleeding.  No evidence of colonic or TI thickening,  Favor LGI source at this time based on reported BRBPR, absence of melena on IVAN per surgery and nml BUN:Cr    In consideration of IBD flare based on hx, pt describes formed stool leading up to acute GIB. Lower suspicion for enteric infxn in absence of fever, colitis on imaging.     Bedside Colonoscopy (2/10/23): Normal mucosa in the terminal ileum. Diverticulosis of the whole colon which may be the source of hematochezia. Polyps in the cecum and ascending colon. No stigmata of recent or active bleeding. The colon was otherwise unremarkable.    Suspect previous bleeding 2/2 diverticulosis.     Recommendations:  -Continue supportive care  -Continue current medical regimen.  -High Fiber Diet  -Repeat colonoscopy as outpatient for colon cancer screening and evaluation of Crohn's disease history  -Monitor for clinical bleeding.  -chemical DVT prophy contraindicated given GIB of unclear cause. SCDs for now pending clarity of presentation. May be high risk for DVT given hx of PE and underlying IBD.  -Trend CBC, BMP, CRP daily    #ETOH use disorder:    Hx of withdrawal sx, but no clear hx of complicated ETOH withdrawal. High risk based on current GIB, polysubstance us hx    Recommendations:  -MVI, Thiamine, Folate supplementation  -Agree with CIWA  -Check b12, folate given macrocytic anemia; replete PRN    Please request that the patient schedule GI follow-up at the clinic located on the fourth floor of [facility] [address] by calling the office at [phone]     Case discussed with [facility] attending and primary team.     Thank you for allowing us to participate in the care of this patient.  GI will sign off. Please call back with any questions or concerns.     Ingrid Quevedo DO  Gastroenterology Fellow  Pager: [phone]

## 2023-02-11 NOTE — PATIENT PROFILE ADULT - FALL HARM RISK - HARM RISK INTERVENTIONS
Assistance with ambulation/Assistance OOB with selected safe patient handling equipment/Communicate Risk of Fall with Harm to all staff/Discuss with provider need for PT consult/Monitor for mental status changes/Monitor gait and stability/Provide patient with walking aids - walker, cane, crutches/Reinforce activity limits and safety measures with patient and family/Tailored Fall Risk Interventions/Toileting schedule using arm’s reach rule for commode and bathroom/Use of alarms - bed, chair and/or voice tab/Visual Cue: Yellow wristband and red socks/Bed in lowest position, wheels locked, appropriate side rails in place/Call bell, personal items and telephone in reach/Instruct patient to call for assistance before getting out of bed or chair/Non-slip footwear when patient is out of bed/[place] to call system/Physically safe environment - no spills, clutter or unnecessary equipment/Purposeful Proactive Rounding/Room/bathroom lighting operational, light cord in reach

## 2023-02-11 NOTE — PROGRESS NOTE ADULT - ASSESSMENT
61yo Male pt with PMH Crohn disease, DVT/PE (on Eliquis), prior CVA x3, PSA, Ethanol use disorder, BPH, and PSH of appendectomy presents to [facility] with 1 day history of BRBPR and lower abdominal pain. Afebrile, non tachycardic and normotensive. Exam significant for LLQ abdominal tenderness and dark blood on IVAN. Labs with reassuring Hgb of 12.5, normal WBC, normal BUN/Cr. CTA without evidence of bleeding, with diverticulosis, and concern for urological mass. Differential for LGIB includes diverticulosis given history, less likely Crohn flare, less likely PRIETO in setting of cocaine use. Admitted to SICU for HD monitoring.     Plan:   Neuro: Hx CVA x3- monitor; Hx ETOH use disorder- continue CIWA protocol, monitor for sxs withdrawal  CV: Maintain MAP >65; Hx PE/DVT 4 years ago - Patient reports taking Eliquis on and off. Holding in the setting of GIB, rpt duplex this admission (prior duplex negative 1/5/23); Hx HTN- hold home Amlodipine, lisinopril; Hx HLD- hold home Lipitor at this time    Pulm: RA, IS  GI/FEN: LGIB; S/p colonoscopy. Polyps identified. No intervention at this time. Advance diet as tolerated   : Hx BPH-  hold home Flomax; Voids, strict I&Os  Endo:   Heme: Active T&S;   ID: No active issues  PPX: SCDs, holding SQH in LGIB setting  L/D/T: PIV  PT/OT: Not ordered  Dispo: SICU

## 2023-02-12 LAB
ANION GAP SERPL CALC-SCNC: 10 MMOL/L — SIGNIFICANT CHANGE UP (ref 5–17)
BUN SERPL-MCNC: 7 MG/DL — SIGNIFICANT CHANGE UP (ref 7–23)
CALCIUM SERPL-MCNC: 8.3 MG/DL — LOW (ref 8.4–10.5)
CHLORIDE SERPL-SCNC: 106 MMOL/L — SIGNIFICANT CHANGE UP (ref 96–108)
CO2 SERPL-SCNC: 24 MMOL/L — SIGNIFICANT CHANGE UP (ref 22–31)
CREAT SERPL-MCNC: 0.92 MG/DL — SIGNIFICANT CHANGE UP (ref 0.5–1.3)
EGFR: 95 ML/MIN/1.73M2 — SIGNIFICANT CHANGE UP
GLUCOSE BLDC GLUCOMTR-MCNC: 100 MG/DL — HIGH (ref 70–99)
GLUCOSE BLDC GLUCOMTR-MCNC: 108 MG/DL — HIGH (ref 70–99)
GLUCOSE BLDC GLUCOMTR-MCNC: 121 MG/DL — HIGH (ref 70–99)
GLUCOSE BLDC GLUCOMTR-MCNC: 131 MG/DL — HIGH (ref 70–99)
GLUCOSE SERPL-MCNC: 98 MG/DL — SIGNIFICANT CHANGE UP (ref 70–99)
HCT VFR BLD CALC: 28.3 % — LOW (ref 39–50)
HGB BLD-MCNC: 9.5 G/DL — LOW (ref 13–17)
MAGNESIUM SERPL-MCNC: 1.7 MG/DL — SIGNIFICANT CHANGE UP (ref 1.6–2.6)
MCHC RBC-ENTMCNC: 33.6 GM/DL — SIGNIFICANT CHANGE UP (ref 32–36)
MCHC RBC-ENTMCNC: 35.1 PG — HIGH (ref 27–34)
MCV RBC AUTO: 104.4 FL — HIGH (ref 80–100)
NRBC # BLD: 0 /100 WBCS — SIGNIFICANT CHANGE UP (ref 0–0)
PHOSPHATE SERPL-MCNC: 2.3 MG/DL — LOW (ref 2.5–4.5)
PLATELET # BLD AUTO: 237 K/UL — SIGNIFICANT CHANGE UP (ref 150–400)
POTASSIUM SERPL-MCNC: 3.3 MMOL/L — LOW (ref 3.5–5.3)
POTASSIUM SERPL-SCNC: 3.3 MMOL/L — LOW (ref 3.5–5.3)
PROT C ACT/NOR PPP: 74 % — SIGNIFICANT CHANGE UP (ref 74–150)
PROT S FREE PPP-ACNC: 56 % — LOW (ref 63–140)
RBC # BLD: 2.71 M/UL — LOW (ref 4.2–5.8)
RBC # FLD: 11.9 % — SIGNIFICANT CHANGE UP (ref 10.3–14.5)
SODIUM SERPL-SCNC: 140 MMOL/L — SIGNIFICANT CHANGE UP (ref 135–145)
VWF AG ACT/NOR PPP IA: 117 % — SIGNIFICANT CHANGE UP (ref 63–170)
WBC # BLD: 5.47 K/UL — SIGNIFICANT CHANGE UP (ref 3.8–10.5)
WBC # FLD AUTO: 5.47 K/UL — SIGNIFICANT CHANGE UP (ref 3.8–10.5)

## 2023-02-12 PROCEDURE — 72197 MRI PELVIS W/O & W/DYE: CPT | Mod: 26

## 2023-02-12 PROCEDURE — 99223 1ST HOSP IP/OBS HIGH 75: CPT

## 2023-02-12 RX ORDER — ACETAMINOPHEN 500 MG
650 TABLET ORAL EVERY 6 HOURS
Refills: 0 | Status: DISCONTINUED | OUTPATIENT
Start: 2023-02-12 | End: 2023-02-15

## 2023-02-12 RX ORDER — POTASSIUM CHLORIDE 20 MEQ
40 PACKET (EA) ORAL ONCE
Refills: 0 | Status: COMPLETED | OUTPATIENT
Start: 2023-02-12 | End: 2023-02-12

## 2023-02-12 RX ORDER — MAGNESIUM SULFATE 500 MG/ML
1 VIAL (ML) INJECTION ONCE
Refills: 0 | Status: COMPLETED | OUTPATIENT
Start: 2023-02-12 | End: 2023-02-12

## 2023-02-12 RX ORDER — POTASSIUM PHOSPHATE, MONOBASIC POTASSIUM PHOSPHATE, DIBASIC 236; 224 MG/ML; MG/ML
15 INJECTION, SOLUTION INTRAVENOUS ONCE
Refills: 0 | Status: COMPLETED | OUTPATIENT
Start: 2023-02-12 | End: 2023-02-12

## 2023-02-12 RX ADMIN — Medication 100 GRAM(S): at 16:07

## 2023-02-12 RX ADMIN — PANTOPRAZOLE SODIUM 40 MILLIGRAM(S): 20 TABLET, DELAYED RELEASE ORAL at 13:18

## 2023-02-12 RX ADMIN — Medication 650 MILLIGRAM(S): at 01:20

## 2023-02-12 RX ADMIN — POTASSIUM PHOSPHATE, MONOBASIC POTASSIUM PHOSPHATE, DIBASIC 62.5 MILLIMOLE(S): 236; 224 INJECTION, SOLUTION INTRAVENOUS at 18:10

## 2023-02-12 RX ADMIN — ATORVASTATIN CALCIUM 80 MILLIGRAM(S): 80 TABLET, FILM COATED ORAL at 21:09

## 2023-02-12 RX ADMIN — Medication 40 MILLIEQUIVALENT(S): at 16:07

## 2023-02-12 RX ADMIN — CHLORHEXIDINE GLUCONATE 1 APPLICATION(S): 213 SOLUTION TOPICAL at 05:04

## 2023-02-12 RX ADMIN — Medication 650 MILLIGRAM(S): at 00:15

## 2023-02-12 RX ADMIN — Medication 1 TABLET(S): at 14:06

## 2023-02-12 RX ADMIN — Medication 100 MILLIGRAM(S): at 13:18

## 2023-02-12 RX ADMIN — Medication 1 MILLIGRAM(S): at 13:18

## 2023-02-12 NOTE — CONSULT NOTE ADULT - CONSULT REASON
hemodynamic monitoring
recommendations for AC
renal lesion, irregular soft tissue at bladder trigone
BRBPR
GIB

## 2023-02-12 NOTE — CONSULT NOTE ADULT - ASSESSMENT
Assessment: 61yo Male pt with PMH Crohn disease, DVT/PE (on Eliquis), prior CVA x3, PSA, Ethanol use disorder, BPH, and PSH of appendectomy presents to [facility] with 1 day history of BRBPR and lower abdominal pain. Afebrile, non tachycardic and normotensive. Exam significant for LLQ abdominal tenderness and dark blood on IVAN. Labs with reassuring Hgb of 12.5, normal WBC, normal BUN/Cr. CTA without evidence of bleeding, with diverticulosis, and concern for urological mass. Differential for LGIB includes diverticulosis given history, less likely Crohn flare, less likely PRIETO in setting of cocaine use. Admitted to SICU for HD monitoring.     Plan:   Neuro: Hx CVA x3- monitor; Hx ETOH use disorder- continue CIWA protocol, monitor for sxs withdrawal  CV: Maintain MAP >65; Hx PE/DVT- hold home Eliquis, rpt duplex this admission (prior duplex negative 1/5/23); Hx HTN- hold home Amlodipine, lisinopril; Hx HLD- hold home Lipitor at this time    Pulm: RA, IS  GI/FEN: LGIB; NPO, GI consult pending   : Hx BPH-  hold home Flomax; Voids, strict I&Os  Endo: mISS  Heme: Active T&S;   ID: No active issues  PPX: SCDs, holding SQH in LGIB setting  L/D/T: PIV  PT/OT: Not ordered  Dispo: SICU     
61yo Male pt with PMH Crohn disease, DVT/PE (on Eliquis), prior CVA x3, PSA, polysubstance use disorder, BPH, and PSH of appendectomy admitted with GIB in setting of abd pain.    #Crohns disease  #Anemia  #GIB  #Abd pain    Pt remained hemodynamically stable overnight. Bleeding seemed to have stopped with prep.  Hgb 10.8-->11-->9.4 this AM.    Unclear hx of Crohns in absence of collateral hx of pathology to confirm.  CRP 11-->6 and CT abd/pelvis noting extensive diverticulosis of the sigmoid and descending colon. No active bleeding.  No evidence of colonic or TI thickening,  Favor LGI source at this time based on reported BRBPR, absence of melena on IVAN per surgery and nml BUN:Cr    In consideration of IBD flare based on hx, pt describes formed stool leading up to acute GIB. Lower suspicion for enteric infxn in absence of fever, colitis on imaging.  Stool loose in setting of prep this AM.    DDx broad, includes diverticular bleed, ischemic colitis, IBD flare, bleeding colonic lesion    Recommendations:  -Continue supportive care  -Repeat Hgb at 1200, transfuse as indicated  -Tentative plan for bedside colonoscopy however UDS recommended given polysubstance use disorder/prior cocaine use to help assess risk related to endoscopy and procedural sedation.  -Will plan to eval for endoscopic e/o colitis to indicate IBD based on hx  -chemical DVT prophy contraindicated given GIB of unclear cause. SCDs for now pending clarity of presentation. May be high risk for DVT given hx of PE and underlying IBD.  -Trend CBC, BMP, CRP daily  -Low threshold to check CDiff/GI PCR with fever, leukocytosis, persistent loose stool.    #ETOH use disorder:    Hx of withdrawal sx, but no clear hx of complicated ETOH withdrawal. High risk based on current GIB, polysubstance us hx    Recommendations:  -MVI, Thiamine, Folate supplementation  -Agree with CIWA  -Check b12, folate given macrocytic anemia; replete PRN    Jose Woodard DO  Gastroenterology Fellow  Pager: [phone]
61yo Male pt with PMH Crohn disease, DVT/PE (on Eliquis), prior CVA x3, PSA, polysubstance use disorder, BPH, and PSH of appendectomy admitted with GIB in setting of abd pain.    #Crohns disease  #Anemia  #GIB  #Abdominal  pain  -GI follow up appreciated; Per GI the pt is for a repeat colonoscopy as outpatient for colon cancer screening and evaluation of Crohn's disease history.  -Will continue to monitor serial CBC     #ETOH use disorder  -MVI, Thiamine, Folate supplementation  -Continue  CIWA  -Check b12, folate given macrocytic anemia    #DVT/PE ( was on Eliquis)   - In addition GI stated that chemical DVT prophy contraindicated given GIB of unclear cause. Continue SCDs for now . May be high risk for DVT given hx of PE and underlying IBD.  -Heme consult appreciated per heme after GI completes workup, and bleeding is completely ruled out, and if the decision is to restart the pt's anticoagulation, then Heme recommend first starting with a heparin drip, and if patient's hemoglobin remains stable, you can transition back to eliquis    #CVA x3   -Continue statin     #Renal lesion  -Urology consult appreciated   -Per urology the pt's  renal mass increased in 0.5cm over 4 years and they recommended outpatient follow up for renal mass protocol     #Bladder lesion  -Given PSA elevated as per Urology obtain prostate MRI    #DISPO   -As per primary team   
61yo Male pt with PMH Crohn disease, DVT/PE (on Eliquis), prior CVA x3, PSA, Ethanol use disorder, BPH, and PSH of appendectomy presents to [facility] with 1 day history of BRBPR and lower abdominal pain. Hematology consulted for recommendations for anticoagulation.    Recommend holding chemical DVT prophylaxis.  HAS-BLED score is currently 4, and he is currently at a high risk for bleeding.  It is unclear whether he has undergone any hypercoagulable workup given his history, although this does not have to be done while inpatient. His untreated IBS is a persistent risk factor, and given his multiple CVAs in addition to the DVT and PE, he would be a reasonable candidate for indefinite anticoagulation, but the benefits have to weighed against his bleeding risk.  -Maintain active type and screen  -Transfuse for Hb <7  -please obtain iron studies and folate    -After GI completes workup, and bleeding is completely ruled out, should you consider restarting his anticoagulation, would recommend first starting with a heparin drip, and if patient's hemoglobin remains stable, you can transition back to eliquis  -If you decide to initiate hypercoagulable workup, you can consider checking for other acquired risk factors: lupus anticoagulant, anticardiolipin antibodies, anti beta 2 glycoprotein antibodies to rule out APS. APS would influence anticoagulant of choice moving forward as he would need to be on warfarin or lovenox.    To be discussed with Dr. Velez.
63yo Male pt with PMH Crohn disease, DVT/PE (on Eliquis), prior CVA x3, PSA, Ethanol use disorder, BPH, and PSH of appendectomy presents to [facility] with 1 day history of BRBPR and lower abdominal pain. Afebrile, non tachycardic and normotensive. Exam significant for LLQ abdominal tenderness and dark blood on IVAN. Labs with reassuring Hgb of 12.5, normal WBC, normal BUN/Cr. CTA without evidence of bleeding, with diverticulosis, and concern for urological mass. Differential for LGIB includes diverticulosis given history, less likely Crohn flare, less likely PRIETO in setting of cocaine use. Admitted to SICU for HD monitoring.   Urology consulted left renal lesion and irregular soft tissue at bladder trigone. CT showing indeterminate lesion lower pole of the right kidney, enlarge from 1cm in 2019. Irregular soft tissue at the trigon, bladder or prostate neoplasm should be excluded." Patient without urologic history. Denies seeing a urologist. No hematuria. UA with trace blood. Significant smoking history.

## 2023-02-12 NOTE — PROGRESS NOTE ADULT - ASSESSMENT
61yo Male pt with PMH Crohn disease, DVT/PE (on Eliquis), prior CVA x3, PSA, Ethanol use disorder, BPH, and PSH of appendectomy presents to [facility] with 1 day history of BRBPR and lower abdominal pain. Afebrile, non tachycardic and normotensive. Exam significant for LLQ abdominal tenderness and dark blood on IVAN. Labs with reassuring Hgb of 12.5, normal WBC, normal BUN/Cr. CTA without evidence of bleeding, with diverticulosis, and concern for urological mass. Differential for LGIB includes diverticulosis given history, less likely Crohn flare, less likely PRIETO in setting of cocaine use. Admitted to SICU for HD monitoring. Stepped down to telemetry (2/11).    Low fiber diet  No IVF  No antibiotics  mISS  Holding home Eliquis, duplex LE 2/11 negative  Heme/onc recomendations appreciated  F/U MRI today. Urology recs appreciated. Elevated PSA

## 2023-02-12 NOTE — CONSULT NOTE ADULT - SUBJECTIVE AND OBJECTIVE BOX
In brief summary the pt is a 63yo Male pt with PMH Crohn disease, DVT/PE (on Eliquis), prior CVA x3, PSA, Ethanol use disorder, BPH, and PSH of appendectomy presented BRBPR and lower abdominal pain. Plocated in the  LLQ non radiating. The pt had several episodes of bleeding today, no clots or stool.In addition the pt reported having  dizziness.  The pt reports that he had colonoscopy 5y ago and diagnosed with Crohn dx and diverticulosis. Does not follow with GI, has not had flare, is not on maintanence Rx, no steroid history. During the pt's hospital course the pt was seen by GI and had a colonoscopy on 2/10/23 which showed Normal mucosa in the terminal ileum. Diverticulosis of the whole colon which may be the source of hematochezia. Polyps in the cecum and ascending colon. No stigmata of recent or active bleeding. The colon was otherwise unremarkable.       PMH: DVT/PE (on eliquis), CVA, Crohn disease, Ethanol use disorder, PSA, BPH, CVA x3 last 5yr ago  PSH: Appendectomy    Home Medications:  amLODIPine 10 mg oral tablet: 1 tab(s) orally once a day (09 Jan 2023 16:24)  Eliquis 5 mg oral tablet: 1 tab(s) orally 2 times a day (09 Feb 2023 19:51)  lisinopril 20 mg oral tablet: 1 tab(s) orally once a day (09 Feb 2023 19:51)  tamsulosin 0.4 mg oral capsule: 1 cap(s) orally once a day (09 Jan 2023 16:24)      Allergies: Codeine (Unknown)    Social Hx: every day ETOH 1 pint last this am, cocaine use qd  Family Hx: Denies family hx of IBS, Crohns, UC, or colon cancer.  Functional capacity: >4METS  Last colonoscopy/EGD: 5 years ago    VITAL SIGNS, INS/OUTS (last 24 hours):  Vital Signs Last 24 Hrs  T(C): 36.8 (12 Feb 2023 09:00), Max: 37.2 (11 Feb 2023 13:29)  T(F): 98.2 (12 Feb 2023 09:00), Max: 98.9 (11 Feb 2023 13:29)  HR: 80 (12 Feb 2023 08:12) (80 - 90)  BP: 134/98 (12 Feb 2023 08:12) (115/76 - 134/98)  BP(mean): 111 (12 Feb 2023 08:12) (91 - 111)  RR: 18 (12 Feb 2023 08:12) (16 - 25)  SpO2: 94% (12 Feb 2023 08:12) (94% - 99%)    Parameters below as of 12 Feb 2023 08:12  Patient On (Oxygen Delivery Method): room air    PHYSICAL EXAM:  NAD laying in bed  CTA b/l no wheezing or crackles  NL S1,S2 no mumurs   soft NT/ND + BS no rebound or guarding     BASIC LABS:                        9.5    5.47  )-----------( 237      ( 12 Feb 2023 08:23 )             28.3     02-12    140  |  106  |  7   ----------------------------<  98  3.3<L>   |  24  |  0.92    Ca    8.3<L>      12 Feb 2023 08:23  Phos  2.3     02-12  Mg     1.7     02-12      CAPILLARY BLOOD GLUCOSE  POCT Blood Glucose.: 100 mg/dL (12 Feb 2023 06:25)  POCT Blood Glucose.: 104 mg/dL (11 Feb 2023 23:19)  POCT Blood Glucose.: 130 mg/dL (11 Feb 2023 17:42)      Imaging: IMPRESSION:    Extensive diverticulosis of the sigmoid and descending colon. No active   bleeding.    Question mass at the base of the urinary bladder, prostate or urothelial,   suggest endoscopy.    Indeterminate right renal lesion, enlarged from 2019, further   characterization with contrast-enhanced MRI is recommended. Other   incidental comments as above.      CURRENT MEDICATIONS:   acetaminophen     Tablet .. 650 milliGRAM(s) Oral every 6 hours PRN  atorvastatin 80 milliGRAM(s) Oral at bedtime  chlorhexidine 2% Cloths 1 Application(s) Topical <User Schedule>  folic acid 1 milliGRAM(s) Oral daily  influenza   Vaccine 0.5 milliLiter(s) IntraMuscular once  insulin lispro (ADMELOG) corrective regimen sliding scale   SubCutaneous every 6 hours  magnesium sulfate  IVPB 1 Gram(s) IV Intermittent once  multivitamin 1 Tablet(s) Oral daily  pantoprazole  Injectable 40 milliGRAM(s) IV Push daily  potassium chloride   Powder 40 milliEquivalent(s) Oral once  potassium phosphate IVPB 15 milliMole(s) IV Intermittent once  thiamine 100 milliGRAM(s) Oral daily

## 2023-02-13 LAB
ANION GAP SERPL CALC-SCNC: 7 MMOL/L — SIGNIFICANT CHANGE UP (ref 5–17)
APTT BLD: 30.3 SEC — SIGNIFICANT CHANGE UP (ref 27.5–35.5)
APTT BLD: 46.2 SEC — HIGH (ref 27.5–35.5)
APTT BLD: 65.4 SEC — HIGH (ref 27.5–35.5)
BLD GP AB SCN SERPL QL: NEGATIVE — SIGNIFICANT CHANGE UP
BUN SERPL-MCNC: 6 MG/DL — LOW (ref 7–23)
CALCIUM SERPL-MCNC: 8.5 MG/DL — SIGNIFICANT CHANGE UP (ref 8.4–10.5)
CHLORIDE SERPL-SCNC: 108 MMOL/L — SIGNIFICANT CHANGE UP (ref 96–108)
CO2 SERPL-SCNC: 23 MMOL/L — SIGNIFICANT CHANGE UP (ref 22–31)
CREAT SERPL-MCNC: 0.95 MG/DL — SIGNIFICANT CHANGE UP (ref 0.5–1.3)
EGFR: 91 ML/MIN/1.73M2 — SIGNIFICANT CHANGE UP
GLUCOSE BLDC GLUCOMTR-MCNC: 116 MG/DL — HIGH (ref 70–99)
GLUCOSE BLDC GLUCOMTR-MCNC: 117 MG/DL — HIGH (ref 70–99)
GLUCOSE BLDC GLUCOMTR-MCNC: 126 MG/DL — HIGH (ref 70–99)
GLUCOSE BLDC GLUCOMTR-MCNC: 97 MG/DL — SIGNIFICANT CHANGE UP (ref 70–99)
GLUCOSE SERPL-MCNC: 100 MG/DL — HIGH (ref 70–99)
HCT VFR BLD CALC: 27.9 % — LOW (ref 39–50)
HCT VFR BLD CALC: 29.1 % — LOW (ref 39–50)
HGB BLD-MCNC: 9.4 G/DL — LOW (ref 13–17)
HGB BLD-MCNC: 9.6 G/DL — LOW (ref 13–17)
INR BLD: 1.02 — SIGNIFICANT CHANGE UP (ref 0.88–1.16)
MAGNESIUM SERPL-MCNC: 1.6 MG/DL — SIGNIFICANT CHANGE UP (ref 1.6–2.6)
MCHC RBC-ENTMCNC: 33 GM/DL — SIGNIFICANT CHANGE UP (ref 32–36)
MCHC RBC-ENTMCNC: 33.7 GM/DL — SIGNIFICANT CHANGE UP (ref 32–36)
MCHC RBC-ENTMCNC: 35.2 PG — HIGH (ref 27–34)
MCHC RBC-ENTMCNC: 35.9 PG — HIGH (ref 27–34)
MCV RBC AUTO: 106.5 FL — HIGH (ref 80–100)
MCV RBC AUTO: 106.6 FL — HIGH (ref 80–100)
NRBC # BLD: 0 /100 WBCS — SIGNIFICANT CHANGE UP (ref 0–0)
NRBC # BLD: 0 /100 WBCS — SIGNIFICANT CHANGE UP (ref 0–0)
PHOSPHATE SERPL-MCNC: 2.8 MG/DL — SIGNIFICANT CHANGE UP (ref 2.5–4.5)
PLATELET # BLD AUTO: 261 K/UL — SIGNIFICANT CHANGE UP (ref 150–400)
PLATELET # BLD AUTO: 294 K/UL — SIGNIFICANT CHANGE UP (ref 150–400)
POTASSIUM SERPL-MCNC: 3.7 MMOL/L — SIGNIFICANT CHANGE UP (ref 3.5–5.3)
POTASSIUM SERPL-SCNC: 3.7 MMOL/L — SIGNIFICANT CHANGE UP (ref 3.5–5.3)
PROTHROM AB SERPL-ACNC: 12.2 SEC — SIGNIFICANT CHANGE UP (ref 10.5–13.4)
RBC # BLD: 2.62 M/UL — LOW (ref 4.2–5.8)
RBC # BLD: 2.73 M/UL — LOW (ref 4.2–5.8)
RBC # FLD: 11.9 % — SIGNIFICANT CHANGE UP (ref 10.3–14.5)
RBC # FLD: 12.1 % — SIGNIFICANT CHANGE UP (ref 10.3–14.5)
RH IG SCN BLD-IMP: POSITIVE — SIGNIFICANT CHANGE UP
SODIUM SERPL-SCNC: 138 MMOL/L — SIGNIFICANT CHANGE UP (ref 135–145)
WBC # BLD: 5.67 K/UL — SIGNIFICANT CHANGE UP (ref 3.8–10.5)
WBC # BLD: 7.29 K/UL — SIGNIFICANT CHANGE UP (ref 3.8–10.5)
WBC # FLD AUTO: 5.67 K/UL — SIGNIFICANT CHANGE UP (ref 3.8–10.5)
WBC # FLD AUTO: 7.29 K/UL — SIGNIFICANT CHANGE UP (ref 3.8–10.5)

## 2023-02-13 PROCEDURE — 99233 SBSQ HOSP IP/OBS HIGH 50: CPT

## 2023-02-13 PROCEDURE — 99231 SBSQ HOSP IP/OBS SF/LOW 25: CPT

## 2023-02-13 RX ORDER — MAGNESIUM SULFATE 500 MG/ML
2 VIAL (ML) INJECTION EVERY 4 HOURS
Refills: 0 | Status: COMPLETED | OUTPATIENT
Start: 2023-02-13 | End: 2023-02-13

## 2023-02-13 RX ORDER — POTASSIUM CHLORIDE 20 MEQ
40 PACKET (EA) ORAL ONCE
Refills: 0 | Status: COMPLETED | OUTPATIENT
Start: 2023-02-13 | End: 2023-02-13

## 2023-02-13 RX ORDER — HEPARIN SODIUM 5000 [USP'U]/ML
1200 INJECTION INTRAVENOUS; SUBCUTANEOUS
Qty: 25000 | Refills: 0 | Status: DISCONTINUED | OUTPATIENT
Start: 2023-02-13 | End: 2023-02-14

## 2023-02-13 RX ADMIN — Medication 25 GRAM(S): at 06:36

## 2023-02-13 RX ADMIN — ATORVASTATIN CALCIUM 80 MILLIGRAM(S): 80 TABLET, FILM COATED ORAL at 21:20

## 2023-02-13 RX ADMIN — Medication 40 MILLIEQUIVALENT(S): at 06:36

## 2023-02-13 RX ADMIN — Medication 1 MILLIGRAM(S): at 12:08

## 2023-02-13 RX ADMIN — Medication 25 GRAM(S): at 09:14

## 2023-02-13 RX ADMIN — PANTOPRAZOLE SODIUM 40 MILLIGRAM(S): 20 TABLET, DELAYED RELEASE ORAL at 12:09

## 2023-02-13 RX ADMIN — HEPARIN SODIUM 12 UNIT(S)/HR: 5000 INJECTION INTRAVENOUS; SUBCUTANEOUS at 09:30

## 2023-02-13 RX ADMIN — Medication 1 TABLET(S): at 12:09

## 2023-02-13 NOTE — PROGRESS NOTE ADULT - ASSESSMENT
61yo Male pt with PMH Crohn disease, DVT/PE (on Eliquis), prior CVA x3, PSA, polysubstance use disorder, BPH, and PSH of appendectomy admitted with GIB in setting of abd pain.    #Crohns disease  #Anemia  #GIB  #Abdominal  pain  -GI follow up appreciated; Per GI the pt is for a repeat colonoscopy as outpatient for colon cancer screening and evaluation of Crohn's disease history.  - continue to monitor CBC  - patient was started on heparin drip, will continue to closely monitor    #ETOH use disorder  -MVI, Thiamine, Folate supplementation  -Continue  CIWA monitor  -Check b12, folate given macrocytic anemia    #DVT/PE ( was on Eliquis)   - In addition GI stated that chemical DVT prophy contraindicated given GIB of unclear cause. Continue SCDs for now . May be high risk for DVT given hx of PE and underlying IBD.  -Heme consult appreciated per heme after GI completes workup, and bleeding is completely ruled out, and if the decision is to restart the pt's anticoagulation, then Heme recommend first starting with a heparin drip, and if patient's hemoglobin remains stable, you can transition back to eliquis    #CVA x3   -Continue statin     #Renal lesion  -Urology consult appreciated   -Per urology the pt's  renal mass increased in 0.5cm over 4 years and they recommended outpatient follow up for renal mass protocol   - outpatient follow up    #Bladder lesion  -Given PSA elevated as per Urology obtain prostate MRI    #DISPO   -As per primary team     Discussed with surgical team.   Due to gastrointestinal bleeding, and close monitoring required for heparin drip, high level of medical decision making required.

## 2023-02-13 NOTE — PROGRESS NOTE ADULT - ASSESSMENT
63yo Male pt with PMH Crohn disease, DVT/PE (on Eliquis), prior CVA x3, PSA, Ethanol use disorder, BPH, and PSH of appendectomy presents to [facility] with 1 day history of BRBPR and lower abdominal pain. Afebrile, non tachycardic and normotensive. Exam significant for LLQ abdominal tenderness and dark blood on IVAN. Labs with reassuring Hgb of 12.5, normal WBC, normal BUN/Cr. CTA without evidence of bleeding, with diverticulosis, and concern for urological mass. Differential for LGIB includes diverticulosis given history, less likely Crohn flare, less likely PRIETO in setting of cocaine use. Admitted to SICU for HD monitoring.   Urology consulted left renal lesion and irregular soft tissue at bladder trigone. CT showing indeterminate lesion lower pole of the right kidney, enlarge from 1cm in 2019. Irregular soft tissue at the trigon, bladder or prostate neoplasm should be excluded." Patient without urologic history. Denies seeing a urologist. No hematuria. UA with trace blood. Significant smoking history. Elevated PSA 6, prostate MRI shows right posterior pzm, base to mid gland peripheral zone pirads 4 lesion and cystic BPH.     -no acute  intervention at this time  -please follow up outpatient with Dr. Alvarado   -rest of care per primary team   -we will sign off for now, reconsult as needed thanks

## 2023-02-13 NOTE — PROGRESS NOTE ADULT - ASSESSMENT
63yo Male pt with PMH Crohn disease, DVT/PE (on Eliquis), prior CVA x3, PSA, Ethanol use disorder, BPH, and PSH of appendectomy presents to [facility] with 1 day history of BRBPR and lower abdominal pain. Afebrile, non tachycardic and normotensive. Exam significant for LLQ abdominal tenderness and dark blood on IVAN. Labs with reassuring Hgb of 12.5, normal WBC, normal BUN/Cr. CTA without evidence of bleeding, with diverticulosis, and concern for urological mass. Differential for LGIB includes diverticulosis given history, less likely Crohn flare, less likely PRIETO in setting of cocaine use. Admitted to SICU for HD monitoring. Stepped down to telemetry (2/11).    plan:  Low fiber diet  No antibiotics  mISS  Holding home Eliquis, duplex LE 2/11 negative  Heme/onc recomendations appreciated  Urology recs appreciated. Elevated PSA - will f/u Final MRI read

## 2023-02-13 NOTE — PROGRESS NOTE ADULT - ASSESSMENT
61yo Male pt with PMH Crohn disease, DVT/PE (on Eliquis), prior CVA x3, PSA, Ethanol use disorder, BPH, and PSH of appendectomy presents to [facility] with 1 day history of BRBPR and lower abdominal pain. Hematology consulted for recommendations for anticoagulation.    Recommend holding chemical DVT prophylaxis.  HAS-BLED score is currently 4, and he is currently at a high risk for bleeding.  It is unclear whether he has undergone any hypercoagulable workup given his history, although this does not have to be done while inpatient. His untreated IBS is a persistent risk factor, and given his multiple CVAs in addition to the DVT and PE, he would be a reasonable candidate for indefinite anticoagulation, but the benefits have to weighed against his bleeding risk.  -Maintain active type and screen  -Transfuse for Hb <7  -no evidence of iron, b12 or folate deficiency   -colonoscopy on 2/10/23 showing diverticulosis of entire colon, likely source of LGIB. No evidence of active bleeding.   -Per GI, they recommend holding AC at this time given unclear source of bleeding. Once patient is cleared from GI perspective to restart AC, would recommend first starting with a heparin drip, and if patient's hemoglobin remains stable, you can transition back to Eliquis.  -If you decide to initiate hypercoagulable workup, you can consider checking for other acquired risk factors: lupus anticoagulant, anticardiolipin antibodies, anti beta 2 glycoprotein antibodies to rule out APS. APS would influence anticoagulant of choice moving forward as he would need to be on warfarin or Lovenox    Elevated PSA   - MRI prostate showing a prostate mass PIRADS 4   - Urology following.     Discussed with Dr. Velez. 61yo Male pt with PMH Crohn disease, DVT/PE (on Eliquis), prior CVA x3, PSA, Ethanol use disorder, BPH, and PSH of appendectomy presents to [facility] with 1 day history of BRBPR and lower abdominal pain. Hematology consulted for recommendations for anticoagulation.    Recommend holding chemical DVT prophylaxis.  HAS-BLED score is currently 4, and he is currently at a high risk for bleeding.  It is unclear whether he has undergone any hypercoagulable workup given his history, although this does not have to be done while inpatient. His untreated IBS is a persistent risk factor, and given his multiple CVAs in addition to the DVT and PE, he would be a reasonable candidate for indefinite anticoagulation, but the benefits have to weighed against his bleeding risk.  -Maintain active type and screen  -Transfuse for Hb <7  -no evidence of iron, b12 or folate deficiency   -colonoscopy on 2/10/23 showing diverticulosis of entire colon, likely source of LGIB. No evidence of active bleeding.   -Per GI, they recommend holding AC at this time given unclear source of bleeding. Once patient is cleared from GI perspective to restart AC, would recommend first starting with a heparin drip, and if patient's hemoglobin remains stable, you can transition back to Eliquis.  -If you decide to initiate hypercoagulable workup, you can consider checking for other acquired risk factors: lupus anticoagulant, anticardiolipin antibodies, anti beta 2 glycoprotein antibodies to rule out APS. APS would influence anticoagulant of choice moving forward as he would need to be on warfarin or Lovenox    Elevated PSA   - MRI prostate showing a prostate mass PIRADS 4   - Urology following.   - Consider prostate biopsy, can be done as outpatient.     Discussed with Dr. Velez.

## 2023-02-14 ENCOUNTER — TRANSCRIPTION ENCOUNTER (OUTPATIENT)
Age: 63
End: 2023-02-14

## 2023-02-14 LAB
ANION GAP SERPL CALC-SCNC: 8 MMOL/L — SIGNIFICANT CHANGE UP (ref 5–17)
APTT BLD: 67.8 SEC — HIGH (ref 27.5–35.5)
BUN SERPL-MCNC: 9 MG/DL — SIGNIFICANT CHANGE UP (ref 7–23)
CALCIUM SERPL-MCNC: 8.6 MG/DL — SIGNIFICANT CHANGE UP (ref 8.4–10.5)
CHLORIDE SERPL-SCNC: 107 MMOL/L — SIGNIFICANT CHANGE UP (ref 96–108)
CO2 SERPL-SCNC: 24 MMOL/L — SIGNIFICANT CHANGE UP (ref 22–31)
CREAT SERPL-MCNC: 1.08 MG/DL — SIGNIFICANT CHANGE UP (ref 0.5–1.3)
EGFR: 78 ML/MIN/1.73M2 — SIGNIFICANT CHANGE UP
GLUCOSE BLDC GLUCOMTR-MCNC: 104 MG/DL — HIGH (ref 70–99)
GLUCOSE BLDC GLUCOMTR-MCNC: 126 MG/DL — HIGH (ref 70–99)
GLUCOSE BLDC GLUCOMTR-MCNC: 133 MG/DL — HIGH (ref 70–99)
GLUCOSE SERPL-MCNC: 104 MG/DL — HIGH (ref 70–99)
HCT VFR BLD CALC: 28.6 % — LOW (ref 39–50)
HGB BLD-MCNC: 9.5 G/DL — LOW (ref 13–17)
MAGNESIUM SERPL-MCNC: 1.8 MG/DL — SIGNIFICANT CHANGE UP (ref 1.6–2.6)
MCHC RBC-ENTMCNC: 33.2 GM/DL — SIGNIFICANT CHANGE UP (ref 32–36)
MCHC RBC-ENTMCNC: 35.4 PG — HIGH (ref 27–34)
MCV RBC AUTO: 106.7 FL — HIGH (ref 80–100)
NRBC # BLD: 0 /100 WBCS — SIGNIFICANT CHANGE UP (ref 0–0)
PHOSPHATE SERPL-MCNC: 2.8 MG/DL — SIGNIFICANT CHANGE UP (ref 2.5–4.5)
PLATELET # BLD AUTO: 301 K/UL — SIGNIFICANT CHANGE UP (ref 150–400)
POTASSIUM SERPL-MCNC: 4 MMOL/L — SIGNIFICANT CHANGE UP (ref 3.5–5.3)
POTASSIUM SERPL-SCNC: 4 MMOL/L — SIGNIFICANT CHANGE UP (ref 3.5–5.3)
RBC # BLD: 2.68 M/UL — LOW (ref 4.2–5.8)
RBC # FLD: 12.2 % — SIGNIFICANT CHANGE UP (ref 10.3–14.5)
SODIUM SERPL-SCNC: 139 MMOL/L — SIGNIFICANT CHANGE UP (ref 135–145)
WBC # BLD: 7.45 K/UL — SIGNIFICANT CHANGE UP (ref 3.8–10.5)
WBC # FLD AUTO: 7.45 K/UL — SIGNIFICANT CHANGE UP (ref 3.8–10.5)

## 2023-02-14 PROCEDURE — 99231 SBSQ HOSP IP/OBS SF/LOW 25: CPT

## 2023-02-14 RX ORDER — MAGNESIUM SULFATE 500 MG/ML
1 VIAL (ML) INJECTION ONCE
Refills: 0 | Status: COMPLETED | OUTPATIENT
Start: 2023-02-14 | End: 2023-02-14

## 2023-02-14 RX ORDER — APIXABAN 2.5 MG/1
5 TABLET, FILM COATED ORAL EVERY 12 HOURS
Refills: 0 | Status: DISCONTINUED | OUTPATIENT
Start: 2023-02-14 | End: 2023-02-15

## 2023-02-14 RX ORDER — MAGNESIUM SULFATE 500 MG/ML
1 VIAL (ML) INJECTION ONCE
Refills: 0 | Status: DISCONTINUED | OUTPATIENT
Start: 2023-02-14 | End: 2023-02-14

## 2023-02-14 RX ADMIN — Medication 1 TABLET(S): at 12:04

## 2023-02-14 RX ADMIN — Medication 100 GRAM(S): at 06:25

## 2023-02-14 RX ADMIN — Medication 650 MILLIGRAM(S): at 22:06

## 2023-02-14 RX ADMIN — Medication 1 MILLIGRAM(S): at 12:04

## 2023-02-14 RX ADMIN — APIXABAN 5 MILLIGRAM(S): 2.5 TABLET, FILM COATED ORAL at 21:15

## 2023-02-14 RX ADMIN — APIXABAN 5 MILLIGRAM(S): 2.5 TABLET, FILM COATED ORAL at 10:25

## 2023-02-14 RX ADMIN — ATORVASTATIN CALCIUM 80 MILLIGRAM(S): 80 TABLET, FILM COATED ORAL at 21:15

## 2023-02-14 RX ADMIN — HEPARIN SODIUM 13 UNIT(S)/HR: 5000 INJECTION INTRAVENOUS; SUBCUTANEOUS at 04:37

## 2023-02-14 RX ADMIN — PANTOPRAZOLE SODIUM 40 MILLIGRAM(S): 20 TABLET, DELAYED RELEASE ORAL at 12:04

## 2023-02-14 NOTE — DISCHARGE NOTE PROVIDER - CARE PROVIDER_API CALL
Abiodun Jenkins)  Surgery  [address] [address]  Phone: [phone]  Fax: [phone]  Follow Up Time:     Hillary Almonte)  Hematology; Internal Medicine; Medical Oncology  [address] [place] [address]  Phone: [phone]  Follow Up Time:    Abiodun Jenkins)  Surgery  [address] [address]  Phone: [phone]  Fax: [phone]  Follow Up Time:     Hillary Almonte)  Hematology; Internal Medicine; Medical Oncology  [address] [place] [address]  Phone: [phone]  Follow Up Time:     Tyrone Alvarado (DO)  Urology  [address] [place] [address]  Phone: [phone]  Fax: [phone]  Follow Up Time:

## 2023-02-14 NOTE — DISCHARGE NOTE PROVIDER - NSDCFUSCHEDAPPT_GEN_ALL_CORE_FT
Hillary Almonte  [facility]  [facility] [address]  Scheduled Appointment: 03/01/2023

## 2023-02-14 NOTE — DISCHARGE NOTE PROVIDER - NSDCMRMEDTOKEN_GEN_ALL_CORE_FT
amLODIPine 10 mg oral tablet: 1 tab(s) orally once a day  atorvastatin 80 mg oral tablet: 1 tab(s) orally once a day (at bedtime)  Eliquis 5 mg oral tablet: 1 tab(s) orally 2 times a day  folic acid 1 mg oral tablet: 1 tab(s) orally once a day  lisinopril 20 mg oral tablet: 1 tab(s) orally once a day  tamsulosin 0.4 mg oral capsule: 1 cap(s) orally once a day  thiamine 100 mg oral tablet: 1 tab(s) orally once a day

## 2023-02-14 NOTE — DISCHARGE NOTE PROVIDER - NSDCFUADDINST_GEN_ALL_CORE_FT
General Discharge Instructions:  Please resume all regular home medications unless specifically advised not to take a particular medication. Also, please take any new medications as prescribed.  Please get plenty of rest, continue to ambulate several times per day, and drink adequate amounts of fluids.   Please follow-up with your surgeon and Primary Care Provider (PCP) in 1-2 weeks.    Warning Signs:  Please call your doctor if you experience the following:  *You experience new chest pain, pressure, squeezing or tightness.  *New or worsening cough, shortness of breath, or wheeze.  *If you are vomiting and cannot keep down fluids or your medications.  *You are getting dehydrated due to continued vomiting, diarrhea, or other reasons. Signs of dehydration include dry mouth, rapid heartbeat, or feeling dizzy or faint when standing.  *You see blood or dark/black material when you vomit or have a bowel movement.  *You experience burning when you urinate, have blood in your urine, or experience a discharge.  *Your pain is not improving within 8-12 hours or is not gone within 24 hours. Call or return immediately if your pain is getting worse, changes location, or moves to your chest or back.  *You have shaking chills, or fever greater than 101.5 degrees Fahrenheit or 38 degrees Celsius.  *Any change in your symptoms, or any new symptoms that concern you.

## 2023-02-14 NOTE — H&P ADULT - NSHPROSALLOTHERNEGRD_GEN_ALL_CORE
BPs in chart do not run that high. Please keep monitoring it and let me know if it is staying this high.
Lvm for Zachery to continue to monitor BP and call office is it continues to be high.
Zachery from [facility]. Pt's BP today 176/76 P-93. No sob, or other symptoms. Pt states that she feels good and that bp runs high. FYI. Just has to report it. No need to call him back.
All other review of systems negative, except as noted in HPI

## 2023-02-14 NOTE — DISCHARGE NOTE PROVIDER - NSDCFUADDAPPT_GEN_ALL_CORE_FT
Gastroenterology Follow Up:  Please schedule GI follow-up at the clinic located on the fourth floor of [address] [place] [address] by calling the office at [phone]     Hematology Follow Up:  Please follow up with Dr. Almonte on 3/1/2023 at 10am to review the results of your hypercoagulable workup. Located at [address] [place] [address]. Phone number [phone]. Please call the office to make an appointment at your earliest convenience.      Surgery Follow Up:  Please follow up with Dr. Jenkins as needed; you may call the office to make an appointment at your earliest convenience.   Gastroenterology Follow Up:  Please schedule GI follow-up at the clinic located on the fourth floor of [address] [place] [address] by calling the office at [phone]     Hematology Follow Up:  Please follow up with Dr. Almonte on 3/1/2023 at 10am to review the results of your hypercoagulable workup. Located at [address] [place] [address]. Phone number [phone]. Please call the office to make an appointment at your earliest convenience.    Urology Follow Up:  Please follow up with Dr. Alvarado for further genitourinary workup for your prostate in one week; you may call the office to make an appointment at your earliest convenience.         Surgery Follow Up:  Please follow up with Dr. Jenkins as needed; you may call the office to make an appointment at your earliest convenience.   Gastroenterology Follow Up:  Please schedule GI follow-up at the clinic located on the fourth floor of [address] [place] [address] by calling the office at [phone]     Hematology Follow Up:  Please follow up with Dr. Almonte on 3/1/2023 at 10am to review the results of your hypercoagulable workup. Located at [address] [place] [address]. Phone number [phone]. Please call the office to make an appointment at your earliest convenience.    Urology Follow Up:  Please follow up with Dr. Alvarado for further management of your prostate mass in one week; you may call the office to make an appointment at your earliest convenience.         Surgery Follow Up:  Please follow up with Dr. Jenkins as needed; you may call the office to make an appointment at your earliest convenience.   Gastroenterology Follow Up:  Please schedule GI follow-up at the clinic located on the fourth floor of [address] [place] [address] by calling the office at [phone]. You will need to repeat a colonoscopy as an outpatient for colon cancer screening and evaluation of Crohn's disease history.     Hematology Follow Up:  Please follow up with Dr. Almonte on 3/1/2023 at 10am to review the results of your hypercoagulable workup. Located at [address] [place] [address]. Phone number [phone]. Please call the office to make an appointment at your earliest convenience.    Urology Follow Up:  Please follow up with Dr. Alvarado for further management of your prostate mass in one week; you may call the office to make an appointment at your earliest convenience.     Surgery Follow Up:  Please follow up with Dr. Jenkins as needed; you may call the office to make an appointment at your earliest convenience.

## 2023-02-14 NOTE — DISCHARGE NOTE PROVIDER - PROVIDER TOKENS
PROVIDER:[TOKEN:[[id number]]],PROVIDER:[TOKEN:[[id number]]] PROVIDER:[TOKEN:[[id number]]],PROVIDER:[TOKEN:[[id number]]],PROVIDER:[TOKEN:[[id number]]]

## 2023-02-14 NOTE — PROGRESS NOTE ADULT - ASSESSMENT
63yo Male pt with PMH Crohn disease, DVT/PE (on Eliquis), prior CVA x3, PSA, Ethanol use disorder, BPH, and PSH of appendectomy presents to [facility] with 1 day history of BRBPR and LLQ pain. AFVSS. Hgb stable. CTA w/o bleeding, with diverticulosis and urologic mass. Admitted for LGIB likely 2/2 diverticulosis. C-scope showed polyps, no active bleed.     Low fiber diet   Active T&S  Hep GTT w/ plans to transition to elqiuis today now that LGIB resolved, duplex negative this admission  Holding home lipitor, norvasc, flomax   mISS  SCDs  Urology consult for urologic mass; MRI performed.   AICHA

## 2023-02-14 NOTE — DISCHARGE NOTE PROVIDER - HOSPITAL COURSE
63yo Male pt with PMH Crohn disease, DVT/PE (on Eliquis), prior CVA x3, PSA, Ethanol use disorder, BPH, and PSH of appendectomy presented to [facility] with 1 day history of BRBPR and lower abdominal pain. He had 4-5 episodes of bleeding, no clots or stool. Had colonoscopy 5y ago and diagnosed with Crohn dx and diverticulosis. Does not follow with GI, has not had flare, is not on maintenance Rx, no steroid history. In the ED, Afebrile, nontachycardic, normotensive. Hgb 12.5. Pt was admitted to SICU for hemodynamic monitoring. On 2/10 c-scope showed polyps and diverticulosis, no acitve bleeding. Pt remained hemodynamically stable throughout hospital course. Bleeding seemed to have stopped with prep.  Hgb 10.8-->11-->9.4 -->9.7 --> 9.2 and pt stepped down to tele. Heme consulted for hx of DVT/PE and rec'd anticoag workup. LE doppler on 2/11 negative. Pt placed on Hgtt , hgb remained stable, and pt had normal nonbloody BM. On 2/14 pt was transitioned to eliquis and on day of discharge pt was stable for dc to shelter.    63yo Male pt with PMH Crohn disease, DVT/PE (on Eliquis), prior CVA x3, PSA, Ethanol use disorder, BPH, and PSH of appendectomy presented to [facility] with 1 day history of BRBPR and lower abdominal pain. He had 4-5 episodes of bleeding, no clots or stool. Had colonoscopy 5y ago and diagnosed with Crohn dx and diverticulosis. Does not follow with GI, has not had flare, is not on maintenance Rx, no steroid history. In the ED, Afebrile, nontachycardic, normotensive. Hgb 12.5. Pt was admitted to SICU for hemodynamic monitoring. On 2/10 c-scope showed polyps and diverticulosis, no acitve bleeding. Pt remained hemodynamically stable throughout hospital course. Bleeding seemed to have stopped with prep.  Hgb 10.8-->11-->9.4 -->9.7 --> 9.2 and pt stepped down to tele. Heme consulted for hx of DVT/PE and rec'd anticoag workup. LE doppler on 2/11 negative. Pt placed on Hgtt, hgb remained stable, and pt had normal nonbloody BM. On 2/14 pt was transitioned to Eliquis. Filled out shelter packet, but patient did not want to wait for shelter acceptance. He is going to go to his friends house once discharged from the hospital. Patient understands that he will need to follow up with GI, heme/onc, urology, and surgery outpatient.

## 2023-02-14 NOTE — DISCHARGE NOTE PROVIDER - NSDCCPCAREPLAN_GEN_ALL_CORE_FT
PRINCIPAL DISCHARGE DIAGNOSIS  Diagnosis: GI bleeding  Assessment and Plan of Treatment:        PRINCIPAL DISCHARGE DIAGNOSIS  Diagnosis: GI bleeding  Assessment and Plan of Treatment:       SECONDARY DISCHARGE DIAGNOSES  Diagnosis: Diverticulosis of colon  Assessment and Plan of Treatment:     Diagnosis: BPH with elevated PSA  Assessment and Plan of Treatment:     Diagnosis: Prostate mass  Assessment and Plan of Treatment:

## 2023-02-15 ENCOUNTER — TRANSCRIPTION ENCOUNTER (OUTPATIENT)
Age: 63
End: 2023-02-15

## 2023-02-15 VITALS
RESPIRATION RATE: 18 BRPM | HEART RATE: 86 BPM | DIASTOLIC BLOOD PRESSURE: 83 MMHG | OXYGEN SATURATION: 99 % | SYSTOLIC BLOOD PRESSURE: 131 MMHG

## 2023-02-15 LAB
ANION GAP SERPL CALC-SCNC: 8 MMOL/L — SIGNIFICANT CHANGE UP (ref 5–17)
BUN SERPL-MCNC: 8 MG/DL — SIGNIFICANT CHANGE UP (ref 7–23)
CALCIUM SERPL-MCNC: 8.4 MG/DL — SIGNIFICANT CHANGE UP (ref 8.4–10.5)
CHLORIDE SERPL-SCNC: 108 MMOL/L — SIGNIFICANT CHANGE UP (ref 96–108)
CO2 SERPL-SCNC: 24 MMOL/L — SIGNIFICANT CHANGE UP (ref 22–31)
CREAT SERPL-MCNC: 1.04 MG/DL — SIGNIFICANT CHANGE UP (ref 0.5–1.3)
EGFR: 82 ML/MIN/1.73M2 — SIGNIFICANT CHANGE UP
GLUCOSE BLDC GLUCOMTR-MCNC: 103 MG/DL — HIGH (ref 70–99)
GLUCOSE BLDC GLUCOMTR-MCNC: 117 MG/DL — HIGH (ref 70–99)
GLUCOSE BLDC GLUCOMTR-MCNC: 119 MG/DL — HIGH (ref 70–99)
GLUCOSE SERPL-MCNC: 92 MG/DL — SIGNIFICANT CHANGE UP (ref 70–99)
HCT VFR BLD CALC: 30.6 % — LOW (ref 39–50)
HGB BLD-MCNC: 10 G/DL — LOW (ref 13–17)
MAGNESIUM SERPL-MCNC: 1.7 MG/DL — SIGNIFICANT CHANGE UP (ref 1.6–2.6)
MCHC RBC-ENTMCNC: 32.7 GM/DL — SIGNIFICANT CHANGE UP (ref 32–36)
MCHC RBC-ENTMCNC: 35.1 PG — HIGH (ref 27–34)
MCV RBC AUTO: 107.4 FL — HIGH (ref 80–100)
NRBC # BLD: 0 /100 WBCS — SIGNIFICANT CHANGE UP (ref 0–0)
PHOSPHATE SERPL-MCNC: 3.7 MG/DL — SIGNIFICANT CHANGE UP (ref 2.5–4.5)
PLATELET # BLD AUTO: 343 K/UL — SIGNIFICANT CHANGE UP (ref 150–400)
POTASSIUM SERPL-MCNC: 4 MMOL/L — SIGNIFICANT CHANGE UP (ref 3.5–5.3)
POTASSIUM SERPL-SCNC: 4 MMOL/L — SIGNIFICANT CHANGE UP (ref 3.5–5.3)
RBC # BLD: 2.85 M/UL — LOW (ref 4.2–5.8)
RBC # FLD: 12.3 % — SIGNIFICANT CHANGE UP (ref 10.3–14.5)
SODIUM SERPL-SCNC: 140 MMOL/L — SIGNIFICANT CHANGE UP (ref 135–145)
WBC # BLD: 6.57 K/UL — SIGNIFICANT CHANGE UP (ref 3.8–10.5)
WBC # FLD AUTO: 6.57 K/UL — SIGNIFICANT CHANGE UP (ref 3.8–10.5)

## 2023-02-15 PROCEDURE — 85027 COMPLETE CBC AUTOMATED: CPT

## 2023-02-15 PROCEDURE — 85306 CLOT INHIBIT PROT S FREE: CPT

## 2023-02-15 PROCEDURE — 82607 VITAMIN B-12: CPT

## 2023-02-15 PROCEDURE — 96374 THER/PROPH/DIAG INJ IV PUSH: CPT

## 2023-02-15 PROCEDURE — 83735 ASSAY OF MAGNESIUM: CPT

## 2023-02-15 PROCEDURE — 85303 CLOT INHIBIT PROT C ACTIVITY: CPT

## 2023-02-15 PROCEDURE — 86140 C-REACTIVE PROTEIN: CPT

## 2023-02-15 PROCEDURE — 86901 BLOOD TYPING SEROLOGIC RH(D): CPT

## 2023-02-15 PROCEDURE — 83605 ASSAY OF LACTIC ACID: CPT

## 2023-02-15 PROCEDURE — 86850 RBC ANTIBODY SCREEN: CPT

## 2023-02-15 PROCEDURE — 80307 DRUG TEST PRSMV CHEM ANLYZR: CPT

## 2023-02-15 PROCEDURE — 84100 ASSAY OF PHOSPHORUS: CPT

## 2023-02-15 PROCEDURE — 85384 FIBRINOGEN ACTIVITY: CPT

## 2023-02-15 PROCEDURE — 74174 CTA ABD&PLVS W/CONTRAST: CPT

## 2023-02-15 PROCEDURE — 85730 THROMBOPLASTIN TIME PARTIAL: CPT

## 2023-02-15 PROCEDURE — 80048 BASIC METABOLIC PNL TOTAL CA: CPT

## 2023-02-15 PROCEDURE — 85610 PROTHROMBIN TIME: CPT

## 2023-02-15 PROCEDURE — 99285 EMERGENCY DEPT VISIT HI MDM: CPT

## 2023-02-15 PROCEDURE — 85025 COMPLETE CBC W/AUTO DIFF WBC: CPT

## 2023-02-15 PROCEDURE — 86703 HIV-1/HIV-2 1 RESULT ANTBDY: CPT

## 2023-02-15 PROCEDURE — G0103: CPT

## 2023-02-15 PROCEDURE — 36415 COLL VENOUS BLD VENIPUNCTURE: CPT

## 2023-02-15 PROCEDURE — 87635 SARS-COV-2 COVID-19 AMP PRB: CPT

## 2023-02-15 PROCEDURE — 82272 OCCULT BLD FECES 1-3 TESTS: CPT

## 2023-02-15 PROCEDURE — 85598 HEXAGNAL PHOSPH PLTLT NEUTRL: CPT

## 2023-02-15 PROCEDURE — 99238 HOSP IP/OBS DSCHRG MGMT 30/<: CPT

## 2023-02-15 PROCEDURE — 86147 CARDIOLIPIN ANTIBODY EA IG: CPT

## 2023-02-15 PROCEDURE — 82728 ASSAY OF FERRITIN: CPT

## 2023-02-15 PROCEDURE — 82962 GLUCOSE BLOOD TEST: CPT

## 2023-02-15 PROCEDURE — 85246 CLOT FACTOR VIII VW ANTIGEN: CPT

## 2023-02-15 PROCEDURE — 83550 IRON BINDING TEST: CPT

## 2023-02-15 PROCEDURE — 72197 MRI PELVIS W/O & W/DYE: CPT

## 2023-02-15 PROCEDURE — A9585: CPT

## 2023-02-15 PROCEDURE — 85379 FIBRIN DEGRADATION QUANT: CPT

## 2023-02-15 PROCEDURE — 86900 BLOOD TYPING SEROLOGIC ABO: CPT

## 2023-02-15 PROCEDURE — 86146 BETA-2 GLYCOPROTEIN ANTIBODY: CPT

## 2023-02-15 PROCEDURE — 82746 ASSAY OF FOLIC ACID SERUM: CPT

## 2023-02-15 PROCEDURE — 83540 ASSAY OF IRON: CPT

## 2023-02-15 PROCEDURE — 93970 EXTREMITY STUDY: CPT

## 2023-02-15 PROCEDURE — 80053 COMPREHEN METABOLIC PANEL: CPT

## 2023-02-15 RX ORDER — MAGNESIUM SULFATE 500 MG/ML
1 VIAL (ML) INJECTION ONCE
Refills: 0 | Status: COMPLETED | OUTPATIENT
Start: 2023-02-15 | End: 2023-02-15

## 2023-02-15 RX ADMIN — Medication 1 MILLIGRAM(S): at 11:30

## 2023-02-15 RX ADMIN — Medication 650 MILLIGRAM(S): at 00:03

## 2023-02-15 RX ADMIN — PANTOPRAZOLE SODIUM 40 MILLIGRAM(S): 20 TABLET, DELAYED RELEASE ORAL at 11:30

## 2023-02-15 RX ADMIN — Medication 100 GRAM(S): at 09:09

## 2023-02-15 RX ADMIN — Medication 1 TABLET(S): at 11:30

## 2023-02-15 RX ADMIN — APIXABAN 5 MILLIGRAM(S): 2.5 TABLET, FILM COATED ORAL at 05:16

## 2023-02-15 NOTE — PROGRESS NOTE ADULT - ASSESSMENT
63yo Male pt with PMH Crohn disease, DVT/PE (on Eliquis), prior CVA x3, PSA, Ethanol use disorder, BPH, and PSH of appendectomy presents to [facility] with 1 day history of BRBPR and LLQ pain. AFVSS. Hgb stable. CTA w/o bleeding, with diverticulosis and urologic mass. Admitted for LGIB likely 2/2 diverticulosis. C-scope showed polyps, no active bleed (2/10).     Low fiber diet   Active T&S  Eliquis  Duplex negative this admission  Holding home norvasc, flomax   mISS  SCDs  Urology consult for urologic mass; MRI performed   [place]

## 2023-02-15 NOTE — PROGRESS NOTE ADULT - SUBJECTIVE AND OBJECTIVE BOX
SUBJECTIVE: Pt seen and examined by consult team. ABDULAZIZ.  No acute complaints. Denies obstructive or irritative urinary symptoms, denies hematuria fever, or chills.       MEDICATIONS  (STANDING):  atorvastatin 80 milliGRAM(s) Oral at bedtime  folic acid 1 milliGRAM(s) Oral daily  heparin  Infusion 1200 Unit(s)/Hr (12 mL/Hr) IV Continuous <Continuous>  influenza   Vaccine 0.5 milliLiter(s) IntraMuscular once  insulin lispro (ADMELOG) corrective regimen sliding scale   SubCutaneous every 6 hours  magnesium sulfate  IVPB 2 Gram(s) IV Intermittent every 4 hours  multivitamin 1 Tablet(s) Oral daily  pantoprazole  Injectable 40 milliGRAM(s) IV Push daily    MEDICATIONS  (PRN):  acetaminophen     Tablet .. 650 milliGRAM(s) Oral every 6 hours PRN Mild Pain (1 - 3)      Vital Signs Last 24 Hrs  T(C): 37 (13 Feb 2023 09:09), Max: 37 (13 Feb 2023 09:09)  T(F): 98.6 (13 Feb 2023 09:09), Max: 98.6 (13 Feb 2023 09:09)  HR: 74 (13 Feb 2023 08:22) (72 - 92)  BP: 134/90 (13 Feb 2023 08:22) (110/68 - 178/96)  BP(mean): 108 (13 Feb 2023 08:22) (84 - 126)  RR: 18 (13 Feb 2023 08:22) (17 - 18)  SpO2: 96% (13 Feb 2023 08:22) (93% - 97%)    Parameters below as of 13 Feb 2023 08:22  Patient On (Oxygen Delivery Method): room air      Physical Exam  General: AAOx3, NAD, laying comfortably in bed  Cardio: RRR  Pulm: Lungs bilaterally clear to auscultation  Abdomen: soft non distende  : no CVAT b/l, no suprapubic tenderness   Extremities: WWP, peripheral pulses appreciated  I&O's Summary    12 Feb 2023 07:01  -  13 Feb 2023 07:00  --------------------------------------------------------  IN: 1210 mL / OUT: 2225 mL / NET: -1015 mL        LABS:                        9.4    5.67  )-----------( 261      ( 13 Feb 2023 05:17 )             27.9     02-13    138  |  108  |  6<L>  ----------------------------<  100<H>  3.7   |  23  |  0.95    Ca    8.5      13 Feb 2023 05:17  Phos  2.8     02-13  Mg     1.6     02-13      PT/INR - ( 13 Feb 2023 07:15 )   PT: 12.2 sec;   INR: 1.02          PTT - ( 13 Feb 2023 07:15 )  PTT:30.3 sec    CAPILLARY BLOOD GLUCOSE      POCT Blood Glucose.: 97 mg/dL (13 Feb 2023 06:29)  POCT Blood Glucose.: 131 mg/dL (12 Feb 2023 23:27)  POCT Blood Glucose.: 121 mg/dL (12 Feb 2023 16:31)  POCT Blood Glucose.: 108 mg/dL (12 Feb 2023 11:25)        RADIOLOGY & ADDITIONAL STUDIES:  
[facility]; Hgb 12.6 --> 10.8 @8pm; coags wnl; Cr stable, lytes repleted; GI  consulted- golytely started for cscope prep; Bloody stools; [place] Hgb 11.0    SUBJECTIVE: Patient had golytely prep overnight, bleeding improved but still present, otherwise feeling well, no complaints.    MEDICATIONS  (STANDING):  chlordiazePOXIDE   Oral   chlordiazePOXIDE 50 milliGRAM(s) Oral every 6 hours  chlordiazePOXIDE 50 milliGRAM(s) Oral every 8 hours  chlorhexidine 2% Cloths 1 Application(s) Topical <User Schedule>  folic acid 1 milliGRAM(s) Oral daily  insulin lispro (ADMELOG) corrective regimen sliding scale   SubCutaneous every 6 hours  multivitamin 1 Tablet(s) Oral daily  pantoprazole  Injectable 40 milliGRAM(s) IV Push daily  thiamine 100 milliGRAM(s) Oral daily    MEDICATIONS  (PRN):  LORazepam     Tablet 2 milliGRAM(s) Oral every 2 hours PRN CIWA-Ar score increase by 2 points and a total score of 7 or less  LORazepam   Injectable 2 milliGRAM(s) IV Push every 1 hour PRN CIWA-Ar score 8 or greater      Drips:     ICU Vital Signs Last 24 Hrs  T(C): 37.2 (10 Feb 2023 05:10), Max: 37.2 (09 Feb 2023 13:55)  T(F): 98.9 (10 Feb 2023 05:10), Max: 98.9 (09 Feb 2023 13:55)  HR: 80 (10 Feb 2023 05:00) (69 - 88)  BP: 132/74 (10 Feb 2023 05:00) (127/80 - 159/93)  BP(mean): 97 (10 Feb 2023 05:00) (97 - 121)  ABP: --  ABP(mean): --  RR: 18 (10 Feb 2023 05:00) (18 - 21)  SpO2: 99% (10 Feb 2023 05:00) (96% - 99%)    O2 Parameters below as of 10 Feb 2023 06:00  Patient On (Oxygen Delivery Method): room air            Physical Exam:    General: NAD, resting comfortably in bed  HEENT: NC/AT, EOMI, PERRLA, normal hearing, no oral lesions, neck supple w/o LAD  Pulmonary: B/l breath sounds, nonlabored breathing, no r/r/w  Cardiovascular: S1 s2, NSR, no m/r/g  Abdominal: Soft, NTND  Extremities: WWP, 5/5 strength x 4, no clubbing/cyanosis/edema  Neuro: AAO x3, CNs II-XII grossly intact, normal motor/sensation, no focal deficits  Pulses: Palpable distal pulses    Lines/tubes/drains:  F[place]:	      Vent settings:      I&O's Summary    09 Feb 2023 07:01  -  10 Feb 2023 07:00  --------------------------------------------------------  IN: 300 mL / OUT: 0 mL / NET: 300 mL        LABS:                        9.4    5.28  )-----------( 200      ( 10 Feb 2023 05:43 )             27.8     02-10    139  |  107  |  8   ----------------------------<  90  3.5   |  21<L>  |  0.84    Ca    7.7<L>      10 Feb 2023 05:43  Phos  2.6     02-10  Mg     1.7     02-10    TPro  5.2<L>  /  Alb  2.9<L>  /  TBili  1.0  /  DBili  x   /  AST  13  /  ALT  9<L>  /  AlkPhos  52  02-10    PT/INR - ( 10 Feb 2023 05:43 )   PT: 13.3 sec;   INR: 1.12          PTT - ( 10 Feb 2023 05:43 )  PTT:31.3 sec    CAPILLARY BLOOD GLUCOSE      POCT Blood Glucose.: 81 mg/dL (09 Feb 2023 23:59)    LIVER FUNCTIONS - ( 10 Feb 2023 05:43 )  Alb: 2.9 g/dL / Pro: 5.2 g/dL / ALK PHOS: 52 U/L / ALT: 9 U/L / AST: 13 U/L / GGT: x             Cultures:    RADIOLOGY & ADDITIONAL STUDIES:    
General Surgery Progress Note    SUBJECTIVE:   Patient seen and examined at bedside by chief during AM rounds. No acute events noted overnight. Patient reports he is doing well this AM. States he is tolerating low fiber diet well without nausea or emesis. Reports he is passing flatus, but has not had a bowel movement since colonoscopy. Denies blood per rectum. No other complaints.     Vital Signs Last 24 Hrs  T(C): 36.8 (12 Feb 2023 09:00), Max: 37.2 (11 Feb 2023 13:29)  T(F): 98.2 (12 Feb 2023 09:00), Max: 98.9 (11 Feb 2023 13:29)  HR: 88 (12 Feb 2023 04:10) (79 - 95)  BP: 134/86 (12 Feb 2023 04:10) (115/76 - 140/82)  BP(mean): 105 (12 Feb 2023 04:10) (91 - 107)  RR: 17 (12 Feb 2023 04:10) (16 - 25)  SpO2: 99% (12 Feb 2023 04:10) (96% - 99%)    Parameters below as of 12 Feb 2023 04:10  Patient On (Oxygen Delivery Method): room air        I&O's Summary    11 Feb 2023 07:01  -  12 Feb 2023 07:00  --------------------------------------------------------  IN: 1180 mL / OUT: 2400 mL / NET: -1220 mL        Physical Exam:  General: Resting comfortably in bed, NAD  HEENT: ATNC  Pulmonary: Equal chest wall expansion b/l, no respiratory distress  Cardiovascular: NSR  Abdomen: Soft, nondistended, nontender   Extremities: WWP, SCDs in place, No edema appreciated     LABS:                        9.2    5.41  )-----------( 221      ( 11 Feb 2023 05:30 )             27.8     02-11    139  |  106  |  6<L>  ----------------------------<  86  3.7   |  25  |  0.95    Ca    8.0<L>      11 Feb 2023 05:30  Phos  2.5     02-11  Mg     1.7     02-11      
Hematology Progress Note    Subjective/Interval HPI: Patient denies any further bleeding episodes, he has not had a BM for 2 days, but passing flatus. Denies any abdominal pain, n/v.   He was noted to have an elevated PSA 6.06, underwent MRI prostate showing a prostate mass. Urology following.   He had a colonoscopy on 2/10/23 showing diverticulosis of entire colon, likely source of LGIB. No evidence of active bleeding.       Vital Signs Last 24 Hrs  T(C): 36.9 (13 Feb 2023 04:44), Max: 36.9 (12 Feb 2023 14:00)  T(F): 98.5 (13 Feb 2023 04:44), Max: 98.5 (13 Feb 2023 04:44)  HR: 74 (13 Feb 2023 08:22) (72 - 92)  BP: 134/90 (13 Feb 2023 08:22) (110/68 - 178/96)  BP(mean): 108 (13 Feb 2023 08:22) (84 - 126)  RR: 18 (13 Feb 2023 08:22) (17 - 18)  SpO2: 96% (13 Feb 2023 08:22) (93% - 97%)    Parameters below as of 13 Feb 2023 08:22  Patient On (Oxygen Delivery Method): room air      GENERAL: NAD  HEAD:  Atraumatic, Normocephalic  EYES: EOMI, PERRLA, conjunctiva and sclera clear  NECK: Supple, No JVD  CHEST/LUNG: Clear to auscultation bilaterally; No wheeze  HEART: Regular rate and rhythm; No murmurs, rubs, or gallops  ABDOMEN: Soft, Nontender, Nondistended; Bowel sounds present  EXTREMITIES:  2+ Peripheral Pulses, No clubbing, cyanosis, or edema  NEUROLOGY: non-focal  SKIN: No rashes or lesions      MEDICATIONS  (STANDING):  atorvastatin 80 milliGRAM(s) Oral at bedtime  folic acid 1 milliGRAM(s) Oral daily  heparin  Infusion 1200 Unit(s)/Hr (12 mL/Hr) IV Continuous <Continuous>  influenza   Vaccine 0.5 milliLiter(s) IntraMuscular once  insulin lispro (ADMELOG) corrective regimen sliding scale   SubCutaneous every 6 hours  magnesium sulfate  IVPB 2 Gram(s) IV Intermittent every 4 hours  multivitamin 1 Tablet(s) Oral daily  pantoprazole  Injectable 40 milliGRAM(s) IV Push daily    MEDICATIONS  (PRN):  acetaminophen     Tablet .. 650 milliGRAM(s) Oral every 6 hours PRN Mild Pain (1 - 3)                            9.4    5.67  )-----------( 261      ( 13 Feb 2023 05:17 )             27.9       02-13    138  |  108  |  6<L>  ----------------------------<  100<H>  3.7   |  23  |  0.95    Ca    8.5      13 Feb 2023 05:17  Phos  2.8     02-13  Mg     1.6     02-13      PT/INR - ( 13 Feb 2023 07:15 )   PT: 12.2 sec;   INR: 1.02       PTT - ( 13 Feb 2023 07:15 )  PTT:30.3 sec    MR Prostate w/wo IV Cont (02.12.23 @ 20:29)  IMPRESSION:  Right, posterior medial (PZpm), base-to-midgland, peripheral zonelesion   as detailed above.  PIRADS 4 - High (clinically significant cancer is likely to be present)    No extraprostatic extension, seminal vesicle invasion, or pelvic   lymphadenopathy.    Prostate Volume: 46 mL    Cystic BPH protruding into the bladder base corresponding to findings on   CT.      US Duplex Venous Lower Ext Complete, Bilateral (02.11.23 @ 12:05)   IMPRESSION:  No evidence of deep venous thrombosis in either lower extremity.      CT Angio Abdomen and Pelvis w/ IV Cont (02.09.23 @ 15:57)   IMPRESSION:  Extensive diverticulosis of the sigmoid and descending colon. No active   bleeding.    Question mass at the base of the urinary bladder, prostate or urothelial,   suggest endoscopy.    Indeterminate right renal lesion, enlarged from 2019, further   characterization with contrast-enhanced MRI is recommended. Other   incidental comments as above.    
SUBJECTIVE:  Patient seen and examined on AM rounds with chief resident. No acute events overnight. Patient this morning is sitting in chair watching TV. Denies abdominal pain, nausea, vomiting, fever, and chills. Tolerating CLD. Voiding without issue. No recent BM since colonoscopy yesterday.     MEDICATIONS  (STANDING):  atorvastatin 80 milliGRAM(s) Oral at bedtime  chlorhexidine 2% Cloths 1 Application(s) Topical <User Schedule>  folic acid 1 milliGRAM(s) Oral daily  influenza   Vaccine 0.5 milliLiter(s) IntraMuscular once  insulin lispro (ADMELOG) corrective regimen sliding scale   SubCutaneous every 6 hours  multivitamin 1 Tablet(s) Oral daily  pantoprazole  Injectable 40 milliGRAM(s) IV Push daily  thiamine 100 milliGRAM(s) Oral daily    MEDICATIONS  (PRN):      Vital Signs Last 24 Hrs  T(C): 37.2 (11 Feb 2023 13:29), Max: 37.2 (11 Feb 2023 13:29)  T(F): 98.9 (11 Feb 2023 13:29), Max: 98.9 (11 Feb 2023 13:29)  HR: 84 (11 Feb 2023 14:57) (71 - 96)  BP: 126/76 (11 Feb 2023 14:00) (105/57 - 145/89)  BP(mean): 96 (11 Feb 2023 14:00) (74 - 111)  RR: 24 (11 Feb 2023 14:57) (16 - 28)  SpO2: 96% (11 Feb 2023 14:00) (95% - 100%)    Parameters below as of 11 Feb 2023 14:00  Patient On (Oxygen Delivery Method): room air    Physical Exam:  General: NAD, resting comfortably in bed  Pulmonary: Nonlabored breathing, no respiratory distress  Cardiovascular: NSR  Abdominal: soft, NT/ND  Extremities: WWP, normal strength    I&O's Summary    10 Feb 2023 07:01  -  11 Feb 2023 07:00  --------------------------------------------------------  IN: 325 mL / OUT: 1250 mL / NET: -925 mL    11 Feb 2023 07:01  -  11 Feb 2023 17:28  --------------------------------------------------------  IN: 530 mL / OUT: 1275 mL / NET: -745 mL        LABS:                        9.2    5.41  )-----------( 221      ( 11 Feb 2023 05:30 )             27.8     02-11    139  |  106  |  6<L>  ----------------------------<  86  3.7   |  25  |  0.95    Ca    8.0<L>      11 Feb 2023 05:30  Phos  2.5     02-11  Mg     1.7     02-11    TPro  5.2<L>  /  Alb  2.9<L>  /  TBili  1.0  /  DBili  x   /  AST  13  /  ALT  9<L>  /  AlkPhos  52  02-10    PT/INR - ( 10 Feb 2023 05:43 )   PT: 13.3 sec;   INR: 1.12          PTT - ( 10 Feb 2023 05:43 )  PTT:31.3 sec    CAPILLARY BLOOD GLUCOSE      POCT Blood Glucose.: 111 mg/dL (11 Feb 2023 11:11)  POCT Blood Glucose.: 77 mg/dL (10 Feb 2023 23:01)  POCT Blood Glucose.: 196 mg/dL (10 Feb 2023 18:44)    LIVER FUNCTIONS - ( 10 Feb 2023 05:43 )  Alb: 2.9 g/dL / Pro: 5.2 g/dL / ALK PHOS: 52 U/L / ALT: 9 U/L / AST: 13 U/L / GGT: x             RADIOLOGY & ADDITIONAL STUDIES:  
    SUBJECTIVE: Patient seen and examined bedside by consult team. Pt endorses some midline abdominal pain. Denies any obstructive or irritative urinary symptoms. Denies any hematuria, fever, chills.         Vital Signs Last 24 Hrs  T(C): 37.2 (10 Feb 2023 05:10), Max: 37.2 (09 Feb 2023 13:55)  T(F): 98.9 (10 Feb 2023 05:10), Max: 98.9 (09 Feb 2023 13:55)  HR: 82 (10 Feb 2023 08:00) (69 - 88)  BP: 133/80 (10 Feb 2023 08:00) (125/73 - 159/93)  BP(mean): 100 (10 Feb 2023 08:00) (94 - 121)  RR: 19 (10 Feb 2023 08:00) (18 - 24)  SpO2: 97% (10 Feb 2023 08:00) (96% - 99%)    Parameters below as of 10 Feb 2023 08:00  Patient On (Oxygen Delivery Method): room air      I&O's Detail    09 Feb 2023 07:01  -  10 Feb 2023 07:00  --------------------------------------------------------  IN:    IV PiggyBack: 250 mL    IV PiggyBack: 75 mL  Total IN: 325 mL    OUT:  Total OUT: 0 mL    Total NET: 325 mL      10 Feb 2023 07:01  -  10 Feb 2023 08:49  --------------------------------------------------------  IN:    IV PiggyBack: 25 mL  Total IN: 25 mL    OUT:  Total OUT: 0 mL    Total NET: 25 mL    Physical Exam  General: AAOx3, NAD, laying comfortably in bed  Cardio: RRR  Pulm: Lungs bilaterally clear to auscultation  Abdomen: soft non distended, mildly tender in LLQ,   Extremities: WWP, peripheral pulses appreciated      LABS:                        9.4    5.28  )-----------( 200      ( 10 Feb 2023 05:43 )             27.8     02-10    139  |  107  |  8   ----------------------------<  90  3.5   |  21<L>  |  0.84    Ca    7.7<L>      10 Feb 2023 05:43  Phos  2.6     02-10  Mg     1.7     02-10    TPro  5.2<L>  /  Alb  2.9<L>  /  TBili  1.0  /  DBili  x   /  AST  13  /  ALT  9<L>  /  AlkPhos  52  02-10    PT/INR - ( 10 Feb 2023 05:43 )   PT: 13.3 sec;   INR: 1.12          PTT - ( 10 Feb 2023 05:43 )  PTT:31.3 sec      RADIOLOGY & ADDITIONAL STUDIES:  
  SUBJECTIVE: Pt seen and examined by chief resident. Pt is doing well, resting comfortably on bed. Denies abdominal pain. Had normal BM last night without any blood. Tolerating regular diet.    Vital Signs Last 24 Hrs  T(C): 36.6 (14 Feb 2023 05:00), Max: 37.1 (13 Feb 2023 14:07)  T(F): 97.8 (14 Feb 2023 05:00), Max: 98.7 (13 Feb 2023 14:07)  HR: 82 (14 Feb 2023 03:33) (74 - 82)  BP: 135/88 (14 Feb 2023 03:33) (126/83 - 140/87)  BP(mean): 107 (14 Feb 2023 03:33) (98 - 109)  RR: 17 (14 Feb 2023 03:33) (17 - 18)    SpO2: 96% (14 Feb 2023 03:33) (96% - 100%)    Parameters below as of 14 Feb 2023 03:33  Patient On (Oxygen Delivery Method): room air        I&O's Summary    13 Feb 2023 07:01  -  14 Feb 2023 07:00  --------------------------------------------------------  IN: 920 mL / OUT: 1375 mL / NET: -455 mL        Physical Exam:  General Appearance: Appears well, NAD  Pulmonary: Nonlabored breathing, no respiratory distress  Cardiovascular: NSR  Abdomen: Soft, nondisteded, nontender  Extremities: WWP, SCD's in place     LABS:                        9.5    7.45  )-----------( 301      ( 14 Feb 2023 03:40 )             28.6     02-14    139  |  107  |  9   ----------------------------<  104<H>  4.0   |  24  |  1.08    Ca    8.6      14 Feb 2023 03:40  Phos  2.8     02-14  Mg     1.8     02-14      PT/INR - ( 13 Feb 2023 07:15 )   PT: 12.2 sec;   INR: 1.02          PTT - ( 14 Feb 2023 03:40 )  PTT:67.8 sec    
Feeling okay this morning.  Denies any bowel movements.  Tolerated food.  No significant pain.     General: no acute distress, sitting up in bed  HEENT: NCAT, nonicteric sclera, MMM  Cards: RRR, S1/S2  Pulm: CTAB, no wheeze  Ab: nondistended, normoactive bowel sounds  Neurological: no focal deficits, speech is fluent, no facial asymmetry, follows commands  Ext: wwp, scds in place  Psychiatric: pleasant mood and affect  Dermatologic: no appreciable wounds or damage to the skin    VITAL SIGNS:  Vital Signs Last 24 Hrs  T(C): 37 (13 Feb 2023 09:09), Max: 37 (13 Feb 2023 09:09)  T(F): 98.6 (13 Feb 2023 09:09), Max: 98.6 (13 Feb 2023 09:09)  HR: 74 (13 Feb 2023 08:22) (72 - 92)  BP: 134/90 (13 Feb 2023 08:22) (110/68 - 178/96)  BP(mean): 108 (13 Feb 2023 08:22) (84 - 126)  RR: 18 (13 Feb 2023 08:22) (17 - 18)  SpO2: 96% (13 Feb 2023 08:22) (93% - 97%)    Parameters below as of 13 Feb 2023 08:22  Patient On (Oxygen Delivery Method): room air          MEDICATIONS:  MEDICATIONS  (STANDING):  atorvastatin 80 milliGRAM(s) Oral at bedtime  folic acid 1 milliGRAM(s) Oral daily  heparin  Infusion 1200 Unit(s)/Hr (12 mL/Hr) IV Continuous <Continuous>  influenza   Vaccine 0.5 milliLiter(s) IntraMuscular once  insulin lispro (ADMELOG) corrective regimen sliding scale   SubCutaneous every 6 hours  multivitamin 1 Tablet(s) Oral daily  pantoprazole  Injectable 40 milliGRAM(s) IV Push daily    MEDICATIONS  (PRN):  acetaminophen     Tablet .. 650 milliGRAM(s) Oral every 6 hours PRN Mild Pain (1 - 3)      ALLERGIES:  Allergies    codeine (Unknown)    Intolerances        LABS:                        9.4    5.67  )-----------( 261      ( 13 Feb 2023 05:17 )             27.9     02-13    138  |  108  |  6<L>  ----------------------------<  100<H>  3.7   |  23  |  0.95    Ca    8.5      13 Feb 2023 05:17  Phos  2.8     02-13  Mg     1.6     02-13      PT/INR - ( 13 Feb 2023 07:15 )   PT: 12.2 sec;   INR: 1.02          PTT - ( 13 Feb 2023 07:15 )  PTT:30.3 sec    CAPILLARY BLOOD GLUCOSE      POCT Blood Glucose.: 97 mg/dL (13 Feb 2023 06:29)      RADIOLOGY & ADDITIONAL TESTS: Reviewed.
GASTROENTEROLOGY PROGRESS NOTE  Patient seen and examined at bedside.  -Hgb 9.7 to 9.2  -Bedside Colonoscopy (2/10/23): Normal mucosa in the terminal ileum. Diverticulosis of the whole colon which may be the source of hematochezia. Polyps in the cecum and ascending colon. No stigmata of recent or active bleeding. The colon was otherwise unremarkable.    ROS: Patient with no complaints; denies any fevers, chills, NS, nausea/vomiting, abdominal pain. No BM since colonoscopy.    PERTINENT REVIEW OF SYSTEMS:  CONSTITUTIONAL: No weakness, fevers or chills  HEENT: No visual changes; No vertigo or throat pain   GASTROINTESTINAL: As above.  NEUROLOGICAL: No numbness or weakness  SKIN: No itching, burning, rashes, or lesions     Allergies    codeine (Unknown)    Intolerances      MEDICATIONS:  MEDICATIONS  (STANDING):  atorvastatin 80 milliGRAM(s) Oral at bedtime  chlorhexidine 2% Cloths 1 Application(s) Topical <User Schedule>  folic acid 1 milliGRAM(s) Oral daily  influenza   Vaccine 0.5 milliLiter(s) IntraMuscular once  insulin lispro (ADMELOG) corrective regimen sliding scale   SubCutaneous every 6 hours  multivitamin 1 Tablet(s) Oral daily  pantoprazole  Injectable 40 milliGRAM(s) IV Push daily  thiamine 100 milliGRAM(s) Oral daily    MEDICATIONS  (PRN):    Vital Signs Last 24 Hrs  T(C): 37.2 (11 Feb 2023 13:29), Max: 37.2 (11 Feb 2023 13:29)  T(F): 98.9 (11 Feb 2023 13:29), Max: 98.9 (11 Feb 2023 13:29)  HR: 84 (11 Feb 2023 14:57) (60 - 96)  BP: 126/76 (11 Feb 2023 14:00) (105/57 - 145/89)  BP(mean): 96 (11 Feb 2023 14:00) (74 - 111)  RR: 24 (11 Feb 2023 14:57) (16 - 28)  SpO2: 96% (11 Feb 2023 14:00) (95% - 100%)    Parameters below as of 11 Feb 2023 14:00  Patient On (Oxygen Delivery Method): room air        02-10 @ 07:01  -  02-11 @ 07:00  --------------------------------------------------------  IN: 325 mL / OUT: 1250 mL / NET: -925 mL    02-11 @ 07:01  -  02-11 @ 15:43  --------------------------------------------------------  IN: 530 mL / OUT: 1275 mL / NET: -745 mL      PHYSICAL EXAM:    General:  male, lying in bed; in no acute distress  HEENT: MMM, conjunctiva and sclera clear  Gastrointestinal: Soft non-tender non-distended; No rebound or guarding  Skin: Warm and dry. No obvious rash    LABS:                        9.2    5.41  )-----------( 221      ( 11 Feb 2023 05:30 )             27.8     02-11    139  |  106  |  6<L>  ----------------------------<  86  3.7   |  25  |  0.95    Ca    8.0<L>      11 Feb 2023 05:30  Phos  2.5     02-11  Mg     1.7     02-11    TPro  5.2<L>  /  Alb  2.9<L>  /  TBili  1.0  /  DBili  x   /  AST  13  /  ALT  9<L>  /  AlkPhos  52  02-10    PT/INR - ( 10 Feb 2023 05:43 )   PT: 13.3 sec;   INR: 1.12          PTT - ( 10 Feb 2023 05:43 )  PTT:31.3 sec                  RADIOLOGY & ADDITIONAL STUDIES:  Reviewed
INTERVAL HPI/OVERNIGHT EVENTS:  Overnight, +F/-BM. MRI performed. Patient seen and examined by chief resident and team on AM rounds. Patient appears well. Reports -n/-v/+f/-BM. Patient denies any recent bloody BMs. Will f/u MRI final read this AM.     SUBJECTIVE:  MEDICATIONS  (STANDING):  atorvastatin 80 milliGRAM(s) Oral at bedtime  folic acid 1 milliGRAM(s) Oral daily  influenza   Vaccine 0.5 milliLiter(s) IntraMuscular once  insulin lispro (ADMELOG) corrective regimen sliding scale   SubCutaneous every 6 hours  magnesium sulfate  IVPB 2 Gram(s) IV Intermittent every 4 hours  multivitamin 1 Tablet(s) Oral daily  pantoprazole  Injectable 40 milliGRAM(s) IV Push daily    MEDICATIONS  (PRN):  acetaminophen     Tablet .. 650 milliGRAM(s) Oral every 6 hours PRN Mild Pain (1 - 3)      Vital Signs Last 24 Hrs  T(C): 36.9 (13 Feb 2023 04:44), Max: 36.9 (12 Feb 2023 14:00)  T(F): 98.5 (13 Feb 2023 04:44), Max: 98.5 (13 Feb 2023 04:44)  HR: 72 (13 Feb 2023 04:53) (72 - 92)  BP: 110/68 (13 Feb 2023 04:53) (110/68 - 178/96)  BP(mean): 84 (13 Feb 2023 04:53) (84 - 126)  RR: 17 (13 Feb 2023 04:53) (17 - 18)  SpO2: 93% (13 Feb 2023 04:53) (93% - 97%)    Parameters below as of 13 Feb 2023 04:53  Patient On (Oxygen Delivery Method): room air        PHYSICAL EXAM:      Constitutional: A&Ox3    Respiratory: non labored breathing, no respiratory distress    Cardiovascular: NSR, RRR    Gastrointestinal: soft, NTND abdomen. No rebound or guarding.     Extremities: (-) edema                  I&O's Detail    12 Feb 2023 07:01  -  13 Feb 2023 07:00  --------------------------------------------------------  IN:    Oral Fluid: 1210 mL  Total IN: 1210 mL    OUT:    Voided (mL): 2225 mL  Total OUT: 2225 mL    Total NET: -1015 mL          LABS:                        9.4    5.67  )-----------( 261      ( 13 Feb 2023 05:17 )             27.9     02-13    138  |  108  |  6<L>  ----------------------------<  100<H>  3.7   |  23  |  0.95    Ca    8.5      13 Feb 2023 05:17  Phos  2.8     02-13  Mg     1.6     02-13            RADIOLOGY & ADDITIONAL STUDIES:
INTERVAL HPI/OVERNIGHT EVENTS: OOB, non-bloody BM. Requesting to leave by noon tomorrow otherwise will leave.    STATUS POST:  2/10: colonoscopy - polyps, no active bleed    SUBJECTIVE: Pt seen and examined at bedside this am by surgery team. Feels well, wishes to go home. Overall tolerating diet, pain well controlled. Denies blood per rectum. Denies f/n/v/cp/sob.    MEDICATIONS  (STANDING):  apixaban 5 milliGRAM(s) Oral every 12 hours  atorvastatin 80 milliGRAM(s) Oral at bedtime  folic acid 1 milliGRAM(s) Oral daily  influenza   Vaccine 0.5 milliLiter(s) IntraMuscular once  insulin lispro (ADMELOG) corrective regimen sliding scale   SubCutaneous every 6 hours  multivitamin 1 Tablet(s) Oral daily  pantoprazole  Injectable 40 milliGRAM(s) IV Push daily    MEDICATIONS  (PRN):  acetaminophen     Tablet .. 650 milliGRAM(s) Oral every 6 hours PRN Mild Pain (1 - 3)    Vital Signs Last 24 Hrs  T(C): 37.1 (15 Feb 2023 04:13), Max: 37.1 (14 Feb 2023 14:14)  T(F): 98.8 (15 Feb 2023 04:13), Max: 98.8 (14 Feb 2023 14:14)  HR: 62 (15 Feb 2023 04:04) (62 - 84)  BP: 129/78 (15 Feb 2023 04:04) (122/74 - 140/79)  BP(mean): 98 (15 Feb 2023 04:04) (94 - 104)  RR: 17 (15 Feb 2023 04:04) (17 - 18)  SpO2: 96% (15 Feb 2023 04:04) (96% - 98%)    Parameters below as of 15 Feb 2023 04:04  Patient On (Oxygen Delivery Method): room air    PHYSICAL EXAM:    Constitutional: A&Ox3, NAD    Respiratory: non labored breathing, no respiratory distress    Cardiovascular: NSR, RRR    Gastrointestinal: abdomen soft, nd, nt. No rebound or guarding.        Extremities: wwp, no calf tenderness or edema. SCDs in place     I&O's Detail    14 Feb 2023 07:01  -  15 Feb 2023 07:00  --------------------------------------------------------  IN:    Oral Fluid: 1110 mL  Total IN: 1110 mL    OUT:    Voided (mL): 975 mL  Total OUT: 975 mL    Total NET: 135 mL          LABS:                        10.0   6.57  )-----------( 343      ( 15 Feb 2023 05:30 )             30.6     02-15    140  |  108  |  8   ----------------------------<  92  4.0   |  24  |  1.04    Ca    8.4      15 Feb 2023 05:30  Phos  3.7     02-15  Mg     1.7     02-15      PT/INR - ( 13 Feb 2023 07:15 )   PT: 12.2 sec;   INR: 1.02          PTT - ( 14 Feb 2023 03:40 )  PTT:67.8 sec      RADIOLOGY & ADDITIONAL STUDIES:
Interval Events: No acute events overnight       Subjective Events: Patient seen and examined at bedside. Reports feeling well this morning. Sitting up in a chair eating breakfast. ROS negative       Allergies  codeine (Unknown)    Intolerances    Vital Signs Last 24 Hrs  T(C): 36.9 (11 Feb 2023 10:00), Max: 36.9 (10 Feb 2023 14:00)  T(F): 98.5 (11 Feb 2023 10:00), Max: 98.5 (10 Feb 2023 21:34)  HR: 79 (11 Feb 2023 11:00) (60 - 96)  BP: 140/82 (11 Feb 2023 11:00) (97/57 - 145/89)  BP(mean): 107 (11 Feb 2023 11:00) (72 - 111)  RR: 23 (11 Feb 2023 11:00) (18 - 28)  SpO2: 98% (11 Feb 2023 11:00) (95% - 100%)    Parameters below as of 11 Feb 2023 11:00  Patient On (Oxygen Delivery Method): room air        02-10 @ 07:01 - 02-11 @ 07:00  --------------------------------------------------------  IN: 325 mL / OUT: 1250 mL / NET: -925 mL    02-11 @ 07:01 - 02-11 @ 12:18  --------------------------------------------------------  IN: 530 mL / OUT: 675 mL / NET: -145 mL      02-10 @ 07:01 - 02-11 @ 07:00  --------------------------------------------------------  IN: 325 mL / OUT: 1250 mL / NET: -925 mL    02-11 @ 07:01  -  02-11 @ 12:18  --------------------------------------------------------  IN: 530 mL / OUT: 675 mL / NET: -145 mL        Physical Exam:   Gen: No acute distress   Neuro: A&OX3 No deficits  CV: Regular rate and rhythm, S1,S2, no peripheral edema   Pulm: Lung sounds clear bilaterally   Abd: Soft NT ND + BS  Ext: Warm, well-perfused   Vasc: +2 radial/pedal pulses   Skin: no rashes noted  MSK: No joint swelling  Psych: No signs of anxiety or depression      LABS:  CBC Full  -  ( 11 Feb 2023 05:30 )  WBC Count : 5.41 K/uL  RBC Count : 2.57 M/uL  Hemoglobin : 9.2 g/dL  Hematocrit : 27.8 %  Platelet Count - Automated : 221 K/uL  Mean Cell Volume : 108.2 fl  Mean Cell Hemoglobin : 35.8 pg  Mean Cell Hemoglobin Concentration : 33.1 gm/dL        02-11    139  |  106  |  6<L>  ----------------------------<  86  3.7   |  25  |  0.95    Ca    8.0<L>      11 Feb 2023 05:30  Phos  2.5     02-11  Mg     1.7     02-11                            A/p: 62yMale

## 2023-02-15 NOTE — PROGRESS NOTE ADULT - PROVIDER SPECIALTY LIST ADULT
Urology
Gastroenterology
Hospitalist
SICU
Surgery
Heme/Onc
Urology
SICU
Surgery

## 2023-02-15 NOTE — DISCHARGE NOTE NURSING/CASE MANAGEMENT/SOCIAL WORK - NSDCFUADDAPPT_GEN_ALL_CORE_FT
Gastroenterology Follow Up:  Please schedule GI follow-up at the clinic located on the fourth floor of [address] [place] [address] by calling the office at [phone]. You will need to repeat a colonoscopy as an outpatient for colon cancer screening and evaluation of Crohn's disease history.     Hematology Follow Up:  Please follow up with Dr. Almonte on 3/1/2023 at 10am to review the results of your hypercoagulable workup. Located at [address] [place] [address]. Phone number [phone]. Please call the office to make an appointment at your earliest convenience.    Urology Follow Up:  Please follow up with Dr. Alvarado for further management of your prostate mass in one week; you may call the office to make an appointment at your earliest convenience.     Surgery Follow Up:  Please follow up with Dr. Jenkins as needed; you may call the office to make an appointment at your earliest convenience.

## 2023-02-15 NOTE — DISCHARGE NOTE NURSING/CASE MANAGEMENT/SOCIAL WORK - PATIENT PORTAL LINK FT
You can access the FollowMyHealth Patient Portal offered by [facility] by registering at the following website: [email or web address]. By joining [employer]’s FollowMyHealth portal, you will also be able to view your health information using other applications (apps) compatible with our system.

## 2023-02-16 PROBLEM — Z00.00 ENCOUNTER FOR PREVENTIVE HEALTH EXAMINATION: Status: ACTIVE | Noted: 2023-02-16

## 2023-02-16 LAB
CONFIRM APTT STACLOT: NEGATIVE — SIGNIFICANT CHANGE UP
DRVVT SCREEN TO CONFIRM RATIO: SIGNIFICANT CHANGE UP
LA NT DPL PPP QL: 21.9 SEC — SIGNIFICANT CHANGE UP

## 2023-02-17 DIAGNOSIS — K57.51 DIVERTICULOSIS OF BOTH SMALL AND LARGE INTESTINE WITHOUT PERFORATION OR ABSCESS WITH BLEEDING: ICD-10-CM

## 2023-02-17 DIAGNOSIS — F14.99 COCAINE USE, UNSPECIFIED WITH UNSPECIFIED COCAINE-INDUCED DISORDER: ICD-10-CM

## 2023-02-17 DIAGNOSIS — Z86.718 PERSONAL HISTORY OF OTHER VENOUS THROMBOSIS AND EMBOLISM: ICD-10-CM

## 2023-02-17 DIAGNOSIS — Z86.73 PERSONAL HISTORY OF TRANSIENT ISCHEMIC ATTACK (TIA), AND CEREBRAL INFARCTION WITHOUT RESIDUAL DEFICITS: ICD-10-CM

## 2023-02-17 DIAGNOSIS — I10 ESSENTIAL (PRIMARY) HYPERTENSION: ICD-10-CM

## 2023-02-17 DIAGNOSIS — F10.10 ALCOHOL ABUSE, UNCOMPLICATED: ICD-10-CM

## 2023-02-17 DIAGNOSIS — K63.5 POLYP OF COLON: ICD-10-CM

## 2023-02-17 DIAGNOSIS — J44.9 CHRONIC OBSTRUCTIVE PULMONARY DISEASE, UNSPECIFIED: ICD-10-CM

## 2023-02-17 DIAGNOSIS — N28.9 DISORDER OF KIDNEY AND URETER, UNSPECIFIED: ICD-10-CM

## 2023-02-17 DIAGNOSIS — Z86.711 PERSONAL HISTORY OF PULMONARY EMBOLISM: ICD-10-CM

## 2023-02-17 DIAGNOSIS — N32.9 BLADDER DISORDER, UNSPECIFIED: ICD-10-CM

## 2023-02-17 DIAGNOSIS — D62 ACUTE POSTHEMORRHAGIC ANEMIA: ICD-10-CM

## 2023-02-17 DIAGNOSIS — N40.0 BENIGN PROSTATIC HYPERPLASIA WITHOUT LOWER URINARY TRACT SYMPTOMS: ICD-10-CM

## 2023-02-17 DIAGNOSIS — I25.2 OLD MYOCARDIAL INFARCTION: ICD-10-CM

## 2023-02-17 DIAGNOSIS — Z79.01 LONG TERM (CURRENT) USE OF ANTICOAGULANTS: ICD-10-CM

## 2023-02-17 DIAGNOSIS — K50.911 CROHN'S DISEASE, UNSPECIFIED, WITH RECTAL BLEEDING: ICD-10-CM

## 2023-02-17 DIAGNOSIS — Z20.822 CONTACT WITH AND (SUSPECTED) EXPOSURE TO COVID-19: ICD-10-CM

## 2023-02-17 DIAGNOSIS — Z88.5 ALLERGY STATUS TO NARCOTIC AGENT: ICD-10-CM

## 2023-02-17 DIAGNOSIS — E78.5 HYPERLIPIDEMIA, UNSPECIFIED: ICD-10-CM

## 2023-02-28 NOTE — ED CDU PROVIDER NOTE - CHIEF COMPLAINT
The patient is a 57y Male complaining of chest pain. Eucrisa Pregnancy And Lactation Text: This medication has not been assigned a Pregnancy Risk Category but animal studies failed to show danger with the topical medication. It is unknown if the medication is excreted in breast milk.

## 2023-03-01 ENCOUNTER — APPOINTMENT (OUTPATIENT)
Dept: HEMATOLOGY ONCOLOGY | Facility: CLINIC | Age: 63
End: 2023-03-01

## 2023-05-13 ENCOUNTER — HOSPITAL ENCOUNTER (INPATIENT)
Dept: HOSPITAL 74 - YASAS | Age: 63
LOS: 5 days | Discharge: OTHER INSTITUTION | DRG: 897 | End: 2023-05-18
Attending: SURGERY | Admitting: ALLERGY & IMMUNOLOGY
Payer: COMMERCIAL

## 2023-05-13 VITALS — BODY MASS INDEX: 28.3 KG/M2

## 2023-05-13 DIAGNOSIS — F14.20: ICD-10-CM

## 2023-05-13 DIAGNOSIS — F10.230: Primary | ICD-10-CM

## 2023-05-13 DIAGNOSIS — I10: ICD-10-CM

## 2023-05-13 DIAGNOSIS — F12.20: ICD-10-CM

## 2023-05-13 DIAGNOSIS — N40.0: ICD-10-CM

## 2023-05-13 DIAGNOSIS — Z91.81: ICD-10-CM

## 2023-05-13 DIAGNOSIS — Z87.19: ICD-10-CM

## 2023-05-13 DIAGNOSIS — J44.9: ICD-10-CM

## 2023-05-13 DIAGNOSIS — Z20.822: ICD-10-CM

## 2023-05-13 DIAGNOSIS — F19.24: ICD-10-CM

## 2023-05-13 DIAGNOSIS — Z88.8: ICD-10-CM

## 2023-05-13 DIAGNOSIS — F32.9: ICD-10-CM

## 2023-05-13 DIAGNOSIS — F19.282: ICD-10-CM

## 2023-05-13 DIAGNOSIS — F17.210: ICD-10-CM

## 2023-05-13 DIAGNOSIS — Z86.19: ICD-10-CM

## 2023-05-13 DIAGNOSIS — Z99.89: ICD-10-CM

## 2023-05-13 DIAGNOSIS — I69.854: ICD-10-CM

## 2023-05-13 DIAGNOSIS — E78.5: ICD-10-CM

## 2023-05-13 PROCEDURE — U0003 INFECTIOUS AGENT DETECTION BY NUCLEIC ACID (DNA OR RNA); SEVERE ACUTE RESPIRATORY SYNDROME CORONAVIRUS 2 (SARS-COV-2) (CORONAVIRUS DISEASE [COVID-19]), AMPLIFIED PROBE TECHNIQUE, MAKING USE OF HIGH THROUGHPUT TECHNOLOGIES AS DESCRIBED BY CMS-2020-01-R: HCPCS

## 2023-05-13 PROCEDURE — U0005 INFEC AGEN DETEC AMPLI PROBE: HCPCS

## 2023-05-13 PROCEDURE — HZ2ZZZZ DETOXIFICATION SERVICES FOR SUBSTANCE ABUSE TREATMENT: ICD-10-PCS | Performed by: SURGERY

## 2023-05-13 RX ADMIN — Medication SCH MG: at 22:20

## 2023-05-14 LAB
ALBUMIN SERPL-MCNC: 3.1 G/DL (ref 3.4–5)
ALP SERPL-CCNC: 84 U/L (ref 45–117)
ALT SERPL-CCNC: 20 U/L (ref 13–61)
ANION GAP SERPL CALC-SCNC: 7 MMOL/L (ref 8–16)
APPEARANCE UR: CLEAR
AST SERPL-CCNC: 25 U/L (ref 15–37)
BACTERIA # UR AUTO: 1.9 /UL (ref 0–1359)
BILIRUB SERPL-MCNC: 1.7 MG/DL (ref 0.2–1)
BILIRUB UR STRIP.AUTO-MCNC: (no result) MG/DL
BUN SERPL-MCNC: 10.8 MG/DL (ref 7–18)
CALCIUM SERPL-MCNC: 8.3 MG/DL (ref 8.5–10.1)
CASTS URNS QL MICRO: 5 /UL (ref 0–3.1)
CHLORIDE SERPL-SCNC: 107 MMOL/L (ref 98–107)
CO2 SERPL-SCNC: 25 MMOL/L (ref 21–32)
COLOR UR: (no result)
CREAT SERPL-MCNC: 0.8 MG/DL (ref 0.55–1.3)
DEPRECATED RDW RBC AUTO: 15.5 % (ref 11.9–15.9)
EPITH CASTS URNS QL MICRO: 24 /UL (ref 0–25.1)
GLUCOSE SERPL-MCNC: 103 MG/DL (ref 74–106)
HCT VFR BLD CALC: 35.9 % (ref 35.4–49)
HGB BLD-MCNC: 12.6 GM/DL (ref 11.7–16.9)
KETONES UR QL STRIP: (no result)
LEUKOCYTE ESTERASE UR QL STRIP.AUTO: (no result)
MCH RBC QN AUTO: 35 PG (ref 25.7–33.7)
MCHC RBC AUTO-ENTMCNC: 35 G/DL (ref 32–35.9)
MCV RBC: 100 FL (ref 80–96)
NITRITE UR QL STRIP: POSITIVE
PH UR: 6 [PH] (ref 5–8)
PLATELET # BLD AUTO: 187 10^3/UL (ref 134–434)
PMV BLD: 9.1 FL (ref 7.5–11.1)
POTASSIUM SERPLBLD-SCNC: 3.4 MMOL/L (ref 3.5–5.1)
PROT SERPL-MCNC: 6.4 G/DL (ref 6.4–8.2)
PROT UR QL STRIP: (no result)
PROT UR QL STRIP: NEGATIVE
RBC # BLD AUTO: 21 /UL (ref 0–23.9)
RBC # BLD AUTO: 3.59 M/MM3 (ref 4–5.6)
SODIUM SERPL-SCNC: 139 MMOL/L (ref 136–145)
SP GR UR: 1.03 (ref 1.01–1.03)
UROBILINOGEN UR STRIP-MCNC: (no result) MG/DL (ref 0.2–1)
WBC # BLD AUTO: 5.7 K/MM3 (ref 4–10)
WBC # UR AUTO: 47 /UL (ref 0–25.8)

## 2023-05-14 RX ADMIN — PANTOPRAZOLE SODIUM SCH MG: 40 TABLET, DELAYED RELEASE ORAL at 10:15

## 2023-05-14 RX ADMIN — NICOTINE SCH: 14 PATCH, EXTENDED RELEASE TRANSDERMAL at 10:16

## 2023-05-14 RX ADMIN — TAMSULOSIN HYDROCHLORIDE SCH MG: 0.4 CAPSULE ORAL at 08:49

## 2023-05-14 RX ADMIN — LISINOPRIL SCH MG: 20 TABLET ORAL at 10:15

## 2023-05-14 RX ADMIN — Medication SCH MG: at 22:28

## 2023-05-14 RX ADMIN — Medication SCH TAB: at 10:16

## 2023-05-14 RX ADMIN — HYDROCHLOROTHIAZIDE SCH MG: 12.5 CAPSULE ORAL at 10:15

## 2023-05-14 RX ADMIN — ASPIRIN SCH MG: 81 TABLET, COATED ORAL at 10:15

## 2023-05-15 RX ADMIN — NICOTINE SCH: 14 PATCH, EXTENDED RELEASE TRANSDERMAL at 10:26

## 2023-05-15 RX ADMIN — Medication SCH MG: at 23:34

## 2023-05-15 RX ADMIN — ASPIRIN SCH MG: 81 TABLET, COATED ORAL at 10:26

## 2023-05-15 RX ADMIN — TAMSULOSIN HYDROCHLORIDE SCH MG: 0.4 CAPSULE ORAL at 07:36

## 2023-05-15 RX ADMIN — POTASSIUM CHLORIDE SCH MEQ: 20 SOLUTION ORAL at 23:34

## 2023-05-15 RX ADMIN — POTASSIUM CHLORIDE SCH MEQ: 20 SOLUTION ORAL at 10:26

## 2023-05-15 RX ADMIN — HYDROCHLOROTHIAZIDE SCH MG: 12.5 CAPSULE ORAL at 10:26

## 2023-05-15 RX ADMIN — PANTOPRAZOLE SODIUM SCH MG: 40 TABLET, DELAYED RELEASE ORAL at 10:26

## 2023-05-15 RX ADMIN — LISINOPRIL SCH MG: 20 TABLET ORAL at 10:26

## 2023-05-15 RX ADMIN — Medication SCH TAB: at 10:26

## 2023-05-16 RX ADMIN — POTASSIUM CHLORIDE SCH MEQ: 20 SOLUTION ORAL at 10:24

## 2023-05-16 RX ADMIN — TAMSULOSIN HYDROCHLORIDE SCH MG: 0.4 CAPSULE ORAL at 09:03

## 2023-05-16 RX ADMIN — NICOTINE SCH MG: 14 PATCH, EXTENDED RELEASE TRANSDERMAL at 10:25

## 2023-05-16 RX ADMIN — LISINOPRIL SCH MG: 20 TABLET ORAL at 10:24

## 2023-05-16 RX ADMIN — HYDROCHLOROTHIAZIDE SCH MG: 12.5 CAPSULE ORAL at 10:24

## 2023-05-16 RX ADMIN — POTASSIUM CHLORIDE SCH MEQ: 20 SOLUTION ORAL at 23:19

## 2023-05-16 RX ADMIN — ASPIRIN SCH MG: 81 TABLET, COATED ORAL at 10:24

## 2023-05-16 RX ADMIN — Medication SCH MG: at 23:20

## 2023-05-16 RX ADMIN — SULFAMETHOXAZOLE AND TRIMETHOPRIM SCH EACH: 800; 160 TABLET ORAL at 23:19

## 2023-05-16 RX ADMIN — Medication SCH TAB: at 10:24

## 2023-05-16 RX ADMIN — Medication SCH MG: at 23:19

## 2023-05-16 RX ADMIN — SULFAMETHOXAZOLE AND TRIMETHOPRIM SCH EACH: 800; 160 TABLET ORAL at 12:51

## 2023-05-16 RX ADMIN — PANTOPRAZOLE SODIUM SCH MG: 40 TABLET, DELAYED RELEASE ORAL at 10:24

## 2023-05-17 RX ADMIN — SULFAMETHOXAZOLE AND TRIMETHOPRIM SCH EACH: 800; 160 TABLET ORAL at 21:46

## 2023-05-17 RX ADMIN — HYDROCHLOROTHIAZIDE SCH MG: 12.5 CAPSULE ORAL at 09:09

## 2023-05-17 RX ADMIN — ASPIRIN SCH MG: 81 TABLET, COATED ORAL at 09:09

## 2023-05-17 RX ADMIN — TAMSULOSIN HYDROCHLORIDE SCH MG: 0.4 CAPSULE ORAL at 09:09

## 2023-05-17 RX ADMIN — Medication SCH MG: at 21:46

## 2023-05-17 RX ADMIN — SULFAMETHOXAZOLE AND TRIMETHOPRIM SCH EACH: 800; 160 TABLET ORAL at 09:09

## 2023-05-17 RX ADMIN — LISINOPRIL SCH MG: 20 TABLET ORAL at 09:09

## 2023-05-17 RX ADMIN — Medication SCH TAB: at 09:09

## 2023-05-17 RX ADMIN — PANTOPRAZOLE SODIUM SCH MG: 40 TABLET, DELAYED RELEASE ORAL at 09:09

## 2023-05-17 RX ADMIN — NICOTINE SCH MG: 14 PATCH, EXTENDED RELEASE TRANSDERMAL at 09:10

## 2023-05-17 RX ADMIN — Medication SCH MG: at 21:48

## 2023-05-18 VITALS
TEMPERATURE: 98.4 F | HEART RATE: 93 BPM | SYSTOLIC BLOOD PRESSURE: 133 MMHG | DIASTOLIC BLOOD PRESSURE: 87 MMHG | RESPIRATION RATE: 18 BRPM

## 2023-05-18 RX ADMIN — HYDROCHLOROTHIAZIDE SCH MG: 12.5 CAPSULE ORAL at 09:23

## 2023-05-18 RX ADMIN — NICOTINE SCH: 14 PATCH, EXTENDED RELEASE TRANSDERMAL at 09:24

## 2023-05-18 RX ADMIN — TAMSULOSIN HYDROCHLORIDE SCH MG: 0.4 CAPSULE ORAL at 09:23

## 2023-05-18 RX ADMIN — ASPIRIN SCH MG: 81 TABLET, COATED ORAL at 09:23

## 2023-05-18 RX ADMIN — SULFAMETHOXAZOLE AND TRIMETHOPRIM SCH EACH: 800; 160 TABLET ORAL at 09:24

## 2023-05-18 RX ADMIN — PANTOPRAZOLE SODIUM SCH MG: 40 TABLET, DELAYED RELEASE ORAL at 09:24

## 2023-05-18 RX ADMIN — LISINOPRIL SCH MG: 20 TABLET ORAL at 09:23

## 2023-05-18 RX ADMIN — Medication SCH TAB: at 09:22

## 2023-05-31 NOTE — ED ADULT NURSE NOTE - CCCP TRG CHIEF CMPLNT
[FreeTextEntry1] : 1. Pt is here for 3 months F/u for review of medical problems including \par 2. Completed COVID vaccine x 4 including bivalent \par  chest pain

## 2023-06-26 NOTE — ED ADULT NURSE NOTE - CHPI ED NUR CONTEXT2
Subjective    Not in acute distress, no chest pain, dyspnea improved, resting  Medications  Current Facility-Administered Medications   Medication Dose Route Frequency Provider Last Rate Last Admin   • diclofenac (VOLTAREN) 1 % gel 2 g  2 g Topical 4x Daily PRN Cheikh Keen MD       • [START ON 6/27/2023] potassium CHLORIDE (KLOR-CON) packet 40 mEq  40 mEq Oral BID WC Taylor Pantoja MD       • furosemide (LASIX INJECT) injection 60 mg  60 mg Intravenous BID Taylor Pantoja MD   60 mg at 06/26/23 1719   • melatonin tablet 3 mg  3 mg Oral Nightly PRN Cheikh Keen MD   3 mg at 06/24/23 2110   • potassium CHLORIDE 20 mEq/100mL IVPB premix  20 mEq Intravenous Once Taylor Pantoja MD       • sodium chloride 0.9 % flush bag 25 mL  25 mL Intravenous PRN Birgit Armstrong MD       • sodium chloride (PF) 0.9 % injection 2 mL  2 mL Intracatheter 2 times per day Birgit Armstrong MD   2 mL at 06/26/23 0912   • sodium chloride (NORMAL SALINE) 0.9 % bolus 500 mL  500 mL Intravenous PRN Birgit Armstrong MD       • sodium chloride 0.9 % flush bag 25 mL  25 mL Intravenous PRN Birgit Armstrong MD       • prochlorperazine (COMPAZINE) injection 5 mg  5 mg Intravenous Q4H PRN Birgit Armstrong MD       • acetaminophen (TYLENOL) tablet 650 mg  650 mg Oral Q4H PRN Birgit Armstrong MD       • polyethylene glycol (MIRALAX) packet 17 g  17 g Oral Daily PRN Birgit Armstrong MD       • docusate sodium-sennosides (SENOKOT S) 50-8.6 MG 2 tablet  2 tablet Oral Daily PRN Birgit Armstrong MD       • spironolactone (ALDACTONE) tablet 25 mg  25 mg Oral Daily Taylor Pantoja MD   25 mg at 06/26/23 0912   • [Held by provider] apixaBAN (ELIQUIS) tablet 2.5 mg  2.5 mg Oral 2 times per day Taylor Pantoja MD   2.5 mg at 06/25/23 0952   • senna (SENOKOT) 8.6 mg  1 tablet Oral BID PRN Cheikh Keen MD       • traMADol (ULTRAM) tablet 50 mg  50 mg Oral Q6H PRN Cheikh Keen MD   50 mg at 06/26/23 1114       I/O's    Intake/Output Summary  (Last 24 hours) at 2023 1808  Last data filed at 2023 1320  Gross per 24 hour   Intake --   Output 1425 ml   Net -1425 ml       Last Recorded Vitals    Vitals with min/max:      Vital Last Value 24 Hour Range   Temperature 96.1 °F (35.6 °C) (23 1036) Temp  Min: 95.9 °F (35.5 °C)  Max: 98.1 °F (36.7 °C)   Pulse 61 (23 1534) Pulse  Min: 61  Max: 91   Respiratory 16 (23 1036) Resp  Min: 15  Max: 17   Non-Invasive  Blood Pressure 135/77 (23 1534) BP  Min: 133/68  Max: 150/90   Pulse Oximetry 93 % (23 1554) SpO2  Min: 92 %  Max: 95 %   Arterial   Blood Pressure   No data recorded         Physical Exam  Physical Exam  Constitutional:       General: She is not in acute distress.     Appearance: She is ill-appearing.   Cardiovascular:      Rate and Rhythm: Normal rate and regular rhythm.      Heart sounds: Murmur heard.       Systolic murmur is present with a grade of 2/6.  Pulmonary:      Effort: No respiratory distress.      Breath sounds: Normal breath sounds.   Abdominal:      General: Bowel sounds are normal.      Palpations: Abdomen is soft.   Musculoskeletal:      Right lower le+ Pitting Edema present.      Left lower le+ Pitting Edema present.   Neurological:      General: No focal deficit present.      Mental Status: She is alert.          Imaging  No results found.    Labs     Recent Labs   Lab 23  0519 23  1820 23  0509 23  1013   WBC 7.3  --  8.2 7.7   HCT 41.7  --  40.4 41.2   HGB 13.8  --  13.1 13.5     --  240 240   SODIUM 137  --  132* 132*   POTASSIUM 3.4 3.9 4.4 4.7   CHLORIDE 101  --  100 101   CO2 -     CALCIUM 9.2  --  9.1 9.0   GLUCOSE 122*  --  125* 115*   BUN 27*  --  29* 26*   CREATININE 0.84  --  0.92 0.90   AST 31  --  33 32   GPT 23  --  19 19   ALKPT 97  --  96 97   BILIRUBIN 1.7*  --  1.7* 1.5*   ALBUMIN 2.8*  --  2.7* 2.7*   PHOS 2.6  --  2.9 2.6       Assessment and Plan:  Hallucinations:  Presently  awake, alert and oriented. W/U per hospitalist.     Acute on chronic diastolic CHF,-   Multiple admissions  Will keep her on the dry side.  Still has moderate effusion. Dr Sears consulted for thorocenthesis  -Diuresis, with IV lasix  60 mg for next 24 hours .  Strict I/o and daily weights. Possibly change to po in next 24 hours. Patient has lost >15 lbs since admission.  S/p thorocenthesis.       Atrial fibrillation and SSS s/p pacemaker - No acute issues. Cont Eliquis     Aortic stenosis;Moderate-severe. Given her age medical treatment. D/W patient and daughter.     Hypokalemia; received extra KCL, will increase daily dose to 40 meq bid.    Hypertension - stable. CPM    Pts daughter and son talking to palliative care.   unknown

## 2023-06-27 ENCOUNTER — EMERGENCY (EMERGENCY)
Facility: HOSPITAL | Age: 63
LOS: 1 days | Discharge: HOME | End: 2023-06-27
Attending: EMERGENCY MEDICINE | Admitting: EMERGENCY MEDICINE
Payer: MEDICARE

## 2023-06-27 VITALS
HEART RATE: 80 BPM | OXYGEN SATURATION: 94 % | SYSTOLIC BLOOD PRESSURE: 128 MMHG | DIASTOLIC BLOOD PRESSURE: 78 MMHG | RESPIRATION RATE: 18 BRPM

## 2023-06-27 VITALS
HEART RATE: 87 BPM | SYSTOLIC BLOOD PRESSURE: 127 MMHG | RESPIRATION RATE: 16 BRPM | WEIGHT: 187.39 LBS | TEMPERATURE: 97 F | HEIGHT: 71 IN | DIASTOLIC BLOOD PRESSURE: 84 MMHG | OXYGEN SATURATION: 94 %

## 2023-06-27 PROCEDURE — 99283 EMERGENCY DEPT VISIT LOW MDM: CPT

## 2023-06-27 NOTE — ED PROVIDER NOTE - PATIENT PORTAL LINK FT
You can access the FollowMyHealth Patient Portal offered by Health system by registering at the following website: [email or web address]. By joining [employer]’s FollowMyHealth portal, you will also be able to view your health information using other applications (apps) compatible with our system.

## 2023-06-27 NOTE — ED PROVIDER NOTE - CLINICAL SUMMARY MEDICAL DECISION MAKING FREE TEXT BOX
pt s/p mechanical fall today after heavy alcohol consumption, noted recent R wrist fx splinted at [facility] 2 wks ago, but splint got soiled and displaced, repeat xray performed and resplinted at bedside, CT with no acute fx noted, pt ambulatory with cane at bedside, splint care and f/u instructions provided, pain adequately controlled, strict return precautions discussed, pt verbalized understanding Fluconazole Counseling:  Patient counseled regarding adverse effects of fluconazole including but not limited to headache, diarrhea, nausea, upset stomach, liver function test abnormalities, taste disturbance, and stomach pain.  There is a rare possibility of liver failure that can occur when taking fluconazole.  The patient understands that monitoring of LFTs and kidney function test may be required, especially at baseline. The patient verbalized understanding of the proper use and possible adverse effects of fluconazole.  All of the patient's questions and concerns were addressed.

## 2023-06-27 NOTE — ED ADULT NURSE NOTE - NSFALLRISKINTERV_ED_ALL_ED
Assistance OOB with selected safe patient handling equipment if applicable/Assistance with ambulation/Communicate fall risk and risk factors to all staff, patient, and family/Monitor gait and stability/Monitor for mental status changes and reorient to person, place, and time, as needed/Provide patient with walking aids/Provide visual cue: yellow wristband, yellow gown, etc/Reinforce activity limits and safety measures with patient and family/Toileting schedule using arm’s reach rule for commode and bathroom/Use of alarms - bed, stretcher, chair and/or video monitoring/Call bell, personal items and telephone in reach/Instruct patient to call for assistance before getting out of bed/chair/stretcher/Non-slip footwear applied when patient is off stretcher/[place] to call system/Physically safe environment - no spills, clutter or unnecessary equipment/Purposeful Proactive Rounding/Room/bathroom lighting operational, light cord in reach

## 2023-06-27 NOTE — ED PROVIDER NOTE - CLINICAL SUMMARY MEDICAL DECISION MAKING FREE TEXT BOX
FS wnl, VSS, no e/o trauma, _+ etoh intox, clinically sober at time of discharge, given return precautions, ambulatory w/steady gait.

## 2023-06-27 NOTE — ED ADULT TRIAGE NOTE - CHIEF COMPLAINT QUOTE
Pt BIBA from street with AMS. Admitted to drinking. Responsive to voice and asking for detox from alcohol on arrival.

## 2023-06-27 NOTE — ED PROVIDER NOTE - OBJECTIVE STATEMENT
62 y/o M w/hx etoh abuse, DVT/PE on Eliquis w/prior lower GI bleed, BIBEMS 2/2 public intoxication. Admits to heavy ETOH use today, no other complaints.  Placed in stretcher and quickly falls asleep.  Unable to cooperate with remainder of history.

## 2023-06-27 NOTE — ED ADULT NURSE NOTE - NSICDXPASTMEDICALHX_GEN_ALL_CORE_FT
PAST MEDICAL HISTORY:  Acute pancreatitis Pancreatitis    Alcohol withdrawal     BPH (benign prostatic hyperplasia)     Cerebral artery occlusion with cerebral infarction Stroke    COPD (chronic obstructive pulmonary disease)     Crohn's disease     Diverticulosis of large intestine Diverticulosis    ETOH abuse     History of pulmonary embolism     HLD (hyperlipidemia)     HTN (hypertension)     Hypertension     MI (myocardial infarction)     Pulmonary embolism      Refuses to speak

## 2023-06-29 ENCOUNTER — HOSPITAL ENCOUNTER (INPATIENT)
Dept: HOSPITAL 74 - YASAS | Age: 63
LOS: 4 days | Discharge: HOME | DRG: 897 | End: 2023-07-03
Attending: SURGERY | Admitting: ALLERGY & IMMUNOLOGY
Payer: COMMERCIAL

## 2023-06-29 VITALS — BODY MASS INDEX: 30.7 KG/M2

## 2023-06-29 DIAGNOSIS — Z79.01: ICD-10-CM

## 2023-06-29 DIAGNOSIS — Z86.711 PERSONAL HISTORY OF PULMONARY EMBOLISM: ICD-10-CM

## 2023-06-29 DIAGNOSIS — E78.5: ICD-10-CM

## 2023-06-29 DIAGNOSIS — Z86.711: ICD-10-CM

## 2023-06-29 DIAGNOSIS — Z87.19 PERSONAL HISTORY OF OTHER DISEASES OF THE DIGESTIVE SYSTEM: ICD-10-CM

## 2023-06-29 DIAGNOSIS — J44.9: ICD-10-CM

## 2023-06-29 DIAGNOSIS — Z88.8: ICD-10-CM

## 2023-06-29 DIAGNOSIS — F14.20: ICD-10-CM

## 2023-06-29 DIAGNOSIS — Z88.5 ALLERGY STATUS TO NARCOTIC AGENT: ICD-10-CM

## 2023-06-29 DIAGNOSIS — F12.20: ICD-10-CM

## 2023-06-29 DIAGNOSIS — F10.230: Primary | ICD-10-CM

## 2023-06-29 DIAGNOSIS — I10: ICD-10-CM

## 2023-06-29 DIAGNOSIS — F10.920 ALCOHOL USE, UNSPECIFIED WITH INTOXICATION, UNCOMPLICATED: ICD-10-CM

## 2023-06-29 DIAGNOSIS — Z86.73: ICD-10-CM

## 2023-06-29 DIAGNOSIS — Z86.19: ICD-10-CM

## 2023-06-29 DIAGNOSIS — N40.0: ICD-10-CM

## 2023-06-29 DIAGNOSIS — E87.6: ICD-10-CM

## 2023-06-29 DIAGNOSIS — Z86.718 PERSONAL HISTORY OF OTHER VENOUS THROMBOSIS AND EMBOLISM: ICD-10-CM

## 2023-06-29 DIAGNOSIS — Z79.01 LONG TERM (CURRENT) USE OF ANTICOAGULANTS: ICD-10-CM

## 2023-06-29 DIAGNOSIS — R79.89: ICD-10-CM

## 2023-06-29 DIAGNOSIS — F10.120 ALCOHOL ABUSE WITH INTOXICATION, UNCOMPLICATED: ICD-10-CM

## 2023-06-29 LAB
ALBUMIN SERPL-MCNC: 3.4 G/DL (ref 3.4–5)
ALP SERPL-CCNC: 97 U/L (ref 45–117)
ALT SERPL-CCNC: 24 U/L (ref 13–61)
ANION GAP SERPL CALC-SCNC: 9 MMOL/L (ref 8–16)
AST SERPL-CCNC: 29 U/L (ref 15–37)
BILIRUB SERPL-MCNC: 0.9 MG/DL (ref 0.2–1)
BUN SERPL-MCNC: 7.9 MG/DL (ref 7–18)
CALCIUM SERPL-MCNC: 8.5 MG/DL (ref 8.5–10.1)
CHLORIDE SERPL-SCNC: 106 MMOL/L (ref 98–107)
CO2 SERPL-SCNC: 28 MMOL/L (ref 21–32)
CREAT SERPL-MCNC: 0.8 MG/DL (ref 0.55–1.3)
DEPRECATED RDW RBC AUTO: 14.2 % (ref 11.9–15.9)
GLUCOSE SERPL-MCNC: 101 MG/DL (ref 74–106)
HCT VFR BLD CALC: 37.7 % (ref 35.4–49)
HGB BLD-MCNC: 13.1 GM/DL (ref 11.7–16.9)
MCH RBC QN AUTO: 35.1 PG (ref 25.7–33.7)
MCHC RBC AUTO-ENTMCNC: 34.7 G/DL (ref 32–35.9)
MCV RBC: 100.9 FL (ref 80–96)
PLATELET # BLD AUTO: 241 10^3/UL (ref 134–434)
PMV BLD: 9.3 FL (ref 7.5–11.1)
POTASSIUM SERPLBLD-SCNC: 3.4 MMOL/L (ref 3.5–5.1)
PROT SERPL-MCNC: 7.4 G/DL (ref 6.4–8.2)
RBC # BLD AUTO: 3.73 M/MM3 (ref 4–5.6)
SODIUM SERPL-SCNC: 142 MMOL/L (ref 136–145)
WBC # BLD AUTO: 6 K/MM3 (ref 4–10)

## 2023-06-29 PROCEDURE — HZ2ZZZZ DETOXIFICATION SERVICES FOR SUBSTANCE ABUSE TREATMENT: ICD-10-PCS | Performed by: SURGERY

## 2023-06-29 RX ADMIN — LISINOPRIL SCH MG: 20 TABLET ORAL at 12:22

## 2023-06-29 RX ADMIN — FLUTICASONE PROPIONATE SCH SPRAY: 50 SPRAY, METERED NASAL at 13:18

## 2023-06-29 RX ADMIN — PANTOPRAZOLE SODIUM SCH MG: 40 TABLET, DELAYED RELEASE ORAL at 13:18

## 2023-06-29 RX ADMIN — POTASSIUM CHLORIDE SCH MEQ: 20 SOLUTION ORAL at 22:24

## 2023-06-29 RX ADMIN — LORATADINE SCH MG: 10 TABLET ORAL at 13:18

## 2023-06-29 RX ADMIN — Medication SCH MG: at 22:24

## 2023-06-29 RX ADMIN — HYDROCHLOROTHIAZIDE SCH MG: 12.5 CAPSULE ORAL at 12:22

## 2023-06-29 RX ADMIN — FLUTICASONE PROPIONATE SCH SPRAY: 50 SPRAY, METERED NASAL at 22:25

## 2023-06-29 RX ADMIN — APIXABAN SCH MG: 2.5 TABLET, FILM COATED ORAL at 22:25

## 2023-06-29 RX ADMIN — TAMSULOSIN HYDROCHLORIDE SCH MG: 0.4 CAPSULE ORAL at 13:18

## 2023-06-29 RX ADMIN — APIXABAN SCH MG: 2.5 TABLET, FILM COATED ORAL at 13:17

## 2023-06-30 RX ADMIN — METOPROLOL TARTRATE SCH MG: 25 TABLET, FILM COATED ORAL at 22:33

## 2023-06-30 RX ADMIN — FLUTICASONE PROPIONATE SCH: 50 SPRAY, METERED NASAL at 22:33

## 2023-06-30 RX ADMIN — Medication SCH MG: at 22:33

## 2023-06-30 RX ADMIN — TAMSULOSIN HYDROCHLORIDE SCH MG: 0.4 CAPSULE ORAL at 08:48

## 2023-06-30 RX ADMIN — POTASSIUM CHLORIDE SCH MEQ: 20 SOLUTION ORAL at 10:25

## 2023-06-30 RX ADMIN — LACTULOSE SCH GM: 20 SOLUTION ORAL at 17:33

## 2023-06-30 RX ADMIN — APIXABAN SCH MG: 5 TABLET, FILM COATED ORAL at 22:33

## 2023-06-30 RX ADMIN — PANTOPRAZOLE SODIUM SCH MG: 40 TABLET, DELAYED RELEASE ORAL at 10:24

## 2023-06-30 RX ADMIN — LORATADINE SCH MG: 10 TABLET ORAL at 10:24

## 2023-06-30 RX ADMIN — HYDROCHLOROTHIAZIDE SCH MG: 12.5 CAPSULE ORAL at 10:24

## 2023-06-30 RX ADMIN — APIXABAN SCH MG: 5 TABLET, FILM COATED ORAL at 10:24

## 2023-06-30 RX ADMIN — Medication SCH TAB: at 10:24

## 2023-06-30 RX ADMIN — LISINOPRIL SCH MG: 20 TABLET ORAL at 10:24

## 2023-06-30 RX ADMIN — LACTULOSE SCH: 20 SOLUTION ORAL at 22:32

## 2023-06-30 RX ADMIN — FLUTICASONE PROPIONATE SCH SPRAY: 50 SPRAY, METERED NASAL at 10:25

## 2023-06-30 RX ADMIN — POTASSIUM CHLORIDE SCH MEQ: 20 SOLUTION ORAL at 22:32

## 2023-07-01 RX ADMIN — PANTOPRAZOLE SODIUM SCH MG: 40 TABLET, DELAYED RELEASE ORAL at 10:31

## 2023-07-01 RX ADMIN — LORATADINE SCH MG: 10 TABLET ORAL at 10:31

## 2023-07-01 RX ADMIN — TAMSULOSIN HYDROCHLORIDE SCH MG: 0.4 CAPSULE ORAL at 10:31

## 2023-07-01 RX ADMIN — APIXABAN SCH MG: 5 TABLET, FILM COATED ORAL at 10:31

## 2023-07-01 RX ADMIN — Medication SCH MG: at 22:33

## 2023-07-01 RX ADMIN — LACTULOSE SCH GM: 20 SOLUTION ORAL at 17:45

## 2023-07-01 RX ADMIN — LISINOPRIL SCH MG: 20 TABLET ORAL at 10:31

## 2023-07-01 RX ADMIN — LACTULOSE SCH: 20 SOLUTION ORAL at 13:32

## 2023-07-01 RX ADMIN — LACTULOSE SCH: 20 SOLUTION ORAL at 22:32

## 2023-07-01 RX ADMIN — PANTOPRAZOLE SODIUM SCH: 40 TABLET, DELAYED RELEASE ORAL at 10:36

## 2023-07-01 RX ADMIN — FLUTICASONE PROPIONATE SCH SPRAY: 50 SPRAY, METERED NASAL at 22:33

## 2023-07-01 RX ADMIN — HYDROCHLOROTHIAZIDE SCH MG: 12.5 CAPSULE ORAL at 10:31

## 2023-07-01 RX ADMIN — APIXABAN SCH MG: 5 TABLET, FILM COATED ORAL at 22:32

## 2023-07-01 RX ADMIN — METOPROLOL TARTRATE SCH MG: 25 TABLET, FILM COATED ORAL at 10:31

## 2023-07-01 RX ADMIN — METOPROLOL TARTRATE SCH MG: 25 TABLET, FILM COATED ORAL at 22:32

## 2023-07-01 RX ADMIN — TAMSULOSIN HYDROCHLORIDE SCH MG: 0.4 CAPSULE ORAL at 09:14

## 2023-07-01 RX ADMIN — Medication SCH TAB: at 10:32

## 2023-07-01 RX ADMIN — POTASSIUM CHLORIDE SCH MEQ: 20 SOLUTION ORAL at 10:31

## 2023-07-01 RX ADMIN — FLUTICASONE PROPIONATE SCH SPRAY: 50 SPRAY, METERED NASAL at 10:30

## 2023-07-01 RX ADMIN — LACTULOSE SCH: 20 SOLUTION ORAL at 10:37

## 2023-07-02 RX ADMIN — LISINOPRIL SCH MG: 20 TABLET ORAL at 09:13

## 2023-07-02 RX ADMIN — METOPROLOL TARTRATE SCH MG: 25 TABLET, FILM COATED ORAL at 22:08

## 2023-07-02 RX ADMIN — FLUTICASONE PROPIONATE SCH SPRAY: 50 SPRAY, METERED NASAL at 22:07

## 2023-07-02 RX ADMIN — LACTULOSE SCH: 20 SOLUTION ORAL at 17:51

## 2023-07-02 RX ADMIN — TAMSULOSIN HYDROCHLORIDE SCH MG: 0.4 CAPSULE ORAL at 09:06

## 2023-07-02 RX ADMIN — LACTULOSE SCH: 20 SOLUTION ORAL at 09:12

## 2023-07-02 RX ADMIN — APIXABAN SCH MG: 5 TABLET, FILM COATED ORAL at 09:13

## 2023-07-02 RX ADMIN — FLUTICASONE PROPIONATE SCH SPRAY: 50 SPRAY, METERED NASAL at 09:14

## 2023-07-02 RX ADMIN — Medication SCH TAB: at 09:15

## 2023-07-02 RX ADMIN — Medication SCH MG: at 22:08

## 2023-07-02 RX ADMIN — TAMSULOSIN HYDROCHLORIDE SCH: 0.4 CAPSULE ORAL at 11:06

## 2023-07-02 RX ADMIN — LORATADINE SCH MG: 10 TABLET ORAL at 09:13

## 2023-07-02 RX ADMIN — LACTULOSE SCH: 20 SOLUTION ORAL at 22:07

## 2023-07-02 RX ADMIN — METOPROLOL TARTRATE SCH MG: 25 TABLET, FILM COATED ORAL at 09:13

## 2023-07-02 RX ADMIN — APIXABAN SCH MG: 5 TABLET, FILM COATED ORAL at 22:08

## 2023-07-02 RX ADMIN — HYDROCHLOROTHIAZIDE SCH MG: 12.5 CAPSULE ORAL at 09:13

## 2023-07-02 RX ADMIN — PANTOPRAZOLE SODIUM SCH MG: 40 TABLET, DELAYED RELEASE ORAL at 09:13

## 2023-07-02 RX ADMIN — LACTULOSE SCH: 20 SOLUTION ORAL at 14:08

## 2023-07-03 VITALS
DIASTOLIC BLOOD PRESSURE: 77 MMHG | SYSTOLIC BLOOD PRESSURE: 116 MMHG | RESPIRATION RATE: 17 BRPM | TEMPERATURE: 97.5 F | HEART RATE: 87 BPM

## 2023-07-03 LAB
ANION GAP SERPL CALC-SCNC: 7 MMOL/L (ref 8–16)
BUN SERPL-MCNC: 14.5 MG/DL (ref 7–18)
CALCIUM SERPL-MCNC: 8.4 MG/DL (ref 8.5–10.1)
CHLORIDE SERPL-SCNC: 108 MMOL/L (ref 98–107)
CO2 SERPL-SCNC: 27 MMOL/L (ref 21–32)
CREAT SERPL-MCNC: 0.8 MG/DL (ref 0.55–1.3)
GLUCOSE SERPL-MCNC: 99 MG/DL (ref 74–106)
POTASSIUM SERPLBLD-SCNC: 3.6 MMOL/L (ref 3.5–5.1)
SODIUM SERPL-SCNC: 142 MMOL/L (ref 136–145)

## 2023-07-03 RX ADMIN — LORATADINE SCH MG: 10 TABLET ORAL at 10:17

## 2023-07-03 RX ADMIN — APIXABAN SCH MG: 5 TABLET, FILM COATED ORAL at 10:17

## 2023-07-03 RX ADMIN — LISINOPRIL SCH MG: 20 TABLET ORAL at 10:17

## 2023-07-03 RX ADMIN — PANTOPRAZOLE SODIUM SCH MG: 40 TABLET, DELAYED RELEASE ORAL at 10:17

## 2023-07-03 RX ADMIN — METOPROLOL TARTRATE SCH MG: 25 TABLET, FILM COATED ORAL at 10:17

## 2023-07-03 RX ADMIN — Medication SCH TAB: at 10:17

## 2023-07-03 RX ADMIN — HYDROCHLOROTHIAZIDE SCH MG: 12.5 CAPSULE ORAL at 10:17

## 2023-07-03 RX ADMIN — TAMSULOSIN HYDROCHLORIDE SCH: 0.4 CAPSULE ORAL at 09:27

## 2023-07-03 RX ADMIN — LACTULOSE SCH: 20 SOLUTION ORAL at 10:18

## 2023-07-03 RX ADMIN — FLUTICASONE PROPIONATE SCH SPRAY: 50 SPRAY, METERED NASAL at 10:17

## 2023-07-20 NOTE — ED PROVIDER NOTE - CLINICAL SUMMARY MEDICAL DECISION MAKING FREE TEXT BOX
show
Neuro intact, no fx on imaging, pain improved in ED, BP improved with home meds, ambulatory, will d/c home with supportive care measures for MSK back pain and recommend spine f/u. given return precautions.

## 2023-10-23 NOTE — ED ADULT NURSE NOTE - JUGULAR VENOUS DISTENTION
absent Advancement Flap (Single) Text: Given the location of the defect and the proximity to free margins a single advancement flap was deemed most appropriate.  Using a sterile surgical marker, an appropriate advancement flap was drawn incorporating the defect and placing the expected incisions within the relaxed skin tension lines where possible.    The area thus outlined was incised deep to adipose tissue with a #15c scalpel blade.  The skin margins were undermined to an appropriate distance in all directions utilizing iris scissors.  Following this, the designed flap was advanced and carried over into the primary defect and sutured into place.

## 2023-12-06 NOTE — ED PROVIDER NOTE - NS ED MD DISPO DISCHARGE CCDA
12/06/2023  Deni Frye    Current provider:  Yuri Washington MD    Device interrogation:      12/6/2023     9:00 AM 12/6/2023     8:00 AM 12/6/2023     7:00 AM 12/6/2023     6:00 AM 12/6/2023     5:00 AM 12/6/2023     4:00 AM 12/6/2023     3:00 AM   TXP LVAD INTERROGATIONS   Type HeartMate3 HeartMate3 HeartMate3 HeartMate3 HeartMate3 HeartMate3 HeartMate3   Flow 3.8 3.7 3.5 3.6 3.4 3.4 3.5   Speed 4800 4800 4800 4800 4800 4800 4800   PI 4.2 4.5 5.3 4.8 6.2 5.7 5.7   Power ([place]) 3.1 3.1 3.2 3.1 3.1 3.1 3.1   LSL 4400 4400 4400 4400 4400 4400 4400   Pulsatility Pulse Pulse Pulse Pulse Pulse Pulse Pulse          Rounded on [facility] to ensure all mechanical assist device settings (IABP or VAD) were appropriate and all parameters were within limits.  I was able to ensure all back up equipment was present, the staff had no issues, and the Perfusion Department daily rounding was complete.      For implantable VADs: Interrogation of Ventricular assist device was performed with analysis of device parameters and review of device function. I have personally reviewed the interrogation findings and agree with findings as stated.     In emergency, the nursing units have been notified to contact the perfusion department either by:  Calling [id number] from 630am to 4pm Mon thru Fri, utilizing the On-Call Finder functionality of Epic and searching for Perfusion, or by contacting the hospital  from 4pm to 630am and on weekends and asking to speak with the perfusionist on call.    10:29 AM      Patient/Caregiver provided printed discharge information.

## 2024-01-11 NOTE — ED PROVIDER NOTE - OBJECTIVE STATEMENT
1-11-24 +rsv, discussed supportive measures and return precautions.
60 y/o male who says he has a dependence on multiple drugs and alcohol states he has been on "henao" past 4 days. He states he used K2, crack cocaine and drank alcohol (last drink 20 minute prior to arrival). BIB EMS because friend said he had seizure like activity. Patient states he has no seizure history. Patient did urinate on himself but states he does this often when he is severely intoxicated. Patient has no other complaints, and wants food. He is high from K2. Denies head pain, neck pain focal weakness and numbness.

## 2024-02-27 NOTE — ED ADULT TRIAGE NOTE - NS ED TRIAGE AVPU SCALE
Alert-The patient is alert, awake and responds to voice. The patient is oriented to time, place, and person. The triage nurse is able to obtain subjective information. [facility]  [address] [place] [address]  Phone: [phone]  Fax:   Follow Up Time: 1-3 Days    [facility]hthal[facility]  Ophthalmology  [address] [place] [id number]  Phone: [phone]  Fax:   Follow Up Time: 4-6 Days

## 2024-04-16 NOTE — ED PROVIDER NOTE - NS ED MD EM SELECTION
Gino Casillas is a 46 year old male presenting for   Chief Complaint   Patient presents with    Office Visit    Physical     Denies Latex allergy or sensitivity.    Medication verified, no changes  Refills needed today: No    Patient would like communication of their results via:    [employer]    Cell Phone:   Telephone Information:   Mobile [phone]     Okay to leave a message containing results? Yes    Chief Complaint   Patient presents with    Office Visit    Physical          Health Maintenance       Hepatitis B Vaccine (1 of 3 - 19+ 3-dose series)  Never done    COVID-19 Vaccine (5 - 2023-24 season)  Order placed this encounter    Depression Screening (Yearly)  Due soon on 9/26/2024           Following review of the above:  Pended orders    Note: Refer to final orders and clinician documentation.                    [id number] Detailed

## 2024-05-26 NOTE — ED ADULT NURSE NOTE - NS ED NURSE DC INFO COMPLEXITY
26-May-2024 13:30 Straightforward: Basic instructions, no meds, no home treatment/Verbalized Understanding

## 2024-07-25 NOTE — ED ADULT NURSE NOTE - HOW OFTEN DO YOU HAVE SIX OR MORE DRINKS ON ONE OCCASION?
Pt to [facility] ambulatory with left foot pain.  Pain started yesterday.  Pt has applied ice/elevation to left foot prior to arrival here today.     Four or more times a week

## 2024-08-03 NOTE — ED CDU PROVIDER INITIAL DAY NOTE - ATTESTATION, MLM
I have reviewed and confirmed nurses' notes for patient's medications, allergies, medical history, and surgical history. yes

## 2024-10-29 NOTE — ED ADULT NURSE NOTE - FALLEN IN THE PAST
78 y/o male with PMHx of HTN, HLD, BPH, CAD (?s/p coronary stent per patient), severe PAD s/p fem-fem bypass per patient (20 years ago) and an "abdominal stent for circulation problems".  He states he was having severe left leg pain and chest pain, prompting him to call an ambulance.  He was taken to [facility] for evaluation and cardiac cath revealed CAD.  Transferred here from  for surgical evaluation.    Pulm consulted for lung nodule and to rule out metastasis. Was informed by vascular provider that lung nodule was 2.8cm x 1.7cm located in the RUL by outside radiologist read.    #Pulmonary nodule    PET CT showed highly avid 2.7cm RUL nodule suspicious for malignancy, and a 1.2cm mildly FDG avid KULDEEP nodule which has a broad differential.   There was no evidence of distant/extensive metastatic disease.  High suspicion for lung cancer in RUL, however would need further workup for staging/diagnosis which does not require inpatient stay.     Recs:  - ongoing discussions with patient regarding next steps, does not require inpatient workup  - will need pulmonary follow up for possible biopsy and staging, vs CT surg for wedge  - there is no pulmonary contraindication for vascular surgery     Patient was seen, evaluated and discussed with PCCM attending.    Tom Kimble MD  PCCM Fellow, PGY-5    Please page pulmonary if there are any questions with above recommendations.  no

## 2024-11-03 NOTE — ED PROVIDER NOTE - NS_ACPWITHSCRIBE_ED_ALL_ED
"I personally performed the services described in the documentation  recorded by the scribe in my presence, and it accurately and completely records my words and action.”
03-Nov-2024 02:12:26

## 2024-11-14 NOTE — ED ADULT NURSE NOTE - EXTENSIONS OF SELF_ADULT
----- Message from Jeff Drummond sent at 11/13/2024  9:21 AM EST -----  Echocardiogram was excellent with normal heart function.  Only very mild aortic stenosis with no other defects.  No further follow-up necessary  
Pt notified of message   
None

## 2025-02-19 NOTE — ED ADULT NURSE NOTE - NSFALLRSKUNASSIST_ED_ALL_ED
Pt discharged with written instructions.  Pt verbalized understanding. Pt advise to go to ED for further evaluation.     no